# Patient Record
Sex: FEMALE | Race: WHITE | NOT HISPANIC OR LATINO | Employment: OTHER | URBAN - METROPOLITAN AREA
[De-identification: names, ages, dates, MRNs, and addresses within clinical notes are randomized per-mention and may not be internally consistent; named-entity substitution may affect disease eponyms.]

---

## 2017-05-11 ENCOUNTER — TRANSFERRED RECORDS (OUTPATIENT)
Dept: HEALTH INFORMATION MANAGEMENT | Facility: CLINIC | Age: 82
End: 2017-05-11

## 2019-01-18 ENCOUNTER — TRANSFERRED RECORDS (OUTPATIENT)
Dept: HEALTH INFORMATION MANAGEMENT | Facility: CLINIC | Age: 84
End: 2019-01-18

## 2019-02-01 ENCOUNTER — TRANSFERRED RECORDS (OUTPATIENT)
Dept: HEALTH INFORMATION MANAGEMENT | Facility: CLINIC | Age: 84
End: 2019-02-01

## 2019-02-01 LAB
ALT SERPL-CCNC: 20 U/L
AST SERPL-CCNC: 35 U/L (ref 14–36)
CREAT SERPL-MCNC: 1.08 MG/DL (ref 0.52–1.04)
GFR SERPL CREATININE-BSD FRML MDRD: 49 ML/MIN/1.73M2
GLUCOSE SERPL-MCNC: 108 MG/DL (ref 75–100)
POTASSIUM SERPL-SCNC: 4 MMOL/L (ref 3.5–5.1)

## 2019-03-07 LAB — PHQ9 SCORE: 2

## 2019-03-27 LAB
CREAT SERPL-MCNC: 1.07 MG/DL (ref 0.52–1.04)
CREAT SERPL-MCNC: 1.2 MG/DL (ref 0.52–1.04)
GFR SERPL CREATININE-BSD FRML MDRD: 43 ML/MIN/1.73M2
GFR SERPL CREATININE-BSD FRML MDRD: 49 ML/MIN/1.73M2
GLUCOSE SERPL-MCNC: 82 MG/DL (ref 75–100)
GLUCOSE SERPL-MCNC: 96 MG/DL (ref 75–100)
PHQ9 SCORE: 4
POTASSIUM SERPL-SCNC: 3.8 MMOL/L (ref 3.5–5.1)
POTASSIUM SERPL-SCNC: 4.4 MMOL/L (ref 3.5–5.1)

## 2019-04-30 ENCOUNTER — TRANSFERRED RECORDS (OUTPATIENT)
Dept: HEALTH INFORMATION MANAGEMENT | Facility: CLINIC | Age: 84
End: 2019-04-30

## 2019-04-30 LAB
CREAT SERPL-MCNC: 1.07 MG/DL (ref 0.52–1.04)
GFR SERPL CREATININE-BSD FRML MDRD: 49 ML/MIN/1.73M2
GLUCOSE SERPL-MCNC: 82 MG/DL (ref 75–100)
PHQ9 SCORE: 2
POTASSIUM SERPL-SCNC: 3.8 MMOL/L (ref 3.5–5.1)

## 2019-06-12 LAB — PHQ9 SCORE: 11

## 2019-06-26 ENCOUNTER — TRANSFERRED RECORDS (OUTPATIENT)
Dept: HEALTH INFORMATION MANAGEMENT | Facility: CLINIC | Age: 84
End: 2019-06-26

## 2019-06-26 LAB — PHQ9 SCORE: 3

## 2019-12-16 ENCOUNTER — APPOINTMENT (OUTPATIENT)
Dept: CT IMAGING | Facility: CLINIC | Age: 84
End: 2019-12-16
Attending: EMERGENCY MEDICINE
Payer: MEDICARE

## 2019-12-16 ENCOUNTER — HOSPITAL ENCOUNTER (EMERGENCY)
Facility: CLINIC | Age: 84
Discharge: HOME | End: 2019-12-16
Attending: EMERGENCY MEDICINE | Admitting: EMERGENCY MEDICINE
Payer: MEDICARE

## 2019-12-16 VITALS
TEMPERATURE: 98.8 F | SYSTOLIC BLOOD PRESSURE: 138 MMHG | WEIGHT: 124.31 LBS | OXYGEN SATURATION: 97 % | HEIGHT: 65 IN | HEART RATE: 70 BPM | BODY MASS INDEX: 20.71 KG/M2 | RESPIRATION RATE: 10 BRPM | DIASTOLIC BLOOD PRESSURE: 67 MMHG

## 2019-12-16 DIAGNOSIS — E86.0 DEHYDRATION: ICD-10-CM

## 2019-12-16 DIAGNOSIS — R55 SYNCOPE AND COLLAPSE: ICD-10-CM

## 2019-12-16 DIAGNOSIS — K57.92 ACUTE DIVERTICULITIS: ICD-10-CM

## 2019-12-16 DIAGNOSIS — E87.1 HYPONATREMIA: ICD-10-CM

## 2019-12-16 LAB
ALBUMIN SERPL-MCNC: 4.1 G/DL (ref 3.4–5)
ALBUMIN UR-MCNC: NEGATIVE MG/DL
ALP SERPL-CCNC: 70 U/L (ref 40–150)
ALT SERPL W P-5'-P-CCNC: 25 U/L (ref 0–50)
ANION GAP SERPL CALCULATED.3IONS-SCNC: 6 MMOL/L (ref 3–14)
APPEARANCE UR: ABNORMAL
AST SERPL W P-5'-P-CCNC: 20 U/L (ref 0–45)
BACTERIA #/AREA URNS HPF: ABNORMAL /HPF
BASOPHILS # BLD AUTO: 0 10E9/L (ref 0–0.2)
BASOPHILS NFR BLD AUTO: 0.1 %
BILIRUB SERPL-MCNC: 1.7 MG/DL (ref 0.2–1.3)
BILIRUB UR QL STRIP: NEGATIVE
BUN SERPL-MCNC: 24 MG/DL (ref 7–30)
CALCIUM SERPL-MCNC: 9.3 MG/DL (ref 8.5–10.1)
CHLORIDE SERPL-SCNC: 89 MMOL/L (ref 94–109)
CO2 SERPL-SCNC: 27 MMOL/L (ref 20–32)
COLOR UR AUTO: YELLOW
CREAT SERPL-MCNC: 1.21 MG/DL (ref 0.52–1.04)
CRP SERPL-MCNC: 15.7 MG/L (ref 0–8)
DIFFERENTIAL METHOD BLD: ABNORMAL
EOSINOPHIL NFR BLD AUTO: 0.2 %
ERYTHROCYTE [DISTWIDTH] IN BLOOD BY AUTOMATED COUNT: 11.8 % (ref 10–15)
GFR SERPL CREATININE-BSD FRML MDRD: 41 ML/MIN/{1.73_M2}
GLUCOSE SERPL-MCNC: 115 MG/DL (ref 70–99)
GLUCOSE UR STRIP-MCNC: NEGATIVE MG/DL
GRAN CASTS #/AREA URNS LPF: 3 /LPF
HCT VFR BLD AUTO: 41.2 % (ref 35–47)
HGB BLD-MCNC: 14.5 G/DL (ref 11.7–15.7)
HGB UR QL STRIP: NEGATIVE
HYALINE CASTS #/AREA URNS LPF: 46 /LPF (ref 0–2)
IMM GRANULOCYTES # BLD: 0.1 10E9/L (ref 0–0.4)
IMM GRANULOCYTES NFR BLD: 0.5 %
KETONES UR STRIP-MCNC: NEGATIVE MG/DL
LEUKOCYTE ESTERASE UR QL STRIP: ABNORMAL
LIPASE SERPL-CCNC: 157 U/L (ref 73–393)
LYMPHOCYTES # BLD AUTO: 0.8 10E9/L (ref 0.8–5.3)
LYMPHOCYTES NFR BLD AUTO: 6.3 %
MCH RBC QN AUTO: 31.7 PG (ref 26.5–33)
MCHC RBC AUTO-ENTMCNC: 35.2 G/DL (ref 31.5–36.5)
MCV RBC AUTO: 90 FL (ref 78–100)
MONOCYTES # BLD AUTO: 1 10E9/L (ref 0–1.3)
MONOCYTES NFR BLD AUTO: 7.9 %
MUCOUS THREADS #/AREA URNS LPF: PRESENT /LPF
NEUTROPHILS # BLD AUTO: 10.9 10E9/L (ref 1.6–8.3)
NEUTROPHILS NFR BLD AUTO: 85 %
NITRATE UR QL: NEGATIVE
NRBC # BLD AUTO: 0 10*3/UL
NRBC BLD AUTO-RTO: 0 /100
PH UR STRIP: 7 PH (ref 5–7)
PLATELET # BLD AUTO: 238 10E9/L (ref 150–450)
POTASSIUM SERPL-SCNC: 4.4 MMOL/L (ref 3.4–5.3)
PROT SERPL-MCNC: 7.9 G/DL (ref 6.8–8.8)
RBC # BLD AUTO: 4.58 10E12/L (ref 3.8–5.2)
RBC #/AREA URNS AUTO: <1 /HPF (ref 0–2)
SODIUM SERPL-SCNC: 122 MMOL/L (ref 133–144)
SOURCE: ABNORMAL
SP GR UR STRIP: 1.01 (ref 1–1.03)
SQUAMOUS #/AREA URNS AUTO: 3 /HPF (ref 0–1)
TROPONIN I SERPL-MCNC: <0.015 UG/L (ref 0–0.04)
UROBILINOGEN UR STRIP-MCNC: 0 MG/DL (ref 0–2)
WAXY CASTS #/AREA URNS LPF: 1 /LPF
WBC # BLD AUTO: 12.8 10E9/L (ref 4–11)
WBC #/AREA URNS AUTO: 5 /HPF (ref 0–5)

## 2019-12-16 PROCEDURE — 25000128 H RX IP 250 OP 636: Performed by: RADIOLOGY

## 2019-12-16 PROCEDURE — 25800030 ZZH RX IP 258 OP 636: Performed by: EMERGENCY MEDICINE

## 2019-12-16 PROCEDURE — 87088 URINE BACTERIA CULTURE: CPT | Performed by: EMERGENCY MEDICINE

## 2019-12-16 PROCEDURE — 83690 ASSAY OF LIPASE: CPT | Performed by: EMERGENCY MEDICINE

## 2019-12-16 PROCEDURE — 99285 EMERGENCY DEPT VISIT HI MDM: CPT | Mod: 25 | Performed by: EMERGENCY MEDICINE

## 2019-12-16 PROCEDURE — 85025 COMPLETE CBC W/AUTO DIFF WBC: CPT | Performed by: EMERGENCY MEDICINE

## 2019-12-16 PROCEDURE — 80053 COMPREHEN METABOLIC PANEL: CPT | Performed by: EMERGENCY MEDICINE

## 2019-12-16 PROCEDURE — 96360 HYDRATION IV INFUSION INIT: CPT | Performed by: EMERGENCY MEDICINE

## 2019-12-16 PROCEDURE — 25000125 ZZHC RX 250: Performed by: RADIOLOGY

## 2019-12-16 PROCEDURE — 86140 C-REACTIVE PROTEIN: CPT | Performed by: EMERGENCY MEDICINE

## 2019-12-16 PROCEDURE — 72125 CT NECK SPINE W/O DYE: CPT

## 2019-12-16 PROCEDURE — 81001 URINALYSIS AUTO W/SCOPE: CPT | Performed by: EMERGENCY MEDICINE

## 2019-12-16 PROCEDURE — 93005 ELECTROCARDIOGRAM TRACING: CPT | Performed by: EMERGENCY MEDICINE

## 2019-12-16 PROCEDURE — 74177 CT ABD & PELVIS W/CONTRAST: CPT

## 2019-12-16 PROCEDURE — 84484 ASSAY OF TROPONIN QUANT: CPT | Performed by: EMERGENCY MEDICINE

## 2019-12-16 PROCEDURE — 93010 ELECTROCARDIOGRAM REPORT: CPT | Mod: Z6 | Performed by: EMERGENCY MEDICINE

## 2019-12-16 PROCEDURE — 87086 URINE CULTURE/COLONY COUNT: CPT | Performed by: EMERGENCY MEDICINE

## 2019-12-16 RX ORDER — IOPAMIDOL 755 MG/ML
500 INJECTION, SOLUTION INTRAVASCULAR ONCE
Status: COMPLETED | OUTPATIENT
Start: 2019-12-16 | End: 2019-12-16

## 2019-12-16 RX ADMIN — SODIUM CHLORIDE 60 ML: 9 INJECTION, SOLUTION INTRAVENOUS at 11:46

## 2019-12-16 RX ADMIN — SODIUM CHLORIDE 1000 ML: 9 INJECTION, SOLUTION INTRAVENOUS at 12:21

## 2019-12-16 RX ADMIN — IOPAMIDOL 61 ML: 755 INJECTION, SOLUTION INTRAVENOUS at 11:46

## 2019-12-16 ASSESSMENT — ENCOUNTER SYMPTOMS
APPETITE CHANGE: 0
PSYCHIATRIC NEGATIVE: 1
CONSTIPATION: 1
NAUSEA: 1
DIARRHEA: 1
LIGHT-HEADEDNESS: 1
COUGH: 0
ABDOMINAL PAIN: 1
FATIGUE: 1
WEAKNESS: 1
EYES NEGATIVE: 1
HEMATOLOGIC/LYMPHATIC NEGATIVE: 1
DIFFICULTY URINATING: 0
WHEEZING: 0
PALPITATIONS: 0
ACTIVITY CHANGE: 0
SHORTNESS OF BREATH: 0
VOMITING: 0

## 2019-12-16 ASSESSMENT — MIFFLIN-ST. JEOR: SCORE: 1019.76

## 2019-12-16 NOTE — ED AVS SNAPSHOT
[facility] Emergency Department  [address] [place] [address]  Phone:  [phone]  Fax:  [phone]                                    Audrey Ortega   MRN: [id number]    Department:  [facility] Emergency Department   Date of Visit:  12/16/2019           After Visit Summary Signature Page    I have received my discharge instructions, and my questions have been answered. I have discussed any challenges I see with this plan with the nurse or doctor.    ..........................................................................................................................................  Patient/Patient Representative Signature      ..........................................................................................................................................  Patient Representative Print Name and Relationship to Patient    ..................................................               ................................................  Date                                   Time    ..........................................................................................................................................  Reviewed by Signature/Title    ...................................................              ..............................................  Date                                               Time          22EPIC Rev 08/18

## 2019-12-16 NOTE — DISCHARGE INSTRUCTIONS
Acute abdominal pain was confirmed to be secondary to acute diverticulitis.  This is when diverticulum in the lower colon become inflamed and infected.  Fortunately this was caught early and there is no indication for any complications.  Should respond favorably to oral antibiotics.  Please start Augmentin 875 mg and take twice daily for 10 days.  With recent collapse/fainting on 2 separate occasions I recommend follow-up with your primary clinic provider.  I suspect that this could be due to electrolyte disturbance with low sodium and because you are dehydrated.  This would result in lightheadedness when he stands or change positions quickly.  With history for needing a cardiac pacemaker would recommend that she follow-up in the clinic and have the pacemaker interrogated.     Drink more water or Gatorade    Please keep appointment with your new clinic provider at [facility].

## 2019-12-16 NOTE — ED PROVIDER NOTES
History     Chief Complaint   Patient presents with     Abdominal Pain     HPI     Audrey Ortega is a 84 year old female significant past medical history for paroxysmal atrial fibrillation, sick sinus syndrome requiring in situ pacemaker, hypertension, hyperlipidemia presents with complaints of abdominal discomfort and event of syncope.    Abdominal discomfort: Patient reports she is been having hypogastric bilateral abdominal pain intermittently over the last 2 weeks.  She admits to having difficulties with constipation.  Has been taking OTC products to try to alleviate constipation.  This morning had a very loose stool.  Prior to having loose stool she was having increased abdominal cramping.  States the discomfort stopped after stool decompression.     Syncope: Shortly after passing stool she got up felt lightheaded and states she collapsed falling forward.  She has had no chest discomfort or recognized palpitations.  Patient is seen by cardiology bradycardia related to sick sinus syndrome.  Last pacemaker interrogation completed about 11 months ago showed normal functioning pacemaker.  Patient does report a few months ago she had an event of syncope unexplained.      Patient reports today when she fell she fell forward.  She is complaining of some mild to moderate midline neck pain and mild headache.  Does not believe she hit her head.  C-collar applied per RN in triage.    Allergies    No Known Allergies    Medications  Reconcile with Patient's Chart  Medication Sig Dispensed Refills Start Date End Date Status   aspirin enteric coated (ECOTRIN) 325 mg tablet   Take 1 tablet by mouth once daily with a meal.   0 10/13/2017   Active   sertraline (ZOLOFT) 50 mg tablet    Indications: Mood disorder (HC) Take 0.5 tablets by mouth once daily. 30 tablet   0 06/12/2019   Active   zolpidem (AMBIEN) 10 mg tablet    Indications: Primary insomnia Take 2.5 mg as needed for sleep. 30 tablet   0 06/26/2019   Active    lisinopril-hydrochlorothiazide, 20-25 mg, (PRINZIDE, ZESTORETIC) 20-25 mg per tablet    Indications: Essential hypertension Take 0.5 tablets by mouth once daily. 90 tablet   0 06/26/2019   Active   lisinopril-hydrochlorothiazide, 20-25 mg, (PRINZIDE, ZESTORETIC) 20-25 mg per tablet    Indications: Essential hypertension TAKE 1 TABLET BY MOUTH ONCE DAILY 90 tablet   2 08/27/2019   Active     Active Problems  Reconcile with Patient's Chart  Problem Noted Date   Pacemaker 10/13/2017   Overview:     Cardiology consult 1/2019  Her pacemaker interrogation from December 21st is overall very favorable. She did have an episode of A. fib of 33 hours duration on December 18th. The EGM suggests either an atrial tachycardia or an atrial flutter as there is significant regularity to the atrial activity. She had no perception of this but acknowledges increased situational stress around the holidays.          Atrial fibrillation 10/13/2017   Overview:         Cardiology consult 1/2019  1. Paroxysmal atrial fibrillation.   A. Low AF burden, asymptomatic. The 33-hour episode on December 18th may have reflected atrial flutter of even atrial tachycardia. Asymptomatic. Continue to follow for any increase atrial arrhythmia burden.   B. Strong conviction against anticoagulation. Continue to use aspirin. We will continue surveillance.   C. If we see evidence of AF of significant frequency and duration I would like to discuss with her a trial of anti-arrhythmic therapy such as flecainide given her firm reservations against anti-coagulation therapy.          HTN (hypertension) 10/13/2017   Sick sinus syndrome     Overview:     Status post dual chamber pacemaker placement for this in 2011       Immunizations    Name Administration Dates Next Due   Pneumococcal conj 13-Valent (Prevnar 13) 03/07/2019       Surgical History    Surgery Date Site/Laterality Comments   BUNIONECTOMY          HEART CATHETERIZATION          PACEMAKER EXTERNAL           ANAL FISSURECTOMY     in her 30's       Medical History    Medical History Date Comments   Atrial fibrillation (HC)       Hyperlipidemia       HTN (hypertension)       Sick sinus syndrome (HC)       Bradycardia       Tick fever         Family History    Medical History Relation Name Comments   Heart attack Brother       Heart attack Brother       Heart attack Father       Irregular heart beat Sister         Relation Name Status Comments   Brother        Brother        Brother        Brother        Brother   Alive     Father        Mother        Sister   Alive     Sister   Alive     Sister   Alive     Sister   Alive       Social History    Tobacco Use Types Packs/Day Years Used Date   Never Smoker           Smokeless Tobacco: Never Used           Alcohol Use Drinks/Week oz/Week Comments   No            Allergies:  No Known Allergies    Problem List:    There are no active problems to display for this patient.       Past Medical History:    No past medical history on file.    Past Surgical History:    No past surgical history on file.    Family History:    No family history on file.    Social History:  Marital Status:  Single [1]  Social History     Tobacco Use     Smoking status: Not on file   Substance Use Topics     Alcohol use: Not on file     Drug use: Not on file        Medications:    No current outpatient medications on file.        Review of Systems   Constitutional: Positive for fatigue. Negative for activity change and appetite change.   HENT: Negative.    Eyes: Negative.    Respiratory: Negative for cough, shortness of breath and wheezing.    Cardiovascular: Negative for chest pain, palpitations and leg swelling.   Gastrointestinal: Positive for abdominal pain, constipation, diarrhea and nausea. Negative for vomiting.   Genitourinary: Positive for urgency. Negative for difficulty urinating.   Neurological: Positive for weakness and light-headedness.  "  Hematological: Negative.    Psychiatric/Behavioral: Negative.    All other systems reviewed and are negative.        Physical Exam   BP: (!) 141/68  Pulse: 67  Temp: 98.8  F (37.1  C)  Resp: 18  Height: 165.1 cm (5' 5\")  Weight: 56.4 kg (124 lb 5 oz)  SpO2: 98 %      Physical Exam  Vitals signs and nursing note reviewed.   Constitutional:       General: She is not in acute distress.     Appearance: She is well-developed. She is not ill-appearing or diaphoretic.   HENT:      Head: Normocephalic and atraumatic.      Mouth/Throat:      Pharynx: No oropharyngeal exudate.      Comments: Dry oral mucous membranes  Eyes:      Pupils: Pupils are equal, round, and reactive to light.   Cardiovascular:      Rate and Rhythm: Normal rate and regular rhythm.      Heart sounds: Normal heart sounds. No murmur. No friction rub.   Pulmonary:      Effort: No respiratory distress.      Breath sounds: Normal breath sounds.   Chest:      Chest wall: No tenderness.   Abdominal:      General: Abdomen is flat. Bowel sounds are normal. There is no distension or abdominal bruit.      Palpations: Abdomen is soft.      Tenderness: There is no abdominal tenderness. There is no guarding or rebound.      Hernia: No hernia is present.   Musculoskeletal: Normal range of motion.         General: No tenderness.      Cervical back: She exhibits no tenderness.      Thoracic back: She exhibits no tenderness.      Lumbar back: She exhibits no tenderness.   Skin:     General: Skin is warm and dry.      Capillary Refill: Capillary refill takes 2 to 3 seconds.      Coloration: Skin is not cyanotic or mottled.      Findings: No abrasion, laceration or rash.   Neurological:      General: No focal deficit present.      Mental Status: She is alert and oriented to person, place, and time.   Psychiatric:         Mood and Affect: Mood normal.         ED Course        Procedures          EKG:  Interpretation by Lincoln Sosa DO.   Indication:  Rate is 73 bpm.  " There is no discernible P waves which would be concerning for this being atrial fibrillation.  There is occasional PVCs that appear to multifocal.  Low voltage in the precordial leads V1 through V3 with a difficult to interpret if there is associated Q waves that could represent an old anterior infarct.  There is no acute ST-T repolarization changes.  Impressions: Abnormal EKG         Results for orders placed or performed during the hospital encounter of 12/16/19 (from the past 24 hour(s))   CBC with platelets differential   Result Value Ref Range    WBC 12.8 (H) 4.0 - 11.0 10e9/L    RBC Count 4.58 3.8 - 5.2 10e12/L    Hemoglobin 14.5 11.7 - 15.7 g/dL    Hematocrit 41.2 35.0 - 47.0 %    MCV 90 78 - 100 fl    MCH 31.7 26.5 - 33.0 pg    MCHC 35.2 31.5 - 36.5 g/dL    RDW 11.8 10.0 - 15.0 %    Platelet Count 238 150 - 450 10e9/L    Diff Method Automated Method     % Neutrophils 85.0 %    % Lymphocytes 6.3 %    % Monocytes 7.9 %    % Eosinophils 0.2 %    % Basophils 0.1 %    % Immature Granulocytes 0.5 %    Nucleated RBCs 0 0 /100    Absolute Neutrophil 10.9 (H) 1.6 - 8.3 10e9/L    Absolute Lymphocytes 0.8 0.8 - 5.3 10e9/L    Absolute Monocytes 1.0 0.0 - 1.3 10e9/L    Absolute Basophils 0.0 0.0 - 0.2 10e9/L    Abs Immature Granulocytes 0.1 0 - 0.4 10e9/L    Absolute Nucleated RBC 0.0    Comprehensive metabolic panel   Result Value Ref Range    Sodium 122 (L) 133 - 144 mmol/L    Potassium 4.4 3.4 - 5.3 mmol/L    Chloride 89 (L) 94 - 109 mmol/L    Carbon Dioxide 27 20 - 32 mmol/L    Anion Gap 6 3 - 14 mmol/L    Glucose 115 (H) 70 - 99 mg/dL    Urea Nitrogen 24 7 - 30 mg/dL    Creatinine 1.21 (H) 0.52 - 1.04 mg/dL    GFR Estimate 41 (L) >60 mL/min/[1.73_m2]    GFR Estimate If Black 48 (L) >60 mL/min/[1.73_m2]    Calcium 9.3 8.5 - 10.1 mg/dL    Bilirubin Total 1.7 (H) 0.2 - 1.3 mg/dL    Albumin 4.1 3.4 - 5.0 g/dL    Protein Total 7.9 6.8 - 8.8 g/dL    Alkaline Phosphatase 70 40 - 150 U/L    ALT 25 0 - 50 U/L    AST 20 0 -  45 U/L   Lipase   Result Value Ref Range    Lipase 157 73 - 393 U/L   Troponin I   Result Value Ref Range    Troponin I ES <0.015 0.000 - 0.045 ug/L   CRP inflammation   Result Value Ref Range    CRP Inflammation 15.7 (H) 0.0 - 8.0 mg/L   UA reflex to Microscopic and Culture   Result Value Ref Range    Color Urine Yellow     Appearance Urine Slightly Cloudy     Glucose Urine Negative NEG^Negative mg/dL    Bilirubin Urine Negative NEG^Negative    Ketones Urine Negative NEG^Negative mg/dL    Specific Gravity Urine 1.011 1.003 - 1.035    Blood Urine Negative NEG^Negative    pH Urine 7.0 5.0 - 7.0 pH    Protein Albumin Urine Negative NEG^Negative mg/dL    Urobilinogen mg/dL 0.0 0.0 - 2.0 mg/dL    Nitrite Urine Negative NEG^Negative    Leukocyte Esterase Urine Large (A) NEG^Negative    Source Midstream Urine     RBC Urine <1 0 - 2 /HPF    WBC Urine 5 0 - 5 /HPF    Bacteria Urine Few (A) NEG^Negative /HPF    Squamous Epithelial /HPF Urine 3 (H) 0 - 1 /HPF    Mucous Urine Present (A) NEG^Negative /LPF    Hyaline Casts 46 (H) 0 - 2 /LPF    Granular Casts 3 (A) NEG^Negative /LPF    Waxy Cast 1 (A) NEG^Negative /LPF   CT Abdomen Pelvis w Contrast    Narrative    CT ABDOMEN AND PELVIS WITH CONTRAST  12/16/2019 11:59 AM    HISTORY: Abdominal pain, more hypogastric and bilateral.    TECHNIQUE: Scans obtained from the diaphragm through the pelvis with  IV contrast, 61 mL Isovue-[id number].  Radiation dose for this scan was  reduced using automated exposure control, adjustment of the mA and/or  kV according to patient size, or iterative reconstruction technique.    COMPARISON:  None.    FINDINGS: Minimal atelectasis versus scarring is seen in the inferior  lingula. Visualized portions of the lung bases are otherwise grossly  clear. Probable pacer wires are projected over the heart, and there  are thoracic aortic calcifications. Visualized mediastinal contents  are otherwise grossly unremarkable. No aggressive osseous lesions are  seen.  Degenerative changes are noted in the spine. No acute fracture.    The gallbladder is mostly decompressed but otherwise unremarkable. The  liver, pancreas, spleen, bilateral adrenal glands, and bilateral  kidneys enhance grossly normally. No hydronephrosis, nephrolithiasis,  hydroureter or ureteral calculus is identified. Urinary bladder is  mostly compressed but is otherwise unremarkable.    The uterus is grossly unremarkable. Prominent vascular structures in  the pelvic regions could represent pelvic congestion syndrome in the  appropriate clinical setting. Structures likely representing the  patient's normal ovaries are seen in the adnexal regions. They are  slightly larger than expected for an 84-year-old female but still  within normal limits. Questionable cyst in the right ovary measures up  to 1.2 cm in diameter.    No adenopathy or free air is seen in the peritoneal cavity. There is  nonaneurysmal atherosclerosis.    There is mild thickening of the wall of the sigmoid colon in the  pelvis (image 58 series 3). There is a small amount free fluid in the  pelvis which could be secondary to this etiology. No definite  pericolonic inflammatory changes are identified. There are scattered  diverticula in the sigmoid colon. No other pericolonic inflammatory  changes to suggest diverticulitis. Appendix is not well seen. No  pericecal inflammatory change to suggest acute appendicitis. Small  bowel is of normal caliber. The stomach is distended by fluid and air  but is otherwise unremarkable.      Impression    IMPRESSION:  1. Question mild thickening of the wall of sigmoid colon of the pelvis  with adjacent fluid. This could represent acute diverticulitis in the  appropriate clinical setting. There are less inflammatory changes in  this region than expected for diverticulitis.  2. Otherwise scattered diverticulosis is seen in the sigmoid colon.  3. Small amount of free fluid in pelvis is greater than expected in  an  84-year-old female.  4. Nonaneurysmal atherosclerosis.  5. Ovaries are slightly more prominent than expected for an  84-year-old female. Questionable small cyst in the right ovary.  6. Prominent vessels in the pelvis adjacent to the uterus could be  associated with pelvic congestion syndrome in the appropriate clinical  setting.    Etiology for patient's symptoms is otherwise not definitely  identified.   CT Cervical Spine w/o Contrast    Narrative    CT OF THE CERVICAL SPINE WITHOUT CONTRAST December 16, 2019 12:00 PM     HISTORY: Fall with acute neck pain.     TECHNIQUE: Axial images of the cervical spine were acquired without  intravenous contrast. Multiplanar reformations were created.   Radiation dose for this scan was reduced using automated exposure  control, adjustment of the mA and/or kV according to patient size, or  iterative reconstruction technique.    COMPARISON: None.    FINDINGS: There is normal alignment of the cervical vertebrae.  Vertebral body heights of the cervical spine are normal.  Craniocervical alignment is normal. There are no fractures of the  cervical spine. There is degenerative endplate spurring at the C5-C6  and C6-C7 levels. There is no degenerative spinal canal narrowing of  the cervical spine. There is no prevertebral soft tissue swelling.    Incidental note is made of bilateral thyroid nodules measuring up to  1.7 cm long axis.      Impression    IMPRESSION:  1. No evidence for fracture or traumatic malalignment of the cervical  spine.  2. Thyroid nodules requiring further evaluation with thyroid  ultrasound.           Medications - No data to display    Assessments & Plan (with Medical Decision Making)  84-year-old female presents for evaluation of 2-week history for hypogastric abdominal pain and 2 episodes of syncope.  Problem list:  1.  Acute sigmoid diverticulitis.-Symptomatic for last few weeks with constipated symptoms now having diarrhea.  Mild leukocytosis.  No fever.   First occurrence.  No complications noted on CT.  CT does confirm diverticulitis.  Recommend starting antibiotic therapy with Augmentin 875 mg twice daily for 10 days  2.  Urinalysis ordered as part of her acute abdominal pain work-up is suggestive for infection versus collection contamination.  Elected not to proceed with urine culture given that she will be on Augmentin  3.  Syncope-patient's had 2 occurrences of syncope.  I suspect the one today could be related to dehydration.  She looks fluid volume down and she has hyponatremia and hypochloremia.  This should correct with 1 L of normal saline.  She also reports that she had a previous event of syncope which she described as lightheadedness preceded by collapse in the last month.  With her history of  sick sinus syndrome needing an in situ pacemaker placed I recommend follow-up with her primary clinic provider to arrange for interrogation.  Her EKG today showed no acute concerns and her troponin was negative.  4.  Fall secondary to dehydration/syncope today.  She did have C-spine pain therefore she c-collar was applied.  Fortunately her CT scan C-spine showed no acute injury and the collar was taken off with repeat examination showing no pain midline nor with range of motion.  No suggested ligamentous disruption.      12:17 PM  C-collar removed after review of CT.  Post removal no s/s ligament instability.  No midline pain.       I have reviewed the nursing notes.    I have reviewed the findings, diagnosis, plan and need for follow up with the patient.      New Prescriptions    No medications on file       Final diagnoses:   Syncope and collapse   Acute diverticulitis   Dehydration   Hyponatremia       12/16/2019   [facility] EMERGENCY DEPARTMENT     Lincoln Sosa,   12/16/19 [id number]

## 2019-12-16 NOTE — ED TRIAGE NOTES
She has had lower abdominal pain for the past week without vomiting or diarrhea.  This morning she passed out at home and hit her head she knows because she bent up her glasses.  She lives alone and had a neighbor  her in.  She had a syncopal episode 2 weeks ago at her nieces also. She has some neck tenderness and a c collar is applied.

## 2019-12-17 LAB
BACTERIA SPEC CULT: ABNORMAL
BACTERIA SPEC CULT: ABNORMAL
Lab: ABNORMAL
SPECIMEN SOURCE: ABNORMAL

## 2019-12-27 ENCOUNTER — OFFICE VISIT (OUTPATIENT)
Dept: FAMILY MEDICINE | Facility: CLINIC | Age: 84
End: 2019-12-27
Payer: COMMERCIAL

## 2019-12-27 ENCOUNTER — TRANSFERRED RECORDS (OUTPATIENT)
Dept: HEALTH INFORMATION MANAGEMENT | Facility: CLINIC | Age: 84
End: 2019-12-27

## 2019-12-27 ENCOUNTER — ALLIED HEALTH/NURSE VISIT (OUTPATIENT)
Dept: FAMILY MEDICINE | Facility: CLINIC | Age: 84
End: 2019-12-27
Payer: COMMERCIAL

## 2019-12-27 VITALS
HEART RATE: 70 BPM | OXYGEN SATURATION: 97 % | WEIGHT: 127.5 LBS | RESPIRATION RATE: 16 BRPM | TEMPERATURE: 96 F | BODY MASS INDEX: 21.22 KG/M2 | SYSTOLIC BLOOD PRESSURE: 138 MMHG | DIASTOLIC BLOOD PRESSURE: 62 MMHG

## 2019-12-27 DIAGNOSIS — Z09 HOSPITAL DISCHARGE FOLLOW-UP: Primary | ICD-10-CM

## 2019-12-27 DIAGNOSIS — K57.32 DIVERTICULITIS OF COLON: ICD-10-CM

## 2019-12-27 DIAGNOSIS — R55 SYNCOPE, UNSPECIFIED SYNCOPE TYPE: ICD-10-CM

## 2019-12-27 DIAGNOSIS — Z23 NEED FOR VACCINATION: ICD-10-CM

## 2019-12-27 LAB
ANION GAP SERPL CALCULATED.3IONS-SCNC: 4 MMOL/L (ref 3–14)
BUN SERPL-MCNC: 26 MG/DL (ref 7–30)
CALCIUM SERPL-MCNC: 9.3 MG/DL (ref 8.5–10.1)
CHLORIDE SERPL-SCNC: 101 MMOL/L (ref 94–109)
CO2 SERPL-SCNC: 30 MMOL/L (ref 20–32)
CREAT SERPL-MCNC: 1.08 MG/DL (ref 0.52–1.04)
ERYTHROCYTE [DISTWIDTH] IN BLOOD BY AUTOMATED COUNT: 12.1 % (ref 10–15)
GFR SERPL CREATININE-BSD FRML MDRD: 47 ML/MIN/{1.73_M2}
GLUCOSE SERPL-MCNC: 59 MG/DL (ref 70–99)
HCT VFR BLD AUTO: 43.3 % (ref 35–47)
HGB BLD-MCNC: 13.8 G/DL (ref 11.7–15.7)
MCH RBC QN AUTO: 31.2 PG (ref 26.5–33)
MCHC RBC AUTO-ENTMCNC: 31.9 G/DL (ref 31.5–36.5)
MCV RBC AUTO: 98 FL (ref 78–100)
PLATELET # BLD AUTO: 289 10E9/L (ref 150–450)
POTASSIUM SERPL-SCNC: 4 MMOL/L (ref 3.4–5.3)
RBC # BLD AUTO: 4.42 10E12/L (ref 3.8–5.2)
SODIUM SERPL-SCNC: 135 MMOL/L (ref 133–144)
WBC # BLD AUTO: 7.7 10E9/L (ref 4–11)

## 2019-12-27 PROCEDURE — 80048 BASIC METABOLIC PNL TOTAL CA: CPT | Performed by: NURSE PRACTITIONER

## 2019-12-27 PROCEDURE — 85027 COMPLETE CBC AUTOMATED: CPT | Performed by: NURSE PRACTITIONER

## 2019-12-27 PROCEDURE — 90471 IMMUNIZATION ADMIN: CPT | Performed by: NURSE PRACTITIONER

## 2019-12-27 PROCEDURE — 93000 ELECTROCARDIOGRAM COMPLETE: CPT | Performed by: NURSE PRACTITIONER

## 2019-12-27 PROCEDURE — 36415 COLL VENOUS BLD VENIPUNCTURE: CPT | Performed by: NURSE PRACTITIONER

## 2019-12-27 PROCEDURE — 99207 ZZC NO CHARGE NURSE ONLY: CPT

## 2019-12-27 PROCEDURE — 99204 OFFICE O/P NEW MOD 45 MIN: CPT | Mod: 25 | Performed by: NURSE PRACTITIONER

## 2019-12-27 PROCEDURE — 90715 TDAP VACCINE 7 YRS/> IM: CPT | Performed by: NURSE PRACTITIONER

## 2019-12-27 RX ORDER — ZOLPIDEM TARTRATE 10 MG/1
TABLET ORAL
COMMUNITY
Start: 2019-06-26 | End: 2020-03-24

## 2019-12-27 RX ORDER — LISINOPRIL AND HYDROCHLOROTHIAZIDE 20; 25 MG/1; MG/1
1 TABLET ORAL
COMMUNITY
Start: 2019-06-26 | End: 2020-04-20

## 2019-12-27 ASSESSMENT — PAIN SCALES - GENERAL: PAINLEVEL: NO PAIN (0)

## 2019-12-27 NOTE — PROGRESS NOTES
Subjective     Audrey Ortega is a 84 year old female who presents to clinic today for the following health issues:    Audrey was seen in the emergency department on 12/16 after an episode of syncope at home.  She woke up in front of refrigerator and had a call the neighbor for a ride to the ED.  She has been having days of loose stool and abdominal pain.  Abdominal CT in the ED confirmed diverticulitis and she just completed a 10-day course of Augmentin yesterday.  She was also found to have hyponatremia and hypochloremia.  She was corrected with 1 L of normal saline.  She has a history of sick sinus syndrome needing an in situ pacemaker.  She has been following with cardiology up near her home in [place] but she recently moved to [place] and would like to transfer care to our cardiology department here.  Recommendation upon discharge from the ED was to get her set up and get interrogation of the pacemaker in the near future.    Since discharge symptoms have now resolved.  She is drinking lots of fluids to include 1 Gatorade a day.  No new episodes of syncope.  No shortness of breath. She is eating normal diet and has actually gained back some weight. She is negative for any dysuria or hematuria. No flank pain of any kind. Stools are back to baseline. She had some bruising and swelling of the left lower leg, she thinks from the fall. This too has resolved.     HPI   ED/UC Followup:    Facility:  [facility] ED   Date of visit: 12/16/19  Reason for visit: Abdominal pain, acute diverticulitis   Current Status: improved, no pain or dizziness            There is no problem list on file for this patient.    History reviewed. No pertinent surgical history.    Social History     Tobacco Use     Smoking status: Never Smoker     Smokeless tobacco: Never Used   Substance Use Topics     Alcohol use: Not on file     History reviewed. No pertinent family history.      Current Outpatient Medications   Medication Sig Dispense Refill      aspirin (ASA) 325 MG EC tablet Take 325 mg by mouth       lisinopril-hydrochlorothiazide (PRINZIDE/ZESTORETIC) 20-25 MG tablet Take 1 tablet by mouth       zolpidem (AMBIEN) 10 MG tablet Take 2.5 mg as needed for sleep.       No Known Allergies  BP Readings from Last 3 Encounters:   12/27/19 138/62   12/16/19 138/67    Wt Readings from Last 3 Encounters:   12/27/19 57.8 kg (127 lb 8 oz)   12/16/19 56.4 kg (124 lb 5 oz)                      Reviewed and updated as needed this visit by Provider  Tobacco  Allergies  Meds  Problems  Med Hx  Surg Hx  Fam Hx         Review of Systems   ROS COMP: Constitutional, HEENT, cardiovascular, pulmonary, gi and gu systems are negative, except as otherwise noted.      Objective    /62   Pulse 70   Temp 96  F (35.6  C) (Temporal)   Resp 16   Wt 57.8 kg (127 lb 8 oz)   SpO2 97%   BMI 21.22 kg/m    Body mass index is 21.22 kg/m .  Physical Exam  Vitals signs and nursing note reviewed.   Constitutional:       Appearance: Normal appearance.   HENT:      Head: Normocephalic and atraumatic.      Nose: Nose normal.      Mouth/Throat:      Mouth: Mucous membranes are moist.      Pharynx: Oropharynx is clear.   Eyes:      Extraocular Movements: Extraocular movements intact.   Neck:      Musculoskeletal: Normal range of motion.   Cardiovascular:      Rate and Rhythm: Normal rate.      Pulses: Normal pulses.      Heart sounds: Normal heart sounds.   Pulmonary:      Effort: Pulmonary effort is normal.      Breath sounds: Normal breath sounds.   Abdominal:      Palpations: Abdomen is soft.   Musculoskeletal: Normal range of motion.   Skin:     General: Skin is warm and dry.   Neurological:      General: No focal deficit present.      Mental Status: She is oriented to person, place, and time.   Psychiatric:         Mood and Affect: Mood normal.         Thought Content: Thought content normal.         Judgment: Judgment normal.            Diagnostic Test Results:  Labs  reviewed in Epic        Assessment & Plan     1. Hospital discharge follow-up  - Patient is improved.  - Patient states she is back to baseline and doing normal daily activities.   - We are going to work to get her in for the pace maker interrogation sooner than January 15 th, although I feel like the syncope was related to the diverticulitis and the dehydration.   - Dicussed signs and symptoms to relate to clinic if the weakness, light headedness return.     2. Syncope, unspecified syncope type  - Will recheck her labs today.   - Will have her continue with daily Gatorade.   - Basic metabolic panel  - CBC with platelets  - Patient will be called with cardiology appointment time to set up the interrogation. She does not have land line and can not do at home anymore. Cardiology will be made aware.     3. Diverticulitis of colon  Leukocytosis has resolved.   - Diarrhea and stomach pain has resolved.   - She will call clinic should symptoms return.     4. Need for vaccination  - TDAP, IM (10 - 64 YRS) - Adacel  - INITIAL VACCINE ADMINISTRATION    Patient instructed to call clinic with new or worsening symptoms prior to her next follow up appointment.      Return in about 3 months (around 3/27/2020) for Physical Exam.    Anatoly Shepard, NP  [facility]

## 2019-12-27 NOTE — NURSING NOTE
Patient came in for an EKG. Anatoly Shepard placed orders and review EKG prior to patient leaving the clinic.     EKG sent to be scanned.    Alessandra Bean LPN........12/27/2019 1:18 PM

## 2019-12-27 NOTE — PATIENT INSTRUCTIONS

## 2020-01-14 NOTE — PROGRESS NOTES
CARDIOLOGY CONSULT    REASON FOR CONSULT: establish cardiology care    PRIMARY CARE PHYSICIAN:  Anatoly Shepard    HISTORY OF PRESENT ILLNESS:  Ms. Ortega is an 83 y/o woman with PMH significant for paroxysmal atrial fibrillation, SSS s/p pacemaker in 2011, hypertension who presents today to establish cardiology care in the [facility].  She was last seen through [facility], most recently in 1/2019. At that time, her device interrogation noted a 33 hour episode of tachycardia-thought possibly aflutter or atrial tachycardia.  She was asymptomatic.  No changes were made to her mediation regimen at that time.  She has declined anticoagulation.  She states she does not have prescription drug coverage to afford the NOACs and she states she took the warfarin for a couple of weeks and got bilateral shoulder pain.  Audrey had two episodes of syncope in December.  The first occurred while she was baking cookies with family members in a hot kitchen.  She got suddenly very light-headed and warm.  She sat down and subsequently passed out.  Her relative who is a paramedic was there and checked things out.  She drank some water and felt better.  The next episode occurred two weeks later at her home.  She had been experiencing abdominal pain (later diagnosed with acute diverticulitis) and not drinking a lot of fluid.  She got up to go to the bathroom and passed out when coming out.  She was seen in the ED and was noted to be dehydrated.  She was diagnosed with acute diverticulitis. She was hyponatremic. She was given IV fluids and instructed to drink gatorade. She states she has felt dramatically better and has had no further episodes of light-headedness or syncope.      PAST MEDICAL HISTORY:  1.  Paroxysmal atrial fibrillation; patient has declined anticoagulation  2.  Sick sinus syndrome s/p pacemaker 2011  3.  Hypertension    MEDICATIONS:  Current Outpatient Medications   Medication     aspirin (ASA) 325 MG EC tablet      lisinopril-hydrochlorothiazide (PRINZIDE/ZESTORETIC) 20-25 MG tablet     zolpidem (AMBIEN) 10 MG tablet     No current facility-administered medications for this visit.        ALLERGIES:  No Known Allergies    SOCIAL HISTORY:  I have reviewed this patient's social history and updated it with pertinent information if needed. Audrey Ortega  reports that she has never smoked. She has never used smokeless tobacco.    FAMILY HISTORY:  I have reviewed this patient's family history and updated it with pertinent information if needed.   No family history on file.    REVIEW OF SYSTEMS:  A complete ROS was obtained and the pertinent positives are outlined in the history of present illness above.  The remainder of systems is negative.      PHYSICAL EXAM:                     Vital Signs with Ranges     0 lbs 0 oz    Constitutional: awake, alert, no distress  Eyes: PERRL, sclera nonicteric  ENT: trachea midline  Respiratory: CTAB  Cardiovascular: RRR no m/r/g, no JVD  GI: nondistended, nontender, bowel sounds present  Lymph/Hematologic: no lymphadenopathy  Skin: dry, no rash  Musculoskeletal: good muscle tone,no edema bilaterally  Neurologic: no focal deficits  Neuropsychiatric: appropriate affact    DATA:  Labs: Reviewed in EPIC    EKG: ECG dated 12/16/19 reviewed personally. Significant artifact.  Probable sinus rhythm with frequent PVCs  ECG dated 12/27/19 reviewed personally.  Intermittent atrial paced rhythm.  First degree AV block          ASSESSMENT:  1.  Paroxysmal atrial fibrillation; patient has declined anticoagulation  2.  Sick sinus syndrome s/p pacemaker 2011  3.  Hypertension    RECOMMENDATIONS:  1. Continue ASA and hydrochlorothiazide/lisinopril.  2.  Will schedule Audrey with our device clinic  3.  Plan for follow up in 12 months or before than as necessary.      Cassy Jama MD  Cardiology - [facility]  Pager:  [phone]  January 14, 2020

## 2020-01-15 ENCOUNTER — OFFICE VISIT (OUTPATIENT)
Dept: CARDIOLOGY | Facility: CLINIC | Age: 85
End: 2020-01-15
Payer: COMMERCIAL

## 2020-01-15 VITALS
HEART RATE: 74 BPM | BODY MASS INDEX: 21.29 KG/M2 | HEIGHT: 65 IN | OXYGEN SATURATION: 97 % | WEIGHT: 127.8 LBS | SYSTOLIC BLOOD PRESSURE: 130 MMHG | DIASTOLIC BLOOD PRESSURE: 74 MMHG

## 2020-01-15 DIAGNOSIS — Z95.0 S/P PLACEMENT OF CARDIAC PACEMAKER: Primary | ICD-10-CM

## 2020-01-15 PROCEDURE — 99204 OFFICE O/P NEW MOD 45 MIN: CPT | Performed by: INTERNAL MEDICINE

## 2020-01-15 ASSESSMENT — MIFFLIN-ST. JEOR: SCORE: 1035.58

## 2020-01-15 NOTE — LETTER
1/15/2020    Anatoly Shepard, NP  [address] [place] [address]    RE: Audrey Ortega       Dear Colleague,    I had the pleasure of seeing Audrey Ortega in the [facility].    CARDIOLOGY CONSULT    REASON FOR CONSULT: establish cardiology care    PRIMARY CARE PHYSICIAN:  Anatoly Shepard    HISTORY OF PRESENT ILLNESS:  Ms. Ortega is an 83 y/o woman with PMH significant for paroxysmal atrial fibrillation, SSS s/p pacemaker in 2011, hypertension who presents today to establish cardiology care in the [facility].  She was last seen through [facility], most recently in 1/2019. At that time, her device interrogation noted a 33 hour episode of tachycardia-thought possibly aflutter or atrial tachycardia.  She was asymptomatic.  No changes were made to her mediation regimen at that time.  She has declined anticoagulation.  She states she does not have prescription drug coverage to afford the NOACs and she states she took the warfarin for a couple of weeks and got bilateral shoulder pain.  Audrey had two episodes of syncope in December.  The first occurred while she was baking cookies with family members in a hot kitchen.  She got suddenly very light-headed and warm.  She sat down and subsequently passed out.  Her relative who is a paramedic was there and checked things out.  She drank some water and felt better.  The next episode occurred two weeks later at her home.  She had been experiencing abdominal pain (later diagnosed with acute diverticulitis) and not drinking a lot of fluid.  She got up to go to the bathroom and passed out when coming out.  She was seen in the ED and was noted to be dehydrated.  She was diagnosed with acute diverticulitis. She was hyponatremic. She was given IV fluids and instructed to drink gatorade. She states she has felt dramatically better and has had no further episodes of light-headedness or syncope.      PAST MEDICAL HISTORY:  1.  Paroxysmal atrial  fibrillation; patient has declined anticoagulation  2.  Sick sinus syndrome s/p pacemaker 2011  3.  Hypertension    MEDICATIONS:  Current Outpatient Medications   Medication     aspirin (ASA) 325 MG EC tablet     lisinopril-hydrochlorothiazide (PRINZIDE/ZESTORETIC) 20-25 MG tablet     zolpidem (AMBIEN) 10 MG tablet     No current facility-administered medications for this visit.        ALLERGIES:  No Known Allergies    SOCIAL HISTORY:  I have reviewed this patient's social history and updated it with pertinent information if needed. Audrey Ortega  reports that she has never smoked. She has never used smokeless tobacco.    FAMILY HISTORY:  I have reviewed this patient's family history and updated it with pertinent information if needed.   No family history on file.    REVIEW OF SYSTEMS:  A complete ROS was obtained and the pertinent positives are outlined in the history of present illness above.  The remainder of systems is negative.      PHYSICAL EXAM:                     Vital Signs with Ranges     0 lbs 0 oz    Constitutional: awake, alert, no distress  Eyes: PERRL, sclera nonicteric  ENT: trachea midline  Respiratory: CTAB  Cardiovascular: RRR no m/r/g, no JVD  GI: nondistended, nontender, bowel sounds present  Lymph/Hematologic: no lymphadenopathy  Skin: dry, no rash  Musculoskeletal: good muscle tone,no edema bilaterally  Neurologic: no focal deficits  Neuropsychiatric: appropriate affact    DATA:  Labs: Reviewed in EPIC    EKG: ECG dated 12/16/19 reviewed personally. Significant artifact.  Probable sinus rhythm with frequent PVCs  ECG dated 12/27/19 reviewed personally.  Intermittent atrial paced rhythm.  First degree AV block          ASSESSMENT:  1.  Paroxysmal atrial fibrillation; patient has declined anticoagulation  2.  Sick sinus syndrome s/p pacemaker 2011  3.  Hypertension    RECOMMENDATIONS:  1. Continue ASA and hydrochlorothiazide/lisinopril.  2.  Will schedule Audrey with our device clinic  3.  Plan for  follow up in 12 months or before than as necessary.      Cassy Jama MD  Cardiology - [facility]  Pager:  [phone]  January 14, 2020    Thank you for allowing me to participate in the care of your patient.    Sincerely,     Cassy Jama MD     [facility]

## 2020-03-15 ENCOUNTER — TELEPHONE (OUTPATIENT)
Dept: CARDIOLOGY | Facility: CLINIC | Age: 85
End: 2020-03-15

## 2020-03-15 NOTE — TELEPHONE ENCOUNTER
Call to pt to discuss likely rescheduling her OV ppm check 3- in [facility] at 0900. Pt is new and establishing with the clinic. She saw Dr Jama on 1-  Message left that her appointment is likely to be cancelled; informed her I will each out later today or tomorrow AM. SueLangenbrunnerRN

## 2020-03-16 ENCOUNTER — TELEPHONE (OUTPATIENT)
Dept: CARDIOLOGY | Facility: CLINIC | Age: 85
End: 2020-03-16

## 2020-03-16 NOTE — TELEPHONE ENCOUNTER
COVID 19 SCREENING DONE    Call to pt to reschedule OV for ppm check to a remote transmission. Pt is doing well with no concerns. She is new and her appt was to establish. She has [employer] so will monitor remotely at this time.    Her monitor was outdated, requiring a phone line connection. I called [employer] and ordered her a new one. She will call [employer] to set it up (ph # given) and then call the clinic for a transmission date. Office # also provided.    SueLangenbrunnerRN

## 2020-03-19 ENCOUNTER — ANCILLARY PROCEDURE (OUTPATIENT)
Dept: CARDIOLOGY | Facility: CLINIC | Age: 85
End: 2020-03-19
Attending: INTERNAL MEDICINE
Payer: COMMERCIAL

## 2020-03-19 DIAGNOSIS — Z95.0 CARDIAC PACEMAKER IN SITU: ICD-10-CM

## 2020-03-19 DIAGNOSIS — I48.91 A-FIB (H): ICD-10-CM

## 2020-03-19 DIAGNOSIS — Z95.0 CARDIAC PACEMAKER IN SITU: Primary | ICD-10-CM

## 2020-03-19 DIAGNOSIS — I49.5 SSS (SICK SINUS SYNDROME) (H): ICD-10-CM

## 2020-03-19 PROCEDURE — 93294 REM INTERROG EVL PM/LDLS PM: CPT | Performed by: INTERNAL MEDICINE

## 2020-03-19 PROCEDURE — 93296 REM INTERROG EVL PM/IDS: CPT | Performed by: INTERNAL MEDICINE

## 2020-03-24 DIAGNOSIS — F51.01 PRIMARY INSOMNIA: Primary | ICD-10-CM

## 2020-03-24 RX ORDER — ZOLPIDEM TARTRATE 5 MG/1
TABLET ORAL
Qty: 15 TABLET | Refills: 0 | Status: SHIPPED | OUTPATIENT
Start: 2020-03-24 | End: 2020-04-20

## 2020-03-24 NOTE — TELEPHONE ENCOUNTER
zolpidem      Last Written Prescription Date:  n/a  Last Fill Quantity: n/a,   # refills: n/a  Last Office Visit: 01/15/20  Future Office visit:       Routing refill request to provider for review/approval because:  Drug not on the [facility], [facility] or [facility] refill protocol or controlled substance

## 2020-03-24 NOTE — TELEPHONE ENCOUNTER
Pt states the she taking a quarter of 5 MG as PRN for sleep. Pt reports she doesn't not take the medication every night. Pt reports the last RX lasted her over a year. Pt reports she hasn't had any falls lately. Pt reports she had a new pacemaker placed and it was check last week. Pt had a cardiology appt over the phone this week. No further questions or concerns.

## 2020-04-03 LAB
MDC_IDC_LEAD_IMPLANT_DT: NORMAL
MDC_IDC_LEAD_IMPLANT_DT: NORMAL
MDC_IDC_LEAD_LOCATION: NORMAL
MDC_IDC_LEAD_LOCATION: NORMAL
MDC_IDC_LEAD_LOCATION_DETAIL_1: NORMAL
MDC_IDC_LEAD_LOCATION_DETAIL_1: NORMAL
MDC_IDC_LEAD_MFG: NORMAL
MDC_IDC_LEAD_MFG: NORMAL
MDC_IDC_LEAD_MODEL: NORMAL
MDC_IDC_LEAD_MODEL: NORMAL
MDC_IDC_LEAD_POLARITY_TYPE: NORMAL
MDC_IDC_LEAD_POLARITY_TYPE: NORMAL
MDC_IDC_LEAD_SERIAL: NORMAL
MDC_IDC_LEAD_SERIAL: NORMAL
MDC_IDC_MSMT_BATTERY_DTM: NORMAL
MDC_IDC_MSMT_BATTERY_IMPEDANCE: 1857 OHM
MDC_IDC_MSMT_BATTERY_REMAINING_LONGEVITY: 39 MO
MDC_IDC_MSMT_BATTERY_STATUS: NORMAL
MDC_IDC_MSMT_BATTERY_VOLTAGE: 2.75 V
MDC_IDC_MSMT_LEADCHNL_RA_IMPEDANCE_VALUE: 401 OHM
MDC_IDC_MSMT_LEADCHNL_RA_PACING_THRESHOLD_AMPLITUDE: 0.5 V
MDC_IDC_MSMT_LEADCHNL_RA_PACING_THRESHOLD_PULSEWIDTH: 0.4 MS
MDC_IDC_MSMT_LEADCHNL_RV_IMPEDANCE_VALUE: 498 OHM
MDC_IDC_MSMT_LEADCHNL_RV_PACING_THRESHOLD_AMPLITUDE: 0.5 V
MDC_IDC_MSMT_LEADCHNL_RV_PACING_THRESHOLD_PULSEWIDTH: 0.4 MS
MDC_IDC_PG_IMPLANT_DTM: NORMAL
MDC_IDC_PG_MFG: NORMAL
MDC_IDC_PG_MODEL: NORMAL
MDC_IDC_PG_SERIAL: NORMAL
MDC_IDC_PG_TYPE: NORMAL
MDC_IDC_SESS_CLINIC_NAME: NORMAL
MDC_IDC_SESS_DTM: NORMAL
MDC_IDC_SESS_TYPE: NORMAL
MDC_IDC_SET_BRADY_AT_MODE_SWITCH_MODE: NORMAL
MDC_IDC_SET_BRADY_AT_MODE_SWITCH_RATE: 150 {BEATS}/MIN
MDC_IDC_SET_BRADY_LOWRATE: 60 {BEATS}/MIN
MDC_IDC_SET_BRADY_MAX_SENSOR_RATE: 130 {BEATS}/MIN
MDC_IDC_SET_BRADY_MAX_TRACKING_RATE: 130 {BEATS}/MIN
MDC_IDC_SET_BRADY_MODE: NORMAL
MDC_IDC_SET_BRADY_PAV_DELAY_LOW: 180 MS
MDC_IDC_SET_BRADY_SAV_DELAY_LOW: 150 MS
MDC_IDC_SET_LEADCHNL_RA_PACING_AMPLITUDE: 1.5 V
MDC_IDC_SET_LEADCHNL_RA_PACING_CAPTURE_MODE: NORMAL
MDC_IDC_SET_LEADCHNL_RA_PACING_POLARITY: NORMAL
MDC_IDC_SET_LEADCHNL_RA_PACING_PULSEWIDTH: 0.4 MS
MDC_IDC_SET_LEADCHNL_RA_SENSING_POLARITY: NORMAL
MDC_IDC_SET_LEADCHNL_RA_SENSING_SENSITIVITY: 0.5 MV
MDC_IDC_SET_LEADCHNL_RV_PACING_AMPLITUDE: 1.5 V
MDC_IDC_SET_LEADCHNL_RV_PACING_CAPTURE_MODE: NORMAL
MDC_IDC_SET_LEADCHNL_RV_PACING_POLARITY: NORMAL
MDC_IDC_SET_LEADCHNL_RV_PACING_PULSEWIDTH: 0.4 MS
MDC_IDC_SET_LEADCHNL_RV_SENSING_POLARITY: NORMAL
MDC_IDC_SET_LEADCHNL_RV_SENSING_SENSITIVITY: 2 MV
MDC_IDC_SET_ZONE_DETECTION_INTERVAL: 333.33 MS
MDC_IDC_SET_ZONE_DETECTION_INTERVAL: 400 MS
MDC_IDC_SET_ZONE_TYPE: NORMAL
MDC_IDC_SET_ZONE_TYPE: NORMAL
MDC_IDC_STAT_AT_BURDEN_PERCENT: 0.4 %
MDC_IDC_STAT_AT_DTM_END: NORMAL
MDC_IDC_STAT_AT_DTM_START: NORMAL
MDC_IDC_STAT_AT_MODE_SW_COUNT: 60
MDC_IDC_STAT_BRADY_AP_VP_PERCENT: 13 %
MDC_IDC_STAT_BRADY_AP_VS_PERCENT: 23 %
MDC_IDC_STAT_BRADY_AS_VP_PERCENT: 13 %
MDC_IDC_STAT_BRADY_AS_VS_PERCENT: 52 %
MDC_IDC_STAT_BRADY_DTM_END: NORMAL
MDC_IDC_STAT_BRADY_DTM_START: NORMAL
MDC_IDC_STAT_EPISODE_RECENT_COUNT: 0
MDC_IDC_STAT_EPISODE_RECENT_COUNT: 18
MDC_IDC_STAT_EPISODE_RECENT_COUNT_DTM_END: NORMAL
MDC_IDC_STAT_EPISODE_RECENT_COUNT_DTM_END: NORMAL
MDC_IDC_STAT_EPISODE_RECENT_COUNT_DTM_START: NORMAL
MDC_IDC_STAT_EPISODE_RECENT_COUNT_DTM_START: NORMAL
MDC_IDC_STAT_EPISODE_TYPE: NORMAL
MDC_IDC_STAT_EPISODE_TYPE: NORMAL

## 2020-04-08 ENCOUNTER — NURSE TRIAGE (OUTPATIENT)
Dept: NURSING | Facility: CLINIC | Age: 85
End: 2020-04-08

## 2020-04-08 DIAGNOSIS — I48.0 PAF (PAROXYSMAL ATRIAL FIBRILLATION) (H): Primary | ICD-10-CM

## 2020-04-08 RX ORDER — METOPROLOL TARTRATE 25 MG/1
25 TABLET, FILM COATED ORAL 2 TIMES DAILY
Qty: 60 TABLET | Refills: 3 | Status: SHIPPED | OUTPATIENT
Start: 2020-04-08 | End: 2020-04-10

## 2020-04-08 NOTE — TELEPHONE ENCOUNTER
S: Audrey calling about fast heart rate.  B: Intermittently she hears her heart beating very fast in her left ear. When checks her pulse at the same time is it 70.  This occurs most often at night and when she lays down to sleep.  Has a pacemaker in place.  She thinks in was put in over 10 years ago. Saw Dr. Jama on 1/15/20 her assessment stated Paroxysmal atrial fibrillation; patient has declined anticoagulation. About 1 month ago pacemaker was interrogated. Next appointment with cardiology is in June.   A:  Will page on call  Cardiology on call Dr. Roberts  R:   Provider will call in RX Metoprolol to [facility] in [place].  Writer call to inform patient.   Nichol Smith RN, [facility]      Reason for Disposition    Age > 60 years (Exception: brief heart beat symptoms that went away and now feels well)    Additional Information    Negative: Passed out (i.e., lost consciousness, collapsed and was not responding)    Negative: Shock suspected (e.g., cold/pale/clammy skin, too weak to stand, low BP, rapid pulse)    Negative: Difficult to awaken or acting confused (e.g., disoriented, slurred speech)    Negative: Visible sweat on face or sweat dripping down face    Negative: Unable to walk, or can only walk with assistance (e.g., requires support)    Negative: [1] Received SHOCK from implantable cardiac defibrillator AND [2] persisting symptoms (i.e., palpitations, lightheadedness)    Negative: Sounds like a life-threatening emergency to the triager    Negative: Difficulty breathing    Negative: Dizziness, lightheadedness, or weakness    Negative: Heart beating very slowly (e.g., < 50 / minute)  (Exception: athlete)    Negative: [1] Heart beating very rapidly (e.g., > 140 / minute) AND [2] present now  (Exception: during exercise)    Negative: New or worsened shortness of breath with activity (dyspnea on exertion)    Negative: Patient sounds very sick or weak to the triager    Negative: [1] Heart beating very  rapidly (e.g., > 140 / minute) AND [2] not present now  (Exception: during exercise)    Negative: [1] Skipped or extra beat(s) AND [2] increases with exercise or exertion    Negative: [1] Skipped or extra beat(s) AND [2] occurs 4 or more times per minute    Negative: New or worsened ankle swelling    Negative: History of heart disease  (i.e., heart attack, bypass surgery, angina, angioplasty, CHF) (Exception: brief heart beat symptoms that went away and now feels well)    Protocols used: HEART RATE AND HEARTBEAT [id number]

## 2020-04-09 DIAGNOSIS — I48.0 PAF (PAROXYSMAL ATRIAL FIBRILLATION) (H): Primary | ICD-10-CM

## 2020-04-09 NOTE — PROGRESS NOTES
As the cardiologist on call, I received a page from the [place] nurse advisor line.     Patient Audrey Ortega is a 85-year-old lady, who recently established care with my partner Dr. Cassy Morillo (see her note dated 1/15/2020).  Patient is known to have paroxysmal atrial fibrillation, status post dual-chamber pacemaker implanted in 2011 for sick sinus syndrome (DDDR mode, functioning well on recent interrogation), with normal renal function.    Patient called the nurse line complaining of paroxysmal palpitations that occurs 2-3 times a day, particularly when she lays down at night, and can last for several minutes.  Not associated with chest pain or dyspnea.  Has been going on for a few days.    I reviewed patient's recent pacemaker interrogation.  It shows 60 recorded mode switches consistent with atrial arrhythmia with ventricular rates in the 70-100s.  Patient is currently not on any rate slowing medications and has previously declined anticoagulation.    RECOMMENDATIONS:  1.  I have prescribed metoprolol tartrate 25 mg 2 times daily as I think this lady is been having symptomatic breakthrough atrial fibrillation.  2.  I will have her see a cardiology DERRICK at [place] in 2 to 3 weeks to revisit the question of anticoagulation as she is very high risk for thromboembolic phenomena given her age of 85 years and hypertension.    Dr. PAOLA Roberts MD [facility]  Cardiology

## 2020-04-10 ENCOUNTER — NURSE TRIAGE (OUTPATIENT)
Dept: FAMILY MEDICINE | Facility: CLINIC | Age: 85
End: 2020-04-10

## 2020-04-10 DIAGNOSIS — I48.0 PAF (PAROXYSMAL ATRIAL FIBRILLATION) (H): ICD-10-CM

## 2020-04-10 RX ORDER — METOPROLOL TARTRATE 25 MG/1
TABLET, FILM COATED ORAL DAILY
Qty: 60 TABLET | Refills: 3 | COMMUNITY
Start: 2020-04-10 | End: 2020-04-20 | Stop reason: SINTOL

## 2020-04-10 NOTE — TELEPHONE ENCOUNTER
Audrey Ortega is a 85 year old female     PRESENTING PROBLEM:  Pt started on Metoprolol 25 mg twice daily yesterday by cardiology.  Pt states that yesterday she only took 1/2 tab twice daily instead of the whole dose and felt great. This am she took the full dose just this morning around 7 am and her blood pressure is reading low 85/50.      Pt DENIES lightheadedness, dizziness, weakness or pain. Pt is sitting and drinking water. She previously had been up and about. She is just concerned about taking the full doses from now on. She is wondering if she can change the dose to 1/2 tabs or stop.     Informed that writer is unable to advise her to do this and will route to provider for advice.   Pt would like a call back on best measure to take going forward.     Bell Juan, RN, BSN

## 2020-04-10 NOTE — TELEPHONE ENCOUNTER
Reply from Dr. Roberts:  Please advise patient to reduce Metoprolol to 12.5 mg (half a pill) two times daily.     Please move EDRRICK visit to Monday, April 13th.     Thanks.      Spoke with patient to review Dr. Roberts's recommendation to decrease lopressor to 12.5mg BID. She will break her pills in half.    Message to [place] RN team to contact patient and move up her telephone visit with DERRICK Chula Brown from 4/23/2020.

## 2020-04-13 ENCOUNTER — TELEPHONE (OUTPATIENT)
Dept: CARDIOLOGY | Facility: CLINIC | Age: 85
End: 2020-04-13

## 2020-04-13 NOTE — TELEPHONE ENCOUNTER
Notified patient of an DERRICK phone visit needing to be set up for this week. Patient agreed and call transferred to [place] schedulers to get an DERRICK phone visit set up.

## 2020-04-13 NOTE — TELEPHONE ENCOUNTER
----- Message from Marcella Sorensen RN sent at 4/13/2020  7:55 AM CDT -----  Regarding: RE: per Dr. Roberts, please contact patient and move up her telephone follow up visit to Monday if possible (or asap)  Pacheco Wolf,   In Dr. Roberts's note is says DERRICK visit on Monday 4/13. We only have Dr. Jama with a [facility] schedule we do not have an DERRICK schedule on Mondays. Chula is the only DERRICK with [place] schedule for the next two weeks. She is full on 4/16 and then the next time is 4/23. Can this patient be put on an [place] schedule??    Thanks  Marcella  ----- Message -----  From: Maryann Richards RN  Sent: 4/10/2020   2:11 PM CDT  To: MORENO Pineda, #  Subject: per Dr. Roberts, please contact patient and mov#    She is currently scheduled to see DERRICK Chula More (phone visit) on 4/23/2020 and  wants that moved up as she approved a medication change on Friday.  Thanks  Team 2

## 2020-04-13 NOTE — TELEPHONE ENCOUNTER
Per message from Benny, Patient called, message left, would like to move up telephone visit to ear;y this week.  Message left to please call back, , that scheduling will be calling to arrange and that Benny RN was messaged also.     No DERRICK Visits available today. 4-

## 2020-04-15 ENCOUNTER — VIRTUAL VISIT (OUTPATIENT)
Dept: CARDIOLOGY | Facility: CLINIC | Age: 85
End: 2020-04-15
Payer: COMMERCIAL

## 2020-04-15 VITALS — TEMPERATURE: 94.9 F | SYSTOLIC BLOOD PRESSURE: 125 MMHG | DIASTOLIC BLOOD PRESSURE: 74 MMHG | HEART RATE: 67 BPM

## 2020-04-15 DIAGNOSIS — I48.0 PAF (PAROXYSMAL ATRIAL FIBRILLATION) (H): Primary | ICD-10-CM

## 2020-04-15 PROCEDURE — 99213 OFFICE O/P EST LOW 20 MIN: CPT | Mod: 95 | Performed by: NURSE PRACTITIONER

## 2020-04-15 NOTE — PROGRESS NOTES
"Audrey Ortega is a 85 year old female who is being evaluated via a billable telephone visit.      The patient has been notified of following:     \"This telephone visit will be conducted via a call between you and your physician/provider. We have found that certain health care needs can be provided without the need for a physical exam.  This service lets us provide the care you need with a short phone conversation.  If a prescription is necessary we can send it directly to your pharmacy.  If lab work is needed we can place an order for that and you can then stop by our lab to have the test done at a later time.    If during the course of the call the physician/provider feels a telephone visit is not appropriate, you will not be charged for this service.\"     Physician has received verbal consent for a Telephone Visit from the patient? Yes  4/15/20  - Writer spoke w/pt via phone. She is expecting the  Phone visit today.  - She has concerns of side effects w/her metoprolol    Bmoen, LPN    Audrey Ortega complains of  No chief complaint on file.      I have reviewed and updated the patient's Past Medical History, Social History, Family History and Medication List.    ALLERGIES  Patient has no known allergies.      Reason for visit: Medication adjustments    Primary cardiologist: Dr. Roberts    History of presenting illness:    Audrey Ortega, a pleasant 85 year old patient who has a past medical history significant for paroxysmal atrial fibrillation (declined anticoagulation), sick sinus syndrome status post permanent pacemaker in 2011 and hypertension.    She was initially seen by Dr. Jama in January of this year to establish care within the [facility] system.  She has previously declined anticoagulation (unable to afford DOACs and warfarin caused bilateral shoulder pain). Previous device checks have been showing prolonged episodes of tachycardia thought to be atrial flutter or atrial tachycardia.    In December 2018 that she " had 2 episodes of syncope.  One episode was while she was baking cookies in a hot kitchen.  She initially felt very lightheaded and warm and sat down and subsequently passed out.  She had assisted by a family member who is a paramedic and drink water and then did feel better.  The next episode is when she was getting up to go use the restroom and she passed out after coming out of the bathroom.  She was seen in the ED and was diagnosed with acute diverticulitis.  She was found to be dehydrated and hyponatremic and was treated with IV hydration and instructed to drink Gatorade.    Recently the patient called the [place] nurse advisors line with paroxysmal palpitations (that she describes as pounding in her left ear) 2-3 times daily and when she lays down at night.  She was discussed with our on-call cardiologist Dr. Roberts who reviewed her most recent device check that noted 60 recorded mode switches consistent with atrial arrhythmia on ventricular rates between 70 to 100 bpm.  She felt the this was likely indicating breakthrough atrial fibrillation and recommended metoprolol 25 mg twice daily.    Since starting metoprolol she has had ongoing symptoms of pounding in her ear.  She states that when she was taking 25 mg twice daily she had severe hypotension down to 80/48 and has been very gassy with profound diarrhea.  She has subsequently decreased her dose to 12.5 mg in the p.m. with ongoing diarrhea but improvement in blood pressure.  She also notes that she has had left shoulder pain since starting metoprolol    Again discussed discussed oral anticoagulation given her elevated CHADS2-VASc score.  She states that while living in [place] she had bilateral shoulder and arm pain while taking warfarin and that she she is unable to afford DOACs.         Assessment and Plan:     ASSESSMENT:    1. Paroxysmal atrial fibrillation    Declines anticoagulation    Recently commenced on metoprolol 25 mg twice daily for rate  control during episodes of paroxysmal atrial fibrillation noted on device check    Due to drop in blood pressure as low as 80/48 the patient decreased her metoprolol from 25 mg twice daily to 12.5 mg in the evening.    2. Hypertension    Lisinopril HCTZ 20-25 mg daily    3. Sick sinus syndrome    Status post permanent pacemaker in 2011    PLAN:     1. Stop metoprolol   2. Follow up in 1 week phone visit     ZA Keyes, CNP    Phone call duration: 11 minutes

## 2020-04-19 ENCOUNTER — NURSE TRIAGE (OUTPATIENT)
Dept: NURSING | Facility: CLINIC | Age: 85
End: 2020-04-19

## 2020-04-19 NOTE — TELEPHONE ENCOUNTER
Pt states the metoprolol made her blood pressure go low, had diarrhea.   Pt states she was told to stop taking the metoprolol.  Pt stopped taking the metoprolol but the diarrhea is getting worse.  Pt states diarrhea is watery, has had 3 already this morning and was going multiple times through the night.   Pt states she gets diarrhea every time she eats or drinks.   Some severe abdominal pain when she has to go to the bathroom, goes away with a bowel movement.   Urinating, mouth is moist, denies being light headed at this time.   Pt advised of need to schedule telephone visit with urgent care provider to discuss symptoms.  Pt agreed to plan.  Pt transferred to scheduling to set up phone visit.     Soumya Callahan RN   [facility] RN Triage     COVID 19 Nurse Triage Plan/Patient Instructions    Please be aware that novel coronavirus (COVID-19) may be circulating in the community. If you develop symptoms such as fever, cough, or SOB or if you have concerns about the presence of another infection including coronavirus (COVID-19), please contact your health care provider or visit [email or web address].     Disposition/Instructions    Patient to have an Urgent Care Telephone Visit with a provider. Follow System Ambulatory Workflow for COVID 19.     Urgent Care Telephone Visits are available between the hours of 8 am to 9 pm. Staff will assist patent in scheduling an appointment for this Urgent Care Telephone Visit.     Call Back If: Your symptoms worsen before you are able to complete your Urgent Care Telephone Visit with a provider.    Thank you for limiting contact with others, wearing a simple mask to cover your cough, practice good hand hygiene habits and accessing our virtual services where possible to limit the spread of this virus.    For more information about COVID19 and options for caring for yourself at home, please visit the CDC website at  https://www.cdc.gov/coronavirus/2019-ncov/about/steps-when-sick.html  For more options for care at [facility], please visit our website at [email or web address]    For more information, please use the [place] Department of Health ([facility]) COVID-19 Hotlines (Interpreters available):     Health questions: Phone Number: [phone] or [phone] and Hours: 7 a.m. to 7 p.m.    Schools and  questions: Phone Number: [phone] or [phone] and Hours 7 a.m. to 7 p.m.                      Reason for Disposition    [1] SEVERE diarrhea (e.g., 7 or more times / day more than normal) AND [2]  age > 60 years    Additional Information    Negative: Shock suspected (e.g., cold/pale/clammy skin, too weak to stand, low BP, rapid pulse)    Negative: Difficult to awaken or acting confused (e.g., disoriented, slurred speech)    Negative: Sounds like a life-threatening emergency to the triager    Negative: Vomiting also present and worse than the diarrhea    Negative: [1] Blood in stool AND [2] without diarrhea    Negative: Diarrhea in a cancer patient who is currently (or recently) receiving chemotherapy or radiation therapy, or cancer patient who has metastatic or end-stage cancer and is receiving palliative care    Negative: [1] SEVERE abdominal pain (e.g., excruciating) AND [2] present > 1 hour    Negative: [1] SEVERE abdominal pain AND [2] age > 60    Negative: [1] Blood in the stool AND [2] moderate or large amount of blood    Negative: Black or tarry bowel movements  (Exception: chronic-unchanged  black-grey bowel movements AND is taking iron pills or Pepto-bismol)    Negative: [1] Drinking very little AND [2] dehydration suspected (e.g., no urine > 12 hours, very dry mouth, very lightheaded)    Negative: Patient sounds very sick or weak to the triager    Protocols used: DIARRHEA-A-AH

## 2020-04-19 NOTE — TELEPHONE ENCOUNTER
Pt calls in trying to make appt for clinic appt tomorrow and labs per her virtual visit today   ( see note)    [facility]    Pt transferred to Scheduling     Protocol and care advice reviewed  Caller states understanding of the recommended disposition    Advised to call back if further questions or concerns    Malvin Antony , RN / [facility]    Reason for Disposition    Question about upcoming scheduled test, no triage required and triager able to answer question    Protocols used: INFORMATION ONLY CALL-A-AH

## 2020-04-20 ENCOUNTER — OFFICE VISIT (OUTPATIENT)
Dept: FAMILY MEDICINE | Facility: CLINIC | Age: 85
End: 2020-04-20
Payer: COMMERCIAL

## 2020-04-20 ENCOUNTER — VIRTUAL VISIT (OUTPATIENT)
Dept: CARDIOLOGY | Facility: CLINIC | Age: 85
End: 2020-04-20
Attending: INTERNAL MEDICINE
Payer: COMMERCIAL

## 2020-04-20 VITALS
SYSTOLIC BLOOD PRESSURE: 128 MMHG | BODY MASS INDEX: 21.3 KG/M2 | WEIGHT: 128 LBS | RESPIRATION RATE: 16 BRPM | TEMPERATURE: 97.1 F | DIASTOLIC BLOOD PRESSURE: 76 MMHG | HEART RATE: 84 BPM

## 2020-04-20 DIAGNOSIS — Z00.00 ENCOUNTER FOR MEDICARE ANNUAL WELLNESS EXAM: ICD-10-CM

## 2020-04-20 DIAGNOSIS — M62.81 GENERALIZED MUSCLE WEAKNESS: ICD-10-CM

## 2020-04-20 DIAGNOSIS — F51.01 PRIMARY INSOMNIA: ICD-10-CM

## 2020-04-20 DIAGNOSIS — I48.0 PAF (PAROXYSMAL ATRIAL FIBRILLATION) (H): ICD-10-CM

## 2020-04-20 DIAGNOSIS — R19.7 DIARRHEA, UNSPECIFIED TYPE: Primary | ICD-10-CM

## 2020-04-20 DIAGNOSIS — K57.32 DIVERTICULITIS OF COLON: ICD-10-CM

## 2020-04-20 DIAGNOSIS — I10 BENIGN ESSENTIAL HYPERTENSION: ICD-10-CM

## 2020-04-20 DIAGNOSIS — E87.1 HYPONATREMIA: ICD-10-CM

## 2020-04-20 DIAGNOSIS — Z12.10 ENCOUNTER FOR SCREENING FOR MALIGNANT NEOPLASM OF INTESTINAL TRACT, UNSPECIFIED: ICD-10-CM

## 2020-04-20 DIAGNOSIS — Z23 ENCOUNTER FOR IMMUNIZATION: ICD-10-CM

## 2020-04-20 DIAGNOSIS — N39.41 URGENCY INCONTINENCE: ICD-10-CM

## 2020-04-20 LAB
ALBUMIN SERPL-MCNC: 3.7 G/DL (ref 3.4–5)
ALBUMIN UR-MCNC: NEGATIVE MG/DL
ALP SERPL-CCNC: 62 U/L (ref 40–150)
ALT SERPL W P-5'-P-CCNC: 20 U/L (ref 0–50)
ANION GAP SERPL CALCULATED.3IONS-SCNC: 7 MMOL/L (ref 3–14)
APPEARANCE UR: CLEAR
AST SERPL W P-5'-P-CCNC: 18 U/L (ref 0–45)
BILIRUB SERPL-MCNC: 0.7 MG/DL (ref 0.2–1.3)
BILIRUB UR QL STRIP: NEGATIVE
BUN SERPL-MCNC: 25 MG/DL (ref 7–30)
CALCIUM SERPL-MCNC: 9.5 MG/DL (ref 8.5–10.1)
CHLORIDE SERPL-SCNC: 91 MMOL/L (ref 94–109)
CO2 SERPL-SCNC: 28 MMOL/L (ref 20–32)
COLOR UR AUTO: YELLOW
CREAT SERPL-MCNC: 1.07 MG/DL (ref 0.52–1.04)
ERYTHROCYTE [DISTWIDTH] IN BLOOD BY AUTOMATED COUNT: 11.7 % (ref 10–15)
GFR SERPL CREATININE-BSD FRML MDRD: 48 ML/MIN/{1.73_M2}
GLUCOSE SERPL-MCNC: 95 MG/DL (ref 70–99)
GLUCOSE UR STRIP-MCNC: NEGATIVE MG/DL
HCT VFR BLD AUTO: 39.1 % (ref 35–47)
HGB BLD-MCNC: 13.6 G/DL (ref 11.7–15.7)
HGB UR QL STRIP: NEGATIVE
HYALINE CASTS #/AREA URNS LPF: 6 /LPF (ref 0–2)
KETONES UR STRIP-MCNC: NEGATIVE MG/DL
LEUKOCYTE ESTERASE UR QL STRIP: ABNORMAL
MCH RBC QN AUTO: 32 PG (ref 26.5–33)
MCHC RBC AUTO-ENTMCNC: 34.8 G/DL (ref 31.5–36.5)
MCV RBC AUTO: 92 FL (ref 78–100)
MUCOUS THREADS #/AREA URNS LPF: PRESENT /LPF
NITRATE UR QL: NEGATIVE
PH UR STRIP: 6 PH (ref 5–7)
PLATELET # BLD AUTO: 250 10E9/L (ref 150–450)
POTASSIUM SERPL-SCNC: 3.8 MMOL/L (ref 3.4–5.3)
PROT SERPL-MCNC: 7.9 G/DL (ref 6.8–8.8)
RBC # BLD AUTO: 4.25 10E12/L (ref 3.8–5.2)
RBC #/AREA URNS AUTO: <1 /HPF (ref 0–2)
SODIUM SERPL-SCNC: 126 MMOL/L (ref 133–144)
SOURCE: ABNORMAL
SP GR UR STRIP: 1.01 (ref 1–1.03)
SQUAMOUS #/AREA URNS AUTO: 2 /HPF (ref 0–1)
UROBILINOGEN UR STRIP-MCNC: 0 MG/DL (ref 0–2)
WBC # BLD AUTO: 7.7 10E9/L (ref 4–11)
WBC #/AREA URNS AUTO: 2 /HPF (ref 0–5)

## 2020-04-20 PROCEDURE — 87088 URINE BACTERIA CULTURE: CPT | Performed by: NURSE PRACTITIONER

## 2020-04-20 PROCEDURE — 85027 COMPLETE CBC AUTOMATED: CPT | Performed by: NURSE PRACTITIONER

## 2020-04-20 PROCEDURE — 36415 COLL VENOUS BLD VENIPUNCTURE: CPT | Performed by: NURSE PRACTITIONER

## 2020-04-20 PROCEDURE — 99213 OFFICE O/P EST LOW 20 MIN: CPT | Mod: 25 | Performed by: NURSE PRACTITIONER

## 2020-04-20 PROCEDURE — 87086 URINE CULTURE/COLONY COUNT: CPT | Performed by: NURSE PRACTITIONER

## 2020-04-20 PROCEDURE — 99212 OFFICE O/P EST SF 10 MIN: CPT | Mod: 95 | Performed by: NURSE PRACTITIONER

## 2020-04-20 PROCEDURE — 81001 URINALYSIS AUTO W/SCOPE: CPT | Performed by: NURSE PRACTITIONER

## 2020-04-20 PROCEDURE — G0439 PPPS, SUBSEQ VISIT: HCPCS | Performed by: NURSE PRACTITIONER

## 2020-04-20 PROCEDURE — 80053 COMPREHEN METABOLIC PANEL: CPT | Performed by: NURSE PRACTITIONER

## 2020-04-20 RX ORDER — MULTIPLE VITAMINS W/ MINERALS TAB 9MG-400MCG
1 TAB ORAL DAILY
COMMUNITY

## 2020-04-20 RX ORDER — IBUPROFEN 200 MG
1 CAPSULE ORAL DAILY
COMMUNITY

## 2020-04-20 RX ORDER — MULTIVIT-MIN/IRON/FOLIC ACID/K 18-600-40
1000 CAPSULE ORAL DAILY
COMMUNITY

## 2020-04-20 RX ORDER — LISINOPRIL AND HYDROCHLOROTHIAZIDE 20; 25 MG/1; MG/1
1 TABLET ORAL DAILY
Qty: 90 TABLET | Refills: 3 | Status: SHIPPED | OUTPATIENT
Start: 2020-04-20 | End: 2020-05-15

## 2020-04-20 RX ORDER — ZOLPIDEM TARTRATE 5 MG/1
TABLET ORAL
Qty: 15 TABLET | Refills: 0 | Status: SHIPPED | OUTPATIENT
Start: 2020-04-20 | End: 2021-05-05

## 2020-04-20 RX ORDER — MULTIVITAMIN WITH IRON
1 TABLET ORAL DAILY
COMMUNITY

## 2020-04-20 RX ORDER — MULTIVIT-MIN/IRON/FOLIC ACID/K 18-600-40
500 CAPSULE ORAL DAILY
COMMUNITY

## 2020-04-20 ASSESSMENT — PAIN SCALES - GENERAL: PAINLEVEL: NO PAIN (0)

## 2020-04-20 ASSESSMENT — ACTIVITIES OF DAILY LIVING (ADL): CURRENT_FUNCTION: NO ASSISTANCE NEEDED

## 2020-04-20 NOTE — PROGRESS NOTES
"Audrey Ortega is a 85 year old female who is being evaluated via a billable video visit.  This visit is being conducted as a virtual visit due to the emphasis on mitigation of the COVID-19 virus pandemic. The clinician has decided that the risk of an in-office visit outweighs the benefit for this patient.     The patient has been notified of following:   \"This video visit will be conducted via a call between you and your physician/provider. We have found that certain health care needs can be provided without the need for an in-person physical exam.  This service lets us provide the care you need with a video conversation.  If a prescription is necessary we can send it directly to your pharmacy.  If lab work is needed we can place an order for that and you can then stop by our lab to have the test done at a later time.  If during the course of the call the physician/provider feels a video visit is not appropriate, you will not be charged for this service.\"     Patient has given verbal consent for Video visit? Yes    Patient would like the video invitation sent by: Text to cell phone: [phone]    VITALS REPORTED BY PATIENT:  Weight- 127 lbs  BP- 128/76  HR- 84    AMBER Olivas    Video Start Time: 1530    Cardiology Clinic Progress Note  Audrey Ortega MRN# [id number]   YOB: 1935 Age: 85 year old     Primary cardiologist: Dr. Jama    Reason for visit: One week followup    History of presenting illness:    Audrey Ortega, a pleasant 85 year old patient who has a past medical history significant for paroxysmal atrial fibrillation (declined anticoagulation), sick sinus syndrome status post permanent pacemaker in 2011 and hypertension    She was initially seen by Dr. Jama in January of this year to establish care within the [facility].  She has previously declined anticoagulation (unable to afford DOACs and warfarin caused bilateral shoulder pain). Previous device checks have been showing prolonged " episodes of tachycardia thought to be atrial flutter or atrial tachycardia.     In December 2018 that she had 2 episodes of syncope.  One episode was while she was baking cookies in a hot kitchen.  She initially felt very lightheaded and warm and sat down and subsequently passed out.  She had assisted by a family member who is a paramedic and drink water and then did feel better.  The next episode is when she was getting up to go use the restroom and she passed out after coming out of the bathroom.  She was seen in the ED and was diagnosed with acute diverticulitis.  She was found to be dehydrated and hyponatremic and was treated with IV hydration and instructed to drink Gatorade.     Recently the patient called the [place] nurse advisors line with paroxysmal palpitations (that she describes as pounding in her left ear) 2-3 times daily and when she lays down at night.  She was discussed with our on-call cardiologist Dr. Roberts who reviewed her most recent device check that noted 60 recorded mode switches consistent with atrial arrhythmia on ventricular rates between 70 to 100 bpm.  She felt the this was likely indicating breakthrough atrial fibrillation and recommended metoprolol 25 mg twice daily.     After starting metoprolol she has had ongoing symptoms of pounding in her ear.  She states that when she was taking 25 mg twice daily she had severe hypotension down to 80/48 and has been very gassy with profound diarrhea.  She independently decreased her dose to 12.5 mg in the p.m. with ongoing diarrhea but improvement in blood pressure with reported left shoulder pain since starting metoprolol     The patient declines oral anticoagulation despite the choco risk of CVA with elevated CHADS2-VASc score.  She stated that while living in [place] she had bilateral shoulder and arm pain while taking warfarin and that she she is unable to afford DOACs.     Since discontinuing Toprol she is had ongoing symptoms of abdominal  discomfort and diarrhea.  Over the weekend she was evaluated by primary care is found to be hyponatremic and diagnosed with diverticulitis.  Previously, she was treated for hyponatremia in early [place] and HCTZ was eliminated.  The patient reports that her blood pressure was unable to be controlled off the HCTZ.  Of note, she no longer has the pounding in her ears and has had no further palpitations.           Assessment and Plan:     ASSESSMENT:    1. Paroxysmal atrial fibrillation    Declines anticoagulation    Recently commenced on metoprolol 25 mg twice daily for rate control during episodes of paroxysmal atrial fibrillation noted on device check    Metoprolol was recently discontinued due to patient's report of diarrhea.  After discontinuation of the metoprolol the diarrhea was ongoing.    Due to drop in blood pressure as low as 80/48 the patient decreased her metoprolol from 25 mg twice daily to 12.5 mg in the evening.    2. Hypertension    Lisinopril HCTZ 20-25 mg daily     3. Sick sinus syndrome    Status post permanent pacemaker in 2011     PLAN:  1. Remain off metoprolol  2. Follow-up with cardiology in 3 months (video or in person visit).            Review of Systems:     Negative unless noted in HPI          Physical Exam:     General Appearance:  No distress, normal body habitus, upright.    ENT/Mouth:  Membranes moist, no nasal discharge or bleeding gums. Normal head shape, no evidence of injury or laceration.    EYES:  No scleral icterus, normal conjunctivae    Neck:  No evidence of thyromegaly. Supple    Chest/Lungs:  No audible wheezing equal chest wall expansion. Non labored breathing. No cough.    Cardiovascular:  No evidence of elevated jugular venous pressure. No evidence of pitting edema bilaterally    Abdomen:  No evidence of abdominal distention. No observed jaundice.    Extremities:  No cyanosis or clubbing noted.    Skin:  No xanthelasma, normal skin collar. No evidence of facial  lacerations.    Neurologic:  Normal arm motion bilateral, no tremors. No evidence of focal defect.    Psychiatric:  Alert and oriented x3, calm         Medications:     Current Outpatient Medications   Medication Sig Dispense Refill     amoxicillin-clavulanate (AUGMENTIN) 875-125 MG tablet Take 1 tablet by mouth 2 times daily for 7 days 14 tablet 0     aspirin (ASA) 325 MG EC tablet Take 325 mg by mouth       lisinopril-hydrochlorothiazide (ZESTORETIC) 20-25 MG tablet Take 1 tablet by mouth daily 90 tablet 3     zolpidem (AMBIEN) 5 MG tablet Take 2.5 mg as needed for sleep. 15 tablet 0       No family history on file.    Social History     Socioeconomic History     Marital status: Single     Spouse name: Not on file     Number of children: Not on file     Years of education: Not on file     Highest education level: Not on file   Occupational History     Not on file   Social Needs     Financial resource strain: Not on file     Food insecurity     Worry: Not on file     Inability: Not on file     Transportation needs     Medical: Not on file     Non-medical: Not on file   Tobacco Use     Smoking status: Never Smoker     Smokeless tobacco: Never Used   Substance and Sexual Activity     Alcohol use: Not on file     Drug use: Not on file     Sexual activity: Not on file   Lifestyle     Physical activity     Days per week: Not on file     Minutes per session: Not on file     Stress: Not on file   Relationships     Social connections     Talks on phone: Not on file     Gets together: Not on file     Attends [employer] service: Not on file     Active member of club or organization: Not on file     Attends meetings of clubs or organizations: Not on file     Relationship status: Not on file     Intimate partner violence     Fear of current or ex partner: Not on file     Emotionally abused: Not on file     Physically abused: Not on file     Forced sexual activity: Not on file   Other Topics Concern     Not on file   Social  History Narrative     Not on file            Past Medical History:   No past medical history on file.           Past Surgical History:   No past surgical history on file.           Allergies:   Patient has no known allergies.       Data:   All laboratory data reviewed:    No lab results found.    Invalid input(s): CMP, CBC    Lab Results   Component Value Date    WBC 7.7 04/20/2020    RBC 4.25 04/20/2020    HGB 13.6 04/20/2020    HCT 39.1 04/20/2020    MCV 92 04/20/2020    MCH 32.0 04/20/2020    MCHC 34.8 04/20/2020    RDW 11.7 04/20/2020     04/20/2020       Lab Results   Component Value Date     (L) 04/20/2020    POTASSIUM 3.8 04/20/2020    CHLORIDE 91 (L) 04/20/2020    CO2 28 04/20/2020    ANIONGAP 7 04/20/2020    GLC 95 04/20/2020    BUN 25 04/20/2020    CR 1.07 (H) 04/20/2020    GFRESTIMATED 48 (L) 04/20/2020    GFRESTBLACK 55 (L) 04/20/2020    HO 9.5 04/20/2020      Lab Results   Component Value Date    AST 18 04/20/2020    ALT 20 04/20/2020       No results found for: A1C    No results found for: INR    ZA LUNDBERG [facility]  Pager: [phone]  RN phone: [phone]    Video-Visit Details  Type of service:  Video Visit  Video End Time (time video stopped): 1534  Originating Location (pt. Location): Home  Distant Location (provider location):  [facility]   Mode of Communication:  Doximity

## 2020-04-20 NOTE — PATIENT INSTRUCTIONS
Patient Education   Diet for Diverticular Disease  What is diverticular disease?   When the inner wall of your colon weakens and forms small pouches, you have diverticulosis. For most people, this causes no symptoms.   If the pouches become inflamed or infected, you have diverticulitis. Warning signs may include pain in the lower abdomen, chills and vomiting (throwing up).  These conditions are called diverticular disease.  What causes it?  The cause has been linked to many years of eating too little fiber. People who eat plenty of whole grains, fresh fruits and vegetables are less likely to get this disease.   Once the pouches have formed, there is no way to reverse them.   How much fiber should I get?  If you're healing from diverticulitis or surgery to remove the inflamed area, you will at first follow a low-fiber diet (less than 10 grams each day). Then, as your doctor advises, you may slowly add fiber. Increase your intake by 1 to 2 grams a day. Your goal will be 25 to 30 grams a day.   To avoid future problems, continue to get 25 to 30 grams of fiber each day.   How can fiber help?   A high-fiber diet will help you have regular bowel movements. Fiber adds bulk to the stool and helps it pass more easily. Fiber also helps prevent the pouches from growing larger or getting infected.  In the past, doctors have warned patients to avoid eating nuts, seeds and popcorn. They believed these foods could get caught in the pouches and inflame them. But recent studies do not support this idea.   Please ask your dietitian for the handout Fiber Content in Common Foods. It will show you how to get more fiber in your diet.  For informational purposes only. Not to replace the advice of your health care provider.   Copyright   2007 [employer]. All rights reserved. [employer] [id number] - REV 09/15.       Patient Education   Coping with Diarrhea  What is diarrhea?  If you have loose, watery bowel movements at least  three times a day, you have diarrhea. You may also have gas and stomach cramps.  Emotional upset, infection and bad reactions to food may make diarrhea worse.  How is it treated?  Severe diarrhea can be treated with medicine, such as Imodium and Lomotil. These slow the digestive tract and reduce the amount of fluid lost in bowel movements. Your care team can tell you if medicine is right for you.  What else can I do to treat or prevent diarrhea?    Avoid:  ? Fried, fatty, greasy, spicy or very sweet foods  ? Caffeine and alcohol  ? High-fiber foods such as whole grains, raw vegetables, unpeeled fresh fruits, dried fruits, dried beans and peas, nuts, seeds and popcorn  ? Chewing gum and sugar-free candies (these often contain a sugar alcohol that may increase diarrhea)  ? Gas-forming foods such as broccoli, cauliflower, brussels sprouts and carbonated (fizzy) drinks.    Eat smaller amounts of food, but eat more often. Serve foods cold or at room temperature.    Drink six to eight 8-ounce glasses of fluid each day, such as:  ? Fruit juice, watered-down (half water)  ? Broth or gelatin  ? Popsicles  ? Sports drinks or flat decaf soda pop (leave it open for at least 10 minutes before drinking).    Limit milk products to two low-fat servings daily.    Increase your potassium with watered-down orange juice, sports drinks, potatoes without skin, bananas or tomato products.    Increase your sodium with soups and broths, sports drinks, crackers and pretzels.    Rest and avoid stress.    Weigh yourself daily. Keep a record of your weight and how often you pass loose stools.    Take warm baths to keep yourself clean. Tell your doctor if your rectum is red, painful or swollen.    Think about buying a skin barrier (such as Aquaphor) at the drug store. This can help protect the skin around your rectum from irritation and skin breakdown. Use it after each bowel movement.    If diarrhea is severe, have only clear liquids (liquids  that you can see through) until it stops. Once it stops, slowly return to your normal diet.  When should I call my care team?  Call your care team if:    You follow the steps listed here, but your diarrhea does not improve within 24 hours.    You have more than six loose stools in one day.    You have sudden, severe belly pain.    You have been unable to eat for over two days.    You have fever or dizziness along with diarrhea.    You notice blood in your stool or you have black, tarry stools.  Food group Recommended foods Foods to avoid   Meats, eggs and cheese Broiled or baked lean meat, fish, chicken or turkey (no skin). Eggs, well-cooked. Beans. Chicken or turkey with the skin.   Breads and starches Breads, rolls and pastas made from white flour. Instant white rice, refined cereals (Cream of Wheat, Cream of Rice, Cornflakes, Rice Krispies), pancakes, waffles, muffins, cornbread, alfie crackers. Whole grain breads and cereals, bran, Shredded Wheat, wild rice, granola.   Fruits and vegetables Canned, frozen or peeled fresh fruits such as bananas or applesauce. Tomato paste, tomato sauce, tomato puree, cooked vegetables (acorn squash, asparagus, beets, carrots, celery, green beans, mushrooms, baked potato without skin, peeled zucchini). Unpeeled fruits, melons, grapefruit juice, all vegetables not listed on the left.   Milk products Skim or 1% milk, low-fat yogurt, low-fat cheese. Whole or 2% milk, high-fat ice cream, cream.   Drinks Sports drinks, watered-down fruit juices. (No caffeine.) Prune juice, caffeine.   Desserts Cookies, cake, gelatin, sherbet, fruit pies (avoid pies made with unpeeled fruit). Nuts, coconut, chocolate, licorice.   Other Broth, butter, margarine, mayonnaise, salad dressing, vegetable oil. Hot sauce, pepper, chili powder, taco seasoning.    Comments:  __________________________________________  __________________________________________  __________________________________________  __________________________________________  __________________________________________  __________________________________________  __________________________________________  __________________________________________  __________________________________________  __________________________________________  For informational purposes only. Not to replace the advice of your health care provider. Copyright   2006 [facility]. All rights reserved. Clinically reviewed by [employer]. [employer] 951447 - REV 04/19.       Patient Education   Personalized Prevention Plan  You are due for the preventive services outlined below.  Your care team is available to assist you in scheduling these services.  If you have already completed any of these items, please share that information with your care team to update in your medical record.  Health Maintenance Due   Topic Date Due     Osteoporosis Screening  1935     Discuss Advance Care Planning  1935     Zoster (Shingles) Vaccine (1 of 2) 03/19/1986     Annual Wellness Visit  03/19/2001     Flu Vaccine (1) 09/01/2019     Depression Assessment  12/26/2019     Pneumococcal Vaccine (2 of 2 - PPSV23) 03/07/2020       Understanding [employer] MyPlate  The USDA (U.S. Department of Agriculture) has guidelines to help you make healthy food choices. These are called MyPlate. MyPlate shows the food groups that make up healthy meals using the image of a place setting. Before you eat, think about the healthiest choices for what to put onto your plate or into your cup or bowl. To learn more about building a healthy plate, visit www.choosemyplate.gov.    The food groups    Fruits. Any fruit or 100% fruit juice counts as part of the Fruit Group. Fruits may be fresh, canned, frozen, or dried, and may be whole, cut-up, or pureed.  Make half your plate fruits and vegetables.    Vegetables. Any vegetable or 100% vegetable juice counts as a member of the Vegetable Group. Vegetables may be fresh, frozen, canned, or dried. They can be served raw or cooked and may be whole, cut-up, or mashed. Make half your plate fruits and vegetables.    Grains. All foods made from grains are part of the Grains Group. These include wheat, rice, oats, cornmeal, and barley such as bread, pasta, oatmeal, cereal, tortillas, and grits. Grains should be no more than a quarter of your plate. At least half of your grains should be whole grains.    Protein. This group includes meat, poultry, seafood, beans and peas, eggs, processed soy products (like tofu), nuts (including nut butters), and seeds. Make protein choices no more than a quarter of your plate. Meat and poultry choices should be lean or low fat.    Dairy. All fluid milk products and foods made from milk that contain calcium, like yogurt and cheese, are part of the Dairy Group. (Foods that have little calcium, such as cream, butter, and cream cheese, are not part of the group.) Most dairy choices should be low-fat or fat-free.    Oils. These are fats that are liquid at room temperature. They include canola, corn, olive, soybean, and sunflower oil. Foods that are mainly oil include mayonnaise, certain salad dressings, and soft margarines. You should have only 5 to 7 teaspoons of oils a day. You probably already get this much from the food you eat.  Date Last Reviewed: 8/1/2017 2000-2019 [employer]. [address] [place] [address]. All rights reserved. This information is not intended as a substitute for professional medical care. Always follow your healthcare professional's instructions.

## 2020-04-20 NOTE — PATIENT INSTRUCTIONS
TODAY'S RECOMMENDATIONS    1. Remain off metoprolol   2. Follow up with cardiology in 3 months    If you have questions or concerns please call clinic at [phone].    Please call [phone] for scheduling.      It was a pleasure seeing you today!

## 2020-04-21 LAB
BACTERIA SPEC CULT: ABNORMAL
Lab: ABNORMAL
SPECIMEN SOURCE: ABNORMAL

## 2020-04-22 ENCOUNTER — TELEPHONE (OUTPATIENT)
Dept: FAMILY MEDICINE | Facility: CLINIC | Age: 85
End: 2020-04-22

## 2020-04-22 DIAGNOSIS — M62.81 GENERALIZED MUSCLE WEAKNESS: ICD-10-CM

## 2020-04-22 DIAGNOSIS — Z12.10 ENCOUNTER FOR SCREENING FOR MALIGNANT NEOPLASM OF INTESTINAL TRACT, UNSPECIFIED: ICD-10-CM

## 2020-04-22 DIAGNOSIS — K57.32 DIVERTICULITIS OF COLON: ICD-10-CM

## 2020-04-22 LAB
COLLECT DATE SP2 STL: NORMAL
COLLECT DATE SP3 STL: NORMAL
COLLECT DATE STL: NORMAL
HEMOCCULT SP1 STL QL: NEGATIVE
HEMOCCULT SP2 STL QL: NEGATIVE
HEMOCCULT SP3 STL QL: NEGATIVE

## 2020-04-22 PROCEDURE — 82270 OCCULT BLOOD FECES: CPT | Performed by: NURSE PRACTITIONER

## 2020-04-22 NOTE — TELEPHONE ENCOUNTER
----- Message from Anatoly Shepard NP sent at 4/22/2020 10:09 AM CDT -----  Please call with results. Please let her know she does have a UTI but the antibiotic we put her on for her diverticulitis will treat the UTI as well.     Thank you,       Anatoly Shepard NP on 4/22/2020 at 10:08 AM

## 2020-04-27 ENCOUNTER — TELEPHONE (OUTPATIENT)
Dept: FAMILY MEDICINE | Facility: CLINIC | Age: 85
End: 2020-04-27

## 2020-04-27 DIAGNOSIS — E87.1 HYPONATREMIA: ICD-10-CM

## 2020-04-27 LAB
ANION GAP SERPL CALCULATED.3IONS-SCNC: 7 MMOL/L (ref 3–14)
BUN SERPL-MCNC: 33 MG/DL (ref 7–30)
CALCIUM SERPL-MCNC: 8.6 MG/DL (ref 8.5–10.1)
CHLORIDE SERPL-SCNC: 105 MMOL/L (ref 94–109)
CO2 SERPL-SCNC: 24 MMOL/L (ref 20–32)
CREAT SERPL-MCNC: 1.22 MG/DL (ref 0.52–1.04)
GFR SERPL CREATININE-BSD FRML MDRD: 41 ML/MIN/{1.73_M2}
GLUCOSE SERPL-MCNC: 64 MG/DL (ref 70–99)
POTASSIUM SERPL-SCNC: 3.8 MMOL/L (ref 3.4–5.3)
SODIUM SERPL-SCNC: 136 MMOL/L (ref 133–144)

## 2020-04-27 PROCEDURE — 36415 COLL VENOUS BLD VENIPUNCTURE: CPT | Performed by: NURSE PRACTITIONER

## 2020-04-27 PROCEDURE — 80048 BASIC METABOLIC PNL TOTAL CA: CPT | Performed by: NURSE PRACTITIONER

## 2020-04-27 NOTE — TELEPHONE ENCOUNTER
----- Message from Anatoly Shepard NP sent at 4/27/2020 10:27 AM CDT -----  Please call with results. Please ensure that labs overall are stable. I want her to continue pushing fluids. She should follow up with me with labs in 2 months. I will want a BMP with my visit for follow up of diverticulitis.     Thank you,      Anatoly Shepard NP on 4/27/2020 at 10:24 AM

## 2020-04-27 NOTE — TELEPHONE ENCOUNTER
Called and left a  for return call. Please give results and close encounter.  Geetha Bean on 4/27/2020 at 11:12 AM

## 2020-05-14 ENCOUNTER — NURSE TRIAGE (OUTPATIENT)
Dept: FAMILY MEDICINE | Facility: CLINIC | Age: 85
End: 2020-05-14

## 2020-05-14 NOTE — TELEPHONE ENCOUNTER
"Patient has been having low BP in the morning when she takes her BP medication.  She would like to know what she can do to fix this issue.  She does get dizzy when her BP is that low.  She thinks she should be reducing her BP medications and is informed she should discuss this with a provider first.  She is scheduled with Dr. Bates tomorrow, 5/15/2020.  Patient understands and agrees to this plan.  SAM UgarteN, RN      Additional Information    Negative: Systolic BP < 80 and NOT dizzy, lightheaded or weak (feels normal)    Negative: Abdominal pain    Negative: Major surgery in the past month    Negative: Fever > 100.5 F (38.1 C)    Negative: Drinking very little and has signs of dehydration (e.g., no urine > 12 hours, very dry mouth, very lightheaded)    Negative: Systolic BP < 90 and feeling dizzy, lightheaded, or weak    Negative: Started suddenly after an allergic medicine, an allergic food, or bee sting    Negative: Shock suspected (e.g., cold/pale/clammy skin, too weak to stand, low BP, rapid pulse)    Negative: Difficult to awaken or acting confused  (e.g., disoriented, slurred speech)    Negative: Fainted    Negative: Chest pain    Negative: Bleeding (e.g., vomiting blood, rectal bleeding or tarry stools, severe vaginal bleeding)    Negative: Extra heart beats or heart is beating fast  (i.e., \"palpitations\")    Negative: Sounds like a life-threatening emergency to the triager    Negative: Systolic BP < 90 and NOT dizzy, lightheaded or weak    Negative: Fall in systolic BP > 20 mm Hg from normal and feeling dizzy, lightheaded, or weak    Negative: Patient sounds very sick or weak to the triager    Protocols used: LOW BLOOD PRESSURE-A-OH      "

## 2020-05-15 ENCOUNTER — VIRTUAL VISIT (OUTPATIENT)
Dept: INTERNAL MEDICINE | Facility: CLINIC | Age: 85
End: 2020-05-15
Payer: COMMERCIAL

## 2020-05-15 VITALS — DIASTOLIC BLOOD PRESSURE: 58 MMHG | SYSTOLIC BLOOD PRESSURE: 98 MMHG

## 2020-05-15 DIAGNOSIS — E86.1 HYPOTENSION DUE TO HYPOVOLEMIA: Primary | ICD-10-CM

## 2020-05-15 DIAGNOSIS — R19.7 DIARRHEA, UNSPECIFIED TYPE: ICD-10-CM

## 2020-05-15 DIAGNOSIS — I10 BENIGN ESSENTIAL HYPERTENSION: ICD-10-CM

## 2020-05-15 PROCEDURE — 99214 OFFICE O/P EST MOD 30 MIN: CPT | Mod: 95 | Performed by: INTERNAL MEDICINE

## 2020-05-15 RX ORDER — LISINOPRIL AND HYDROCHLOROTHIAZIDE 20; 25 MG/1; MG/1
TABLET ORAL
Qty: 90 TABLET | Refills: 3 | COMMUNITY
Start: 2020-05-15 | End: 2020-07-09

## 2020-05-15 NOTE — PROGRESS NOTES
"Audrey Ortega is a 85 year old female who is being evaluated via a billable video visit.      The patient has been notified of following:     \"This video visit will be conducted via a call between you and your physician/provider. We have found that certain health care needs can be provided without the need for an in-person physical exam.  This service lets us provide the care you need with a video conversation.  If a prescription is necessary we can send it directly to your pharmacy.  If lab work is needed we can place an order for that and you can then stop by our lab to have the test done at a later time.    Video visits are billed at different rates depending on your insurance coverage.  Please reach out to your insurance provider with any questions.    If during the course of the call the physician/provider feels a video visit is not appropriate, you will not be charged for this service.\"    Patient has given verbal consent for Video visit? Yes    How would you like to obtain your AVS?     Patient would like the video invitation sent by: Text to cell phone: [phone]    Will anyone else be joining your video visit? No    Subjective     Audrey Ortega is a 85 year old female who presents today via video visit for the following health issues:    HPI  Chief Complaint   Patient presents with     Video Visit     discuss low blood pressure and follow up on diarrhea       Video Start Time: 1:39 PM    Diarrhea is getting better, was lasting 6 weeks.  Imodium is making her only go once or not at all.     BP goes down felt faint, she was planting a cristobal.  BP was down to 66/44. Sat and drank some water, got up to 78/48. Then 89/55.  Weight hasn't gone down she thinks.  Drinking gatorade 16 ounce a day.  Add some salt. Breakfast oatmeal cookies, lunch not much, not eating much supper.     Pulse has been 71, 83, 75, 73.      Taking lisinopril-hydrochlorothiazide 20-25.      She was out mowing today, then was 98/58.  " "        There is no problem list on file for this patient.    History reviewed. No pertinent surgical history.    Social History     Tobacco Use     Smoking status: Never Smoker     Smokeless tobacco: Never Used   Substance Use Topics     Alcohol use: Not Currently     History reviewed. No pertinent family history.      Current Outpatient Medications   Medication Sig Dispense Refill     Ascorbic Acid (VITAMIN C) 500 MG CAPS Take 500 mg by mouth daily       aspirin (ASA) 325 MG EC tablet Take 325 mg by mouth       calcium 600 MG tablet Take 1 tablet by mouth daily       lisinopril-hydrochlorothiazide (ZESTORETIC) 20-25 MG tablet Take 1 tablet by mouth daily 90 tablet 3     magnesium 250 MG tablet Take 1 tablet by mouth daily       multivitamin w/minerals (MULTI-VITAMIN) tablet Take 1 tablet by mouth daily       Vitamin D, Cholecalciferol, 25 MCG (1000 UT) TABS Take 1,000 Units by mouth daily       zolpidem (AMBIEN) 5 MG tablet Take 2.5 mg as needed for sleep. 15 tablet 0       Reviewed and updated as needed this visit by Provider         Review of Systems   Constitutional, HEENT, cardiovascular, pulmonary, gi and gu systems are negative, except as otherwise noted. Just lightheaded       Objective    BP 98/58   Estimated body mass index is 21.3 kg/m  as calculated from the following:    Height as of 1/15/20: 1.651 m (5' 5\").    Weight as of 4/20/20: 58.1 kg (128 lb).  Physical Exam     GENERAL: Healthy, alert and no distress  EYES: Eyes grossly normal to inspection.  No discharge or erythema, or obvious scleral/conjunctival abnormalities.  RESP: No audible wheeze, cough, or visible cyanosis.  No visible retractions or increased work of breathing.    SKIN: Visible skin clear. No significant rash, abnormal pigmentation or lesions.  NEURO: Cranial nerves grossly intact.  Mentation and speech appropriate for age.  PSYCH: Mentation appears normal, affect normal/bright, judgement and insight intact, normal speech and " appearance well-groomed.      Diagnostic Test Results:  Labs reviewed in Epic        Assessment & Plan       ICD-10-CM    1. Hypotension due to hypovolemia  I95.89     E86.1    2. Benign essential hypertension  I10 lisinopril-hydrochlorothiazide (ZESTORETIC) 20-25 MG tablet   3. Diarrhea, unspecified type  R19.7      Patient has had diarrhea for 6 weeks.  Had one virtual visit and thought it was diverticulitis.  She did get Augmentin.  She says now the diarrhea is finally getting better she is used some Imodium.  I do want her to stop the Imodium and see if she can have normal bowel movements.  If she continues to have diarrhea we need to see her again.  She denies any weight loss.  She is really not eating a lot.  Probably has some hypovolemia.  She does take Gatorade and water.  I told her she needs to have a meals 3 times a day something for breakfast lunch and supper each day.    Her hypotension and lightheadedness.  The patient looks very good looks calm looks strong has good color.  She has been out planting bushes and mowing her lawn so I think her energy is there she denies chest pain or shortness of breath.  However with blood pressure readings of 60/40 she needs to stop her lisinopril hydrochlorothiazide.  She can stop this until her blood pressure goes up over 140 and she could restart at half a pill of the lisinopril hydrochlorothiazide.  Patient is able to read [place] my instructions and is comfortable doing that medication change over the weekend.        Return in about 1 week (around 5/22/2020) for If not better with this diagnosis.    Krish Bates MD  [facility]      Video-Visit Details    Type of service:  Video Visit    Video End Time:1:57    Originating Location (pt. Location): Home    Distant Location (provider location):  [facility]     Platform used for Video Visit: Doximity    No follow-ups on file.       Krish Bates MD

## 2020-05-20 DIAGNOSIS — I49.5 SSS (SICK SINUS SYNDROME) (H): ICD-10-CM

## 2020-05-20 DIAGNOSIS — Z95.0 CARDIAC PACEMAKER IN SITU: Primary | ICD-10-CM

## 2020-06-18 ENCOUNTER — ANCILLARY PROCEDURE (OUTPATIENT)
Dept: CARDIOLOGY | Facility: CLINIC | Age: 85
End: 2020-06-18
Attending: INTERNAL MEDICINE
Payer: COMMERCIAL

## 2020-06-18 DIAGNOSIS — Z95.0 CARDIAC PACEMAKER IN SITU: ICD-10-CM

## 2020-06-18 DIAGNOSIS — I49.5 SSS (SICK SINUS SYNDROME) (H): ICD-10-CM

## 2020-06-18 PROCEDURE — 93296 REM INTERROG EVL PM/IDS: CPT | Performed by: INTERNAL MEDICINE

## 2020-06-18 PROCEDURE — 93294 REM INTERROG EVL PM/LDLS PM: CPT | Performed by: INTERNAL MEDICINE

## 2020-06-22 LAB
MDC_IDC_LEAD_IMPLANT_DT: NORMAL
MDC_IDC_LEAD_IMPLANT_DT: NORMAL
MDC_IDC_LEAD_LOCATION: NORMAL
MDC_IDC_LEAD_LOCATION: NORMAL
MDC_IDC_LEAD_LOCATION_DETAIL_1: NORMAL
MDC_IDC_LEAD_LOCATION_DETAIL_1: NORMAL
MDC_IDC_LEAD_MFG: NORMAL
MDC_IDC_LEAD_MFG: NORMAL
MDC_IDC_LEAD_MODEL: NORMAL
MDC_IDC_LEAD_MODEL: NORMAL
MDC_IDC_LEAD_POLARITY_TYPE: NORMAL
MDC_IDC_LEAD_POLARITY_TYPE: NORMAL
MDC_IDC_LEAD_SERIAL: NORMAL
MDC_IDC_LEAD_SERIAL: NORMAL
MDC_IDC_MSMT_BATTERY_DTM: NORMAL
MDC_IDC_MSMT_BATTERY_IMPEDANCE: 1892 OHM
MDC_IDC_MSMT_BATTERY_REMAINING_LONGEVITY: 38 MO
MDC_IDC_MSMT_BATTERY_STATUS: NORMAL
MDC_IDC_MSMT_BATTERY_VOLTAGE: 2.74 V
MDC_IDC_MSMT_LEADCHNL_RA_IMPEDANCE_VALUE: 352 OHM
MDC_IDC_MSMT_LEADCHNL_RA_PACING_THRESHOLD_AMPLITUDE: 0.38 V
MDC_IDC_MSMT_LEADCHNL_RA_PACING_THRESHOLD_PULSEWIDTH: 0.4 MS
MDC_IDC_MSMT_LEADCHNL_RV_IMPEDANCE_VALUE: 435 OHM
MDC_IDC_MSMT_LEADCHNL_RV_PACING_THRESHOLD_AMPLITUDE: 0.38 V
MDC_IDC_MSMT_LEADCHNL_RV_PACING_THRESHOLD_PULSEWIDTH: 0.4 MS
MDC_IDC_PG_IMPLANT_DTM: NORMAL
MDC_IDC_PG_MFG: NORMAL
MDC_IDC_PG_MODEL: NORMAL
MDC_IDC_PG_SERIAL: NORMAL
MDC_IDC_PG_TYPE: NORMAL
MDC_IDC_SESS_CLINIC_NAME: NORMAL
MDC_IDC_SESS_DTM: NORMAL
MDC_IDC_SESS_TYPE: NORMAL
MDC_IDC_SET_BRADY_AT_MODE_SWITCH_MODE: NORMAL
MDC_IDC_SET_BRADY_AT_MODE_SWITCH_RATE: 150 {BEATS}/MIN
MDC_IDC_SET_BRADY_LOWRATE: 60 {BEATS}/MIN
MDC_IDC_SET_BRADY_MAX_SENSOR_RATE: 130 {BEATS}/MIN
MDC_IDC_SET_BRADY_MAX_TRACKING_RATE: 130 {BEATS}/MIN
MDC_IDC_SET_BRADY_MODE: NORMAL
MDC_IDC_SET_BRADY_PAV_DELAY_LOW: 180 MS
MDC_IDC_SET_BRADY_SAV_DELAY_LOW: 150 MS
MDC_IDC_SET_LEADCHNL_RA_PACING_AMPLITUDE: 1.5 V
MDC_IDC_SET_LEADCHNL_RA_PACING_CAPTURE_MODE: NORMAL
MDC_IDC_SET_LEADCHNL_RA_PACING_POLARITY: NORMAL
MDC_IDC_SET_LEADCHNL_RA_PACING_PULSEWIDTH: 0.4 MS
MDC_IDC_SET_LEADCHNL_RA_SENSING_POLARITY: NORMAL
MDC_IDC_SET_LEADCHNL_RA_SENSING_SENSITIVITY: 0.5 MV
MDC_IDC_SET_LEADCHNL_RV_PACING_AMPLITUDE: 1.5 V
MDC_IDC_SET_LEADCHNL_RV_PACING_CAPTURE_MODE: NORMAL
MDC_IDC_SET_LEADCHNL_RV_PACING_POLARITY: NORMAL
MDC_IDC_SET_LEADCHNL_RV_PACING_PULSEWIDTH: 0.4 MS
MDC_IDC_SET_LEADCHNL_RV_SENSING_POLARITY: NORMAL
MDC_IDC_SET_LEADCHNL_RV_SENSING_SENSITIVITY: 2 MV
MDC_IDC_SET_ZONE_DETECTION_INTERVAL: 333.33 MS
MDC_IDC_SET_ZONE_DETECTION_INTERVAL: 400 MS
MDC_IDC_SET_ZONE_TYPE: NORMAL
MDC_IDC_SET_ZONE_TYPE: NORMAL
MDC_IDC_STAT_AT_BURDEN_PERCENT: 0.7 %
MDC_IDC_STAT_AT_DTM_END: NORMAL
MDC_IDC_STAT_AT_DTM_START: NORMAL
MDC_IDC_STAT_AT_MODE_SW_COUNT: 93
MDC_IDC_STAT_BRADY_AP_VP_PERCENT: 12 %
MDC_IDC_STAT_BRADY_AP_VS_PERCENT: 24 %
MDC_IDC_STAT_BRADY_AS_VP_PERCENT: 13 %
MDC_IDC_STAT_BRADY_AS_VS_PERCENT: 51 %
MDC_IDC_STAT_BRADY_DTM_END: NORMAL
MDC_IDC_STAT_BRADY_DTM_START: NORMAL
MDC_IDC_STAT_EPISODE_RECENT_COUNT: 0
MDC_IDC_STAT_EPISODE_RECENT_COUNT: 26
MDC_IDC_STAT_EPISODE_RECENT_COUNT_DTM_END: NORMAL
MDC_IDC_STAT_EPISODE_RECENT_COUNT_DTM_END: NORMAL
MDC_IDC_STAT_EPISODE_RECENT_COUNT_DTM_START: NORMAL
MDC_IDC_STAT_EPISODE_RECENT_COUNT_DTM_START: NORMAL
MDC_IDC_STAT_EPISODE_TYPE: NORMAL
MDC_IDC_STAT_EPISODE_TYPE: NORMAL

## 2020-06-29 DIAGNOSIS — Z11.59 ENCOUNTER FOR SCREENING FOR OTHER VIRAL DISEASES: Primary | ICD-10-CM

## 2020-07-08 DIAGNOSIS — I49.5 SSS (SICK SINUS SYNDROME) (H): ICD-10-CM

## 2020-07-08 DIAGNOSIS — Z95.0 CARDIAC PACEMAKER IN SITU: Primary | ICD-10-CM

## 2020-07-09 ENCOUNTER — OFFICE VISIT (OUTPATIENT)
Dept: INTERNAL MEDICINE | Facility: CLINIC | Age: 85
End: 2020-07-09
Payer: COMMERCIAL

## 2020-07-09 VITALS
HEART RATE: 74 BPM | OXYGEN SATURATION: 98 % | RESPIRATION RATE: 16 BRPM | DIASTOLIC BLOOD PRESSURE: 66 MMHG | BODY MASS INDEX: 20.8 KG/M2 | TEMPERATURE: 97.8 F | SYSTOLIC BLOOD PRESSURE: 122 MMHG | WEIGHT: 125 LBS

## 2020-07-09 DIAGNOSIS — Z01.818 PREOP GENERAL PHYSICAL EXAM: Primary | ICD-10-CM

## 2020-07-09 DIAGNOSIS — I48.0 PAF (PAROXYSMAL ATRIAL FIBRILLATION) (H): ICD-10-CM

## 2020-07-09 PROCEDURE — 99214 OFFICE O/P EST MOD 30 MIN: CPT | Performed by: INTERNAL MEDICINE

## 2020-07-09 RX ORDER — METOPROLOL TARTRATE 25 MG/1
25 TABLET, FILM COATED ORAL 2 TIMES DAILY
Qty: 60 TABLET | Refills: 3 | COMMUNITY
Start: 2020-07-09 | End: 2020-10-09

## 2020-07-09 ASSESSMENT — PAIN SCALES - GENERAL: PAINLEVEL: NO PAIN (0)

## 2020-07-09 NOTE — PROGRESS NOTES
[address] [address]  [phone]  Dept: [phone]    PRE-OP EVALUATION:  Today's date: 2020    Audrey Ortega (: 1935) presents for pre-operative evaluation assessment as requested by Dr. Ford.  She requires evaluation and anesthesia risk assessment prior to undergoing surgery/procedure for treatment of cataracts .    Proposed Surgery/ Procedure: right cataract  Date of Surgery/ Procedure: 20 and 20  Time of Surgery/ Procedure:   Hospital/Surgical Facility: [facility]    Primary Physician: Anatoly Shepard  Type of Anesthesia Anticipated: Local    Patient has a Health Care Directive or Living Will:  NO    1. NO - Do you have a history of heart attack, stroke, stent, bypass or surgery on an artery in the head, neck, heart or legs?  2. NO - Do you ever have any pain or discomfort in your chest?  3. NO - Do you have a history of  Heart Failure?  4. NO - Are you troubled by shortness of breath when: walking on the level, up a slight hill or at night?  5. NO - Do you currently have a cold, bronchitis or other respiratory infection?  6. NO - Do you have a cough, shortness of breath or wheezing?  7. NO - Do you sometimes get pains in the calves of your legs when you walk?  8. NO - Do you or anyone in your family have previous history of blood clots?  9. NO - Do you or does anyone in your family have a serious bleeding problem such as prolonged bleeding following surgeries or cuts?  10. NO - Have you ever had problems with anemia or been told to take iron pills?  11. NO - Have you had any abnormal blood loss such as black, tarry or bloody stools, or abnormal vaginal bleeding?  12. NO - Have you ever had a blood transfusion?  13. NO - Have you or any of your relatives ever had problems with anesthesia?  14. NO - Do you have sleep apnea, excessive snoring or daytime drowsiness?  15. NO - Do you have any prosthetic heart valves?  16. NO - Do you  have prosthetic joints?  17. NO - Is there any chance that you may be pregnant?      HPI:     HPI related to upcoming procedure: needs to have cataracts treated, especially the right eye.       HYPERTENSION - Patient has longstanding history of HTN , currently denies any symptoms referable to elevated blood pressure. Specifically denies chest pain, palpitations, dyspnea, orthopnea, PND or peripheral edema. Blood pressure readings have been in normal range. Current medication regimen is as listed below. Patient denies any side effects of medication.       MEDICAL HISTORY:   There are no active problems to display for this patient.     History reviewed. No pertinent past medical history.  History reviewed. No pertinent surgical history.  Current Outpatient Medications   Medication Sig Dispense Refill     Ascorbic Acid (VITAMIN C) 500 MG CAPS Take 500 mg by mouth daily       aspirin (ASA) 325 MG EC tablet Take 325 mg by mouth       calcium 600 MG tablet Take 1 tablet by mouth daily       magnesium 250 MG tablet Take 1 tablet by mouth daily       multivitamin w/minerals (MULTI-VITAMIN) tablet Take 1 tablet by mouth daily       Vitamin D, Cholecalciferol, 25 MCG (1000 UT) TABS Take 1,000 Units by mouth daily       lisinopril-hydrochlorothiazide (ZESTORETIC) 20-25 MG tablet She will hold as her bp is too low.   If bp goes over 140 then will take half a pill. 90 tablet 3     zolpidem (AMBIEN) 5 MG tablet Take 2.5 mg as needed for sleep. (Patient not taking: Reported on 7/9/2020) 15 tablet 0     OTC products: None, except as noted above    Allergies   Allergen Reactions     Metoprolol      Possible diarrhea.       Latex Allergy: NO    Social History     Tobacco Use     Smoking status: Never Smoker     Smokeless tobacco: Never Used   Substance Use Topics     Alcohol use: Not Currently     History   Drug Use Not on file       REVIEW OF SYSTEMS:   Constitutional, neuro, ENT, endocrine, pulmonary, cardiac, gastrointestinal,  genitourinary, musculoskeletal, integument and psychiatric systems are negative, except as otherwise noted.    EXAM:   /66   Pulse 74   Temp 97.8  F (36.6  C) (Temporal)   Resp 16   Wt 56.7 kg (125 lb)   SpO2 98%   BMI 20.80 kg/m      GENERAL APPEARANCE: healthy, alert and no distress     EYES: EOMI, PERRL     HENT: ear canals and TM's normal and nose and mouth without ulcers or lesions     NECK: no adenopathy, no asymmetry, masses, or scars and thyroid normal to palpation     RESP: lungs clear to auscultation - no rales, rhonchi or wheezes     CV: regular rates and rhythm, normal S1 S2, no S3 or S4 and no murmur, click or rub     ABDOMEN:  soft, nontender, no HSM or masses and bowel sounds normal     MS: extremities normal- no gross deformities noted, no evidence of inflammation in joints, FROM in all extremities.     SKIN: no suspicious lesions or rashes     NEURO: Normal strength and tone, sensory exam grossly normal, mentation intact and speech normal     PSYCH: mentation appears normal. and affect normal/bright     LYMPHATICS: No cervical adenopathy    DIAGNOSTICS:   No labs or EKG required for low risk surgery (cataract, skin procedure, breast biopsy, etc)    Recent Labs   Lab Test 04/27/20  0911 04/20/20  1018 04/20/20  1009 12/27/19  0845   HGB  --   --  13.6 13.8   PLT  --   --  250 289    126*  --  135   POTASSIUM 3.8 3.8  --  4.0   CR 1.22* 1.07*  --  1.08*        IMPRESSION:   Reason for surgery/procedure: cataract    The proposed surgical procedure is considered LOW risk.    REVISED CARDIAC RISK INDEX  The patient has the following serious cardiovascular risks for perioperative complications such as (MI, PE, VFib and 3  AV Block):  No serious cardiac risks  INTERPRETATION: 1 risks: Class II (low risk - 0.9% complication rate)    The patient has the following additional risks for perioperative complications:  No identified additional risks    No diagnosis found.    RECOMMENDATIONS:      APPROVAL GIVEN to proceed with proposed procedure, without further diagnostic evaluation       Signed Electronically by: Krish Bates MD    Copy of this evaluation report is provided to requesting physician.    [facility] Preop Guidelines    Revised Cardiac Risk Index

## 2020-07-14 DIAGNOSIS — Z11.59 ENCOUNTER FOR SCREENING FOR OTHER VIRAL DISEASES: ICD-10-CM

## 2020-07-14 PROCEDURE — U0003 INFECTIOUS AGENT DETECTION BY NUCLEIC ACID (DNA OR RNA); SEVERE ACUTE RESPIRATORY SYNDROME CORONAVIRUS 2 (SARS-COV-2) (CORONAVIRUS DISEASE [COVID-19]), AMPLIFIED PROBE TECHNIQUE, MAKING USE OF HIGH THROUGHPUT TECHNOLOGIES AS DESCRIBED BY CMS-2020-01-R: HCPCS | Performed by: FAMILY MEDICINE

## 2020-07-15 ENCOUNTER — ANESTHESIA EVENT (OUTPATIENT)
Dept: SURGERY | Facility: CLINIC | Age: 85
End: 2020-07-15
Payer: MEDICARE

## 2020-07-15 LAB
SARS-COV-2 RNA SPEC QL NAA+PROBE: NOT DETECTED
SPECIMEN SOURCE: NORMAL

## 2020-07-15 SDOH — HEALTH STABILITY: MENTAL HEALTH: CURRENT SMOKER: 0

## 2020-07-15 ASSESSMENT — LIFESTYLE VARIABLES: TOBACCO_USE: 0

## 2020-07-16 ENCOUNTER — ANESTHESIA (OUTPATIENT)
Dept: SURGERY | Facility: CLINIC | Age: 85
End: 2020-07-16
Payer: MEDICARE

## 2020-07-16 ENCOUNTER — HOSPITAL ENCOUNTER (OUTPATIENT)
Facility: CLINIC | Age: 85
Discharge: HOME | End: 2020-07-16
Attending: OPHTHALMOLOGY | Admitting: OPHTHALMOLOGY
Payer: MEDICARE

## 2020-07-16 VITALS
OXYGEN SATURATION: 100 % | SYSTOLIC BLOOD PRESSURE: 147 MMHG | WEIGHT: 125 LBS | BODY MASS INDEX: 20.83 KG/M2 | TEMPERATURE: 97.7 F | RESPIRATION RATE: 16 BRPM | DIASTOLIC BLOOD PRESSURE: 90 MMHG | HEIGHT: 65 IN

## 2020-07-16 PROCEDURE — 37000012 ZZH ANESTHESIA CATARACT PACKAGE: Performed by: OPHTHALMOLOGY

## 2020-07-16 PROCEDURE — 25000125 ZZHC RX 250: Performed by: OPHTHALMOLOGY

## 2020-07-16 PROCEDURE — 25000128 H RX IP 250 OP 636: Performed by: NURSE ANESTHETIST, CERTIFIED REGISTERED

## 2020-07-16 PROCEDURE — V2632 POST CHMBR INTRAOCULAR LENS: HCPCS | Performed by: OPHTHALMOLOGY

## 2020-07-16 PROCEDURE — 25000125 ZZHC RX 250: Performed by: NURSE ANESTHETIST, CERTIFIED REGISTERED

## 2020-07-16 PROCEDURE — 25000128 H RX IP 250 OP 636: Performed by: OPHTHALMOLOGY

## 2020-07-16 PROCEDURE — 71000022 ZZH RECOVERY CATRACT PACKAGE: Performed by: OPHTHALMOLOGY

## 2020-07-16 PROCEDURE — 36000025 ZZH CATARACT SURGICAL PACKAGE: Performed by: OPHTHALMOLOGY

## 2020-07-16 DEVICE — EYE IMP IOL AMO PCL TECNIS ZCB00 17.5: Type: IMPLANTABLE DEVICE | Site: EYE | Status: FUNCTIONAL

## 2020-07-16 RX ORDER — FENTANYL CITRATE 50 UG/ML
INJECTION, SOLUTION INTRAMUSCULAR; INTRAVENOUS PRN
Status: DISCONTINUED | OUTPATIENT
Start: 2020-07-16 | End: 2020-07-16

## 2020-07-16 RX ORDER — PROPARACAINE HYDROCHLORIDE 5 MG/ML
1 SOLUTION/ DROPS OPHTHALMIC ONCE
Status: DISCONTINUED | OUTPATIENT
Start: 2020-07-16 | End: 2020-07-16 | Stop reason: HOSPADM

## 2020-07-16 RX ORDER — SODIUM CHLORIDE, SODIUM LACTATE, POTASSIUM CHLORIDE, CALCIUM CHLORIDE 600; 310; 30; 20 MG/100ML; MG/100ML; MG/100ML; MG/100ML
INJECTION, SOLUTION INTRAVENOUS CONTINUOUS
Status: DISCONTINUED | OUTPATIENT
Start: 2020-07-16 | End: 2020-07-16 | Stop reason: HOSPADM

## 2020-07-16 RX ORDER — PHENYLEPHRINE HYDROCHLORIDE 25 MG/ML
1 SOLUTION/ DROPS OPHTHALMIC
Status: COMPLETED | OUTPATIENT
Start: 2020-07-16 | End: 2020-07-16

## 2020-07-16 RX ORDER — PROPARACAINE HYDROCHLORIDE 5 MG/ML
1 SOLUTION/ DROPS OPHTHALMIC ONCE
Status: COMPLETED | OUTPATIENT
Start: 2020-07-16 | End: 2020-07-16

## 2020-07-16 RX ORDER — MOXIFLOXACIN 5 MG/ML
1 SOLUTION/ DROPS OPHTHALMIC
Status: COMPLETED | OUTPATIENT
Start: 2020-07-16 | End: 2020-07-16

## 2020-07-16 RX ORDER — TROPICAMIDE 10 MG/ML
1 SOLUTION/ DROPS OPHTHALMIC
Status: COMPLETED | OUTPATIENT
Start: 2020-07-16 | End: 2020-07-16

## 2020-07-16 RX ORDER — DICLOFENAC SODIUM 1 MG/ML
1 SOLUTION/ DROPS OPHTHALMIC
Status: COMPLETED | OUTPATIENT
Start: 2020-07-16 | End: 2020-07-16

## 2020-07-16 RX ADMIN — PHENYLEPHRINE HYDROCHLORIDE 1 DROP: 25 SOLUTION/ DROPS OPHTHALMIC at 06:50

## 2020-07-16 RX ADMIN — TROPICAMIDE 1 DROP: 10 SOLUTION/ DROPS OPHTHALMIC at 07:14

## 2020-07-16 RX ADMIN — TROPICAMIDE 1 DROP: 10 SOLUTION/ DROPS OPHTHALMIC at 07:02

## 2020-07-16 RX ADMIN — MOXIFLOXACIN 1 DROP: 5 SOLUTION/ DROPS OPHTHALMIC at 07:14

## 2020-07-16 RX ADMIN — DICLOFENAC SODIUM 1 DROP: 1 SOLUTION OPHTHALMIC at 07:14

## 2020-07-16 RX ADMIN — FENTANYL CITRATE 50 MCG: 50 INJECTION, SOLUTION INTRAMUSCULAR; INTRAVENOUS at 07:59

## 2020-07-16 RX ADMIN — MOXIFLOXACIN 1 DROP: 5 SOLUTION/ DROPS OPHTHALMIC at 06:50

## 2020-07-16 RX ADMIN — MIDAZOLAM 1 MG: 1 INJECTION INTRAMUSCULAR; INTRAVENOUS at 07:54

## 2020-07-16 RX ADMIN — MOXIFLOXACIN 1 DROP: 5 SOLUTION/ DROPS OPHTHALMIC at 07:02

## 2020-07-16 RX ADMIN — LIDOCAINE HYDROCHLORIDE 1 ML: 10 INJECTION, SOLUTION EPIDURAL; INFILTRATION; INTRACAUDAL; PERINEURAL at 06:50

## 2020-07-16 RX ADMIN — PHENYLEPHRINE HYDROCHLORIDE 1 DROP: 25 SOLUTION/ DROPS OPHTHALMIC at 07:14

## 2020-07-16 RX ADMIN — PHENYLEPHRINE HYDROCHLORIDE 1 DROP: 25 SOLUTION/ DROPS OPHTHALMIC at 07:02

## 2020-07-16 RX ADMIN — DICLOFENAC SODIUM 1 DROP: 1 SOLUTION OPHTHALMIC at 07:02

## 2020-07-16 RX ADMIN — PROPARACAINE HYDROCHLORIDE 1 DROP: 5 SOLUTION/ DROPS OPHTHALMIC at 06:50

## 2020-07-16 RX ADMIN — TROPICAMIDE 1 DROP: 10 SOLUTION/ DROPS OPHTHALMIC at 06:50

## 2020-07-16 RX ADMIN — DICLOFENAC SODIUM 1 DROP: 1 SOLUTION OPHTHALMIC at 06:50

## 2020-07-16 ASSESSMENT — MIFFLIN-ST. JEOR: SCORE: 1017.88

## 2020-07-16 NOTE — DISCHARGE INSTRUCTIONS
POST CATARACT SURGERY EYE INSTRUCTIONS  [facility]           Eye Medications    VIGAMOX/OFLOXACIN (Tan Cap)    KETOROLAC (García Cap)    PREDNISOLONE (Pink or White Cap)    If you opted for the individual bottles of eye medications follow these instructions.    *Instill one drop from each bottle into the operative eye 3 times a day until each  bottle is empty.    *Wait 5 minutes between each drop.    If you opted for the one bottle containing all 3 eye medications, follow these instructions.    *Instill one drop into the operative eye 3 times a day until the bottle is empty.       - If your are currently being treated for glaucoma please continue your    medication as normally prescribed.     - Wear eye shield while sleeping for 3 days     - Refrain from heavy lifting, bending, straining, or strenuous activity for 1      week.     - Do not rub or push on the operative eye for 1 week (you may wipe the    eye lid(s) gently with a wet wash cloth to remove matter from                         eyelashes).     - You may bathe or shower, but do not get the eye wet for 1 month      (swimming, hot tubs or other water activities).     - Minor itching, scratching sensation, burning sensation, etc. is normal.     Report any severe eye pain or loss of vision.     - Please call our office at [phone] if you have questions or     Concerns.    [facility]  Same-Day Surgery   Adult Discharge Orders & Instructions     For 24 hours after surgery    1. Get plenty of rest.  A responsible adult must stay with you for at least 24 hours after you leave the hospital.   2. Do not drive or use heavy equipment.  If you have weakness or tingling, don't drive or use heavy equipment until this feeling goes away.  3. Do not drink alcohol.  4. Avoid strenuous or risky activities.  Ask for help when climbing stairs.   5. You may feel lightheaded.  IF so, sit for a few minutes before standing.  Have someone help you get up.    6. If you have nausea (feel sick to your stomach): Drink only clear liquids such as apple juice, ginger ale, broth or 7-Up.  Rest may also help.  Be sure to drink enough fluids.  Move to a regular diet as you feel able.  7. You may have a slight fever. Call the doctor if your fever is over 100 F (37.7 C) (taken under the tongue) or lasts longer than 24 hours.  8. You may have a dry mouth, a sore throat, muscle aches or trouble sleeping.  These should go away after 24 hours.  9. Do not make important or legal decisions.   Call your doctor for any of the followin.  Signs of infection (fever, growing tenderness at the surgery site, a large amount of drainage or bleeding, severe pain, foul-smelling drainage, redness, swelling).    2. It has been over 8 to 10 hours since surgery and you are still not able to urinate (pass water).    3.  Headache for over 24 hours.      You have a post op appointment scheduled with Dr. Powell tomorrow (Friday) at 10:30 am here in [place].

## 2020-07-16 NOTE — ANESTHESIA CARE TRANSFER NOTE
Patient: Audrey Ortega    Procedure(s):  Cataract surgery with intraocular lens implant    Diagnosis: Nuclear sclerotic cataract of right eye [H25.11]  Diagnosis Additional Information: No value filed.    Anesthesia Type:   MAC     Note:  Airway :Room Air  Patient transferred to:Phase II  Handoff Report: Identifed the Patient, Identified the Reponsible Provider, Reviewed the pertinent medical history, Discussed the surgical course, Reviewed Intra-OP anesthesia mangement and issues during anesthesia, Set expectations for post-procedure period and Allowed opportunity for questions and acknowledgement of understanding      Vitals: (Last set prior to Anesthesia Care Transfer)    CRNA VITALS  7/16/2020 0754 - 7/16/2020 0842      7/16/2020             Resp Rate (observed):  (!) 4                Electronically Signed By: ZA Cross CRNA  July 16, 2020  8:42 AM

## 2020-07-16 NOTE — ANESTHESIA POSTPROCEDURE EVALUATION
Patient: Audrey Ortega    Procedure(s):  Cataract surgery with intraocular lens implant    Diagnosis:Nuclear sclerotic cataract of right eye [H25.11]  Diagnosis Additional Information: No value filed.    Anesthesia Type:  MAC    Note:  Anesthesia Post Evaluation    Patient location during evaluation: Phase 2  Patient participation: Able to fully participate in evaluation  Level of consciousness: awake  Pain management: adequate  Cardiovascular status: acceptable, stable, hypotensive and blood pressure returned to baseline  Respiratory status: acceptable and room air  Hydration status: acceptable  PONV: none     Anesthetic complications: None    Comments: Appear to tolerate MAC with IV sedation well without anesthesia related problems / complications noted.  Pain level satisfactory per patient. No N  /  V.  No complaints per patient.  Will follow as needed.        Last vitals:  Vitals:    07/16/20 0653 07/16/20 0825   BP: (!) 147/76 (!) 142/65   Resp: 16 16   Temp: 97.7  F (36.5  C)    SpO2: 98% 99%         Electronically Signed By: ZA Cross CRNA  July 16, 2020  8:42 AM

## 2020-07-16 NOTE — ANESTHESIA PREPROCEDURE EVALUATION
Anesthesia Pre-Procedure Evaluation    Patient: Audrey Ortega   MRN: [id number] : 1935          Preoperative Diagnosis: Nuclear sclerotic cataract of right eye [H25.11]    Procedure(s):  Cataract surgery with intraocular lens implant    No past medical history on file.  History reviewed. No pertinent surgical history.    Anesthesia Evaluation     . Pt has had prior anesthetic. Type: General    No history of anesthetic complications          ROS/MED HX    ENT/Pulmonary:      (-) tobacco use   Neurologic:  - neg neurologic ROS     Cardiovascular:     (+) hypertension--CAD, --. : . . . pacemaker :type: Medtronic . .       METS/Exercise Tolerance:     Hematologic:  - neg hematologic  ROS       Musculoskeletal:  - neg musculoskeletal ROS       GI/Hepatic:  - neg GI/hepatic ROS       Renal/Genitourinary:  - ROS Renal section negative       Endo:  - neg endo ROS       Psychiatric:  - neg psychiatric ROS       Infectious Disease:  - neg infectious disease ROS       Malignancy:      - no malignancy   Other:    - neg other ROS                      Physical Exam  Normal systems: cardiovascular, pulmonary and dental    Airway   Mallampati: II  TM distance: >3 FB  Neck ROM: full    Dental     Cardiovascular   Rhythm and rate: regular and normal  (-) no murmur    Pulmonary    breath sounds clear to auscultation    Other findings: Last dose Metoprolol was this am with sip water        Lab Results   Component Value Date    WBC 7.7 2020    HGB 13.6 2020    HCT 39.1 2020     2020    CRP 15.7 (H) 2019     2020    POTASSIUM 3.8 2020    CHLORIDE 105 2020    CO2 24 2020    BUN 33 (H) 2020    CR 1.22 (H) 2020    GLC 64 (L) 2020    HO 8.6 2020    ALBUMIN 3.7 2020    PROTTOTAL 7.9 2020    ALT 20 2020    AST 18 2020    ALKPHOS 62 2020    BILITOTAL 0.7 2020    LIPASE 157 2019       Preop Vitals  BP  "Readings from Last 3 Encounters:   07/09/20 122/66   05/15/20 98/58   04/20/20 128/76    Pulse Readings from Last 3 Encounters:   07/09/20 74   04/20/20 84   04/15/20 67      Resp Readings from Last 3 Encounters:   07/09/20 16   04/20/20 16   12/27/19 16    SpO2 Readings from Last 3 Encounters:   07/09/20 98%   01/15/20 97%   12/27/19 97%      Temp Readings from Last 1 Encounters:   07/09/20 97.8  F (36.6  C) (Temporal)    Ht Readings from Last 1 Encounters:   01/15/20 1.651 m (5' 5\")      Wt Readings from Last 1 Encounters:   07/09/20 56.7 kg (125 lb)    Estimated body mass index is 20.8 kg/m  as calculated from the following:    Height as of 1/15/20: 1.651 m (5' 5\").    Weight as of 7/9/20: 56.7 kg (125 lb).       Anesthesia Plan      History & Physical Review  History and physical reviewed and following examination; no interval change.    ASA Status:  2 .    NPO Status:  > 6 hours    Plan for MAC with Other induction. Maintenance will be TIVA.  Reason for MAC:  Procedure to face, neck, head or breast      The patient is not a current smoker      Postoperative Care  Postoperative pain management:  Oral pain medications.      Consents  Anesthetic plan, risks, benefits and alternatives discussed with:  Patient.  Use of blood products discussed: No .   .                 ZA Cross CRNA  "

## 2020-07-29 ENCOUNTER — VIRTUAL VISIT (OUTPATIENT)
Dept: CARDIOLOGY | Facility: CLINIC | Age: 85
End: 2020-07-29
Payer: COMMERCIAL

## 2020-07-29 ENCOUNTER — ANESTHESIA EVENT (OUTPATIENT)
Dept: SURGERY | Facility: CLINIC | Age: 85
End: 2020-07-29
Payer: MEDICARE

## 2020-07-29 DIAGNOSIS — Z11.59 ENCOUNTER FOR SCREENING FOR OTHER VIRAL DISEASES: ICD-10-CM

## 2020-07-29 DIAGNOSIS — I48.0 PAF (PAROXYSMAL ATRIAL FIBRILLATION) (H): ICD-10-CM

## 2020-07-29 DIAGNOSIS — Z11.59 ENCOUNTER FOR SCREENING FOR OTHER VIRAL DISEASES: Primary | ICD-10-CM

## 2020-07-29 LAB
LABORATORY COMMENT REPORT: NORMAL
SARS-COV-2 RNA SPEC QL NAA+PROBE: NEGATIVE
SARS-COV-2 RNA SPEC QL NAA+PROBE: NORMAL
SPECIMEN SOURCE: NORMAL
SPECIMEN SOURCE: NORMAL

## 2020-07-29 PROCEDURE — U0003 INFECTIOUS AGENT DETECTION BY NUCLEIC ACID (DNA OR RNA); SEVERE ACUTE RESPIRATORY SYNDROME CORONAVIRUS 2 (SARS-COV-2) (CORONAVIRUS DISEASE [COVID-19]), AMPLIFIED PROBE TECHNIQUE, MAKING USE OF HIGH THROUGHPUT TECHNOLOGIES AS DESCRIBED BY CMS-2020-01-R: HCPCS | Performed by: OPHTHALMOLOGY

## 2020-07-29 PROCEDURE — 99213 OFFICE O/P EST LOW 20 MIN: CPT | Mod: 95 | Performed by: NURSE PRACTITIONER

## 2020-07-29 SDOH — HEALTH STABILITY: MENTAL HEALTH: CURRENT SMOKER: 0

## 2020-07-29 ASSESSMENT — LIFESTYLE VARIABLES: TOBACCO_USE: 0

## 2020-07-29 NOTE — LETTER
7/29/2020    Krish Bates MD  [address] [facility] [address]    RE: Audrey Ortega       Dear Colleague,    I had the pleasure of seeing Audrey Ortega in the [facility].      Reason for visit: Three months follow up    Primary cardiologist: Dr. Jama    History of presenting illness:    Audrey Ortega, a pleasant 85 year old patient who has a past medical history significant for paroxysmal atrial fibrillation (declined anticoagulation), sick sinus syndrome status post permanent pacemaker in 2011 and hypertension.     She was initially seen by Dr. Jama in January of this year to establish care within the [facility]. She has previously declined anticoagulation (unable to afford DOACs and warfarin caused bilateral shoulder pain). Previous device checks have been showing prolonged episodes of tachycardia thought to be atrial flutter or atrial tachycardia.     Previously, she noted paroxysmal palpitations (that she describes as pounding in her left ear) 2-3 times daily and when she lays down at night.  The on-call cardiologist, Dr. Roberts, reviewed her most recent device check that noted 60 recorded mode switches consistent with atrial arrhythmia on ventricular rates between 70 to 100 bpm.  She felt the this was likely indicating breakthrough atrial fibrillation and recommended metoprolol 25 mg twice daily. Due to hypotension Lisinopril was discontinued recently without further episodes. She also no longer has palpitations.     Her most recent device check from June 2020 noted 33 mode switches with 8 episodes of high atrial rates.  The longest episode lasting 20 hours with a total of 0.7% of atrial tachycardia or atrial fib flutter burden.          Assessment and Plan:     ASSESSMENT:    1. Paroxysmal atrial fibrillation    Continues to decline anticoagulation    Most recent device 33 mode switches with the longest lasting greater than 28 hours    Previously metoprolol was  decreased and then discontinued due to diarrhea, but the patient has restarted metoprolol at 25 mg twice daily    2. Hypertension    Lisinopril discontinued due to episodes of hypotension    Currently only maintained on metoprolol 25 twice daily    3. Sick sinus syndrome    Status post permanent pacemaker in 2011    Follows with device clinic    PLAN:     1. Follow-up with device clinic as scheduled  2. Return to clinic in 1 year for follow-up with Dr. Jama         Thank you for allowing me to participate in the care of your patient.    Sincerely,     ZA LUNDBERG [facility]

## 2020-07-29 NOTE — PROGRESS NOTES
"  The patient has been notified of following:     \"This telephone visit will be conducted via a call between you and your physician/provider. We have found that certain health care needs can be provided without the need for a physical exam.  This service lets us provide the care you need with a short phone conversation.  If a prescription is necessary we can send it directly to your pharmacy.  If lab work is needed we can place an order for that and you can then stop by our lab to have the test done at a later time.    Telephone visits are billed at different rates depending on your insurance coverage. During this emergency period, for some insurers they may be billed the same as an in-person visit.  Please reach out to your insurance provider with any questions.    If during the course of the call the physician/provider feels a telephone visit is not appropriate, you will not be charged for this service.\"    Patient has given verbal consent for Telephone visit?  Yes    What phone number would you like to be contacted at? [phone]    How would you like to obtain your AVS? Mail a copy      Blood pressure: on 7/9 - 122/66  Had a low reading about two months ago of 62/40 then called PCP who stopped lisinopril and now has been feeling better   Pulse:  Weight: 125 #    Phone call duration: 11 minutes    ZA LUNDBERG CNP     Reason for visit: Three months follow up    Primary cardiologist: Dr. Jama    History of presenting illness:    Audrey Ortega, a pleasant 85 year old patient who has a past medical history significant for paroxysmal atrial fibrillation (declined anticoagulation), sick sinus syndrome status post permanent pacemaker in 2011 and hypertension.     She was initially seen by Dr. Jama in January of this year to establish care within the [facility]. She has previously declined anticoagulation (unable to afford DOACs and warfarin caused bilateral shoulder pain). Previous device checks have " been showing prolonged episodes of tachycardia thought to be atrial flutter or atrial tachycardia.     Previously, she noted paroxysmal palpitations (that she describes as pounding in her left ear) 2-3 times daily and when she lays down at night.  The on-call cardiologist, Dr. Roberts, reviewed her most recent device check that noted 60 recorded mode switches consistent with atrial arrhythmia on ventricular rates between 70 to 100 bpm.  She felt the this was likely indicating breakthrough atrial fibrillation and recommended metoprolol 25 mg twice daily. Due to hypotension Lisinopril was discontinued recently without further episodes. She also no longer has palpitations.     Her most recent device check from June 2020 noted 33 mode switches with 8 episodes of high atrial rates.  The longest episode lasting 20 hours with a total of 0.7% of atrial tachycardia or atrial fib flutter burden.          Assessment and Plan:     ASSESSMENT:    1. Paroxysmal atrial fibrillation    Continues to decline anticoagulation    Most recent device 33 mode switches with the longest lasting greater than 28 hours    Previously metoprolol was decreased and then discontinued due to diarrhea, but the patient has restarted metoprolol at 25 mg twice daily    2. Hypertension    Lisinopril discontinued due to episodes of hypotension    Currently only maintained on metoprolol 25 twice daily    3. Sick sinus syndrome    Status post permanent pacemaker in 2011    Follows with device clinic    PLAN:     1. Follow-up with device clinic as scheduled  2. Return to clinic in 1 year for follow-up with Dr. Wiley Walker, APRN, CNP

## 2020-07-30 ENCOUNTER — ANESTHESIA (OUTPATIENT)
Dept: SURGERY | Facility: CLINIC | Age: 85
End: 2020-07-30
Payer: MEDICARE

## 2020-07-30 ENCOUNTER — HOSPITAL ENCOUNTER (OUTPATIENT)
Facility: CLINIC | Age: 85
Discharge: HOME | End: 2020-07-30
Attending: OPHTHALMOLOGY | Admitting: OPHTHALMOLOGY
Payer: MEDICARE

## 2020-07-30 VITALS
SYSTOLIC BLOOD PRESSURE: 157 MMHG | DIASTOLIC BLOOD PRESSURE: 77 MMHG | TEMPERATURE: 97.6 F | OXYGEN SATURATION: 98 % | RESPIRATION RATE: 18 BRPM | HEART RATE: 60 BPM

## 2020-07-30 PROCEDURE — 71000022 ZZH RECOVERY CATRACT PACKAGE: Performed by: OPHTHALMOLOGY

## 2020-07-30 PROCEDURE — 25000128 H RX IP 250 OP 636: Performed by: NURSE ANESTHETIST, CERTIFIED REGISTERED

## 2020-07-30 PROCEDURE — V2632 POST CHMBR INTRAOCULAR LENS: HCPCS | Performed by: OPHTHALMOLOGY

## 2020-07-30 PROCEDURE — 25000125 ZZHC RX 250: Performed by: NURSE ANESTHETIST, CERTIFIED REGISTERED

## 2020-07-30 PROCEDURE — 37000012 ZZH ANESTHESIA CATARACT PACKAGE: Performed by: OPHTHALMOLOGY

## 2020-07-30 PROCEDURE — 25000125 ZZHC RX 250: Performed by: OPHTHALMOLOGY

## 2020-07-30 PROCEDURE — 36000025 ZZH CATARACT SURGICAL PACKAGE: Performed by: OPHTHALMOLOGY

## 2020-07-30 PROCEDURE — 25000128 H RX IP 250 OP 636: Performed by: OPHTHALMOLOGY

## 2020-07-30 DEVICE — EYE IMP IOL AMO PCL TECNIS ZCB00 17.5: Type: IMPLANTABLE DEVICE | Site: EYE | Status: FUNCTIONAL

## 2020-07-30 RX ORDER — MOXIFLOXACIN 5 MG/ML
SOLUTION/ DROPS OPHTHALMIC PRN
Status: DISCONTINUED | OUTPATIENT
Start: 2020-07-30 | End: 2020-07-30 | Stop reason: HOSPADM

## 2020-07-30 RX ORDER — PROPARACAINE HYDROCHLORIDE 5 MG/ML
1 SOLUTION/ DROPS OPHTHALMIC ONCE
Status: DISCONTINUED | OUTPATIENT
Start: 2020-07-30 | End: 2020-07-30 | Stop reason: HOSPADM

## 2020-07-30 RX ORDER — PROPARACAINE HYDROCHLORIDE 5 MG/ML
1 SOLUTION/ DROPS OPHTHALMIC ONCE
Status: COMPLETED | OUTPATIENT
Start: 2020-07-30 | End: 2020-07-30

## 2020-07-30 RX ORDER — MEPERIDINE HYDROCHLORIDE 25 MG/ML
12.5 INJECTION INTRAMUSCULAR; INTRAVENOUS; SUBCUTANEOUS
Status: CANCELLED | OUTPATIENT
Start: 2020-07-30

## 2020-07-30 RX ORDER — SODIUM CHLORIDE, SODIUM LACTATE, POTASSIUM CHLORIDE, CALCIUM CHLORIDE 600; 310; 30; 20 MG/100ML; MG/100ML; MG/100ML; MG/100ML
INJECTION, SOLUTION INTRAVENOUS CONTINUOUS
Status: CANCELLED | OUTPATIENT
Start: 2020-07-30

## 2020-07-30 RX ORDER — ONDANSETRON 4 MG/1
4 TABLET, ORALLY DISINTEGRATING ORAL EVERY 30 MIN PRN
Status: CANCELLED | OUTPATIENT
Start: 2020-07-30

## 2020-07-30 RX ORDER — FENTANYL CITRATE 50 UG/ML
INJECTION, SOLUTION INTRAMUSCULAR; INTRAVENOUS PRN
Status: DISCONTINUED | OUTPATIENT
Start: 2020-07-30 | End: 2020-07-30

## 2020-07-30 RX ORDER — TROPICAMIDE 10 MG/ML
1 SOLUTION/ DROPS OPHTHALMIC
Status: COMPLETED | OUTPATIENT
Start: 2020-07-30 | End: 2020-07-30

## 2020-07-30 RX ORDER — MOXIFLOXACIN 5 MG/ML
1 SOLUTION/ DROPS OPHTHALMIC
Status: COMPLETED | OUTPATIENT
Start: 2020-07-30 | End: 2020-07-30

## 2020-07-30 RX ORDER — NALOXONE HYDROCHLORIDE 0.4 MG/ML
.1-.4 INJECTION, SOLUTION INTRAMUSCULAR; INTRAVENOUS; SUBCUTANEOUS
Status: CANCELLED | OUTPATIENT
Start: 2020-07-30 | End: 2020-07-31

## 2020-07-30 RX ORDER — PREDNISOLONE ACETATE 10 MG/ML
SUSPENSION/ DROPS OPHTHALMIC PRN
Status: DISCONTINUED | OUTPATIENT
Start: 2020-07-30 | End: 2020-07-30 | Stop reason: HOSPADM

## 2020-07-30 RX ORDER — ONDANSETRON 2 MG/ML
4 INJECTION INTRAMUSCULAR; INTRAVENOUS EVERY 30 MIN PRN
Status: CANCELLED | OUTPATIENT
Start: 2020-07-30

## 2020-07-30 RX ORDER — PHENYLEPHRINE HYDROCHLORIDE 25 MG/ML
1 SOLUTION/ DROPS OPHTHALMIC
Status: COMPLETED | OUTPATIENT
Start: 2020-07-30 | End: 2020-07-30

## 2020-07-30 RX ADMIN — MIDAZOLAM 1 MG: 1 INJECTION INTRAMUSCULAR; INTRAVENOUS at 07:55

## 2020-07-30 RX ADMIN — FENTANYL CITRATE 25 MCG: 50 INJECTION, SOLUTION INTRAMUSCULAR; INTRAVENOUS at 07:55

## 2020-07-30 RX ADMIN — MOXIFLOXACIN 1 DROP: 5 SOLUTION/ DROPS OPHTHALMIC at 06:52

## 2020-07-30 RX ADMIN — TROPICAMIDE 1 DROP: 10 SOLUTION/ DROPS OPHTHALMIC at 06:47

## 2020-07-30 RX ADMIN — MOXIFLOXACIN 1 DROP: 5 SOLUTION/ DROPS OPHTHALMIC at 07:01

## 2020-07-30 RX ADMIN — PHENYLEPHRINE HYDROCHLORIDE 1 DROP: 25 SOLUTION/ DROPS OPHTHALMIC at 06:52

## 2020-07-30 RX ADMIN — PHENYLEPHRINE HYDROCHLORIDE 1 DROP: 25 SOLUTION/ DROPS OPHTHALMIC at 07:01

## 2020-07-30 RX ADMIN — MOXIFLOXACIN 1 DROP: 5 SOLUTION/ DROPS OPHTHALMIC at 06:47

## 2020-07-30 RX ADMIN — PHENYLEPHRINE HYDROCHLORIDE 1 DROP: 25 SOLUTION/ DROPS OPHTHALMIC at 06:47

## 2020-07-30 RX ADMIN — PROPARACAINE HYDROCHLORIDE 1 DROP: 5 SOLUTION/ DROPS OPHTHALMIC at 06:46

## 2020-07-30 RX ADMIN — LIDOCAINE HYDROCHLORIDE 1 ML: 10 INJECTION, SOLUTION EPIDURAL; INFILTRATION; INTRACAUDAL; PERINEURAL at 07:00

## 2020-07-30 RX ADMIN — TROPICAMIDE 1 DROP: 10 SOLUTION/ DROPS OPHTHALMIC at 07:01

## 2020-07-30 RX ADMIN — TROPICAMIDE 1 DROP: 10 SOLUTION/ DROPS OPHTHALMIC at 06:52

## 2020-07-30 NOTE — OP NOTE
PREOPERATIVE DIAGNOSIS: Cataract, Left eye.   POSTOPERATIVE DIAGNOSIS: Cataract, Left eye.   OPERATION: Phacoemulsification with implantation of posterior chamber intraocular lens, Left eye.   ANESTHESIA: Monitored anesthesia care.   INDICATIONS FOR SURGERY: Audrey Ortega has noted a progressive decline in the vision of her Left eye secondary to a cataract. This has affected her ability to perform routine functions including reading. The patient has progressive cataract changes consistent with her vision and symptoms.     PROCEDURE: Informed consent was obtained from the patient preoperatively with the risks and alternatives reviewed, including the possibility of loss of vision. In the preoperative area, the patient was administered topical anesthetic consisting of 2% Xylocaine jelly. The patient was taken to the operating room. The face was prepped and draped in the usual sterile fashion. Attention was directed to the Left eye. A stab incision was made at the limbus with a 15-degree blade. Viscoat was used to replace aqueous. A keratome was used to make a limbal self-sealing incision 2.5 mm in diameter. A curvilinear capsulorrhexis was performed with the Utrata forceps. Hydrodissection was carried out. The nucleus was removed with the phacoemulsification handpiece in a four-quadrant cracking technique. The cortex was removed with the irrigation and aspiration handpiece. The posterior capsule remained intact. Provisc was used to inflate the capsular bag. A posterior chamber intraocular lens was taken from its case and inspected. It was free of defects and it was folded into the shooter. The lens was then injected into the eye by directing the leading haptic into the capsular bag. The trailing haptic was then placed in the eye with a haptic . The lens centered well. The Provisc was removed from the eye with the I&A handpiece. The eye was inflated with balanced salt solution. The wound was inspected and found to  be watertight. Topical Vigamox and Pred Forte were applied. An eye shield was placed over the eye. The patient tolerated the procedure well and left the operating area in good condition.     Implant Name Type Inv. Item Serial No.  Lot No. LRB No. Used Action   EYE IMP IOL PHILIP PCL TECNIS [id number] 17.5 Lens/Eye Implant EYE IMP IOL PHILIP PCL TECNIS [id number] 17.5 [id number] [employer]  Left 1 Implanted       Max Cooley M.D.

## 2020-07-30 NOTE — DISCHARGE INSTRUCTIONS
POST CATARACT SURGERY EYE INSTRUCTIONS  [facility]           Eye Medications    VIGAMOX/OFLOXACIN (Tan Cap)    KETOROLAC (García Cap)    PREDNISOLONE (Pink or White Cap)    If you opted for the individual bottles of eye medications follow these instructions.    *Instill one drop from each bottle into the operative eye 3 times a day until each  bottle is empty.    *Wait 5 minutes between each drop.    If you opted for the one bottle containing all 3 eye medications, follow these instructions.    *Instill one drop into the operative eye 3 times a day until the bottle is empty.       - If your are currently being treated for glaucoma please continue your    medication as normally prescribed.     - Wear eye shield while sleeping for 3 days     - Refrain from heavy lifting, bending, straining, or strenuous activity for 1      week.     - Do not rub or push on the operative eye for 1 week (you may wipe the eye lid(s) gently with a wet wash cloth to remove matter from eyelashes).     - You may bathe or shower, but do not get the eye wet for 1 month      (swimming, hot tubs or other water activities).     - Minor itching, scratching sensation, burning sensation, etc. is normal.     Report any severe eye pain or loss of vision.     - Please call our office at [phone] if you have questions or     concerns.

## 2020-07-30 NOTE — ANESTHESIA POSTPROCEDURE EVALUATION
Patient: Audrey Ortega    Procedure(s):  Cataract Surgery Left Eye    Diagnosis:Nuclear sclerotic cataract of left eye [H25.12]  Diagnosis Additional Information: No value filed.    Anesthesia Type:  MAC    Note:  Anesthesia Post Evaluation    Patient location during evaluation: Phase 2 and Bedside  Patient participation: Able to fully participate in evaluation  Level of consciousness: awake and alert  Pain management: adequate  Airway patency: patent  Cardiovascular status: acceptable  Respiratory status: acceptable  Hydration status: acceptable  PONV: none     Anesthetic complications: None          Last vitals:  Vitals:    07/30/20 0648   BP: (!) 171/91   Pulse: 71   Resp: 18   Temp: 97.6  F (36.4  C)   SpO2: 100%         Electronically Signed By: ZA Patterson CRNA  July 30, 2020  8:39 AM

## 2020-07-30 NOTE — ANESTHESIA PREPROCEDURE EVALUATION
Anesthesia Pre-Procedure Evaluation    Patient: Audrey Ortega   MRN: [id number] : 1935          Preoperative Diagnosis: Nuclear sclerotic cataract of left eye [H25.12]    Procedure(s):  Cataract Surgery Left Eye    No past medical history on file.  Past Surgical History:   Procedure Laterality Date     PHACOEMULSIFICATION WITH STANDARD INTRAOCULAR LENS IMPLANT Right 2020    Procedure: Cataract surgery with intraocular lens implant;  Surgeon: Fortino Ford MD;  Location:  OR       Anesthesia Evaluation     . Pt has had prior anesthetic. Type: General    No history of anesthetic complications          ROS/MED HX    ENT/Pulmonary:      (-) tobacco use   Neurologic:  - neg neurologic ROS     Cardiovascular:     (+) hypertension--CAD, --. : . . . pacemaker :type: Medtronic . .       METS/Exercise Tolerance:     Hematologic:  - neg hematologic  ROS       Musculoskeletal:  - neg musculoskeletal ROS       GI/Hepatic:  - neg GI/hepatic ROS       Renal/Genitourinary:  - ROS Renal section negative       Endo:  - neg endo ROS       Psychiatric:  - neg psychiatric ROS       Infectious Disease:  - neg infectious disease ROS       Malignancy:      - no malignancy   Other:    - neg other ROS                      Physical Exam  Normal systems: cardiovascular, pulmonary and dental    Airway   Mallampati: II  TM distance: >3 FB  Neck ROM: full    Dental     Cardiovascular   Rhythm and rate: regular and normal  (-) no murmur    Pulmonary    breath sounds clear to auscultation    Other findings: Last dose Metoprolol was this am with sip water        Lab Results   Component Value Date    WBC 7.7 2020    HGB 13.6 2020    HCT 39.1 2020     2020    CRP 15.7 (H) 2019     2020    POTASSIUM 3.8 2020    CHLORIDE 105 2020    CO2 24 2020    BUN 33 (H) 2020    CR 1.22 (H) 2020    GLC 64 (L) 2020    HO 8.6 2020    ALBUMIN 3.7 2020     "PROTTOTAL 7.9 04/20/2020    ALT 20 04/20/2020    AST 18 04/20/2020    ALKPHOS 62 04/20/2020    BILITOTAL 0.7 04/20/2020    LIPASE 157 12/16/2019       Preop Vitals  BP Readings from Last 3 Encounters:   07/16/20 (!) 147/90   07/09/20 122/66   05/15/20 98/58    Pulse Readings from Last 3 Encounters:   07/09/20 74   04/20/20 84   04/15/20 67      Resp Readings from Last 3 Encounters:   07/16/20 16   07/09/20 16   04/20/20 16    SpO2 Readings from Last 3 Encounters:   07/16/20 100%   07/09/20 98%   01/15/20 97%      Temp Readings from Last 1 Encounters:   07/16/20 97.7  F (36.5  C) (Oral)    Ht Readings from Last 1 Encounters:   07/16/20 1.651 m (5' 5\")      Wt Readings from Last 1 Encounters:   07/16/20 56.7 kg (125 lb)    Estimated body mass index is 20.8 kg/m  as calculated from the following:    Height as of 7/16/20: 1.651 m (5' 5\").    Weight as of 7/16/20: 56.7 kg (125 lb).       Anesthesia Plan      History & Physical Review  History and physical reviewed and following examination; no interval change.    ASA Status:  2 .    NPO Status:  > 6 hours    Plan for MAC with Other induction. Maintenance will be TIVA.  Reason for MAC:  Procedure to face, neck, head or breast      The patient is not a current smoker      Postoperative Care  Postoperative pain management:  Oral pain medications.      Consents  Anesthetic plan, risks, benefits and alternatives discussed with:  Patient.  Use of blood products discussed: No .   .                 ZA Patterson CRNA  "

## 2020-07-30 NOTE — ANESTHESIA CARE TRANSFER NOTE
Patient: Audrey Ortega    Procedure(s):  Cataract Surgery Left Eye    Diagnosis: Nuclear sclerotic cataract of left eye [H25.12]  Diagnosis Additional Information: No value filed.    Anesthesia Type:   MAC     Note:  Airway :Room Air  Patient transferred to:Phase II  Handoff Report: Identifed the Patient, Identified the Reponsible Provider, Reviewed the pertinent medical history, Discussed the surgical course, Reviewed Intra-OP anesthesia mangement and issues during anesthesia, Set expectations for post-procedure period and Allowed opportunity for questions and acknowledgement of understanding      Vitals: (Last set prior to Anesthesia Care Transfer)    CRNA VITALS  7/30/2020 0750 - 7/30/2020 0838      7/30/2020             SpO2:  100 %                Electronically Signed By: ZA Patterson CRNA  July 30, 2020  8:38 AM

## 2020-08-31 ENCOUNTER — OFFICE VISIT (OUTPATIENT)
Dept: INTERNAL MEDICINE | Facility: CLINIC | Age: 85
End: 2020-08-31
Payer: COMMERCIAL

## 2020-08-31 ENCOUNTER — TELEPHONE (OUTPATIENT)
Dept: INTERNAL MEDICINE | Facility: CLINIC | Age: 85
End: 2020-08-31

## 2020-08-31 VITALS
BODY MASS INDEX: 20.8 KG/M2 | WEIGHT: 125 LBS | OXYGEN SATURATION: 99 % | HEART RATE: 76 BPM | DIASTOLIC BLOOD PRESSURE: 60 MMHG | SYSTOLIC BLOOD PRESSURE: 125 MMHG | TEMPERATURE: 97.6 F | RESPIRATION RATE: 16 BRPM

## 2020-08-31 DIAGNOSIS — L03.113 CELLULITIS OF RIGHT HAND: Primary | ICD-10-CM

## 2020-08-31 PROCEDURE — 99213 OFFICE O/P EST LOW 20 MIN: CPT | Performed by: INTERNAL MEDICINE

## 2020-08-31 RX ORDER — CEPHALEXIN 500 MG/1
500 CAPSULE ORAL 2 TIMES DAILY
Qty: 20 CAPSULE | Refills: 0 | Status: SHIPPED | OUTPATIENT
Start: 2020-08-31 | End: 2021-05-05

## 2020-08-31 ASSESSMENT — PAIN SCALES - GENERAL: PAINLEVEL: NO PAIN (0)

## 2020-08-31 NOTE — PROGRESS NOTES
Subjective     Audrey Ortega is a 85 year old female who presents to clinic today for the following health issues:    HPI     Chief Complaint   Patient presents with     Hand Injury     cut right hand on a branch last wednesday, has redness and swelling     Was trimming a bush 5 days ago, cut edge stabbed her between the 3rd and 4th fingers, swelled up and drained pus 2 days ago.  Red, warm but no systemic fevers.    Happened in [place] a few years ago.          Objective    /60   Pulse 76   Temp 97.6  F (36.4  C) (Temporal)   Resp 16   Wt 56.7 kg (125 lb)   SpO2 99%   BMI 20.80 kg/m    Body mass index is 20.8 kg/m .  Physical Exam   No acute distress  Right hand between the third and fourth finger she does have erythema and an open wound which is healing over, there is mild swelling but she is able to make a fist with her hand            Assessment & Plan     Audrey was seen today for hand injury.    Diagnoses and all orders for this visit:    Cellulitis of right hand  -     cephALEXin (KEFLEX) 500 MG capsule; Take 1 capsule (500 mg) by mouth 2 times daily    Right hand cellulitis from a stick jamming in between the third and fourth digit.  Does not appear to involve the joint at this time we will treat her with Keflex.  She is already done or had some drainage which has improved the swelling.           No follow-ups on file.    Krish Bates MD  [facility]

## 2020-08-31 NOTE — TELEPHONE ENCOUNTER
Reason for call:  Patient reporting a symptom    Symptom or request: red swollen hand. Pt cut hand on a branch    Duration (how long have symptoms been present): 1 wk    Have you been treated for this before? No    Additional comments:     Phone Number patient can be reached at:  Home number on file [phone] (home)    Best Time:      Can we leave a detailed message on this number:  YES    Call taken on 8/31/2020 at 11:36 AM by Lisbet Senior

## 2020-10-05 ENCOUNTER — ANCILLARY PROCEDURE (OUTPATIENT)
Dept: CARDIOLOGY | Facility: CLINIC | Age: 85
End: 2020-10-05
Attending: INTERNAL MEDICINE
Payer: COMMERCIAL

## 2020-10-05 DIAGNOSIS — Z95.0 CARDIAC PACEMAKER IN SITU: ICD-10-CM

## 2020-10-05 DIAGNOSIS — I49.5 SSS (SICK SINUS SYNDROME) (H): ICD-10-CM

## 2020-10-05 PROCEDURE — 93296 REM INTERROG EVL PM/IDS: CPT | Performed by: INTERNAL MEDICINE

## 2020-10-05 PROCEDURE — 93294 REM INTERROG EVL PM/LDLS PM: CPT | Performed by: INTERNAL MEDICINE

## 2020-10-09 DIAGNOSIS — I48.0 PAF (PAROXYSMAL ATRIAL FIBRILLATION) (H): ICD-10-CM

## 2020-10-09 RX ORDER — METOPROLOL TARTRATE 25 MG/1
25 TABLET, FILM COATED ORAL 2 TIMES DAILY
Qty: 180 TABLET | Refills: 2 | Status: SHIPPED | OUTPATIENT
Start: 2020-10-09 | End: 2021-05-05

## 2020-10-26 LAB
MDC_IDC_LEAD_IMPLANT_DT: NORMAL
MDC_IDC_LEAD_IMPLANT_DT: NORMAL
MDC_IDC_LEAD_LOCATION: NORMAL
MDC_IDC_LEAD_LOCATION: NORMAL
MDC_IDC_LEAD_LOCATION_DETAIL_1: NORMAL
MDC_IDC_LEAD_LOCATION_DETAIL_1: NORMAL
MDC_IDC_LEAD_MFG: NORMAL
MDC_IDC_LEAD_MFG: NORMAL
MDC_IDC_LEAD_MODEL: NORMAL
MDC_IDC_LEAD_MODEL: NORMAL
MDC_IDC_LEAD_POLARITY_TYPE: NORMAL
MDC_IDC_LEAD_POLARITY_TYPE: NORMAL
MDC_IDC_LEAD_SERIAL: NORMAL
MDC_IDC_LEAD_SERIAL: NORMAL
MDC_IDC_MSMT_BATTERY_DTM: NORMAL
MDC_IDC_MSMT_BATTERY_IMPEDANCE: 2312 OHM
MDC_IDC_MSMT_BATTERY_REMAINING_LONGEVITY: 31 MO
MDC_IDC_MSMT_BATTERY_STATUS: NORMAL
MDC_IDC_MSMT_BATTERY_VOLTAGE: 2.75 V
MDC_IDC_MSMT_LEADCHNL_RA_IMPEDANCE_VALUE: 425 OHM
MDC_IDC_MSMT_LEADCHNL_RA_PACING_THRESHOLD_AMPLITUDE: 0.5 V
MDC_IDC_MSMT_LEADCHNL_RA_PACING_THRESHOLD_PULSEWIDTH: 0.4 MS
MDC_IDC_MSMT_LEADCHNL_RV_IMPEDANCE_VALUE: 514 OHM
MDC_IDC_MSMT_LEADCHNL_RV_PACING_THRESHOLD_AMPLITUDE: 0.5 V
MDC_IDC_MSMT_LEADCHNL_RV_PACING_THRESHOLD_PULSEWIDTH: 0.4 MS
MDC_IDC_PG_IMPLANT_DTM: NORMAL
MDC_IDC_PG_MFG: NORMAL
MDC_IDC_PG_MODEL: NORMAL
MDC_IDC_PG_SERIAL: NORMAL
MDC_IDC_PG_TYPE: NORMAL
MDC_IDC_SESS_CLINIC_NAME: NORMAL
MDC_IDC_SESS_DTM: NORMAL
MDC_IDC_SESS_TYPE: NORMAL
MDC_IDC_SET_BRADY_AT_MODE_SWITCH_MODE: NORMAL
MDC_IDC_SET_BRADY_AT_MODE_SWITCH_RATE: 150 {BEATS}/MIN
MDC_IDC_SET_BRADY_LOWRATE: 60 {BEATS}/MIN
MDC_IDC_SET_BRADY_MAX_SENSOR_RATE: 130 {BEATS}/MIN
MDC_IDC_SET_BRADY_MAX_TRACKING_RATE: 130 {BEATS}/MIN
MDC_IDC_SET_BRADY_MODE: NORMAL
MDC_IDC_SET_BRADY_PAV_DELAY_LOW: 180 MS
MDC_IDC_SET_BRADY_SAV_DELAY_LOW: 150 MS
MDC_IDC_SET_LEADCHNL_RA_PACING_AMPLITUDE: 1.5 V
MDC_IDC_SET_LEADCHNL_RA_PACING_CAPTURE_MODE: NORMAL
MDC_IDC_SET_LEADCHNL_RA_PACING_POLARITY: NORMAL
MDC_IDC_SET_LEADCHNL_RA_PACING_PULSEWIDTH: 0.4 MS
MDC_IDC_SET_LEADCHNL_RA_SENSING_POLARITY: NORMAL
MDC_IDC_SET_LEADCHNL_RA_SENSING_SENSITIVITY: 0.5 MV
MDC_IDC_SET_LEADCHNL_RV_PACING_AMPLITUDE: 1.5 V
MDC_IDC_SET_LEADCHNL_RV_PACING_CAPTURE_MODE: NORMAL
MDC_IDC_SET_LEADCHNL_RV_PACING_POLARITY: NORMAL
MDC_IDC_SET_LEADCHNL_RV_PACING_PULSEWIDTH: 0.4 MS
MDC_IDC_SET_LEADCHNL_RV_SENSING_POLARITY: NORMAL
MDC_IDC_SET_LEADCHNL_RV_SENSING_SENSITIVITY: 2.8 MV
MDC_IDC_SET_ZONE_DETECTION_INTERVAL: 333.33 MS
MDC_IDC_SET_ZONE_DETECTION_INTERVAL: 400 MS
MDC_IDC_SET_ZONE_TYPE: NORMAL
MDC_IDC_SET_ZONE_TYPE: NORMAL
MDC_IDC_STAT_AT_BURDEN_PERCENT: 0.6 %
MDC_IDC_STAT_AT_DTM_END: NORMAL
MDC_IDC_STAT_AT_DTM_START: NORMAL
MDC_IDC_STAT_AT_MODE_SW_COUNT: 126
MDC_IDC_STAT_BRADY_AP_VP_PERCENT: 18 %
MDC_IDC_STAT_BRADY_AP_VS_PERCENT: 27 %
MDC_IDC_STAT_BRADY_AS_VP_PERCENT: 11 %
MDC_IDC_STAT_BRADY_AS_VS_PERCENT: 44 %
MDC_IDC_STAT_BRADY_DTM_END: NORMAL
MDC_IDC_STAT_BRADY_DTM_START: NORMAL
MDC_IDC_STAT_EPISODE_RECENT_COUNT: 0
MDC_IDC_STAT_EPISODE_RECENT_COUNT: 34
MDC_IDC_STAT_EPISODE_RECENT_COUNT_DTM_END: NORMAL
MDC_IDC_STAT_EPISODE_RECENT_COUNT_DTM_END: NORMAL
MDC_IDC_STAT_EPISODE_RECENT_COUNT_DTM_START: NORMAL
MDC_IDC_STAT_EPISODE_RECENT_COUNT_DTM_START: NORMAL
MDC_IDC_STAT_EPISODE_TYPE: NORMAL
MDC_IDC_STAT_EPISODE_TYPE: NORMAL

## 2021-01-11 ENCOUNTER — ANCILLARY PROCEDURE (OUTPATIENT)
Dept: CARDIOLOGY | Facility: CLINIC | Age: 86
End: 2021-01-11
Attending: INTERNAL MEDICINE
Payer: COMMERCIAL

## 2021-01-11 DIAGNOSIS — Z95.0 CARDIAC PACEMAKER IN SITU: ICD-10-CM

## 2021-01-11 PROCEDURE — 93296 REM INTERROG EVL PM/IDS: CPT | Performed by: INTERNAL MEDICINE

## 2021-01-11 PROCEDURE — 93294 REM INTERROG EVL PM/LDLS PM: CPT | Performed by: INTERNAL MEDICINE

## 2021-01-18 LAB
MDC_IDC_LEAD_IMPLANT_DT: NORMAL
MDC_IDC_LEAD_IMPLANT_DT: NORMAL
MDC_IDC_LEAD_LOCATION: NORMAL
MDC_IDC_LEAD_LOCATION: NORMAL
MDC_IDC_LEAD_LOCATION_DETAIL_1: NORMAL
MDC_IDC_LEAD_LOCATION_DETAIL_1: NORMAL
MDC_IDC_LEAD_MFG: NORMAL
MDC_IDC_LEAD_MFG: NORMAL
MDC_IDC_LEAD_MODEL: NORMAL
MDC_IDC_LEAD_MODEL: NORMAL
MDC_IDC_LEAD_POLARITY_TYPE: NORMAL
MDC_IDC_LEAD_POLARITY_TYPE: NORMAL
MDC_IDC_LEAD_SERIAL: NORMAL
MDC_IDC_LEAD_SERIAL: NORMAL
MDC_IDC_MSMT_BATTERY_DTM: NORMAL
MDC_IDC_MSMT_BATTERY_IMPEDANCE: 2413 OHM
MDC_IDC_MSMT_BATTERY_REMAINING_LONGEVITY: 29 MO
MDC_IDC_MSMT_BATTERY_STATUS: NORMAL
MDC_IDC_MSMT_BATTERY_VOLTAGE: 2.74 V
MDC_IDC_MSMT_LEADCHNL_RA_IMPEDANCE_VALUE: 393 OHM
MDC_IDC_MSMT_LEADCHNL_RA_PACING_THRESHOLD_AMPLITUDE: 0.5 V
MDC_IDC_MSMT_LEADCHNL_RA_PACING_THRESHOLD_PULSEWIDTH: 0.4 MS
MDC_IDC_MSMT_LEADCHNL_RV_IMPEDANCE_VALUE: 505 OHM
MDC_IDC_MSMT_LEADCHNL_RV_PACING_THRESHOLD_AMPLITUDE: 0.5 V
MDC_IDC_MSMT_LEADCHNL_RV_PACING_THRESHOLD_PULSEWIDTH: 0.4 MS
MDC_IDC_PG_IMPLANT_DTM: NORMAL
MDC_IDC_PG_MFG: NORMAL
MDC_IDC_PG_MODEL: NORMAL
MDC_IDC_PG_SERIAL: NORMAL
MDC_IDC_PG_TYPE: NORMAL
MDC_IDC_SESS_CLINIC_NAME: NORMAL
MDC_IDC_SESS_DTM: NORMAL
MDC_IDC_SESS_TYPE: NORMAL
MDC_IDC_SET_BRADY_AT_MODE_SWITCH_MODE: NORMAL
MDC_IDC_SET_BRADY_AT_MODE_SWITCH_RATE: 150 {BEATS}/MIN
MDC_IDC_SET_BRADY_LOWRATE: 60 {BEATS}/MIN
MDC_IDC_SET_BRADY_MAX_SENSOR_RATE: 130 {BEATS}/MIN
MDC_IDC_SET_BRADY_MAX_TRACKING_RATE: 130 {BEATS}/MIN
MDC_IDC_SET_BRADY_MODE: NORMAL
MDC_IDC_SET_BRADY_PAV_DELAY_LOW: 180 MS
MDC_IDC_SET_BRADY_SAV_DELAY_LOW: 150 MS
MDC_IDC_SET_LEADCHNL_RA_PACING_AMPLITUDE: 1.5 V
MDC_IDC_SET_LEADCHNL_RA_PACING_CAPTURE_MODE: NORMAL
MDC_IDC_SET_LEADCHNL_RA_PACING_POLARITY: NORMAL
MDC_IDC_SET_LEADCHNL_RA_PACING_PULSEWIDTH: 0.4 MS
MDC_IDC_SET_LEADCHNL_RA_SENSING_POLARITY: NORMAL
MDC_IDC_SET_LEADCHNL_RA_SENSING_SENSITIVITY: 0.5 MV
MDC_IDC_SET_LEADCHNL_RV_PACING_AMPLITUDE: 1.5 V
MDC_IDC_SET_LEADCHNL_RV_PACING_CAPTURE_MODE: NORMAL
MDC_IDC_SET_LEADCHNL_RV_PACING_POLARITY: NORMAL
MDC_IDC_SET_LEADCHNL_RV_PACING_PULSEWIDTH: 0.4 MS
MDC_IDC_SET_LEADCHNL_RV_SENSING_POLARITY: NORMAL
MDC_IDC_SET_LEADCHNL_RV_SENSING_SENSITIVITY: 2.8 MV
MDC_IDC_SET_ZONE_DETECTION_INTERVAL: 333.33 MS
MDC_IDC_SET_ZONE_DETECTION_INTERVAL: 400 MS
MDC_IDC_SET_ZONE_TYPE: NORMAL
MDC_IDC_SET_ZONE_TYPE: NORMAL
MDC_IDC_STAT_AT_BURDEN_PERCENT: 0.6 %
MDC_IDC_STAT_AT_DTM_END: NORMAL
MDC_IDC_STAT_AT_DTM_START: NORMAL
MDC_IDC_STAT_AT_MODE_SW_COUNT: 149
MDC_IDC_STAT_BRADY_AP_VP_PERCENT: 20 %
MDC_IDC_STAT_BRADY_AP_VS_PERCENT: 29 %
MDC_IDC_STAT_BRADY_AS_VP_PERCENT: 10 %
MDC_IDC_STAT_BRADY_AS_VS_PERCENT: 41 %
MDC_IDC_STAT_BRADY_DTM_END: NORMAL
MDC_IDC_STAT_BRADY_DTM_START: NORMAL
MDC_IDC_STAT_EPISODE_RECENT_COUNT: 0
MDC_IDC_STAT_EPISODE_RECENT_COUNT: 42
MDC_IDC_STAT_EPISODE_RECENT_COUNT_DTM_END: NORMAL
MDC_IDC_STAT_EPISODE_RECENT_COUNT_DTM_END: NORMAL
MDC_IDC_STAT_EPISODE_RECENT_COUNT_DTM_START: NORMAL
MDC_IDC_STAT_EPISODE_RECENT_COUNT_DTM_START: NORMAL
MDC_IDC_STAT_EPISODE_TYPE: NORMAL
MDC_IDC_STAT_EPISODE_TYPE: NORMAL

## 2021-05-03 ENCOUNTER — ANCILLARY PROCEDURE (OUTPATIENT)
Dept: CARDIOLOGY | Facility: CLINIC | Age: 86
End: 2021-05-03
Attending: INTERNAL MEDICINE
Payer: COMMERCIAL

## 2021-05-03 DIAGNOSIS — Z95.0 CARDIAC PACEMAKER IN SITU: ICD-10-CM

## 2021-05-03 LAB
MDC_IDC_LEAD_IMPLANT_DT: NORMAL
MDC_IDC_LEAD_IMPLANT_DT: NORMAL
MDC_IDC_LEAD_LOCATION: NORMAL
MDC_IDC_LEAD_LOCATION: NORMAL
MDC_IDC_LEAD_LOCATION_DETAIL_1: NORMAL
MDC_IDC_LEAD_LOCATION_DETAIL_1: NORMAL
MDC_IDC_LEAD_MFG: NORMAL
MDC_IDC_LEAD_MFG: NORMAL
MDC_IDC_LEAD_MODEL: NORMAL
MDC_IDC_LEAD_MODEL: NORMAL
MDC_IDC_LEAD_POLARITY_TYPE: NORMAL
MDC_IDC_LEAD_POLARITY_TYPE: NORMAL
MDC_IDC_LEAD_SERIAL: NORMAL
MDC_IDC_LEAD_SERIAL: NORMAL
MDC_IDC_MSMT_BATTERY_DTM: NORMAL
MDC_IDC_MSMT_BATTERY_IMPEDANCE: 2674 OHM
MDC_IDC_MSMT_BATTERY_REMAINING_LONGEVITY: 27 MO
MDC_IDC_MSMT_BATTERY_STATUS: NORMAL
MDC_IDC_MSMT_BATTERY_VOLTAGE: 2.73 V
MDC_IDC_MSMT_LEADCHNL_RA_IMPEDANCE_VALUE: 398 OHM
MDC_IDC_MSMT_LEADCHNL_RA_PACING_THRESHOLD_AMPLITUDE: 0.5 V
MDC_IDC_MSMT_LEADCHNL_RA_PACING_THRESHOLD_PULSEWIDTH: 0.4 MS
MDC_IDC_MSMT_LEADCHNL_RV_IMPEDANCE_VALUE: 513 OHM
MDC_IDC_MSMT_LEADCHNL_RV_PACING_THRESHOLD_AMPLITUDE: 0.38 V
MDC_IDC_MSMT_LEADCHNL_RV_PACING_THRESHOLD_PULSEWIDTH: 0.4 MS
MDC_IDC_PG_IMPLANT_DTM: NORMAL
MDC_IDC_PG_MFG: NORMAL
MDC_IDC_PG_MODEL: NORMAL
MDC_IDC_PG_SERIAL: NORMAL
MDC_IDC_PG_TYPE: NORMAL
MDC_IDC_SESS_CLINIC_NAME: NORMAL
MDC_IDC_SESS_DTM: NORMAL
MDC_IDC_SESS_TYPE: NORMAL
MDC_IDC_SET_BRADY_AT_MODE_SWITCH_MODE: NORMAL
MDC_IDC_SET_BRADY_AT_MODE_SWITCH_RATE: 150 {BEATS}/MIN
MDC_IDC_SET_BRADY_LOWRATE: 60 {BEATS}/MIN
MDC_IDC_SET_BRADY_MAX_SENSOR_RATE: 130 {BEATS}/MIN
MDC_IDC_SET_BRADY_MAX_TRACKING_RATE: 130 {BEATS}/MIN
MDC_IDC_SET_BRADY_MODE: NORMAL
MDC_IDC_SET_BRADY_PAV_DELAY_LOW: 180 MS
MDC_IDC_SET_BRADY_SAV_DELAY_LOW: 150 MS
MDC_IDC_SET_LEADCHNL_RA_PACING_AMPLITUDE: 1.5 V
MDC_IDC_SET_LEADCHNL_RA_PACING_CAPTURE_MODE: NORMAL
MDC_IDC_SET_LEADCHNL_RA_PACING_POLARITY: NORMAL
MDC_IDC_SET_LEADCHNL_RA_PACING_PULSEWIDTH: 0.4 MS
MDC_IDC_SET_LEADCHNL_RA_SENSING_POLARITY: NORMAL
MDC_IDC_SET_LEADCHNL_RA_SENSING_SENSITIVITY: 0.5 MV
MDC_IDC_SET_LEADCHNL_RV_PACING_AMPLITUDE: 1.5 V
MDC_IDC_SET_LEADCHNL_RV_PACING_CAPTURE_MODE: NORMAL
MDC_IDC_SET_LEADCHNL_RV_PACING_POLARITY: NORMAL
MDC_IDC_SET_LEADCHNL_RV_PACING_PULSEWIDTH: 0.4 MS
MDC_IDC_SET_LEADCHNL_RV_SENSING_POLARITY: NORMAL
MDC_IDC_SET_LEADCHNL_RV_SENSING_SENSITIVITY: 2 MV
MDC_IDC_SET_ZONE_DETECTION_INTERVAL: 333.33 MS
MDC_IDC_SET_ZONE_DETECTION_INTERVAL: 400 MS
MDC_IDC_SET_ZONE_TYPE: NORMAL
MDC_IDC_SET_ZONE_TYPE: NORMAL
MDC_IDC_STAT_AT_BURDEN_PERCENT: 0.6 %
MDC_IDC_STAT_AT_DTM_END: NORMAL
MDC_IDC_STAT_AT_DTM_START: NORMAL
MDC_IDC_STAT_AT_MODE_SW_COUNT: 202
MDC_IDC_STAT_BRADY_AP_VP_PERCENT: 21 %
MDC_IDC_STAT_BRADY_AP_VS_PERCENT: 31 %
MDC_IDC_STAT_BRADY_AS_VP_PERCENT: 10 %
MDC_IDC_STAT_BRADY_AS_VS_PERCENT: 39 %
MDC_IDC_STAT_BRADY_DTM_END: NORMAL
MDC_IDC_STAT_BRADY_DTM_START: NORMAL
MDC_IDC_STAT_EPISODE_RECENT_COUNT: 0
MDC_IDC_STAT_EPISODE_RECENT_COUNT: 56
MDC_IDC_STAT_EPISODE_RECENT_COUNT_DTM_END: NORMAL
MDC_IDC_STAT_EPISODE_RECENT_COUNT_DTM_END: NORMAL
MDC_IDC_STAT_EPISODE_RECENT_COUNT_DTM_START: NORMAL
MDC_IDC_STAT_EPISODE_RECENT_COUNT_DTM_START: NORMAL
MDC_IDC_STAT_EPISODE_TYPE: NORMAL
MDC_IDC_STAT_EPISODE_TYPE: NORMAL

## 2021-05-03 PROCEDURE — 93296 REM INTERROG EVL PM/IDS: CPT | Performed by: INTERNAL MEDICINE

## 2021-05-03 PROCEDURE — 93294 REM INTERROG EVL PM/LDLS PM: CPT | Performed by: INTERNAL MEDICINE

## 2021-05-05 ENCOUNTER — OFFICE VISIT (OUTPATIENT)
Dept: INTERNAL MEDICINE | Facility: CLINIC | Age: 86
End: 2021-05-05
Payer: COMMERCIAL

## 2021-05-05 ENCOUNTER — IMMUNIZATION (OUTPATIENT)
Dept: FAMILY MEDICINE | Facility: CLINIC | Age: 86
End: 2021-05-05
Payer: COMMERCIAL

## 2021-05-05 VITALS
HEART RATE: 62 BPM | OXYGEN SATURATION: 98 % | SYSTOLIC BLOOD PRESSURE: 116 MMHG | RESPIRATION RATE: 16 BRPM | WEIGHT: 130 LBS | HEIGHT: 65 IN | TEMPERATURE: 97.4 F | DIASTOLIC BLOOD PRESSURE: 72 MMHG | BODY MASS INDEX: 21.66 KG/M2

## 2021-05-05 DIAGNOSIS — I10 BENIGN ESSENTIAL HYPERTENSION: ICD-10-CM

## 2021-05-05 DIAGNOSIS — N18.31 STAGE 3A CHRONIC KIDNEY DISEASE (H): ICD-10-CM

## 2021-05-05 DIAGNOSIS — Z00.00 MEDICARE ANNUAL WELLNESS VISIT, SUBSEQUENT: Primary | ICD-10-CM

## 2021-05-05 DIAGNOSIS — I48.0 PAF (PAROXYSMAL ATRIAL FIBRILLATION) (H): ICD-10-CM

## 2021-05-05 DIAGNOSIS — F51.01 PRIMARY INSOMNIA: ICD-10-CM

## 2021-05-05 PROBLEM — N18.30 CHRONIC KIDNEY DISEASE, STAGE 3 (H): Status: ACTIVE | Noted: 2021-05-05

## 2021-05-05 LAB
ALBUMIN SERPL-MCNC: 3.9 G/DL (ref 3.4–5)
ALP SERPL-CCNC: 58 U/L (ref 40–150)
ALT SERPL W P-5'-P-CCNC: 22 U/L (ref 0–50)
ANION GAP SERPL CALCULATED.3IONS-SCNC: 6 MMOL/L (ref 3–14)
AST SERPL W P-5'-P-CCNC: 17 U/L (ref 0–45)
BILIRUB SERPL-MCNC: 1 MG/DL (ref 0.2–1.3)
BUN SERPL-MCNC: 31 MG/DL (ref 7–30)
CALCIUM SERPL-MCNC: 9.4 MG/DL (ref 8.5–10.1)
CHLORIDE SERPL-SCNC: 97 MMOL/L (ref 94–109)
CO2 SERPL-SCNC: 29 MMOL/L (ref 20–32)
CREAT SERPL-MCNC: 1.2 MG/DL (ref 0.52–1.04)
CREAT UR-MCNC: 49 MG/DL
GFR SERPL CREATININE-BSD FRML MDRD: 41 ML/MIN/{1.73_M2}
GLUCOSE SERPL-MCNC: 96 MG/DL (ref 70–99)
MICROALBUMIN UR-MCNC: <5 MG/L
MICROALBUMIN/CREAT UR: NORMAL MG/G CR (ref 0–25)
POTASSIUM SERPL-SCNC: 4.5 MMOL/L (ref 3.4–5.3)
PROT SERPL-MCNC: 7.7 G/DL (ref 6.8–8.8)
SODIUM SERPL-SCNC: 132 MMOL/L (ref 133–144)

## 2021-05-05 PROCEDURE — 80053 COMPREHEN METABOLIC PANEL: CPT | Performed by: INTERNAL MEDICINE

## 2021-05-05 PROCEDURE — 36415 COLL VENOUS BLD VENIPUNCTURE: CPT | Performed by: INTERNAL MEDICINE

## 2021-05-05 PROCEDURE — 99397 PER PM REEVAL EST PAT 65+ YR: CPT | Performed by: INTERNAL MEDICINE

## 2021-05-05 PROCEDURE — 0011A PR COVID VAC MODERNA 100 MCG/0.5 ML IM: CPT

## 2021-05-05 PROCEDURE — 91301 PR COVID VAC MODERNA 100 MCG/0.5 ML IM: CPT

## 2021-05-05 PROCEDURE — 82043 UR ALBUMIN QUANTITATIVE: CPT | Performed by: INTERNAL MEDICINE

## 2021-05-05 RX ORDER — LISINOPRIL AND HYDROCHLOROTHIAZIDE 20; 25 MG/1; MG/1
0.5 TABLET ORAL
COMMUNITY
Start: 2019-06-26 | End: 2021-05-05

## 2021-05-05 RX ORDER — METOPROLOL TARTRATE 25 MG/1
25 TABLET, FILM COATED ORAL 2 TIMES DAILY
Qty: 180 TABLET | Refills: 3 | Status: SHIPPED | OUTPATIENT
Start: 2021-05-05 | End: 2022-08-19

## 2021-05-05 RX ORDER — ZOLPIDEM TARTRATE 5 MG/1
TABLET ORAL
Qty: 15 TABLET | Refills: 0 | Status: SHIPPED | OUTPATIENT
Start: 2021-05-05 | End: 2021-08-24

## 2021-05-05 RX ORDER — LISINOPRIL AND HYDROCHLOROTHIAZIDE 20; 25 MG/1; MG/1
0.5 TABLET ORAL DAILY
Qty: 45 TABLET | Refills: 3 | Status: SHIPPED | OUTPATIENT
Start: 2021-05-05 | End: 2022-06-22

## 2021-05-05 ASSESSMENT — MIFFLIN-ST. JEOR: SCORE: 1035.56

## 2021-05-05 ASSESSMENT — PAIN SCALES - GENERAL: PAINLEVEL: NO PAIN (0)

## 2021-05-05 ASSESSMENT — ACTIVITIES OF DAILY LIVING (ADL): CURRENT_FUNCTION: NO ASSISTANCE NEEDED

## 2021-05-05 NOTE — PROGRESS NOTES
"SUBJECTIVE:   Audrey Ortega is a 86 year old female who presents for Preventive Visit.    Patient has been advised of split billing requirements and indicates understanding: Yes   Are you in the first 12 months of your Medicare coverage?  No    Healthy Habits:     In general, how would you rate your overall health?  Very good    Frequency of exercise:  6-7 days/week    Duration of exercise:  30-45 minutes    Do you usually eat at least 4 servings of fruit and vegetables a day, include whole grains    & fiber and avoid regularly eating high fat or \"junk\" foods?  Yes    Taking medications regularly:  Yes    Barriers to taking medications:  None    Medication side effects:  None    Ability to successfully perform activities of daily living:  No assistance needed    Home Safety:  No safety concerns identified    Hearing Impairment:  No hearing concerns    In the past 6 months, have you been bothered by leaking of urine? Yes    In general, how would you rate your overall mental or emotional health?  Good      PHQ-2 Total Score: 0    Needs the covid vaccine, staying safe.  Walks for exercise, plays with her dog.       Do you feel safe in your environment? Yes    Have you ever done Advance Care Planning? (For example, a Health Directive, POLST, or a discussion with a medical provider or your loved ones about your wishes): No, advance care planning information given to patient to review.  Advanced care planning was discussed at today's visit.       Fall risk  Fallen 2 or more times in the past year?: No  Any fall with injury in the past year?: No    Cognitive Screening   1) Repeat 3 items (Leader, Season, Table)    2) Clock draw: ABNORMAL   3) 3 item recall: Recalls 3 objects  Results: 3 items recalled: COGNITIVE IMPAIRMENT LESS LIKELY    Mini-CogTM Copyright S Destiney. Licensed by the author for use in [facility]; reprinted with permission (jatinder@.[facility]). All rights reserved.      Do you have sleep apnea, " "excessive snoring or daytime drowsiness?: no    Reviewed and updated as needed this visit by clinical staff  Tobacco   Meds              Reviewed and updated as needed this visit by Provider                Social History     Tobacco Use     Smoking status: Never Smoker     Smokeless tobacco: Never Used   Substance Use Topics     Alcohol use: Not Currently     If you drink alcohol do you typically have >3 drinks per day or >7 drinks per week? No    Alcohol Use 5/5/2021   Prescreen: >3 drinks/day or >7 drinks/week? No     Current providers sharing in care for this patient include:   Patient Care Team:  Krish Bates MD as PCP - General (Internal Medicine)  Krish Bates MD as Assigned PCP  Cassy Jama MD as Assigned Heart and Vascular Provider    The following health maintenance items are reviewed in Epic and correct as of today:  Health Maintenance Due   Topic Date Due     COVID-19 Vaccine (1) Never done     ZOSTER IMMUNIZATION (1 of 2) Never done     Pneumococcal Vaccine: 65+ Years (2 of 2 - PPSV23) 03/07/2020     FALL RISK ASSESSMENT  04/20/2021     MEDICARE ANNUAL WELLNESS VISIT  04/20/2021     Lab work is in process  Pneumonia Vaccine:will wait on 23 for the Covid vaccine.     No mammogram or colonoscopy.   Pertinent mammograms are reviewed under the imaging tab.    Review of Systems  Constitutional, HEENT, cardiovascular, pulmonary, gi and gu systems are negative, except as otherwise noted.    OBJECTIVE:   /72   Temp 97.4  F (36.3  C) (Temporal)   Resp 16   Ht 1.651 m (5' 5\")   Wt 59 kg (130 lb)   BMI 21.63 kg/m   Estimated body mass index is 21.63 kg/m  as calculated from the following:    Height as of this encounter: 1.651 m (5' 5\").    Weight as of this encounter: 59 kg (130 lb).  Physical Exam  GENERAL: healthy, alert and no distress  EYES: Eyes grossly normal to inspection, PERRL and conjunctivae and sclerae normal  HENT: ear canals and TM's normal, nose and mouth without ulcers or " "lesions  NECK: no adenopathy, no asymmetry, masses, or scars and thyroid normal to palpation  RESP: lungs clear to auscultation - no rales, rhonchi or wheezes  CV: regular rate and rhythm, normal S1 S2, no S3 or S4, no murmur, click or rub, no peripheral edema and peripheral pulses strong  ABDOMEN: soft, nontender, no hepatosplenomegaly, no masses and bowel sounds normal  MS: no gross musculoskeletal defects noted, no edema  SKIN: no suspicious lesions or rashes  NEURO: Normal strength and tone, mentation intact and speech normal  PSYCH: mentation appears normal, affect normal/bright        ASSESSMENT / PLAN:       ICD-10-CM    1. Medicare annual wellness visit, subsequent  Z00.00    2. Benign essential hypertension  I10 Comprehensive metabolic panel     Albumin Random Urine Quantitative with Creat Ratio     lisinopril-hydrochlorothiazide (ZESTORETIC) 20-25 MG tablet   3. Primary insomnia  F51.01 zolpidem (AMBIEN) 5 MG tablet   4. PAF (paroxysmal atrial fibrillation) (H)  I48.0 metoprolol tartrate (LOPRESSOR) 25 MG tablet     Overall doing well   She will get the Covid vaccine today.  BP is ok continue medications, pulse is controlled.   CKD on ace inhibitor Insomnia will refill ambien,     Check labs today.     Patient has been advised of split billing requirements and indicates understanding: Yes  COUNSELING:  Reviewed preventive health counseling, as reflected in patient instructions       Regular exercise       Healthy diet/nutrition       Immunizations    Vaccinated for: Covid              Estimated body mass index is 21.63 kg/m  as calculated from the following:    Height as of this encounter: 1.651 m (5' 5\").    Weight as of this encounter: 59 kg (130 lb).        She reports that she has never smoked. She has never used smokeless tobacco.      Appropriate preventive services were discussed with this patient, including applicable screening as appropriate for cardiovascular disease, diabetes, " osteopenia/osteoporosis, and glaucoma.  As appropriate for age/gender, discussed screening for colorectal cancer, prostate cancer, breast cancer, and cervical cancer. Checklist reviewing preventive services available has been given to the patient.    Reviewed patients plan of care and provided an AVS. The Basic Care Plan (routine screening as documented in Health Maintenance) for Audrey meets the Care Plan requirement. This Care Plan has been established and reviewed with the Patient.    Counseling Resources:  ATP IV Guidelines  Pooled Cohorts Equation Calculator  Breast Cancer Risk Calculator  Breast Cancer: Medication to Reduce Risk  FRAX Risk Assessment  ICSI Preventive Guidelines  Dietary Guidelines for Americans, 2010  USDA's MyPlate  ASA Prophylaxis  Lung CA Screening    Krish Bates MD  [facility]    Identified Health Risks:

## 2021-05-06 ENCOUNTER — TELEPHONE (OUTPATIENT)
Dept: INTERNAL MEDICINE | Facility: CLINIC | Age: 86
End: 2021-05-06

## 2021-05-06 NOTE — TELEPHONE ENCOUNTER
Tried to reach patient, left message for patient to call the clinic back. If patient calls back, please relay providers message to patient.    Let her know her sodium, potassium, blood sugars and kidneys are stable.  Continue her medications.    Thank you.    Tracy Layton CMA

## 2021-05-06 NOTE — TELEPHONE ENCOUNTER
----- Message from Krish Bates MD sent at 5/6/2021  8:21 AM CDT -----  Please call her and let her know her sodium, potassium, blood sugars and kidneys are stable.  Continue her medications.

## 2021-05-06 NOTE — TELEPHONE ENCOUNTER
Patient returned call and informed of providers message below of stable labs, continue present medications, no changes at this time. Danae Moreau LPN

## 2021-06-02 ENCOUNTER — IMMUNIZATION (OUTPATIENT)
Dept: FAMILY MEDICINE | Facility: CLINIC | Age: 86
End: 2021-06-02
Attending: FAMILY MEDICINE
Payer: COMMERCIAL

## 2021-06-02 PROCEDURE — 91301 PR COVID VAC MODERNA 100 MCG/0.5 ML IM: CPT

## 2021-06-02 PROCEDURE — 0012A PR COVID VAC MODERNA 100 MCG/0.5 ML IM: CPT

## 2021-07-12 ENCOUNTER — OFFICE VISIT (OUTPATIENT)
Dept: CARDIOLOGY | Facility: CLINIC | Age: 86
End: 2021-07-12
Payer: COMMERCIAL

## 2021-07-12 VITALS
OXYGEN SATURATION: 98 % | SYSTOLIC BLOOD PRESSURE: 144 MMHG | WEIGHT: 133.2 LBS | HEART RATE: 74 BPM | HEIGHT: 65 IN | DIASTOLIC BLOOD PRESSURE: 84 MMHG | BODY MASS INDEX: 22.19 KG/M2

## 2021-07-12 DIAGNOSIS — I48.0 PAF (PAROXYSMAL ATRIAL FIBRILLATION) (H): ICD-10-CM

## 2021-07-12 DIAGNOSIS — R60.0 LOWER EXTREMITY EDEMA: Primary | ICD-10-CM

## 2021-07-12 DIAGNOSIS — Z95.0 S/P PLACEMENT OF CARDIAC PACEMAKER: ICD-10-CM

## 2021-07-12 PROCEDURE — 99214 OFFICE O/P EST MOD 30 MIN: CPT | Performed by: INTERNAL MEDICINE

## 2021-07-12 ASSESSMENT — MIFFLIN-ST. JEOR: SCORE: 1050.07

## 2021-07-12 NOTE — PATIENT INSTRUCTIONS
-wear compression stockings daily  -Venous competency ultrasound of left leg  -Follow up with Dr. Jama in one year

## 2021-07-12 NOTE — LETTER
7/12/2021    Krish Bates MD  [address] [facility] [address]    RE: Audrey Ortega       Dear Colleague,    I had the pleasure of seeing Audrey Ortega in the [facility].    CARDIOLOGY CLINIC VISIT  DATE OF SERVICE:  July 14, 2021      PRIMARY CARE PHYSICIAN:  Krish Bates    HISTORY OF PRESENT ILLNESS:  Ms. Audrey Ortega, a pleasant 86 year old patient who has a past medical history significant for paroxysmal atrial fibrillation (declined anticoagulation), sick sinus syndrome status post permanent pacemaker in 2011 and hypertension who presents to clinic today for follow up.  She was last seen by Brooklyn melendrez in 7/2020.       In brief review, she has previously declined anticoagulation (unable to afford DOACs and warfarin caused bilateral shoulder pain). Previous device checks have been showing prolonged episodes of tachycardia thought to be atrial flutter or atrial tachycardia.     Previously, she noted paroxysmal palpitations (that she describes as pounding in her left ear) 2-3 times daily and when she lays down at night.  The on-call cardiologist, Dr. Roberts, reviewed her most recent device check that noted 60 recorded mode switches consistent with atrial arrhythmia on ventricular rates between 70 to 100 bpm.  She felt the this was likely indicating breakthrough atrial fibrillation and recommended metoprolol 25 mg twice daily. Due to hypotension Lisinopril was discontinued recently without further episodes. She also no longer has palpitations.     In follow up today, Audrey reports feeling well from a cardiac standpoint.  She denies any exertional chest pain, chest discomfort or shortness of breath.  She rarely notes heart palpitations.  She denies light-headedness or dizziness. She does note pain/aching in her right lower extremity and has prominent varicose veins on her shin.  She notes leg cramps at night.  She denies any lower extremity  edema.  She denies any prior hx of blood clots, thrombophlebitis, bleeding varicosities.   She does get regular exercise.  She has not taken any over the counter medications for the discomfort.  She has not tried compression stockings.      PAST MEDICAL HISTORY:  No past medical history on file.    MEDICATIONS:  Current Outpatient Medications   Medication     Ascorbic Acid (VITAMIN C) 500 MG CAPS     aspirin (ASA) 325 MG EC tablet     calcium 600 MG tablet     lisinopril-hydrochlorothiazide (ZESTORETIC) 20-25 MG tablet     magnesium 250 MG tablet     metoprolol tartrate (LOPRESSOR) 25 MG tablet     multivitamin w/minerals (MULTI-VITAMIN) tablet     Vitamin D, Cholecalciferol, 25 MCG (1000 UT) TABS     zolpidem (AMBIEN) 5 MG tablet     No current facility-administered medications for this visit.       ALLERGIES:  No Known Allergies    SOCIAL HISTORY:  I have reviewed this patient's social history and updated it with pertinent information if needed. Audrey Ortega  reports that she has never smoked. She has never used smokeless tobacco. She reports previous alcohol use.    FAMILY HISTORY:  I have reviewed this patient's family history and updated it with pertinent information if needed.   No family history on file.    REVIEW OF SYSTEMS:  A complete ROS was obtained and the pertinent positives are outlined in the history of present illness above.  The remainder of systems is negative.      PHYSICAL EXAM:      BP: (!) 144/84 Pulse: 74     SpO2: 98 %      Vital Signs with Ranges  Pulse:  [74] 74  BP: (144-150)/(84-86) 144/84  SpO2:  [98 %] 98 %  133 lbs 3.2 oz    Constitutional: awake, alert, no distress  Eyes: PERRL, sclera nonicteric  ENT: trachea midline  Respiratory: CTAB  Cardiovascular: RRR no m/r/g, no JVD  GI: nondistended, nontender, bowel sounds present  Lymph/Hematologic: no lymphadenopathy  Skin: dry, no rash  Musculoskeletal: good muscle tone, no edema bilaterally, mild chronic venous stasis changes bilaterally  (mild hyperpigmentation) with prominent varicosities noted on the left shin  Neurologic: no focal deficits  Neuropsychiatric: appropriate affact      ASSESSMENT:    1. Paroxysmal atrial fibrillation    Continues to decline anticoagulation, confirmed again in clinic today    Most recent device notes frequent mode switches; controlled ventricular rates    Previously metoprolol was decreased and then discontinued due to diarrhea, but the patient has restarted metoprolol at 25 mg twice daily     2. Hypertension    Lisinopril discontinued due to episodes of hypotension    Currently only maintained on metoprolol 25 twice daily     3. Sick sinus syndrome    Status post permanent pacemaker in 2011    Follows with device clinic    4.  Chronic venous disease:  Visible mild hyperpigmentation of lower extremities bilaterally with varicosities noted on left shin.  She is symptomatic on the left with leg aching and cramping.        RECOMMENDATIONS:  1. Reviewed the pathophysiology of chronic venous disease and treatment.  She is active and exercises regularly.  For further evaluation will proceed with left venous competency ultrasound. She was given a rx for compression stockings and instructed on their use. In the future, she could be considered for ablation procedure, +/- phlebectomy of symptomatic varicose veins depending on results of venous ultrasound.  2.  Plan for follow up in 12 months or before than as necessary.          Cassy Jama MD [facility]  Cardiology - [facility]  July 12, 2021      Thank you for allowing me to participate in the care of your patient.      Sincerely,     Cassy Jama MD     [facility]  cc:   Brooklyn Walker, ZA [facility]  [address] [place]  [place] [address]

## 2021-07-12 NOTE — PROGRESS NOTES
CARDIOLOGY CLINIC VISIT  DATE OF SERVICE:  July 14, 2021      PRIMARY CARE PHYSICIAN:  Krish Bates    HISTORY OF PRESENT ILLNESS:  Ms. Audrey Ortega, a pleasant 86 year old patient who has a past medical history significant for paroxysmal atrial fibrillation (declined anticoagulation), sick sinus syndrome status post permanent pacemaker in 2011 and hypertension who presents to clinic today for follow up.  She was last seen by Brooklyn melendrez in 7/2020.       In brief review, she has previously declined anticoagulation (unable to afford DOACs and warfarin caused bilateral shoulder pain). Previous device checks have been showing prolonged episodes of tachycardia thought to be atrial flutter or atrial tachycardia.     Previously, she noted paroxysmal palpitations (that she describes as pounding in her left ear) 2-3 times daily and when she lays down at night.  The on-call cardiologist, Dr. Roberts, reviewed her most recent device check that noted 60 recorded mode switches consistent with atrial arrhythmia on ventricular rates between 70 to 100 bpm.  She felt the this was likely indicating breakthrough atrial fibrillation and recommended metoprolol 25 mg twice daily. Due to hypotension Lisinopril was discontinued recently without further episodes. She also no longer has palpitations.     In follow up today, Audrey reports feeling well from a cardiac standpoint.  She denies any exertional chest pain, chest discomfort or shortness of breath.  She rarely notes heart palpitations.  She denies light-headedness or dizziness. She does note pain/aching in her right lower extremity and has prominent varicose veins on her shin.  She notes leg cramps at night.  She denies any lower extremity edema.  She denies any prior hx of blood clots, thrombophlebitis, bleeding varicosities.   She does get regular exercise.  She has not taken any over the counter medications for the discomfort.  She has not tried compression stockings.       PAST MEDICAL HISTORY:  No past medical history on file.    MEDICATIONS:  Current Outpatient Medications   Medication     Ascorbic Acid (VITAMIN C) 500 MG CAPS     aspirin (ASA) 325 MG EC tablet     calcium 600 MG tablet     lisinopril-hydrochlorothiazide (ZESTORETIC) 20-25 MG tablet     magnesium 250 MG tablet     metoprolol tartrate (LOPRESSOR) 25 MG tablet     multivitamin w/minerals (MULTI-VITAMIN) tablet     Vitamin D, Cholecalciferol, 25 MCG (1000 UT) TABS     zolpidem (AMBIEN) 5 MG tablet     No current facility-administered medications for this visit.       ALLERGIES:  No Known Allergies    SOCIAL HISTORY:  I have reviewed this patient's social history and updated it with pertinent information if needed. Audrey Ortega  reports that she has never smoked. She has never used smokeless tobacco. She reports previous alcohol use.    FAMILY HISTORY:  I have reviewed this patient's family history and updated it with pertinent information if needed.   No family history on file.    REVIEW OF SYSTEMS:  A complete ROS was obtained and the pertinent positives are outlined in the history of present illness above.  The remainder of systems is negative.      PHYSICAL EXAM:      BP: (!) 144/84 Pulse: 74     SpO2: 98 %      Vital Signs with Ranges  Pulse:  [74] 74  BP: (144-150)/(84-86) 144/84  SpO2:  [98 %] 98 %  133 lbs 3.2 oz    Constitutional: awake, alert, no distress  Eyes: PERRL, sclera nonicteric  ENT: trachea midline  Respiratory: CTAB  Cardiovascular: RRR no m/r/g, no JVD  GI: nondistended, nontender, bowel sounds present  Lymph/Hematologic: no lymphadenopathy  Skin: dry, no rash  Musculoskeletal: good muscle tone, no edema bilaterally, mild chronic venous stasis changes bilaterally (mild hyperpigmentation) with prominent varicosities noted on the left shin  Neurologic: no focal deficits  Neuropsychiatric: appropriate affact      ASSESSMENT:    1. Paroxysmal atrial fibrillation    Continues to decline  anticoagulation, confirmed again in clinic today    Most recent device notes frequent mode switches; controlled ventricular rates    Previously metoprolol was decreased and then discontinued due to diarrhea, but the patient has restarted metoprolol at 25 mg twice daily     2. Hypertension    Lisinopril discontinued due to episodes of hypotension    Currently only maintained on metoprolol 25 twice daily     3. Sick sinus syndrome    Status post permanent pacemaker in 2011    Follows with device clinic    4.  Chronic venous disease:  Visible mild hyperpigmentation of lower extremities bilaterally with varicosities noted on left shin.  She is symptomatic on the left with leg aching and cramping.        RECOMMENDATIONS:  1. Reviewed the pathophysiology of chronic venous disease and treatment.  She is active and exercises regularly.  For further evaluation will proceed with left venous competency ultrasound. She was given a rx for compression stockings and instructed on their use. In the future, she could be considered for ablation procedure, +/- phlebectomy of symptomatic varicose veins depending on results of venous ultrasound.  2.  Plan for follow up in 12 months or before than as necessary.          Cassy Jama MD [facility]  Cardiology - [facility]  July 12, 2021

## 2021-07-16 ENCOUNTER — HOSPITAL ENCOUNTER (OUTPATIENT)
Dept: ULTRASOUND IMAGING | Facility: CLINIC | Age: 86
Discharge: HOME | End: 2021-07-16
Attending: INTERNAL MEDICINE | Admitting: INTERNAL MEDICINE
Payer: MEDICARE

## 2021-07-16 DIAGNOSIS — R60.0 LOWER EXTREMITY EDEMA: ICD-10-CM

## 2021-07-16 PROCEDURE — 93971 EXTREMITY STUDY: CPT | Mod: LT

## 2021-08-13 ENCOUNTER — NURSE TRIAGE (OUTPATIENT)
Dept: INTERNAL MEDICINE | Facility: CLINIC | Age: 86
End: 2021-08-13

## 2021-08-13 NOTE — TELEPHONE ENCOUNTER
Advised in person OV today to assess intermittent dizziness/lightheadeness over the last 2 weeks.     Denies any chest pain, trouble breathing, SOB or weakness. HA is mild, responds to Tylenol. Has been drinking plenty of fluids. BP has been on the lower end, was 110/60 today, HR unknown. The Pt has been holding her HTN medications.     Transferred to central scheduling.     Dianna Nuñez RN   [facility] -- Triage Nurse        Reason for Disposition    Lightheadedness (dizziness) present now, after 2 hours of rest and fluids    Additional Information    Negative: Shock suspected (e.g., cold/pale/clammy skin, too weak to stand, low BP, rapid pulse)    Negative: Difficult to awaken or acting confused (e.g., disoriented, slurred speech)    Negative: Fainted, and still feels dizzy afterwards    Negative: Severe difficulty breathing (e.g., struggling for each breath, speaks in single words)    Negative: Overdose (accidental or intentional) of medications    Negative: New neurologic deficit that is present now: * Weakness of the face, arm, or leg on one side of the body * Numbness of the face, arm, or leg on one side of the body * Loss of speech or garbled speech    Negative: Heart beating < 50 beats per minute OR > 140 beats per minute    Negative: Sounds like a life-threatening emergency to the triager    Negative: Chest pain    Negative: Rectal bleeding, bloody stool, or tarry-black stool    Negative: Vomiting is the main symptom    Negative: Diarrhea is the main symptom    Negative: Headache is the main symptom    Negative: Heat exhaustion suspected (i.e., dehydration from heat exposure)    Negative: Patient states that he/she is having an anxiety/panic attack    Negative: SEVERE dizziness (e.g., unable to stand, requires support to walk, feels like passing out now)    Negative: SEVERE headache or neck pain    Negative: Spinning or tilting sensation (vertigo) present now and one or more stroke risk factors  "(i.e., hypertension, diabetes, prior stroke/TIA, heart attack, age over 60) (Exception: prior physician evaluation for this AND no different/worse than usual)    Negative: Loss of vision or double vision    Negative: Extra heart beats OR irregular heart beating (i.e., 'palpitations')    Negative: Difficulty breathing    Negative: Drinking very little and has signs of dehydration (e.g., no urine > 12 hours, very dry mouth, very lightheaded)    Negative: Follows bleeding (e.g., stomach, rectum, vagina) (Exception: became dizzy from sight of small amount blood)    Negative: Patient sounds very sick or weak to the triager    Answer Assessment - Initial Assessment Questions  1. DESCRIPTION: \"Describe your dizziness.\"      Feels dizzy and lightheaded.   2. LIGHTHEADED: \"Do you feel lightheaded?\" (e.g., somewhat faint, woozy, weak upon standing)      Yes feels lightheaded.   3. VERTIGO: \"Do you feel like either you or the room is spinning or tilting?\" (i.e. vertigo)      No vertigo.   4. SEVERITY: \"How bad is it?\"  \"Do you feel like you are going to faint?\" \"Can you stand and walk?\"    - MILD - walking normally    - MODERATE - interferes with normal activities (e.g., work, school)     - SEVERE - unable to stand, requires support to walk, feels like passing out now.       Mild.   5. ONSET:  \"When did the dizziness begin?\"      A few weeks ago it started.   6. AGGRAVATING FACTORS: \"Does anything make it worse?\" (e.g., standing, change in head position)      Nothing makes it worse, nothing makes it better.   7. HEART RATE: \"Can you tell me your heart rate?\" \"How many beats in 15 seconds?\"  (Note: not all patients can do this)        No.  8. CAUSE: \"What do you think is causing the dizziness?\"      Unknown.   9. RECURRENT SYMPTOM: \"Have you had dizziness before?\" If so, ask: \"When was the last time?\" \"What happened that time?\"      Had an episode of low sodium,   10. OTHER SYMPTOMS: \"Do you have any other symptoms?\" (e.g., " "fever, chest pain, vomiting, diarrhea, bleeding)        Has a headache, rating pain at a 3/10.   11. PREGNANCY: \"Is there any chance you are pregnant?\" \"When was your last menstrual period?\"        No    Protocols used: DIZZINESS-A-OH      "

## 2021-08-23 DIAGNOSIS — F51.01 PRIMARY INSOMNIA: ICD-10-CM

## 2021-08-24 RX ORDER — ZOLPIDEM TARTRATE 5 MG/1
TABLET ORAL
Qty: 15 TABLET | Refills: 0 | Status: SHIPPED | OUTPATIENT
Start: 2021-08-24 | End: 2022-02-01

## 2021-08-24 NOTE — TELEPHONE ENCOUNTER
Requested Prescriptions   Pending Prescriptions Disp Refills     zolpidem (AMBIEN) 5 MG tablet [Pharmacy Med Name: Zolpidem Tartrate 5 MG Oral Tablet] 15 tablet 0     Sig: TAKE 1/2 (ONE-HALF) TABLET BY MOUTH AS NEEDED FOR SLEEP     Last Written Prescription Date:  05/05/2021  Last Fill Quantity: 15,   # refills: 0  Last Office Visit: 05/05/2021  Future Office visit:       Routing refill request to provider for review/approval because:  Drug not on the [facility], [facility] or [facility] refill protocol or controlled substance

## 2021-08-26 ENCOUNTER — ANCILLARY PROCEDURE (OUTPATIENT)
Dept: CARDIOLOGY | Facility: CLINIC | Age: 86
End: 2021-08-26
Attending: INTERNAL MEDICINE
Payer: COMMERCIAL

## 2021-08-26 DIAGNOSIS — I49.5 SSS (SICK SINUS SYNDROME) (H): Primary | ICD-10-CM

## 2021-08-26 DIAGNOSIS — Z95.0 CARDIAC PACEMAKER IN SITU: ICD-10-CM

## 2021-08-26 PROCEDURE — 93280 PM DEVICE PROGR EVAL DUAL: CPT | Performed by: INTERNAL MEDICINE

## 2021-09-20 ENCOUNTER — TELEPHONE (OUTPATIENT)
Dept: INTERNAL MEDICINE | Facility: CLINIC | Age: 86
End: 2021-09-20

## 2021-09-20 NOTE — TELEPHONE ENCOUNTER
Reason for Call:  Other appointment    Detailed comments: Patient said she is hoping to get something for her knee it is really bothering her and nothing seems to be helping it. She has tried everything she can think of but it is hurting so bad she can hardly walk on it.     Phone Number Patient can be reached at: Home number on file [phone] (home)    Best Time: Any    Can we leave a detailed message on this number? YES    Call taken on 9/20/2021 at 11:13 AM by Zhanna Astudillo

## 2021-09-20 NOTE — TELEPHONE ENCOUNTER
She needs a visit with someone, ortho or sports med ok if they have openings, otherwise use my virtual time

## 2021-09-27 ENCOUNTER — HOSPITAL ENCOUNTER (EMERGENCY)
Facility: CLINIC | Age: 86
Discharge: HOME | End: 2021-09-28
Attending: FAMILY MEDICINE | Admitting: FAMILY MEDICINE
Payer: MEDICARE

## 2021-09-27 ENCOUNTER — HOSPITAL ENCOUNTER (EMERGENCY)
Facility: CLINIC | Age: 86
End: 2021-09-27
Payer: MEDICARE

## 2021-09-27 ENCOUNTER — NURSE TRIAGE (OUTPATIENT)
Dept: INTERNAL MEDICINE | Facility: CLINIC | Age: 86
End: 2021-09-27

## 2021-09-27 VITALS
TEMPERATURE: 98.2 F | DIASTOLIC BLOOD PRESSURE: 81 MMHG | SYSTOLIC BLOOD PRESSURE: 153 MMHG | RESPIRATION RATE: 18 BRPM | OXYGEN SATURATION: 97 % | HEART RATE: 77 BPM

## 2021-09-27 DIAGNOSIS — M71.22 BAKER'S CYST OF KNEE, LEFT: ICD-10-CM

## 2021-09-27 PROCEDURE — 99284 EMERGENCY DEPT VISIT MOD MDM: CPT | Mod: 25

## 2021-09-27 PROCEDURE — 99282 EMERGENCY DEPT VISIT SF MDM: CPT | Performed by: FAMILY MEDICINE

## 2021-09-27 NOTE — TELEPHONE ENCOUNTER
"Patient passed out yesterday due to hypotension 60/40, ate some lunch and it went back up.     Today BP was 162/75, asymptomatic. Typically runs lower.    Left leg pain started yesterday after passing out, does not recall injury. Fell on butt.     Entire leg pain. Swelling appears behind knee. Pain when sitting and leg is up then very mild. When sitting with leg down or walking then pain goes up to 8-9/10. Able to walk but stiff leg due to pain.      No redness, no warmth, no fevers.     Denies SOB, denies chest pain.     Has pacemaker.     GO TO ED/UCC NOW (OR TO OFFICE WITH PCP APPROVAL):                          * After using nurse judgment regarding the most appropriate site, tell the caller:  Go to [facility] ED.  Leave NOW.    * TRIAGER CAUTION: In selecting the most appropriate care site, you must consider both the severity of the patient's symptoms AND what resources are available at that care site.  * ED: Patients who may need surgery, sound seriously ill or may be unstable need to be sent to an ED. Likewise, so do most patients with complex medical problems and serious symptoms.    Advised patient to be seen in emergency room. Patient states that she was there this morning and waiting 1 hour, leg started to hurt from sitting there and she left.     Advised to go back to the emergency room and wait to be seen. Advised patient to let them know her leg is in pain and ask for a chair and pillow to prop leg up while waiting.     Patient agrees to plan and will go back to ED at [facility].     LUISA Gonzales/[place] [facility]      1. ONSET: \"When did the pain start?\"       See above  2. LOCATION: \"Where is the pain located?\"       Left leg  3. PAIN: \"How bad is the pain?\"    (Scale 1-10; or mild, moderate, severe)    -  MILD (1-3): doesn't interfere with normal activities     -  MODERATE (4-7): interferes with normal activities (e.g., work or school) or awakens from " "sleep, limping     -  SEVERE (8-10): excruciating pain, unable to do any normal activities, unable to walk      Mild-severe  4. WORK OR EXERCISE: \"Has there been any recent work or exercise that involved this part of the body?\"       Not known of  5. CAUSE: \"What do you think is causing the leg pain?\"      Does not know  6. OTHER SYMPTOMS: \"Do you have any other symptoms?\" (e.g., chest pain, back pain, breathing difficulty, swelling, rash, fever, numbness, weakness)      Swelling        Reason for Disposition    Thigh or calf pain in only one leg and present > 1 hour    Additional Information    Negative: Looks like a broken bone or dislocated joint (e.g., crooked or deformed)    Negative: Sounds like a life-threatening emergency to the triager    Negative: Followed a hip injury    Negative: Followed a knee injury    Negative: Followed an ankle or foot injury    Negative: Back pain radiating (shooting) into leg(s)    Negative: Foot pain is the main symptom    Negative: Ankle pain is the main symptom    Negative: Knee pain is the main symptom    Negative: Leg swelling is the main symptom    Negative: Chest pain    Negative: Difficulty breathing    Negative: Entire foot is cool or blue in comparison to other side    Negative: Unable to walk    Negative: Fever and red area (or area very tender to touch)    Negative: Fever and swollen joint    Protocols used: LEG PAIN-A-OH      "

## 2021-09-28 ENCOUNTER — APPOINTMENT (OUTPATIENT)
Dept: ULTRASOUND IMAGING | Facility: CLINIC | Age: 86
End: 2021-09-28
Attending: FAMILY MEDICINE
Payer: MEDICARE

## 2021-09-28 PROCEDURE — 93971 EXTREMITY STUDY: CPT | Mod: LT

## 2021-09-28 NOTE — ED PROVIDER NOTES
History     Chief Complaint   Patient presents with     Leg Pain     HPI  Audrey Ortega is a 86 year old female who is concerned about a feeling of swelling and tenderness to the posterior left knee and left calf area.  Patient states that her symptoms started couple days ago but seem to be getting more severe.  She does not recall any specific injury but states that she did have a fall several days ago but does not think she injured the leg with the fall.  Patient has had no fever symptoms, cough, chest pain, shortness of breath, or skin wound to the left leg.        Allergies:  No Known Allergies    Problem List:    Patient Active Problem List    Diagnosis Date Noted     Chronic kidney disease, stage 3 05/05/2021     Priority: Medium        Past Medical History:    No past medical history on file.    Past Surgical History:    Past Surgical History:   Procedure Laterality Date     PHACOEMULSIFICATION WITH STANDARD INTRAOCULAR LENS IMPLANT Right 7/16/2020    Procedure: Cataract surgery with intraocular lens implant;  Surgeon: Fortino Ford MD;  Location: [facility] OR     PHACOEMULSIFICATION WITH STANDARD INTRAOCULAR LENS IMPLANT Left 7/30/2020    Procedure: Cataract Surgery Left Eye;  Surgeon: Max Cooley MD;  Location: [facility] OR       Family History:    No family history on file.    Social History:  Marital Status:  Single [1]  Social History     Tobacco Use     Smoking status: Never Smoker     Smokeless tobacco: Never Used   Substance Use Topics     Alcohol use: Not Currently     Drug use: Not on file        Medications:    zolpidem (AMBIEN) 5 MG tablet  Ascorbic Acid (VITAMIN C) 500 MG CAPS  aspirin (ASA) 325 MG EC tablet  calcium 600 MG tablet  COMPRESSION STOCKINGS  lisinopril-hydrochlorothiazide (ZESTORETIC) 20-25 MG tablet  magnesium 250 MG tablet  metoprolol tartrate (LOPRESSOR) 25 MG tablet  multivitamin w/minerals (MULTI-VITAMIN) tablet  Vitamin D, Cholecalciferol, 25 MCG (1000 UT)  TABS          Review of Systems  Review of Systems   Constitutional: Negative for appetite change, chills and fever.   HENT: Negative.    Eyes: Negative.    Respiratory: Negative.    Cardiovascular: Positive for leg swelling. Negative for chest pain and palpitations.   Genitourinary: Negative.    Musculoskeletal: Positive for joint swelling (posterior left knee) and myalgias (left calf area tenderness).   Skin: Negative.  Negative for color change, rash and wound.   Neurological: Negative.    Hematological: Negative.    Psychiatric/Behavioral: Negative.    All other systems reviewed and are negative.    Physical Exam   BP: (!) 153/81  Pulse: 77  Temp: 98.2  F (36.8  C)  Resp: 18  SpO2: 97 %      Physical Exam  Vitals and nursing note reviewed.   HENT:      Head: Normocephalic and atraumatic.   Cardiovascular:      Rate and Rhythm: Normal rate.      Pulses:           Dorsalis pedis pulses are 1+ on the right side and 1+ on the left side.        Posterior tibial pulses are 1+ on the right side and 1+ on the left side.   Pulmonary:      Effort: Pulmonary effort is normal.      Breath sounds: Normal breath sounds.   Abdominal:      General: There is no distension.      Tenderness: There is no abdominal tenderness.   Musculoskeletal:         General: Tenderness (posterior left knee, ? Baker's cyst on exam.) present. No deformity or signs of injury.      Cervical back: Normal range of motion and neck supple.      Right lower leg: No edema.      Left lower leg: No edema.   Skin:     Capillary Refill: Capillary refill takes less than 2 seconds.      Findings: No bruising, lesion or rash.   Neurological:      Mental Status: She is alert and oriented to person, place, and time.   Psychiatric:         Mood and Affect: Mood normal.         Behavior: Behavior normal.     ED Course        Procedures              Critical Care time:  none               Results for orders placed or performed during the hospital encounter of  09/27/21 (from the past 24 hour(s))   US Lower Extremity Venous Duplex Left    Narrative    EXAM: US LOWER EXTREMITY VENOUS DUPLEX LEFT  LOCATION: [facility]  DATE/TIME: 9/28/2021 12:53 AM    INDICATION: leg pain, swelling, ? Bakers cyst as well  COMPARISON: None.  TECHNIQUE: Venous Duplex ultrasound of the left lower extremity with and without compression, augmentation and duplex. Color flow and spectral Doppler with waveform analysis performed.    FINDINGS: Exam includes the common femoral, femoral, popliteal, and contralateral common femoral veins as well as segmentally visualized deep calf veins and greater saphenous vein.     LEFT: No deep vein thrombosis. No superficial thrombophlebitis. 3.8 x 1.9 x 2.5 cm popliteal cyst.      Impression    IMPRESSION:  1.  No deep venous thrombosis in the left lower extremity.  2.  Baker's cyst.           Assessments & Plan (with Medical Decision Making)  86-year-old to the ER with concerns of left lower leg pain specifically posterior left knee pain.  Exam findings consistent with Baker's cyst is likely reason for symptoms.  Patient was instructed on options for treatment for the cyst.  She was also sent home with handouts describing things that she can do to help with a Baker's cyst/pain.  Patient was relieved that she did not have a DVT in her left leg as reason for symptoms.  Encourage follow-up with her clinic if not improved over the next 10 to 14 days.  To return to the ER for increase or worsening symptoms if needed.     I have reviewed the nursing notes.    I have reviewed the findings, diagnosis, plan and need for follow up with the patient.       Final diagnoses:   Baker's cyst of knee, left       9/27/2021   [facility] EMERGENCY DEPT     Tyler Ward, DO  09/28/21 [id number]

## 2021-11-10 LAB
MDC_IDC_LEAD_IMPLANT_DT: NORMAL
MDC_IDC_LEAD_IMPLANT_DT: NORMAL
MDC_IDC_LEAD_LOCATION: NORMAL
MDC_IDC_LEAD_LOCATION: NORMAL
MDC_IDC_LEAD_LOCATION_DETAIL_1: NORMAL
MDC_IDC_LEAD_LOCATION_DETAIL_1: NORMAL
MDC_IDC_LEAD_MFG: NORMAL
MDC_IDC_LEAD_MFG: NORMAL
MDC_IDC_LEAD_MODEL: NORMAL
MDC_IDC_LEAD_MODEL: NORMAL
MDC_IDC_LEAD_POLARITY_TYPE: NORMAL
MDC_IDC_LEAD_POLARITY_TYPE: NORMAL
MDC_IDC_LEAD_SERIAL: NORMAL
MDC_IDC_LEAD_SERIAL: NORMAL
MDC_IDC_MSMT_BATTERY_DTM: NORMAL
MDC_IDC_MSMT_BATTERY_IMPEDANCE: 2854 OHM
MDC_IDC_MSMT_BATTERY_REMAINING_LONGEVITY: 25 MO
MDC_IDC_MSMT_BATTERY_STATUS: NORMAL
MDC_IDC_MSMT_BATTERY_VOLTAGE: 2.73 V
MDC_IDC_MSMT_LEADCHNL_RA_IMPEDANCE_VALUE: 433 OHM
MDC_IDC_MSMT_LEADCHNL_RA_PACING_THRESHOLD_AMPLITUDE: 0.5 V
MDC_IDC_MSMT_LEADCHNL_RA_PACING_THRESHOLD_PULSEWIDTH: 0.4 MS
MDC_IDC_MSMT_LEADCHNL_RA_SENSING_INTR_AMPL: 4 MV
MDC_IDC_MSMT_LEADCHNL_RV_IMPEDANCE_VALUE: 536 OHM
MDC_IDC_MSMT_LEADCHNL_RV_PACING_THRESHOLD_AMPLITUDE: 0.38 V
MDC_IDC_MSMT_LEADCHNL_RV_PACING_THRESHOLD_PULSEWIDTH: 0.4 MS
MDC_IDC_MSMT_LEADCHNL_RV_SENSING_INTR_AMPL: 8 MV
MDC_IDC_PG_IMPLANT_DTM: NORMAL
MDC_IDC_PG_MFG: NORMAL
MDC_IDC_PG_MODEL: NORMAL
MDC_IDC_PG_SERIAL: NORMAL
MDC_IDC_PG_TYPE: NORMAL
MDC_IDC_SESS_CLINIC_NAME: NORMAL
MDC_IDC_SESS_DTM: NORMAL
MDC_IDC_SESS_TYPE: NORMAL
MDC_IDC_SET_BRADY_AT_MODE_SWITCH_MODE: NORMAL
MDC_IDC_SET_BRADY_AT_MODE_SWITCH_RATE: 150 {BEATS}/MIN
MDC_IDC_SET_BRADY_LOWRATE: 60 {BEATS}/MIN
MDC_IDC_SET_BRADY_MAX_SENSOR_RATE: 130 {BEATS}/MIN
MDC_IDC_SET_BRADY_MAX_TRACKING_RATE: 130 {BEATS}/MIN
MDC_IDC_SET_BRADY_MODE: NORMAL
MDC_IDC_SET_BRADY_PAV_DELAY_LOW: 180 MS
MDC_IDC_SET_BRADY_SAV_DELAY_LOW: 150 MS
MDC_IDC_SET_LEADCHNL_RA_PACING_AMPLITUDE: 1.5 V
MDC_IDC_SET_LEADCHNL_RA_PACING_CAPTURE_MODE: NORMAL
MDC_IDC_SET_LEADCHNL_RA_PACING_POLARITY: NORMAL
MDC_IDC_SET_LEADCHNL_RA_PACING_PULSEWIDTH: 0.4 MS
MDC_IDC_SET_LEADCHNL_RA_SENSING_POLARITY: NORMAL
MDC_IDC_SET_LEADCHNL_RA_SENSING_SENSITIVITY: 0.5 MV
MDC_IDC_SET_LEADCHNL_RV_PACING_AMPLITUDE: 1.5 V
MDC_IDC_SET_LEADCHNL_RV_PACING_CAPTURE_MODE: NORMAL
MDC_IDC_SET_LEADCHNL_RV_PACING_POLARITY: NORMAL
MDC_IDC_SET_LEADCHNL_RV_PACING_PULSEWIDTH: 0.4 MS
MDC_IDC_SET_LEADCHNL_RV_SENSING_POLARITY: NORMAL
MDC_IDC_SET_LEADCHNL_RV_SENSING_SENSITIVITY: 2.8 MV
MDC_IDC_SET_ZONE_DETECTION_INTERVAL: 333.33 MS
MDC_IDC_SET_ZONE_DETECTION_INTERVAL: 400 MS
MDC_IDC_SET_ZONE_TYPE: NORMAL
MDC_IDC_SET_ZONE_TYPE: NORMAL
MDC_IDC_STAT_AT_BURDEN_PERCENT: 0.7 %
MDC_IDC_STAT_AT_DTM_END: NORMAL
MDC_IDC_STAT_AT_DTM_START: NORMAL
MDC_IDC_STAT_AT_MODE_SW_COUNT: 239
MDC_IDC_STAT_BRADY_AP_VP_PERCENT: 22 %
MDC_IDC_STAT_BRADY_AP_VS_PERCENT: 33 %
MDC_IDC_STAT_BRADY_AS_VP_PERCENT: 9 %
MDC_IDC_STAT_BRADY_AS_VS_PERCENT: 36 %
MDC_IDC_STAT_BRADY_DTM_END: NORMAL
MDC_IDC_STAT_BRADY_DTM_START: NORMAL
MDC_IDC_STAT_EPISODE_RECENT_COUNT: 0
MDC_IDC_STAT_EPISODE_RECENT_COUNT: 75
MDC_IDC_STAT_EPISODE_RECENT_COUNT_DTM_END: NORMAL
MDC_IDC_STAT_EPISODE_RECENT_COUNT_DTM_END: NORMAL
MDC_IDC_STAT_EPISODE_RECENT_COUNT_DTM_START: NORMAL
MDC_IDC_STAT_EPISODE_RECENT_COUNT_DTM_START: NORMAL
MDC_IDC_STAT_EPISODE_TYPE: NORMAL
MDC_IDC_STAT_EPISODE_TYPE: NORMAL

## 2021-12-02 ENCOUNTER — ANCILLARY PROCEDURE (OUTPATIENT)
Dept: CARDIOLOGY | Facility: CLINIC | Age: 86
End: 2021-12-02
Attending: INTERNAL MEDICINE
Payer: COMMERCIAL

## 2021-12-02 DIAGNOSIS — Z95.0 CARDIAC PACEMAKER IN SITU: ICD-10-CM

## 2021-12-02 DIAGNOSIS — I49.5 SSS (SICK SINUS SYNDROME) (H): ICD-10-CM

## 2021-12-02 PROCEDURE — 93294 REM INTERROG EVL PM/LDLS PM: CPT | Performed by: INTERNAL MEDICINE

## 2021-12-02 PROCEDURE — 93296 REM INTERROG EVL PM/IDS: CPT | Performed by: INTERNAL MEDICINE

## 2021-12-03 LAB
MDC_IDC_LEAD_IMPLANT_DT: NORMAL
MDC_IDC_LEAD_IMPLANT_DT: NORMAL
MDC_IDC_LEAD_LOCATION: NORMAL
MDC_IDC_LEAD_LOCATION: NORMAL
MDC_IDC_LEAD_LOCATION_DETAIL_1: NORMAL
MDC_IDC_LEAD_LOCATION_DETAIL_1: NORMAL
MDC_IDC_LEAD_MFG: NORMAL
MDC_IDC_LEAD_MFG: NORMAL
MDC_IDC_LEAD_MODEL: NORMAL
MDC_IDC_LEAD_MODEL: NORMAL
MDC_IDC_LEAD_POLARITY_TYPE: NORMAL
MDC_IDC_LEAD_POLARITY_TYPE: NORMAL
MDC_IDC_LEAD_SERIAL: NORMAL
MDC_IDC_LEAD_SERIAL: NORMAL
MDC_IDC_MSMT_BATTERY_DTM: NORMAL
MDC_IDC_MSMT_BATTERY_IMPEDANCE: 2871 OHM
MDC_IDC_MSMT_BATTERY_REMAINING_LONGEVITY: 25 MO
MDC_IDC_MSMT_BATTERY_STATUS: NORMAL
MDC_IDC_MSMT_BATTERY_VOLTAGE: 2.72 V
MDC_IDC_MSMT_LEADCHNL_RA_IMPEDANCE_VALUE: 388 OHM
MDC_IDC_MSMT_LEADCHNL_RA_PACING_THRESHOLD_AMPLITUDE: 0.5 V
MDC_IDC_MSMT_LEADCHNL_RA_PACING_THRESHOLD_PULSEWIDTH: 0.4 MS
MDC_IDC_MSMT_LEADCHNL_RV_IMPEDANCE_VALUE: 487 OHM
MDC_IDC_MSMT_LEADCHNL_RV_PACING_THRESHOLD_AMPLITUDE: 0.38 V
MDC_IDC_MSMT_LEADCHNL_RV_PACING_THRESHOLD_PULSEWIDTH: 0.4 MS
MDC_IDC_PG_IMPLANT_DTM: NORMAL
MDC_IDC_PG_MFG: NORMAL
MDC_IDC_PG_MODEL: NORMAL
MDC_IDC_PG_SERIAL: NORMAL
MDC_IDC_PG_TYPE: NORMAL
MDC_IDC_SESS_CLINIC_NAME: NORMAL
MDC_IDC_SESS_DTM: NORMAL
MDC_IDC_SESS_TYPE: NORMAL
MDC_IDC_SET_BRADY_AT_MODE_SWITCH_MODE: NORMAL
MDC_IDC_SET_BRADY_AT_MODE_SWITCH_RATE: 150 {BEATS}/MIN
MDC_IDC_SET_BRADY_LOWRATE: 60 {BEATS}/MIN
MDC_IDC_SET_BRADY_MAX_SENSOR_RATE: 130 {BEATS}/MIN
MDC_IDC_SET_BRADY_MAX_TRACKING_RATE: 130 {BEATS}/MIN
MDC_IDC_SET_BRADY_MODE: NORMAL
MDC_IDC_SET_BRADY_PAV_DELAY_LOW: 180 MS
MDC_IDC_SET_BRADY_SAV_DELAY_LOW: 150 MS
MDC_IDC_SET_LEADCHNL_RA_PACING_AMPLITUDE: 1.5 V
MDC_IDC_SET_LEADCHNL_RA_PACING_CAPTURE_MODE: NORMAL
MDC_IDC_SET_LEADCHNL_RA_PACING_POLARITY: NORMAL
MDC_IDC_SET_LEADCHNL_RA_PACING_PULSEWIDTH: 0.4 MS
MDC_IDC_SET_LEADCHNL_RA_SENSING_POLARITY: NORMAL
MDC_IDC_SET_LEADCHNL_RA_SENSING_SENSITIVITY: 0.5 MV
MDC_IDC_SET_LEADCHNL_RV_PACING_AMPLITUDE: 1.5 V
MDC_IDC_SET_LEADCHNL_RV_PACING_CAPTURE_MODE: NORMAL
MDC_IDC_SET_LEADCHNL_RV_PACING_POLARITY: NORMAL
MDC_IDC_SET_LEADCHNL_RV_PACING_PULSEWIDTH: 0.4 MS
MDC_IDC_SET_LEADCHNL_RV_SENSING_POLARITY: NORMAL
MDC_IDC_SET_LEADCHNL_RV_SENSING_SENSITIVITY: 2 MV
MDC_IDC_SET_ZONE_DETECTION_INTERVAL: 333.33 MS
MDC_IDC_SET_ZONE_DETECTION_INTERVAL: 400 MS
MDC_IDC_SET_ZONE_TYPE: NORMAL
MDC_IDC_SET_ZONE_TYPE: NORMAL
MDC_IDC_STAT_AT_BURDEN_PERCENT: 0.6 %
MDC_IDC_STAT_AT_DTM_END: NORMAL
MDC_IDC_STAT_AT_DTM_START: NORMAL
MDC_IDC_STAT_AT_MODE_SW_COUNT: 33
MDC_IDC_STAT_BRADY_AP_VP_PERCENT: 11 %
MDC_IDC_STAT_BRADY_AP_VS_PERCENT: 23 %
MDC_IDC_STAT_BRADY_AS_VP_PERCENT: 11 %
MDC_IDC_STAT_BRADY_AS_VS_PERCENT: 54 %
MDC_IDC_STAT_BRADY_DTM_END: NORMAL
MDC_IDC_STAT_BRADY_DTM_START: NORMAL
MDC_IDC_STAT_EPISODE_RECENT_COUNT: 0
MDC_IDC_STAT_EPISODE_RECENT_COUNT: 20
MDC_IDC_STAT_EPISODE_RECENT_COUNT_DTM_END: NORMAL
MDC_IDC_STAT_EPISODE_RECENT_COUNT_DTM_END: NORMAL
MDC_IDC_STAT_EPISODE_RECENT_COUNT_DTM_START: NORMAL
MDC_IDC_STAT_EPISODE_RECENT_COUNT_DTM_START: NORMAL
MDC_IDC_STAT_EPISODE_TYPE: NORMAL
MDC_IDC_STAT_EPISODE_TYPE: NORMAL

## 2021-12-28 ENCOUNTER — OFFICE VISIT (OUTPATIENT)
Dept: INTERNAL MEDICINE | Facility: CLINIC | Age: 86
End: 2021-12-28
Payer: COMMERCIAL

## 2021-12-28 VITALS
BODY MASS INDEX: 22.08 KG/M2 | DIASTOLIC BLOOD PRESSURE: 66 MMHG | OXYGEN SATURATION: 99 % | TEMPERATURE: 97.1 F | RESPIRATION RATE: 18 BRPM | HEART RATE: 84 BPM | SYSTOLIC BLOOD PRESSURE: 134 MMHG | WEIGHT: 132.7 LBS

## 2021-12-28 DIAGNOSIS — N81.11 CYSTOCELE, MIDLINE: ICD-10-CM

## 2021-12-28 DIAGNOSIS — N30.00 ACUTE CYSTITIS WITHOUT HEMATURIA: Primary | ICD-10-CM

## 2021-12-28 LAB
ALBUMIN UR-MCNC: 30 MG/DL
APPEARANCE UR: ABNORMAL
BACTERIA #/AREA URNS HPF: ABNORMAL /HPF
BILIRUB UR QL STRIP: NEGATIVE
COLOR UR AUTO: YELLOW
GLUCOSE UR STRIP-MCNC: NEGATIVE MG/DL
HGB UR QL STRIP: ABNORMAL
HYALINE CASTS: 80 /LPF
KETONES UR STRIP-MCNC: NEGATIVE MG/DL
LEUKOCYTE ESTERASE UR QL STRIP: ABNORMAL
MUCOUS THREADS #/AREA URNS LPF: PRESENT /LPF
NITRATE UR QL: NEGATIVE
PH UR STRIP: 7 [PH] (ref 5–7)
RBC URINE: >182 /HPF
SP GR UR STRIP: 1.02 (ref 1–1.03)
SQUAMOUS EPITHELIAL: 5 /HPF
UROBILINOGEN UR STRIP-MCNC: 2 MG/DL
WBC CLUMPS #/AREA URNS HPF: PRESENT /HPF
WBC URINE: 149 /HPF

## 2021-12-28 PROCEDURE — 99214 OFFICE O/P EST MOD 30 MIN: CPT | Performed by: INTERNAL MEDICINE

## 2021-12-28 PROCEDURE — 87186 SC STD MICRODIL/AGAR DIL: CPT | Performed by: INTERNAL MEDICINE

## 2021-12-28 PROCEDURE — 87086 URINE CULTURE/COLONY COUNT: CPT | Performed by: INTERNAL MEDICINE

## 2021-12-28 PROCEDURE — 81001 URINALYSIS AUTO W/SCOPE: CPT | Performed by: INTERNAL MEDICINE

## 2021-12-28 RX ORDER — NITROFURANTOIN 25; 75 MG/1; MG/1
100 CAPSULE ORAL 2 TIMES DAILY
Qty: 14 CAPSULE | Refills: 0 | Status: SHIPPED | OUTPATIENT
Start: 2021-12-28 | End: 2022-08-19

## 2021-12-28 ASSESSMENT — PAIN SCALES - GENERAL: PAINLEVEL: NO PAIN (0)

## 2021-12-28 NOTE — PROGRESS NOTES
Lyric Ventura is a 86 year old who presents for the following health issues     HPI     Chief Complaint   Patient presents with     UTI     1 day, pain with urination, unable to empty bladder        EMR reviewed including:             Complaint, History of Chief Complaint, Corresponding Review of Systems, and Complaint Specific Physical Examination.    #1   Suprapubic discomfort and dysuria.  Frequency of urination.  No hematuria.  No fever or flank pain.  Small volume urinations.        Exam:   URINARY: Hua's punch is negative.  Moderate suprapubic tenderness is noted with palpation.      #2   Insides falling out  Patient describes a cystocele which is reducible.  Uncomfortable when occurs.  Becoming externalized more frequently.        Patient has been interviewed, applicable history and applied review of systems have been performed.    Vital Signs:   /66 (BP Location: Right arm, Patient Position: Sitting, Cuff Size: Adult Regular)   Pulse 84   Temp 97.1  F (36.2  C) (Temporal)   Resp 18   Wt 60.2 kg (132 lb 11.2 oz)   SpO2 99%   BMI 22.08 kg/m        Decision Making    Problem and Complexity     1. Acute cystitis without hematuria  We will start on Macrobid and check urinary analysis for culture results.  - UA Macro with Reflex to Micro and Culture - lab collect; Future  - nitroFURantoin macrocrystal-monohydrate (MACROBID) 100 MG capsule; Take 1 capsule (100 mg) by mouth 2 times daily  Dispense: 14 capsule; Refill: 0    2. Cystocele, midline  Set up with gynecology for evaluation regarding possible pessary device  - Ob/Gyn Referral; Future                                FOLLOW UP   I have asked the patient to make an appointment for followup with primary care provider in 3 months        I have carefully explained the diagnosis and treatment options to the patient.  The patient has displayed an understanding of the above, and all subsequent questions were answered.      Javier Sampson  DO FACOI    Portions of this note were produced using [employer]  Although every attempt at real-time proof reading has been made, occasional grammar/syntax errors may have been missed.

## 2021-12-28 NOTE — LETTER
January 10, 2022      Audrey Ortega  [address]  [place] [address]      Dear Audrey, your recent test results are attached.     Urine culture demonstrates E. coli.  It is sensitive to the antibiotic upon which you were placed.  Recommend repeat urinary analysis upon conclusion of the antibiotic.     You will be contacted with any outstanding results as they are available.   Feel free to contact me via the office or My Chart if you have any questions regarding the above.                     Resulted Orders   UA Macro with Reflex to Micro and Culture - lab collect   Result Value Ref Range    Color Urine Yellow Colorless, Straw, Light Yellow, Yellow    Appearance Urine Slightly Cloudy (A) Clear    Glucose Urine Negative Negative mg/dL    Bilirubin Urine Negative Negative    Ketones Urine Negative Negative mg/dL    Specific Gravity Urine 1.018 1.003 - 1.035    Blood Urine Moderate (A) Negative    pH Urine 7.0 5.0 - 7.0    Protein Albumin Urine 30  (A) Negative mg/dL    Urobilinogen Urine 2.0 Normal, 2.0 mg/dL    Nitrite Urine Negative Negative    Leukocyte Esterase Urine Large (A) Negative    Bacteria Urine Moderate (A) None Seen /HPF    WBC Clumps Urine Present (A) None Seen /HPF    Mucus Urine Present (A) None Seen /LPF    RBC Urine >182 (H) <=2 /HPF    WBC Urine 149 (H) <=5 /HPF    Squamous Epithelials Urine 5 (H) <=1 /HPF    Hyaline Casts Urine 80 (H) <=2 /LPF    Narrative    Urine Culture ordered based on laboratory criteria   Urine Culture   Result Value Ref Range    Culture >100,000 CFU/mL Escherichia coli (A)     Culture <10,000 CFU/mL Urogenital kisha        If you have any questions or concerns, please call the clinic at the number listed above.       Sincerely,      Javier Sampson,

## 2021-12-30 LAB
BACTERIA UR CULT: ABNORMAL
BACTERIA UR CULT: ABNORMAL

## 2022-01-01 ENCOUNTER — NURSE TRIAGE (OUTPATIENT)
Dept: NURSING | Facility: CLINIC | Age: 87
End: 2022-01-01
Payer: COMMERCIAL

## 2022-01-01 NOTE — TELEPHONE ENCOUNTER
Pt started antibiotic for bladder infection on 12/28/21 and today when pt woke up pt tongue swollen on right side only and this makes it hard for pt to swallow.  No shortness of breath, no itching, no hives. One hour ago pt took Benadryl. Pt has taken 4  Days of 7 day Rx Nitrofurantoin.  Pt states symptoms of pain with urination are gone.  Pt wanting to know if she should stop taking the antibiotic.  On call paged @ 8:41 am.  Dr. Krish Bates returned page @ 8:46 am.  Pt to stop taking Nitrofurantoin, pt should be seen if tongue and swallowing worsens.  Pt to follow up with clinic if UTI symptoms recur.  This information called to pt.  Anna Freitas RN, [facility] Nurse Advisor      Additional Information    Negative: [1] Life-threatening reaction in the past to similar substance (e.g., food, insect bite/sting, medication, etc.) AND [2] < 2 hours since exposure    Negative: Wheezing, stridor, hoarseness, or difficulty breathing    Negative: [1] Tightness in the chest or throat AND [2] begins within 2 hours of exposure to allergic substance    Negative: Difficulty swallowing, drooling or slurred speech    Negative: Difficult to awaken or acting confused (e.g., disoriented, slurred speech)    Negative: Unresponsive, passed out or very weak    Negative: Other symptom of severe allergic reaction (Exception: Hives or facial swelling alone)    Negative: Sounds like a life-threatening emergency to the triager    Negative: [1] Widespread hives AND [2] onset > 2 hours after exposure to high-risk allergen (e.g., sting, nuts, 1st dose of antibiotic)    Negative: [1] Widespread itching AND [2] onset > 2 hours after exposure to high-risk allergen (e.g., sting, nuts, 1st dose of antibiotic)    Negative: [1] Face swelling AND [2] onset > 2 hours after exposure to high-risk allergen (e.g., sting, nuts, 1st dose of antibiotic)    Negative: [1] Widespread hives, itching or facial swelling AND [2] onset < 2 hours of exposure to  high-risk allergen (e.g., sting, nuts, 1st dose of antibiotic)    Negative: [1] Vomiting or abdominal cramps AND [2] onset < 2 hours of exposure to high-risk allergen  (e.g., sting, nuts, 1st dose of antibiotic)    Negative: [1] Gave epinephrine shot AND [2] no symptoms now    Negative: [1] Gave asthma inhaler or neb AND [2] no symptoms now    Negative: [1] Took antihistamine (e.g., Benadryl) by mouth AND [2] no symptoms now    Protocols used: [id number]

## 2022-01-04 ENCOUNTER — TELEPHONE (OUTPATIENT)
Dept: INTERNAL MEDICINE | Facility: CLINIC | Age: 87
End: 2022-01-04
Payer: COMMERCIAL

## 2022-01-04 DIAGNOSIS — N30.00 ACUTE CYSTITIS WITHOUT HEMATURIA: Primary | ICD-10-CM

## 2022-01-04 RX ORDER — CIPROFLOXACIN 250 MG/1
250 TABLET, FILM COATED ORAL 2 TIMES DAILY
Qty: 14 TABLET | Refills: 0 | Status: SHIPPED | OUTPATIENT
Start: 2022-01-04 | End: 2022-08-19

## 2022-01-04 NOTE — TELEPHONE ENCOUNTER
Patient reports she is still having dysuria and overall not feeling well. She had to stop Nitrofurantoin for UTI due to having a reaction. She is asking to restart another antibiotic due to persistent symptoms.     Cassandra Justin RN

## 2022-01-04 NOTE — TELEPHONE ENCOUNTER
Please call let her know I sent ciprofloxacin  250 mg twice a day for 7 days for her.  She should not take it with her vitamins or minerals.  Sent to [facility]

## 2022-01-06 ENCOUNTER — TELEPHONE (OUTPATIENT)
Dept: INTERNAL MEDICINE | Facility: CLINIC | Age: 87
End: 2022-01-06
Payer: COMMERCIAL

## 2022-01-06 DIAGNOSIS — M62.81 GENERALIZED MUSCLE WEAKNESS: Primary | ICD-10-CM

## 2022-01-06 NOTE — TELEPHONE ENCOUNTER
Patient states she thinks her abx are causing her issues.    Patient states she is weak, and she has a headache with nausea and slight chest tightness.  She is informed these symptoms do not sound like an issue with her abx, they sound like she has COVID or other illness.  She states she has been to [facility] in the past couple of weeks.  She does wear a mask, but does not sanitize or wash hands until she gets home.    She is informed to continue her Cipro, and that a message will be sent to PCP to see if he would like to see her, test for COVID, continue on abx, or change.  Izzy Barron, SAMN, RN

## 2022-01-12 ENCOUNTER — NURSE TRIAGE (OUTPATIENT)
Dept: INTERNAL MEDICINE | Facility: CLINIC | Age: 87
End: 2022-01-12
Payer: COMMERCIAL

## 2022-01-12 DIAGNOSIS — B37.31 YEAST INFECTION OF THE VAGINA: Primary | ICD-10-CM

## 2022-01-12 RX ORDER — FLUCONAZOLE 150 MG/1
150 TABLET ORAL ONCE
Qty: 1 TABLET | Refills: 0 | Status: SHIPPED | OUTPATIENT
Start: 2022-01-12 | End: 2022-01-12

## 2022-01-12 NOTE — TELEPHONE ENCOUNTER
"Patient was treated for a UTI and stated the she is feeling better with those symptoms but the itching she was having prior to treatment of the UTI is increasing.  Patient is requesting a telephone visit/ office visit or just have medication ordered for treatment of vaginal itching that has been continuing prior, during and now after her UTI and medication treatment.  Advised patient home care remedies per protocol.  Advised patient to call back with any further questions or concerns.  Patient stated understanding.    Will forward to PCP for review.    Additional Information    Negative: Pain or burning with passing urine (urination) is main symptom    Negative: Pregnant with vaginal discharge    Negative: SEVERE abdominal pain (e.g., excruciating)    Negative: Patient sounds very sick or weak to the triager    Negative: Yellow or green vaginal discharge and has a fever    Negative: Constant abdominal pain lasting > 2 hours    Negative: Mild lower abdominal pain comes and goes (cramps) that lasts > 24 hours    Negative: Genital area looks infected (e.g., draining sore, spreading redness)    Negative: Rash is tiny water blisters (3 or more)    Negative: Patient wants to be seen    Negative: Rash (e.g., redness, tiny bumps, sore) of genital area present > 24 hours    Symptoms of a yeast infection' (i.e., itchy, white discharge, not bad smelling) and not improved > 3 days following Care Advice    Answer Assessment - Initial Assessment Questions  1. DISCHARGE: \"Describe the discharge.\" (e.g., white, yellow, green, gray, foamy, cottage cheese-like)      Some leakage - clear to white    2. ODOR: \"Is there a bad odor?\"      No    3. ONSET: \"When did the discharge begin?\"      Itching has been going on prior to being treated for UTI - now worse    4. RASH: \"Is there a rash in that area?\" If so, ask: \"Describe it.\" (e.g., redness, blisters, sores, bumps)      Redness    5. ABDOMINAL PAIN: \"Are you having any abdominal pain?\" " "If yes: \"What does it feel like? \" (e.g., crampy, dull, intermittent, constant)       No    6. ABDOMINAL PAIN SEVERITY: If present, ask: \"How bad is it?\"  (e.g., mild, moderate, severe)   - MILD - doesn't interfere with normal activities    - MODERATE - interferes with normal activities or awakens from sleep    - SEVERE - patient doesn't want to move (R/O peritonitis)       No    7. CAUSE: \"What do you think is causing the discharge?\" \"Have you had the same problem before? What happened then?\"      Unknown - yeast infection    8. OTHER SYMPTOMS: \"Do you have any other symptoms?\" (e.g., fever, itching, vaginal bleeding, pain with urination, injury to genital area, vaginal foreign body)      No    9. PREGNANCY: \"Is there any chance you are pregnant?\" \"When was your last menstrual period?\"      NA    Protocols used: VAGINAL [id number]    Kavya Simmons RN    "

## 2022-01-12 NOTE — TELEPHONE ENCOUNTER
Phoned patient and informed her of provider documented prescriptions sent as stated below.  Patient stated understanding.  Advised patient to call with any further questions or concerns.  Patient stated understanding.    Kavya Simmons RN

## 2022-01-14 ENCOUNTER — TELEPHONE (OUTPATIENT)
Dept: INTERNAL MEDICINE | Facility: CLINIC | Age: 87
End: 2022-01-14
Payer: COMMERCIAL

## 2022-01-29 DIAGNOSIS — F51.01 PRIMARY INSOMNIA: ICD-10-CM

## 2022-02-01 RX ORDER — ZOLPIDEM TARTRATE 5 MG/1
TABLET ORAL
Qty: 15 TABLET | Refills: 0 | Status: SHIPPED | OUTPATIENT
Start: 2022-02-01 | End: 2022-08-19

## 2022-02-01 NOTE — TELEPHONE ENCOUNTER
Pending Prescriptions:                       Disp   Refills    zolpidem (AMBIEN) 5 MG tablet [Pharmacy Me*15 tab*0        Sig: TAKE 1/2 (ONE-HALF) TABLET BY MOUTH AS NEEDED FOR           SLEEP    Routing refill request to provider for review/approval because:  Drug not on the [facility] refill protocol

## 2022-03-04 ENCOUNTER — ANCILLARY PROCEDURE (OUTPATIENT)
Dept: CARDIOLOGY | Facility: CLINIC | Age: 87
End: 2022-03-04
Attending: INTERNAL MEDICINE
Payer: COMMERCIAL

## 2022-03-04 DIAGNOSIS — Z95.0 CARDIAC PACEMAKER IN SITU: ICD-10-CM

## 2022-03-04 PROCEDURE — 93294 REM INTERROG EVL PM/LDLS PM: CPT | Performed by: INTERNAL MEDICINE

## 2022-03-04 PROCEDURE — 93296 REM INTERROG EVL PM/IDS: CPT | Performed by: INTERNAL MEDICINE

## 2022-03-06 LAB
MDC_IDC_LEAD_IMPLANT_DT: NORMAL
MDC_IDC_LEAD_IMPLANT_DT: NORMAL
MDC_IDC_LEAD_LOCATION: NORMAL
MDC_IDC_LEAD_LOCATION: NORMAL
MDC_IDC_LEAD_LOCATION_DETAIL_1: NORMAL
MDC_IDC_LEAD_LOCATION_DETAIL_1: NORMAL
MDC_IDC_LEAD_MFG: NORMAL
MDC_IDC_LEAD_MFG: NORMAL
MDC_IDC_LEAD_MODEL: NORMAL
MDC_IDC_LEAD_MODEL: NORMAL
MDC_IDC_LEAD_POLARITY_TYPE: NORMAL
MDC_IDC_LEAD_POLARITY_TYPE: NORMAL
MDC_IDC_LEAD_SERIAL: NORMAL
MDC_IDC_LEAD_SERIAL: NORMAL
MDC_IDC_MSMT_BATTERY_DTM: NORMAL
MDC_IDC_MSMT_BATTERY_IMPEDANCE: 3207 OHM
MDC_IDC_MSMT_BATTERY_REMAINING_LONGEVITY: 22 MO
MDC_IDC_MSMT_BATTERY_STATUS: NORMAL
MDC_IDC_MSMT_BATTERY_VOLTAGE: 2.72 V
MDC_IDC_MSMT_LEADCHNL_RA_IMPEDANCE_VALUE: 398 OHM
MDC_IDC_MSMT_LEADCHNL_RA_PACING_THRESHOLD_AMPLITUDE: 0.5 V
MDC_IDC_MSMT_LEADCHNL_RA_PACING_THRESHOLD_PULSEWIDTH: 0.4 MS
MDC_IDC_MSMT_LEADCHNL_RV_IMPEDANCE_VALUE: 499 OHM
MDC_IDC_MSMT_LEADCHNL_RV_PACING_THRESHOLD_AMPLITUDE: 0.5 V
MDC_IDC_MSMT_LEADCHNL_RV_PACING_THRESHOLD_PULSEWIDTH: 0.4 MS
MDC_IDC_PG_IMPLANT_DTM: NORMAL
MDC_IDC_PG_MFG: NORMAL
MDC_IDC_PG_MODEL: NORMAL
MDC_IDC_PG_SERIAL: NORMAL
MDC_IDC_PG_TYPE: NORMAL
MDC_IDC_SESS_CLINIC_NAME: NORMAL
MDC_IDC_SESS_DTM: NORMAL
MDC_IDC_SESS_TYPE: NORMAL
MDC_IDC_SET_BRADY_AT_MODE_SWITCH_MODE: NORMAL
MDC_IDC_SET_BRADY_AT_MODE_SWITCH_RATE: 150 {BEATS}/MIN
MDC_IDC_SET_BRADY_LOWRATE: 60 {BEATS}/MIN
MDC_IDC_SET_BRADY_MAX_SENSOR_RATE: 130 {BEATS}/MIN
MDC_IDC_SET_BRADY_MAX_TRACKING_RATE: 130 {BEATS}/MIN
MDC_IDC_SET_BRADY_MODE: NORMAL
MDC_IDC_SET_BRADY_PAV_DELAY_LOW: 180 MS
MDC_IDC_SET_BRADY_SAV_DELAY_LOW: 150 MS
MDC_IDC_SET_LEADCHNL_RA_PACING_AMPLITUDE: 1.5 V
MDC_IDC_SET_LEADCHNL_RA_PACING_CAPTURE_MODE: NORMAL
MDC_IDC_SET_LEADCHNL_RA_PACING_POLARITY: NORMAL
MDC_IDC_SET_LEADCHNL_RA_PACING_PULSEWIDTH: 0.4 MS
MDC_IDC_SET_LEADCHNL_RA_SENSING_POLARITY: NORMAL
MDC_IDC_SET_LEADCHNL_RA_SENSING_SENSITIVITY: 0.5 MV
MDC_IDC_SET_LEADCHNL_RV_PACING_AMPLITUDE: 1.5 V
MDC_IDC_SET_LEADCHNL_RV_PACING_CAPTURE_MODE: NORMAL
MDC_IDC_SET_LEADCHNL_RV_PACING_POLARITY: NORMAL
MDC_IDC_SET_LEADCHNL_RV_PACING_PULSEWIDTH: 0.4 MS
MDC_IDC_SET_LEADCHNL_RV_SENSING_POLARITY: NORMAL
MDC_IDC_SET_LEADCHNL_RV_SENSING_SENSITIVITY: 2 MV
MDC_IDC_SET_ZONE_DETECTION_INTERVAL: 333.33 MS
MDC_IDC_SET_ZONE_DETECTION_INTERVAL: 400 MS
MDC_IDC_SET_ZONE_TYPE: NORMAL
MDC_IDC_SET_ZONE_TYPE: NORMAL
MDC_IDC_STAT_AT_BURDEN_PERCENT: 1.3 %
MDC_IDC_STAT_AT_DTM_END: NORMAL
MDC_IDC_STAT_AT_DTM_START: NORMAL
MDC_IDC_STAT_AT_MODE_SW_COUNT: 63
MDC_IDC_STAT_BRADY_AP_VP_PERCENT: 16 %
MDC_IDC_STAT_BRADY_AP_VS_PERCENT: 20 %
MDC_IDC_STAT_BRADY_AS_VP_PERCENT: 17 %
MDC_IDC_STAT_BRADY_AS_VS_PERCENT: 47 %
MDC_IDC_STAT_BRADY_DTM_END: NORMAL
MDC_IDC_STAT_BRADY_DTM_START: NORMAL
MDC_IDC_STAT_EPISODE_RECENT_COUNT: 0
MDC_IDC_STAT_EPISODE_RECENT_COUNT: 37
MDC_IDC_STAT_EPISODE_RECENT_COUNT_DTM_END: NORMAL
MDC_IDC_STAT_EPISODE_RECENT_COUNT_DTM_END: NORMAL
MDC_IDC_STAT_EPISODE_RECENT_COUNT_DTM_START: NORMAL
MDC_IDC_STAT_EPISODE_RECENT_COUNT_DTM_START: NORMAL
MDC_IDC_STAT_EPISODE_TYPE: NORMAL
MDC_IDC_STAT_EPISODE_TYPE: NORMAL

## 2022-03-21 ENCOUNTER — NURSE TRIAGE (OUTPATIENT)
Dept: CARDIOLOGY | Facility: CLINIC | Age: 87
End: 2022-03-21
Payer: COMMERCIAL

## 2022-03-21 NOTE — TELEPHONE ENCOUNTER
"Received a call from Audrey stating for the last 3-4 days  she has been having chest tightness at night when she lays down. She says it is not chest \"pain\" per se, but rates it #5 and is on the left side then goes to the middle chest area. She says it goes on for hours. She says she will intermittently feel it during the day but is very minimal in severity. She denies having this chest pressure currently. She says she used to be able to do her 20 minute exercises but has not been able to do it because she \"feels weakish\". She denies any heart racing or palpitations. Blood pressure today 141/78 HR 72 early this morning, then later in the morning after taking medications was 117/67 HR 75. She believes her heart is beating at a regular rhythm. She denies any shortness of breath, dizziness, lightheadedness, nausea, sweating.  She also mentions that her head feels tight and her eyes hurt. She also describes the tips of her left ring and middle finger being discolored, and is noticing this on the other hand now. She says they turn white and then a purplish color. She says her hands are usually cold. She has not seen her PCP recently.   Advised a message will be sent to Dr. Jama's team for follow up.  Also advised if symptoms worsen before speaking to the team, it is recommended to go to ER for an evaluation. She verbalized agreement and understanding of the plan.   1. LOCATION: \"Where does it hurt?\" Left chest to the middle  2. RADIATION: \"Does the pain go anywhere else?\" (e.g., into neck, jaw, arms, back) Denies  3. ONSET: \"When did the chest pain begin?\" (Minutes, hours or days) 3-4 days ago at night when lying down  4. PATTERN \"Does the pain come and go, or has it been constant since it started?\" \"Does it get worse with exertion?\"   5. DURATION: \"How long does it last\" (e.g., seconds, minutes, hours) hours  6. SEVERITY: \"How bad is the pain?\" (e.g., Scale 1-10; mild, moderate, or severe) #5, moderate  - MILD (1-3): " "doesn't interfere with normal activities   - MODERATE (4-7): interferes with normal activities or awakens from sleep  - SEVERE (8-10): excruciating pain, unable to do any normal activities   7. CARDIAC RISK FACTORS: \"Do you have any history of heart problems or risk factors for heart disease?\" (e.g., angina, prior heart attack; diabetes, high blood pressure, high cholesterol, smoker, or strong family history of heart disease) PAF, PPM  8. PULMONARY RISK FACTORS: \"Do you have any history of lung disease?\" (e.g., blood clots in lung, asthma, emphysema, birth control pills)  9. CAUSE: \"What do you think is causing the chest pain?\"  10. OTHER SYMPTOMS: \"Do you have any other symptoms?\" (e.g., dizziness, nausea, vomiting, sweating, fever, difficulty breathing, cough) Denies. Feels \"weakish\"      Additional Information    Negative: Heart beating < 50 beats per minute OR > 140 beats per minute    Negative: Difficulty breathing    Negative: SEVERE chest pain    Negative: Heart beating irregularly or very rapidly    Negative: Dizziness or lightheadedness    Protocols used: CHEST PAIN-A-OH      "

## 2022-03-22 NOTE — TELEPHONE ENCOUNTER
"RN called patient and reviewed with her Dr. Jama's recommendations. Patient advised RN that she feels \"much better\" today and would like to hold off on further evaluation at this time. Patient did report to RN she fell on the ice a few weeks ago and feels that her symptoms may have been related to that. Patient denies hitting her head and also denies being evaluated after her fall. RN recommended patient see her PMD for further evaluation post fall and to call us back if her symptoms persist and we would be happy to order the stress test and cardiology f/u. Patient verbalized understanding and is in agreement with plan.       Dr. Jama's recommendations    Symptoms are atypical for cardiac etiology.  Reasonable to do regadenoson nuclear stress test for further risk stratification with cardiology follow up.   This may also be related to noncardiac issue such as heart burn, musculoskeletal etc.  I would like her to also see her primary MD; especially regarding her other concerns.     Thanks,   Cassy Jama MD   "

## 2022-06-10 ENCOUNTER — ANCILLARY PROCEDURE (OUTPATIENT)
Dept: CARDIOLOGY | Facility: CLINIC | Age: 87
End: 2022-06-10
Attending: INTERNAL MEDICINE
Payer: COMMERCIAL

## 2022-06-10 DIAGNOSIS — I49.5 SICK SINUS SYNDROME (H): ICD-10-CM

## 2022-06-10 DIAGNOSIS — Z95.0 CARDIAC PACEMAKER IN SITU: ICD-10-CM

## 2022-06-10 DIAGNOSIS — Z95.0 CARDIAC PACEMAKER IN SITU: Primary | ICD-10-CM

## 2022-06-10 PROCEDURE — 93296 REM INTERROG EVL PM/IDS: CPT | Performed by: INTERNAL MEDICINE

## 2022-06-10 PROCEDURE — 93294 REM INTERROG EVL PM/LDLS PM: CPT | Performed by: INTERNAL MEDICINE

## 2022-06-20 DIAGNOSIS — I10 BENIGN ESSENTIAL HYPERTENSION: ICD-10-CM

## 2022-06-22 LAB
MDC_IDC_LEAD_IMPLANT_DT: NORMAL
MDC_IDC_LEAD_IMPLANT_DT: NORMAL
MDC_IDC_LEAD_LOCATION: NORMAL
MDC_IDC_LEAD_LOCATION: NORMAL
MDC_IDC_LEAD_LOCATION_DETAIL_1: NORMAL
MDC_IDC_LEAD_LOCATION_DETAIL_1: NORMAL
MDC_IDC_LEAD_MFG: NORMAL
MDC_IDC_LEAD_MFG: NORMAL
MDC_IDC_LEAD_MODEL: NORMAL
MDC_IDC_LEAD_MODEL: NORMAL
MDC_IDC_LEAD_POLARITY_TYPE: NORMAL
MDC_IDC_LEAD_POLARITY_TYPE: NORMAL
MDC_IDC_LEAD_SERIAL: NORMAL
MDC_IDC_LEAD_SERIAL: NORMAL
MDC_IDC_MSMT_BATTERY_DTM: NORMAL
MDC_IDC_MSMT_BATTERY_IMPEDANCE: 3202 OHM
MDC_IDC_MSMT_BATTERY_REMAINING_LONGEVITY: 22 MO
MDC_IDC_MSMT_BATTERY_STATUS: NORMAL
MDC_IDC_MSMT_BATTERY_VOLTAGE: 2.72 V
MDC_IDC_MSMT_LEADCHNL_RA_IMPEDANCE_VALUE: 389 OHM
MDC_IDC_MSMT_LEADCHNL_RA_PACING_THRESHOLD_AMPLITUDE: 0.38 V
MDC_IDC_MSMT_LEADCHNL_RA_PACING_THRESHOLD_PULSEWIDTH: 0.4 MS
MDC_IDC_MSMT_LEADCHNL_RV_IMPEDANCE_VALUE: 485 OHM
MDC_IDC_MSMT_LEADCHNL_RV_PACING_THRESHOLD_AMPLITUDE: 0.5 V
MDC_IDC_MSMT_LEADCHNL_RV_PACING_THRESHOLD_PULSEWIDTH: 0.4 MS
MDC_IDC_PG_IMPLANT_DTM: NORMAL
MDC_IDC_PG_MFG: NORMAL
MDC_IDC_PG_MODEL: NORMAL
MDC_IDC_PG_SERIAL: NORMAL
MDC_IDC_PG_TYPE: NORMAL
MDC_IDC_SESS_CLINIC_NAME: NORMAL
MDC_IDC_SESS_DTM: NORMAL
MDC_IDC_SESS_TYPE: NORMAL
MDC_IDC_SET_BRADY_AT_MODE_SWITCH_MODE: NORMAL
MDC_IDC_SET_BRADY_AT_MODE_SWITCH_RATE: 150 {BEATS}/MIN
MDC_IDC_SET_BRADY_LOWRATE: 60 {BEATS}/MIN
MDC_IDC_SET_BRADY_MAX_SENSOR_RATE: 130 {BEATS}/MIN
MDC_IDC_SET_BRADY_MAX_TRACKING_RATE: 130 {BEATS}/MIN
MDC_IDC_SET_BRADY_MODE: NORMAL
MDC_IDC_SET_BRADY_PAV_DELAY_LOW: 180 MS
MDC_IDC_SET_BRADY_SAV_DELAY_LOW: 150 MS
MDC_IDC_SET_LEADCHNL_RA_PACING_AMPLITUDE: 1.5 V
MDC_IDC_SET_LEADCHNL_RA_PACING_CAPTURE_MODE: NORMAL
MDC_IDC_SET_LEADCHNL_RA_PACING_POLARITY: NORMAL
MDC_IDC_SET_LEADCHNL_RA_PACING_PULSEWIDTH: 0.4 MS
MDC_IDC_SET_LEADCHNL_RA_SENSING_POLARITY: NORMAL
MDC_IDC_SET_LEADCHNL_RA_SENSING_SENSITIVITY: 0.5 MV
MDC_IDC_SET_LEADCHNL_RV_PACING_AMPLITUDE: 1.5 V
MDC_IDC_SET_LEADCHNL_RV_PACING_CAPTURE_MODE: NORMAL
MDC_IDC_SET_LEADCHNL_RV_PACING_POLARITY: NORMAL
MDC_IDC_SET_LEADCHNL_RV_PACING_PULSEWIDTH: 0.4 MS
MDC_IDC_SET_LEADCHNL_RV_SENSING_POLARITY: NORMAL
MDC_IDC_SET_LEADCHNL_RV_SENSING_SENSITIVITY: 2 MV
MDC_IDC_SET_ZONE_DETECTION_INTERVAL: 333.33 MS
MDC_IDC_SET_ZONE_DETECTION_INTERVAL: 400 MS
MDC_IDC_SET_ZONE_TYPE: NORMAL
MDC_IDC_SET_ZONE_TYPE: NORMAL
MDC_IDC_STAT_AT_BURDEN_PERCENT: 4.6 %
MDC_IDC_STAT_AT_DTM_END: NORMAL
MDC_IDC_STAT_AT_DTM_START: NORMAL
MDC_IDC_STAT_AT_MODE_SW_COUNT: 115
MDC_IDC_STAT_BRADY_AP_VP_PERCENT: 15 %
MDC_IDC_STAT_BRADY_AP_VS_PERCENT: 21 %
MDC_IDC_STAT_BRADY_AS_VP_PERCENT: 17 %
MDC_IDC_STAT_BRADY_AS_VS_PERCENT: 47 %
MDC_IDC_STAT_BRADY_DTM_END: NORMAL
MDC_IDC_STAT_BRADY_DTM_START: NORMAL
MDC_IDC_STAT_EPISODE_RECENT_COUNT: 0
MDC_IDC_STAT_EPISODE_RECENT_COUNT: 57
MDC_IDC_STAT_EPISODE_RECENT_COUNT_DTM_END: NORMAL
MDC_IDC_STAT_EPISODE_RECENT_COUNT_DTM_END: NORMAL
MDC_IDC_STAT_EPISODE_RECENT_COUNT_DTM_START: NORMAL
MDC_IDC_STAT_EPISODE_RECENT_COUNT_DTM_START: NORMAL
MDC_IDC_STAT_EPISODE_TYPE: NORMAL
MDC_IDC_STAT_EPISODE_TYPE: NORMAL

## 2022-06-22 RX ORDER — LISINOPRIL AND HYDROCHLOROTHIAZIDE 20; 25 MG/1; MG/1
TABLET ORAL
Qty: 45 TABLET | Refills: 0 | Status: SHIPPED | OUTPATIENT
Start: 2022-06-22 | End: 2022-08-19

## 2022-06-22 NOTE — TELEPHONE ENCOUNTER
Pending Prescriptions:                       Disp   Refills    lisinopril-hydrochlorothiazide (ZESTORETIC*45 tab*0        Sig: Take 1/2 (one-half) tablet by mouth once daily      Routing refill request to provider for review/approval because:  Labs not current:    Creatinine   Date Value Ref Range Status   05/05/2021 1.20 (H) 0.52 - 1.04 mg/dL Final     Potassium   Date Value Ref Range Status   05/05/2021 4.5 3.4 - 5.3 mmol/L Final         Samantha Benoit RN

## 2022-07-11 NOTE — PROGRESS NOTES
CARDIOLOGY CLINIC VISIT  DATE OF SERVICE:  July 12, 2022      PRIMARY CARE PHYSICIAN:  Krish Bates    HISTORY OF PRESENT ILLNESS:  Ms. Audrey Ortega, a pleasant 87 year old patient who has a past medical history significant for paroxysmal atrial fibrillation (declined anticoagulation), sick sinus syndrome status post permanent pacemaker in 2011 and hypertension who presents to clinic today for follow up.  She was last seen by me in July 2021.       In brief review, she has previously declined anticoagulation (unable to afford DOACs and warfarin caused bilateral shoulder pain). Previous device checks have been showing prolonged episodes of tachycardia thought to be atrial flutter or atrial tachycardia.     Previously, she noted paroxysmal palpitations (that she describes as pounding in her left ear) 2-3 times daily and when she lays down at night.  The on-call cardiologist, Dr. Roberts, reviewed her most recent device check that noted 60 recorded mode switches consistent with atrial arrhythmia on ventricular rates between 70 to 100 bpm.  She felt the this was likely indicating breakthrough atrial fibrillation and recommended metoprolol 25 mg twice daily. Due to hypotension Lisinopril was discontinued recently without further episodes. She also no longer has palpitations.     At our visit last year, she noted pain/aching in her right lower extremity and has prominent varicose veins on her shin.  She notes leg cramps at night.  She denies any lower extremity edema.  She denies any prior hx of blood clots, thrombophlebitis, bleeding varicosities.   She does get regular exercise.  She has not taken any over the counter medications for the discomfort.  She has not tried compression stockings.  A venous competency ultrasound demonstrated no significant superficial vein insufficiency.  The varicose veins of her anterior shin did not demonstrate incompetence.      In follow up today, Audrey reports feeling well.  She is active  in her garden.  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  She denies any exertional chest pain, chest discomfort or shortness of breath.  She states she checks her BP regularly at home and it runs significantly lower.  After working in the garden, she states her BP can be in the 90's systolic.  She rests and her symptoms resolve.  She reports adequate fluid intake.  She stopped her metoprolol due to these symptoms and lower BP's.        PAST MEDICAL HISTORY:  No past medical history on file.    MEDICATIONS:  Current Outpatient Medications   Medication     Ascorbic Acid (VITAMIN C) 500 MG CAPS     aspirin (ASA) 325 MG EC tablet     calcium 600 MG tablet     ciprofloxacin (CIPRO) 250 MG tablet     COMPRESSION STOCKINGS     lisinopril-hydrochlorothiazide (ZESTORETIC) 20-25 MG tablet     magnesium 250 MG tablet     metoprolol tartrate (LOPRESSOR) 25 MG tablet     multivitamin w/minerals (MULTI-VITAMIN) tablet     nitroFURantoin macrocrystal-monohydrate (MACROBID) 100 MG capsule     Vitamin D, Cholecalciferol, 25 MCG (1000 UT) TABS     zolpidem (AMBIEN) 5 MG tablet     No current facility-administered medications for this visit.       ALLERGIES:  Allergies   Allergen Reactions     Ciprofloxacin Rash     Macrobid [Nitrofurantoin] Itching and Rash       SOCIAL HISTORY:  I have reviewed this patient's social history and updated it with pertinent information if needed. Audrey Ortega  reports that she has never smoked. She has never used smokeless tobacco. She reports previous alcohol use.    FAMILY HISTORY:  I have reviewed this patient's family history and updated it with pertinent information if needed.   No family history on file.    REVIEW OF SYSTEMS:  A complete ROS was obtained and the pertinent positives are outlined in the history of present illness above.  The remainder of systems is negative.      PHYSICAL EXAM:                     Vital Signs with Ranges  BP: ()/()   Arterial Line BP: ()/()   0 lbs 0  oz    Constitutional: awake, alert, no distress  Eyes: PERRL, sclera nonicteric  ENT: trachea midline  Respiratory: CTAB  Cardiovascular: RRR no m/r/g, no JVD  GI: nondistended, nontender, bowel sounds present  Lymph/Hematologic: no lymphadenopathy  Skin: dry, no rash  Musculoskeletal: good muscle tone, no edema bilaterally, mild chronic venous stasis changes bilaterally (mild hyperpigmentation) with prominent varicosities noted on the left shin  Neurologic: no focal deficits  Neuropsychiatric: appropriate affact      ASSESSMENT:    1. Paroxysmal atrial fibrillation    Continues to decline anticoagulation, confirmed again in clinic today.  She has also declined Watchman    Most recent device notes frequent mode switches; controlled ventricular rates    Patient stopped metoprolol on her own due to hypotension     2. Hypertension    Currently on zestoretic. BP elevated in clinic today, but carol reports BP's at goal at home with some soft blood pressures as well    3. Sick sinus syndrome    Status post permanent pacemaker in 2011    Follows with device clinic; normal device functoin    4.  Chronic venous disease:   No significant venous disease by venous competency ultrasound performed last year.        RECOMMENDATIONS:  -Continue zestoretic; BMP today  -Routine device function follow up  -Follow up in one year or before than as necessary    Cassy Jama MD [facility]  Cardiology - [facility]  July 12, 2022

## 2022-07-12 ENCOUNTER — OFFICE VISIT (OUTPATIENT)
Dept: CARDIOLOGY | Facility: CLINIC | Age: 87
End: 2022-07-12
Attending: INTERNAL MEDICINE
Payer: COMMERCIAL

## 2022-07-12 VITALS
DIASTOLIC BLOOD PRESSURE: 82 MMHG | HEART RATE: 66 BPM | BODY MASS INDEX: 21.83 KG/M2 | SYSTOLIC BLOOD PRESSURE: 150 MMHG | OXYGEN SATURATION: 99 % | HEIGHT: 65 IN | WEIGHT: 131 LBS

## 2022-07-12 DIAGNOSIS — Z95.0 S/P PLACEMENT OF CARDIAC PACEMAKER: ICD-10-CM

## 2022-07-12 DIAGNOSIS — I10 BENIGN ESSENTIAL HYPERTENSION: Primary | ICD-10-CM

## 2022-07-12 LAB
ANION GAP SERPL CALCULATED.3IONS-SCNC: 4 MMOL/L (ref 3–14)
BUN SERPL-MCNC: 22 MG/DL (ref 7–30)
CALCIUM SERPL-MCNC: 9.3 MG/DL (ref 8.5–10.1)
CHLORIDE BLD-SCNC: 100 MMOL/L (ref 94–109)
CO2 SERPL-SCNC: 28 MMOL/L (ref 20–32)
CREAT SERPL-MCNC: 1.15 MG/DL (ref 0.52–1.04)
GFR SERPL CREATININE-BSD FRML MDRD: 46 ML/MIN/1.73M2
GLUCOSE BLD-MCNC: 79 MG/DL (ref 70–99)
POTASSIUM BLD-SCNC: 4.6 MMOL/L (ref 3.4–5.3)
SODIUM SERPL-SCNC: 132 MMOL/L (ref 133–144)

## 2022-07-12 PROCEDURE — 36415 COLL VENOUS BLD VENIPUNCTURE: CPT | Performed by: INTERNAL MEDICINE

## 2022-07-12 PROCEDURE — 80048 BASIC METABOLIC PNL TOTAL CA: CPT | Performed by: INTERNAL MEDICINE

## 2022-07-12 PROCEDURE — 99214 OFFICE O/P EST MOD 30 MIN: CPT | Performed by: INTERNAL MEDICINE

## 2022-07-12 NOTE — LETTER
7/12/2022    Krish Bates MD  [address] [address]    RE: Audrey Ortega       Dear Colleague,     I had the pleasure of seeing Audrey Ortega in the [facility].  CARDIOLOGY CLINIC VISIT  DATE OF SERVICE:  July 12, 2022      PRIMARY CARE PHYSICIAN:  Krish Bates    HISTORY OF PRESENT ILLNESS:  Ms. Audrey Ortega, a pleasant 87 year old patient who has a past medical history significant for paroxysmal atrial fibrillation (declined anticoagulation), sick sinus syndrome status post permanent pacemaker in 2011 and hypertension who presents to clinic today for follow up.  She was last seen by me in July 2021.       In brief review, she has previously declined anticoagulation (unable to afford DOACs and warfarin caused bilateral shoulder pain). Previous device checks have been showing prolonged episodes of tachycardia thought to be atrial flutter or atrial tachycardia.     Previously, she noted paroxysmal palpitations (that she describes as pounding in her left ear) 2-3 times daily and when she lays down at night.  The on-call cardiologist, Dr. Roberts, reviewed her most recent device check that noted 60 recorded mode switches consistent with atrial arrhythmia on ventricular rates between 70 to 100 bpm.  She felt the this was likely indicating breakthrough atrial fibrillation and recommended metoprolol 25 mg twice daily. Due to hypotension Lisinopril was discontinued recently without further episodes. She also no longer has palpitations.     At our visit last year, she noted pain/aching in her right lower extremity and has prominent varicose veins on her shin.  She notes leg cramps at night.  She denies any lower extremity edema.  She denies any prior hx of blood clots, thrombophlebitis, bleeding varicosities.   She does get regular exercise.  She has not taken any over the counter medications for the discomfort.  She has not tried compression stockings.  A venous competency ultrasound  demonstrated no significant superficial vein insufficiency.  The varicose veins of her anterior shin did not demonstrate incompetence.      In follow up today, Audrey reports feeling well.  She is active in her garden.  She works at a thrift store three mornings a week.  She denies any exertional chest pain, chest discomfort or shortness of breath.  She states she checks her BP regularly at home and it runs significantly lower.  After working in the garden, she states her BP can be in the 90's systolic.  She rests and her symptoms resolve.  She reports adequate fluid intake.  She stopped her metoprolol due to these symptoms and lower BP's.        PAST MEDICAL HISTORY:  No past medical history on file.    MEDICATIONS:  Current Outpatient Medications   Medication     Ascorbic Acid (VITAMIN C) 500 MG CAPS     aspirin (ASA) 325 MG EC tablet     calcium 600 MG tablet     ciprofloxacin (CIPRO) 250 MG tablet     COMPRESSION STOCKINGS     lisinopril-hydrochlorothiazide (ZESTORETIC) 20-25 MG tablet     magnesium 250 MG tablet     metoprolol tartrate (LOPRESSOR) 25 MG tablet     multivitamin w/minerals (MULTI-VITAMIN) tablet     nitroFURantoin macrocrystal-monohydrate (MACROBID) 100 MG capsule     Vitamin D, Cholecalciferol, 25 MCG (1000 UT) TABS     zolpidem (AMBIEN) 5 MG tablet     No current facility-administered medications for this visit.       ALLERGIES:  Allergies   Allergen Reactions     Ciprofloxacin Rash     Macrobid [Nitrofurantoin] Itching and Rash       SOCIAL HISTORY:  I have reviewed this patient's social history and updated it with pertinent information if needed. Audrey Ortega  reports that she has never smoked. She has never used smokeless tobacco. She reports previous alcohol use.    FAMILY HISTORY:  I have reviewed this patient's family history and updated it with pertinent information if needed.   No family history on file.    REVIEW OF SYSTEMS:  A complete ROS was obtained and the pertinent positives are  outlined in the history of present illness above.  The remainder of systems is negative.      PHYSICAL EXAM:                     Vital Signs with Ranges  BP: ()/()   Arterial Line BP: ()/()   0 lbs 0 oz    Constitutional: awake, alert, no distress  Eyes: PERRL, sclera nonicteric  ENT: trachea midline  Respiratory: CTAB  Cardiovascular: RRR no m/r/g, no JVD  GI: nondistended, nontender, bowel sounds present  Lymph/Hematologic: no lymphadenopathy  Skin: dry, no rash  Musculoskeletal: good muscle tone, no edema bilaterally, mild chronic venous stasis changes bilaterally (mild hyperpigmentation) with prominent varicosities noted on the left shin  Neurologic: no focal deficits  Neuropsychiatric: appropriate affact      ASSESSMENT:    1. Paroxysmal atrial fibrillation    Continues to decline anticoagulation, confirmed again in clinic today.  She has also declined Watchman    Most recent device notes frequent mode switches; controlled ventricular rates    Patient stopped metoprolol on her own due to hypotension     2. Hypertension    Currently on zestoretic. BP elevated in clinic today, but carol reports BP's at goal at home with some soft blood pressures as well    3. Sick sinus syndrome    Status post permanent pacemaker in 2011    Follows with device clinic; normal device functoin    4.  Chronic venous disease:   No significant venous disease by venous competency ultrasound performed last year.        RECOMMENDATIONS:  -Continue zestoretic; BMP today  -Routine device function follow up  -Follow up in one year or before than as necessary    Cassy Jama MD [facility]  Cardiology - [facility] Heart  July 12, 2022    Thank you for allowing me to participate in the care of your patient.      Sincerely,     Cassy Jama MD     [facility]  cc:   Cassy Jama MD  [facility]  [address] [facility] [address]  [place] [address]

## 2022-07-25 ENCOUNTER — TELEPHONE (OUTPATIENT)
Dept: CARDIOLOGY | Facility: CLINIC | Age: 87
End: 2022-07-25

## 2022-07-25 NOTE — TELEPHONE ENCOUNTER
RN called patient and provided her with lab results and Dr. Jama's recommendations. Patient verbalized understanding and has no further questions at this time.         ----- Message from Cassy Jama MD sent at 7/25/2022 11:45 AM CDT -----  BMP reviewed; stable renal function, mild hyponatremia; unchanged.    Cassy Jama MD

## 2022-07-31 ENCOUNTER — APPOINTMENT (OUTPATIENT)
Dept: CT IMAGING | Facility: CLINIC | Age: 87
End: 2022-07-31
Attending: PHYSICIAN ASSISTANT
Payer: MEDICARE

## 2022-07-31 ENCOUNTER — HOSPITAL ENCOUNTER (EMERGENCY)
Facility: CLINIC | Age: 87
Discharge: HOME | End: 2022-07-31
Attending: PHYSICIAN ASSISTANT | Admitting: PHYSICIAN ASSISTANT
Payer: MEDICARE

## 2022-07-31 VITALS
SYSTOLIC BLOOD PRESSURE: 152 MMHG | DIASTOLIC BLOOD PRESSURE: 114 MMHG | WEIGHT: 135 LBS | OXYGEN SATURATION: 97 % | HEART RATE: 69 BPM | RESPIRATION RATE: 17 BRPM | TEMPERATURE: 97.7 F | BODY MASS INDEX: 22.47 KG/M2

## 2022-07-31 DIAGNOSIS — I51.7 ATRIAL ENLARGEMENT, BILATERAL: ICD-10-CM

## 2022-07-31 DIAGNOSIS — U07.1 INFECTION DUE TO 2019 NOVEL CORONAVIRUS: ICD-10-CM

## 2022-07-31 DIAGNOSIS — E87.1 HYPONATREMIA: ICD-10-CM

## 2022-07-31 LAB
ANION GAP SERPL CALCULATED.3IONS-SCNC: 8 MMOL/L (ref 3–14)
BASOPHILS # BLD AUTO: 0 10E3/UL (ref 0–0.2)
BASOPHILS NFR BLD AUTO: 1 %
BUN SERPL-MCNC: 22 MG/DL (ref 7–30)
CALCIUM SERPL-MCNC: 8.9 MG/DL (ref 8.5–10.1)
CHLORIDE BLD-SCNC: 92 MMOL/L (ref 94–109)
CO2 SERPL-SCNC: 25 MMOL/L (ref 20–32)
CREAT SERPL-MCNC: 1.13 MG/DL (ref 0.52–1.04)
D DIMER PPP FEU-MCNC: 2.72 UG/ML FEU (ref 0–0.5)
EOSINOPHIL # BLD AUTO: 0.3 10E3/UL (ref 0–0.7)
EOSINOPHIL NFR BLD AUTO: 5 %
ERYTHROCYTE [DISTWIDTH] IN BLOOD BY AUTOMATED COUNT: 11.6 % (ref 10–15)
FLUAV RNA SPEC QL NAA+PROBE: NEGATIVE
FLUBV RNA RESP QL NAA+PROBE: NEGATIVE
GFR SERPL CREATININE-BSD FRML MDRD: 47 ML/MIN/1.73M2
GLUCOSE BLD-MCNC: 90 MG/DL (ref 70–99)
HCT VFR BLD AUTO: 35.3 % (ref 35–47)
HGB BLD-MCNC: 12.1 G/DL (ref 11.7–15.7)
IMM GRANULOCYTES # BLD: 0 10E3/UL
IMM GRANULOCYTES NFR BLD: 0 %
LACTATE SERPL-SCNC: 0.9 MMOL/L (ref 0.7–2)
LYMPHOCYTES # BLD AUTO: 2.1 10E3/UL (ref 0.8–5.3)
LYMPHOCYTES NFR BLD AUTO: 35 %
MCH RBC QN AUTO: 31.3 PG (ref 26.5–33)
MCHC RBC AUTO-ENTMCNC: 34.3 G/DL (ref 31.5–36.5)
MCV RBC AUTO: 92 FL (ref 78–100)
MONOCYTES # BLD AUTO: 0.5 10E3/UL (ref 0–1.3)
MONOCYTES NFR BLD AUTO: 9 %
NEUTROPHILS # BLD AUTO: 3.1 10E3/UL (ref 1.6–8.3)
NEUTROPHILS NFR BLD AUTO: 50 %
NRBC # BLD AUTO: 0 10E3/UL
NRBC BLD AUTO-RTO: 0 /100
NT-PROBNP SERPL-MCNC: 942 PG/ML (ref 0–1800)
PLATELET # BLD AUTO: 170 10E3/UL (ref 150–450)
POTASSIUM BLD-SCNC: 4.7 MMOL/L (ref 3.4–5.3)
RBC # BLD AUTO: 3.86 10E6/UL (ref 3.8–5.2)
SARS-COV-2 RNA RESP QL NAA+PROBE: POSITIVE
SODIUM SERPL-SCNC: 125 MMOL/L (ref 133–144)
TROPONIN I SERPL HS-MCNC: 19 NG/L
WBC # BLD AUTO: 6.1 10E3/UL (ref 4–11)

## 2022-07-31 PROCEDURE — 99285 EMERGENCY DEPT VISIT HI MDM: CPT | Mod: CS,25 | Performed by: PHYSICIAN ASSISTANT

## 2022-07-31 PROCEDURE — 93005 ELECTROCARDIOGRAM TRACING: CPT | Performed by: PHYSICIAN ASSISTANT

## 2022-07-31 PROCEDURE — 87636 SARSCOV2 & INF A&B AMP PRB: CPT | Performed by: PHYSICIAN ASSISTANT

## 2022-07-31 PROCEDURE — C9803 HOPD COVID-19 SPEC COLLECT: HCPCS | Performed by: PHYSICIAN ASSISTANT

## 2022-07-31 PROCEDURE — 84484 ASSAY OF TROPONIN QUANT: CPT | Performed by: PHYSICIAN ASSISTANT

## 2022-07-31 PROCEDURE — 250N000011 HC RX IP 250 OP 636: Performed by: PHYSICIAN ASSISTANT

## 2022-07-31 PROCEDURE — G1010 CDSM STANSON: HCPCS

## 2022-07-31 PROCEDURE — 85379 FIBRIN DEGRADATION QUANT: CPT | Performed by: PHYSICIAN ASSISTANT

## 2022-07-31 PROCEDURE — 93010 ELECTROCARDIOGRAM REPORT: CPT | Performed by: PHYSICIAN ASSISTANT

## 2022-07-31 PROCEDURE — 83605 ASSAY OF LACTIC ACID: CPT | Performed by: PHYSICIAN ASSISTANT

## 2022-07-31 PROCEDURE — 80048 BASIC METABOLIC PNL TOTAL CA: CPT | Performed by: PHYSICIAN ASSISTANT

## 2022-07-31 PROCEDURE — 94640 AIRWAY INHALATION TREATMENT: CPT | Performed by: PHYSICIAN ASSISTANT

## 2022-07-31 PROCEDURE — 250N000009 HC RX 250: Performed by: PHYSICIAN ASSISTANT

## 2022-07-31 PROCEDURE — 83880 ASSAY OF NATRIURETIC PEPTIDE: CPT | Performed by: PHYSICIAN ASSISTANT

## 2022-07-31 PROCEDURE — 250N000013 HC RX MED GY IP 250 OP 250 PS 637: Performed by: PHYSICIAN ASSISTANT

## 2022-07-31 PROCEDURE — 99285 EMERGENCY DEPT VISIT HI MDM: CPT | Mod: CS | Performed by: PHYSICIAN ASSISTANT

## 2022-07-31 PROCEDURE — 36415 COLL VENOUS BLD VENIPUNCTURE: CPT | Performed by: PHYSICIAN ASSISTANT

## 2022-07-31 PROCEDURE — 85025 COMPLETE CBC W/AUTO DIFF WBC: CPT | Performed by: PHYSICIAN ASSISTANT

## 2022-07-31 RX ORDER — SODIUM CHLORIDE 9 MG/ML
INJECTION, SOLUTION INTRAVENOUS CONTINUOUS
Status: DISCONTINUED | OUTPATIENT
Start: 2022-07-31 | End: 2022-07-31

## 2022-07-31 RX ORDER — ALBUTEROL SULFATE 90 UG/1
2 AEROSOL, METERED RESPIRATORY (INHALATION) ONCE
Status: COMPLETED | OUTPATIENT
Start: 2022-07-31 | End: 2022-07-31

## 2022-07-31 RX ORDER — IOPAMIDOL 755 MG/ML
500 INJECTION, SOLUTION INTRAVASCULAR ONCE
Status: COMPLETED | OUTPATIENT
Start: 2022-07-31 | End: 2022-07-31

## 2022-07-31 RX ADMIN — ALBUTEROL SULFATE 2 PUFF: 90 AEROSOL, METERED RESPIRATORY (INHALATION) at 21:25

## 2022-07-31 RX ADMIN — IOPAMIDOL 65 ML: 755 INJECTION, SOLUTION INTRAVENOUS at 20:29

## 2022-07-31 RX ADMIN — SODIUM CHLORIDE 70 ML: 9 INJECTION, SOLUTION INTRAVENOUS at 20:29

## 2022-08-01 ENCOUNTER — PATIENT OUTREACH (OUTPATIENT)
Dept: CARE COORDINATION | Facility: CLINIC | Age: 87
End: 2022-08-01

## 2022-08-01 DIAGNOSIS — Z71.89 OTHER SPECIFIED COUNSELING: ICD-10-CM

## 2022-08-01 NOTE — ED TRIAGE NOTES
"Pt concerned with chest tightness related to COVID SOB. Vaccinated, tested positive on Tuesday. Pt reports course is fatigue, fogginess, and this new onset tightness. \"I tried to lay in bed last nights and it was hard to breathe, so I slept in the chair, Hypertensive, VSS on room air.       Triage Assessment     Row Name 07/31/22 9184       Triage Assessment (Adult)    Airway WDL WDL       Respiratory WDL    Respiratory WDL WDL       Skin Circulation/Temperature WDL    Skin Circulation/Temperature WDL WDL       Cardiac WDL    Cardiac WDL WDL       Peripheral/Neurovascular WDL    Peripheral Neurovascular WDL WDL       Cognitive/Neuro/Behavioral WDL    Cognitive/Neuro/Behavioral WDL WDL              "

## 2022-08-01 NOTE — DISCHARGE INSTRUCTIONS
Your work-up today showed that you have a little bit of fluid in your lungs and your cardiac atria are enlarged.  Because of this, I ordered an echocardiogram to be completed in the outpatient setting at your convenience.  They should call to schedule this for you hopefully this week.    Your sodium was low today.  I would like you to try to restrict water intake and may be salt your food a little bit more to help compensate for this.  Please contact your clinic provider tomorrow morning to let them know you were here and discuss your low sodium.  They will likely want to recheck your lab studies.    In regard to your COVID-19, for headache and body aches you can use Tylenol.  Practice deep breathing regularly.  You should be on the tail end of it if you have already been sick for 5 days.    If you develop any worsening symptoms please return to the emergency department.    Thank you for choosing [facility] Emergency Department. It was a pleasure taking care of you today. If you have any questions, please call [phone].    Dianna Jacobsen PA-C

## 2022-08-01 NOTE — TELEPHONE ENCOUNTER
She can try to drink like Gatorade which is electrolytes and sodium in it.  I will have to recheck her sodium when I see her.  If she is not feeling well she should be seen by another provider as I will be out of the clinic until that date on the 19th.

## 2022-08-01 NOTE — ED PROVIDER NOTES
History     Chief Complaint   Patient presents with     Chest Pain       HPI  Audrey Ortega is a 87 year old female who presents to the emergency department complaining of chest tightness. The patient reports 5 days ago at home she had a little bit of a headache and body aches and just not feeling well so she took a COVID test and it came back positive.  Since then she has developed congestion and cough.  The cough has not been too bad in the last couple days.  She has not had any fevers.  Last night however she woke up feeling like she was having trouble breathing because her chest felt tight.  She ended up sleeping in her chair.  This chest tightness persisted through the day today so she decided to be evaluated.  She does have a history of a pacemaker due to atrial fibrillation/sick sinus syndrome.  She is not on any blood thinners but does take metoprolol and lisinopril-hydrochlorothiazide.  No leg swelling.  Currently denies any significant shortness of breath, only a mild tightness in her central chest.        Allergies:  Allergies   Allergen Reactions     Ciprofloxacin Rash     Macrobid [Nitrofurantoin] Itching and Rash       Problem List:    Patient Active Problem List    Diagnosis Date Noted     Chronic kidney disease, stage 3 (H) 05/05/2021     Priority: Medium        Past Medical History:    No past medical history on file.    Past Surgical History:    Past Surgical History:   Procedure Laterality Date     PHACOEMULSIFICATION WITH STANDARD INTRAOCULAR LENS IMPLANT Right 7/16/2020    Procedure: Cataract surgery with intraocular lens implant;  Surgeon: Fortino Ford MD;  Location:  OR     PHACOEMULSIFICATION WITH STANDARD INTRAOCULAR LENS IMPLANT Left 7/30/2020    Procedure: Cataract Surgery Left Eye;  Surgeon: Max Cooley MD;  Location:  OR       Family History:    No family history on file.    Social History:  Marital Status:  Single [1]  Social History     Tobacco Use     Smoking  status: Never Smoker     Smokeless tobacco: Never Used   Substance Use Topics     Alcohol use: Not Currently        Medications:    Ascorbic Acid (VITAMIN C) 500 MG CAPS  aspirin (ASA) 325 MG EC tablet  calcium 600 MG tablet  ciprofloxacin (CIPRO) 250 MG tablet  COMPRESSION STOCKINGS  lisinopril-hydrochlorothiazide (ZESTORETIC) 20-25 MG tablet  magnesium 250 MG tablet  metoprolol tartrate (LOPRESSOR) 25 MG tablet  multivitamin w/minerals (THERA-VIT-M) tablet  nitroFURantoin macrocrystal-monohydrate (MACROBID) 100 MG capsule  Vitamin D, Cholecalciferol, 25 MCG (1000 UT) TABS  zolpidem (AMBIEN) 5 MG tablet          Review of Systems   All other systems reviewed and are negative.      Physical Exam   BP: (!) 190/116  Pulse: 81  Temp: 97.7  F (36.5  C)  Resp: 17  Weight: 61.2 kg (135 lb)  SpO2: 97 %      Physical Exam  Vitals and nursing note reviewed.   Constitutional:       General: She is not in acute distress.     Appearance: She is well-developed and normal weight. She is not ill-appearing, toxic-appearing or diaphoretic.   HENT:      Head: Normocephalic and atraumatic.      Nose: Nose normal.      Mouth/Throat:      Mouth: Mucous membranes are moist.      Pharynx: Oropharynx is clear. No oropharyngeal exudate or posterior oropharyngeal erythema.   Eyes:      Extraocular Movements: Extraocular movements intact.      Conjunctiva/sclera: Conjunctivae normal.      Pupils: Pupils are equal, round, and reactive to light.   Cardiovascular:      Rate and Rhythm: Normal rate and regular rhythm.      Heart sounds: Normal heart sounds.   Pulmonary:      Effort: Pulmonary effort is normal. No respiratory distress.      Breath sounds: Normal breath sounds. No wheezing or rales.   Abdominal:      General: Abdomen is flat. There is no distension.      Tenderness: There is no abdominal tenderness.   Musculoskeletal:         General: No deformity.      Cervical back: Neck supple.      Right lower leg: No edema.      Left lower  leg: No edema.   Skin:     General: Skin is warm and dry.   Neurological:      General: No focal deficit present.      Mental Status: She is alert and oriented to person, place, and time. Mental status is at baseline.      Motor: No weakness.   Psychiatric:         Mood and Affect: Mood normal.         Behavior: Behavior normal.         ED Course        Procedures           EKG Interpretation:      Interpreted by Dianna Jacobsen PA-C  Time reviewed: 2000  Symptoms at time of EKG: chest tightness   Rhythm: atrial fibrillation - controlled  Rate: Normal  Axis: Normal  Ectopy: none  Conduction: normal  ST Segments/ T Waves: No acute ischemic changes  Q Waves: none  Comparison to prior: Unchanged    Clinical Impression: no acute changes, atrial fibrillation (chronic)    Results for orders placed or performed during the hospital encounter of 07/31/22 (from the past 24 hour(s))   CBC with platelets differential    Narrative    The following orders were created for panel order CBC with platelets differential.  Procedure                               Abnormality         Status                     ---------                               -----------         ------                     CBC with platelets and d...[[id number]]                      Final result                 Please view results for these tests on the individual orders.   D dimer quantitative   Result Value Ref Range    D-Dimer Quantitative 2.72 (H) 0.00 - 0.50 ug/mL FEU    Narrative    This D-dimer assay is intended for use in conjunction with a clinical pretest probability assessment model to exclude pulmonary embolism (PE) and deep venous thrombosis (DVT) in outpatients suspected of PE or DVT. The cut-off value is 0.50 ug/mL FEU.   Basic metabolic panel   Result Value Ref Range    Sodium 125 (L) 133 - 144 mmol/L    Potassium 4.7 3.4 - 5.3 mmol/L    Chloride 92 (L) 94 - 109 mmol/L    Carbon Dioxide (CO2) 25 20 - 32 mmol/L    Anion Gap 8 3 - 14 mmol/L    Urea  Nitrogen 22 7 - 30 mg/dL    Creatinine 1.13 (H) 0.52 - 1.04 mg/dL    Calcium 8.9 8.5 - 10.1 mg/dL    Glucose 90 70 - 99 mg/dL    GFR Estimate 47 (L) >60 mL/min/1.73m2   Lactic acid whole blood   Result Value Ref Range    Lactic Acid 0.9 0.7 - 2.0 mmol/L   Symptomatic; Auto-generated order Influenza A/B & SARS-CoV2 (COVID-19) Virus PCR Multiplex Nasopharyngeal    Specimen: Nasopharyngeal; Swab   Result Value Ref Range    Influenza A PCR Negative Negative    Influenza B PCR Negative Negative    SARS CoV2 PCR Positive (A) Negative    Narrative    Testing was performed using the irvin SARS-CoV-2 & Influenza A/B Assay on the irvin Lenore System. This test should be ordered for the detection of SARS-CoV-2 and influenza viruses in individuals who meet clinical and/or epidemiological criteria. Test performance is unknown in asymptomatic patients. This test is for in vitro diagnostic use under the FDA EUA for laboratories certified under CLIA to perform moderate and/or high complexity testing. This test has not been FDA cleared or approved. A negative result does not rule out the presence of PCR inhibitors in the specimen or target RNA in concentration below the limit of detection for the assay. If only one viral target is positive but coinfection with multiple targets is suspected, the sample should be re-tested with another FDA cleared, approved or authorized test, if coinfection would change clinical management. [facility] are certified under the Clinical Laboratory Improvement Amendments of 1988 (CLIA-88) as  qualified to perform moderate and/or high complexity laboratory testing.   CBC with platelets and differential   Result Value Ref Range    WBC Count 6.1 4.0 - 11.0 10e3/uL    RBC Count 3.86 3.80 - 5.20 10e6/uL    Hemoglobin 12.1 11.7 - 15.7 g/dL    Hematocrit 35.3 35.0 - 47.0 %    MCV 92 78 - 100 fL    MCH 31.3 26.5 - 33.0 pg    MCHC 34.3 31.5 - 36.5 g/dL    RDW 11.6 10.0 - 15.0 %    Platelet Count  170 150 - 450 10e3/uL    % Neutrophils 50 %    % Lymphocytes 35 %    % Monocytes 9 %    % Eosinophils 5 %    % Basophils 1 %    % Immature Granulocytes 0 %    NRBCs per 100 WBC 0 <1 /100    Absolute Neutrophils 3.1 1.6 - 8.3 10e3/uL    Absolute Lymphocytes 2.1 0.8 - 5.3 10e3/uL    Absolute Monocytes 0.5 0.0 - 1.3 10e3/uL    Absolute Eosinophils 0.3 0.0 - 0.7 10e3/uL    Absolute Basophils 0.0 0.0 - 0.2 10e3/uL    Absolute Immature Granulocytes 0.0 <=0.4 10e3/uL    Absolute NRBCs 0.0 10e3/uL   Troponin I   Result Value Ref Range    Troponin I High Sensitivity 19 <54 ng/L   Nt probnp inpatient (BNP)   Result Value Ref Range    N terminal Pro BNP Inpatient 942 0 - 1,800 pg/mL   CT Chest Pulmonary Embolism w Contrast    Narrative    EXAM: CT CHEST PULMONARY EMBOLISM W CONTRAST  LOCATION: [facility]  DATE/TIME: 7/31/2022 8:27 PM    INDICATION: dyspnea, chest tightness, elevated d dimer  COMPARISON: None.  TECHNIQUE: CT chest pulmonary angiogram during arterial phase injection of IV contrast. Multiplanar reformats and MIP reconstructions were performed. Dose reduction techniques were used.   CONTRAST: 65mL, isovue 370    FINDINGS:  ANGIOGRAM CHEST: Pulmonary arteries are normal caliber and negative for pulmonary emboli. Suboptimal opacification of the aortic lumen and the left ventricle, indirect findings suggesting low cardiac output. No thoracic aortic aneurysm. Moderate mixed   attenuation atheroma of the arch and descending aorta. No periaortic inflammatory stranding. There are no findings to suggest acute aortic syndrome.    LUNGS AND PLEURA: Moderate interstitial lung edema is present with predominantly smooth thickening of intralobular septa as well as peribronchial fluid cuffs in the lower lobes, right greater than left. No pleural effusions. No endoluminal airway debris   or bronchiectasis.    MEDIASTINUM: Left subclavian approach dual chamber pacemaker has right atrial appendage  and right ventricular leads. Severe enlargement of the cardiac atria and mild enlargement of the ventricles. No pericardial effusion. Hilar lymph nodes are at the   upper limit of normal in size, likely reactive in the setting of interstitial lung edema. Esophagus is decompressed.    CORONARY ARTERY CALCIFICATION: None.    UPPER ABDOMEN: Normal.    MUSCULOSKELETAL: Minimal superior endplate deformity of T11. Thoracic vertebra are maintained in height. No aggressive or destructive bone lesions.      Impression    IMPRESSION:    1.  No acute pulmonary embolism.  2.  Severe enlargement of the cardiac atria. Indirect findings of low cardiac output.  3.  Interstitial lung edema in the bases, right greater than left. Consistent with congestive failure.       Medications   Saline 100mL Bag (70 mLs Intravenous Given 7/31/22 2029)   iopamidol (ISOVUE-370) solution 500 mL (65 mLs Intravenous Given 7/31/22 2029)   albuterol (PROVENTIL HFA/VENTOLIN HFA) inhaler (2 puffs Inhalation Given 7/31/22 2125)         Assessments & Plan (with Medical Decision Making)  Audrey Ortega is a 87 year old female who presented to the ED for concerns of chest tightness in the setting of a positive home COVID test 5 days ago.  Reports some shortness of breath last night with the chest tightness but denies feeling short of breath here.  On arrival to the ED she was afebrile.  Blood pressure was elevated but otherwise normal vital signs, O2 saturations 97% on room air.  No acute abnormalities demonstrated on physical exam today.  She did not appear acutely ill or toxic.  Lung sounds were clear.  Decision made to test her for COVID-19 for confirmation.  We will also get EKG and labs to evaluate for cardiac component to chest tightness.  Also will add on D-dimer as COVID-19 does increase your risk for PE.  Patient was given albuterol inhaler here to see if that would help her chest tightness.  EKG showed atrial fibrillation but no signs of ischemia and  overall stable from previous.  Troponin was within normal limits.  White count and hemoglobin were normal.  Creatinine at baseline.  Her sodium was low at 125 so IV NS ordered.  COVID-19 test did come back positive.  D-dimer also quite elevated at 2.72.  CT PE study ordered and showed fortunately no signs of pulmonary embolus.  She did have severe enlargement of the cardiac atria with indirect findings of low cardiac output.  Also with findings of interstitial lung edema at the bases concerning for congestive failure.  BNP added to labs, fluid order DC'ed for now.  BNP was within normal limits.  I discussed case with Dr. Ames.  Patient does not have any significant symptoms of hyponatremia.  In regard to the congestive failure on CT scan, normal BNP, normal SPO2 here, no tachycardia, no evidence of respiratory distress.  At this point I think it reasonable to hold on any aggressive interventions for these findings.  No indication for hospitalization at this time.  Given the cardiac atria enlargement with indirect findings of low cardiac output, I think she would benefit from an echocardiogram but this can be done in the outpatient setting.  Patient is agreeable to this, echocardiogram ordered.  For her hyponatremia, she admitted that she has been drinking quite a lot of water due to her COVID-19 infection.  I advised that she try to restrict her fluid intake and even put a little more salt on her food.  She was advised to contact her clinic provider tomorrow morning to let them know she was here and schedule follow-up for lab recheck.  I anticipate her COVID-19 symptoms should improve since she is already on day 5, chest tightness she has had is likely multifactorial to the congestion in her lungs as well as her COVID infection.  The albuterol inhaler did not really change her symptoms too much.  I encouraged her to practice deep breathing at home.  Can take Tylenol as needed for achiness.  Overall she was provided  instructions on when she should return to the ED.  All questions were answered and patient was discharged home in stable condition.     I have reviewed the nursing notes.    I have reviewed the findings, diagnosis, plan and need for follow up with the patient.    New Prescriptions    No medications on file       Final diagnoses:   Infection due to 2019 novel coronavirus   Hyponatremia   Atrial enlargement, bilateral     Note: Chart documentation done in part with Dragon Voice Recognition software. Although reviewed after completion, some word and grammatical errors may remain.     7/31/2022   [facility] EMERGENCY DEPT     Dianna Jacobsen PA-C  07/31/22 7527

## 2022-08-01 NOTE — PROGRESS NOTES
"Clinic Care Coordination Contact  [facility]: Post-Discharge Note  SITUATION                                                      Admission:    Admission Date: 07/31/22   Reason for Admission: Infection due to 2019 novel coronavirus  Discharge:   Discharge Date: 07/31/22  Discharge Diagnosis: Infection due to 2019 novel coronavirus    BACKGROUND                                                      Per hospital discharge summary and inpatient provider notes:    Audrey Ortega is a 87 year old female who presents to the emergency department complaining of chest tightness. The patient reports 5 days ago at home she had a little bit of a headache and body aches and just not feeling well so she took a COVID test and it came back positive.  Since then she has developed congestion and cough.  The cough has not been too bad in the last couple days.  She has not had any fevers.  Last night however she woke up feeling like she was having trouble breathing because her chest felt tight.  She ended up sleeping in her chair.  This chest tightness persisted through the day today so she decided to be evaluated.  She does have a history of a pacemaker due to atrial fibrillation/sick sinus syndrome.  She is not on any blood thinners but does take metoprolol and lisinopril-hydrochlorothiazide.  No leg swelling.  Currently denies any significant shortness of breath, only a mild tightness in her central chest.    ASSESSMENT      Enrollment  Primary Care Care Coordination Status: Declined    Discharge Assessment  How are you doing now that you are home?: \"Felling tired\"  How are your symptoms? (Red Flag symptoms escalate to triage hotline per guidelines): Improved  Do you feel your condition is stable enough to be safe at home until your provider visit?: Yes  Does the patient have their discharge instructions? : Yes  Does the patient have questions regarding their discharge instructions? : No  Were you started on any new medications " or were there changes to any of your previous medications? : No  Does the patient have all of their medications?: Yes  Do you have questions regarding any of your medications? : No  Do you have all of your needed medical supplies or equipment (DME)?  (i.e. oxygen tank, CPAP, cane, etc.): Yes  Discharge follow-up appointment scheduled within 14 calendar days? : Yes  Discharge Follow Up Appointment Date: 08/19/22  Discharge Follow Up Appointment Scheduled with?: Primary Care Provider    Post-op ([facility] Only)  If the patient had a surgery or procedure, do they have any questions for a nurse?: No    PLAN                                                      Outpatient Plan:    Follow up with [facility] Emergency Dept (EMERGENCY MEDICINE)    Future Appointments   Date Time Provider Department Center   8/19/2022  1:40 PM Krish Bates MD [facility]   9[facility]16[facility]2022 12[facility]00 AM [facility]   9/19/2022  1:15 PM Tyler Mendez MD [facility]         For any urgent concerns, please contact our 24 hour nurse triage line: [phone] ([address] [place] [address])         MARGOTH Huitron  [phone]  [facility]  [facility]

## 2022-08-19 ENCOUNTER — OFFICE VISIT (OUTPATIENT)
Dept: INTERNAL MEDICINE | Facility: CLINIC | Age: 87
End: 2022-08-19
Payer: COMMERCIAL

## 2022-08-19 VITALS
TEMPERATURE: 97.6 F | WEIGHT: 130 LBS | RESPIRATION RATE: 16 BRPM | SYSTOLIC BLOOD PRESSURE: 138 MMHG | OXYGEN SATURATION: 99 % | DIASTOLIC BLOOD PRESSURE: 86 MMHG | HEART RATE: 78 BPM | BODY MASS INDEX: 21.63 KG/M2

## 2022-08-19 DIAGNOSIS — I10 BENIGN ESSENTIAL HYPERTENSION: ICD-10-CM

## 2022-08-19 DIAGNOSIS — E87.1 HYPONATREMIA: ICD-10-CM

## 2022-08-19 DIAGNOSIS — N18.31 STAGE 3A CHRONIC KIDNEY DISEASE (H): ICD-10-CM

## 2022-08-19 DIAGNOSIS — Z00.00 MEDICARE ANNUAL WELLNESS VISIT, SUBSEQUENT: Primary | ICD-10-CM

## 2022-08-19 DIAGNOSIS — F51.01 PRIMARY INSOMNIA: ICD-10-CM

## 2022-08-19 DIAGNOSIS — I48.0 PAF (PAROXYSMAL ATRIAL FIBRILLATION) (H): ICD-10-CM

## 2022-08-19 LAB
ALBUMIN SERPL-MCNC: 3.8 G/DL (ref 3.4–5)
ALP SERPL-CCNC: 66 U/L (ref 40–150)
ALT SERPL W P-5'-P-CCNC: 23 U/L (ref 0–50)
ANION GAP SERPL CALCULATED.3IONS-SCNC: 6 MMOL/L (ref 3–14)
AST SERPL W P-5'-P-CCNC: 20 U/L (ref 0–45)
BILIRUB SERPL-MCNC: 1.5 MG/DL (ref 0.2–1.3)
BUN SERPL-MCNC: 19 MG/DL (ref 7–30)
CALCIUM SERPL-MCNC: 9 MG/DL (ref 8.5–10.1)
CHLORIDE BLD-SCNC: 95 MMOL/L (ref 94–109)
CO2 SERPL-SCNC: 26 MMOL/L (ref 20–32)
CREAT SERPL-MCNC: 1.12 MG/DL (ref 0.52–1.04)
CREAT UR-MCNC: 133 MG/DL
GFR SERPL CREATININE-BSD FRML MDRD: 48 ML/MIN/1.73M2
GLUCOSE BLD-MCNC: 91 MG/DL (ref 70–99)
MICROALBUMIN UR-MCNC: 13 MG/L
MICROALBUMIN/CREAT UR: 9.77 MG/G CR (ref 0–25)
POTASSIUM BLD-SCNC: 4.3 MMOL/L (ref 3.4–5.3)
PROT SERPL-MCNC: 7.6 G/DL (ref 6.8–8.8)
SODIUM SERPL-SCNC: 127 MMOL/L (ref 133–144)

## 2022-08-19 PROCEDURE — G0009 ADMIN PNEUMOCOCCAL VACCINE: HCPCS | Performed by: INTERNAL MEDICINE

## 2022-08-19 PROCEDURE — 36415 COLL VENOUS BLD VENIPUNCTURE: CPT | Performed by: INTERNAL MEDICINE

## 2022-08-19 PROCEDURE — 80053 COMPREHEN METABOLIC PANEL: CPT | Performed by: INTERNAL MEDICINE

## 2022-08-19 PROCEDURE — G0439 PPPS, SUBSEQ VISIT: HCPCS | Performed by: INTERNAL MEDICINE

## 2022-08-19 PROCEDURE — 90732 PPSV23 VACC 2 YRS+ SUBQ/IM: CPT | Performed by: INTERNAL MEDICINE

## 2022-08-19 PROCEDURE — 99214 OFFICE O/P EST MOD 30 MIN: CPT | Mod: 25 | Performed by: INTERNAL MEDICINE

## 2022-08-19 PROCEDURE — 82043 UR ALBUMIN QUANTITATIVE: CPT | Performed by: INTERNAL MEDICINE

## 2022-08-19 RX ORDER — ZOLPIDEM TARTRATE 5 MG/1
TABLET ORAL
Qty: 15 TABLET | Refills: 0 | Status: SHIPPED | OUTPATIENT
Start: 2022-08-19 | End: 2023-01-08

## 2022-08-19 RX ORDER — LISINOPRIL AND HYDROCHLOROTHIAZIDE 20; 25 MG/1; MG/1
TABLET ORAL
Qty: 45 TABLET | Refills: 3 | Status: SHIPPED | OUTPATIENT
Start: 2022-08-19 | End: 2022-08-19

## 2022-08-19 RX ORDER — LISINOPRIL 10 MG/1
10 TABLET ORAL DAILY
Qty: 90 TABLET | Refills: 3 | Status: SHIPPED | OUTPATIENT
Start: 2022-08-19 | End: 2023-01-31

## 2022-08-19 ASSESSMENT — ENCOUNTER SYMPTOMS
BREAST MASS: 0
ARTHRALGIAS: 0
FEVER: 0
DYSURIA: 0
PALPITATIONS: 0
SHORTNESS OF BREATH: 0
HEARTBURN: 0
HEADACHES: 0
JOINT SWELLING: 0
NAUSEA: 0
MYALGIAS: 0
SORE THROAT: 0
CONSTIPATION: 1
WEAKNESS: 0
FREQUENCY: 0
PARESTHESIAS: 0

## 2022-08-19 ASSESSMENT — ACTIVITIES OF DAILY LIVING (ADL): CURRENT_FUNCTION: NO ASSISTANCE NEEDED

## 2022-08-19 ASSESSMENT — PAIN SCALES - GENERAL: PAINLEVEL: NO PAIN (0)

## 2022-08-19 NOTE — PROGRESS NOTES
"SUBJECTIVE:   Audrey Ortega is a 87 year old female who presents for Preventive Visit.    Patient has been advised of split billing requirements and indicates understanding: Yes  Are you in the first 12 months of your Medicare coverage?  No    Healthy Habits:     In general, how would you rate your overall health?  Excellent    Frequency of exercise:  4-5 days/week    Duration of exercise:  15-30 minutes    Do you usually eat at least 4 servings of fruit and vegetables a day, include whole grains    & fiber and avoid regularly eating high fat or \"junk\" foods?  Yes    Taking medications regularly:  Yes    Medication side effects:  No muscle aches    Ability to successfully perform activities of daily living:  No assistance needed    Home Safety:  No safety concerns identified    Hearing Impairment:  No hearing concerns    In the past 6 months, have you been bothered by leaking of urine? Yes    In general, how would you rate your overall mental or emotional health?  Good      PHQ-2 Total Score: 0    Additional concerns today:  No    Feeling ok, had Covid despite vaccinations.  All better now, no prolonged symptoms.  Was in the ER for sob.     Only needs ambien, no palpitations, not on metoprolol.   Half a bp pills.     Do you feel safe in your environment? Yes    Have you ever done Advance Care Planning? (For example, a Health Directive, POLST, or a discussion with a medical provider or your loved ones about your wishes): No, advance care planning information given to patient to review.  Advanced care planning was discussed at today's visit.       Fall risk  Fallen 2 or more times in the past year?: No  Any fall with injury in the past year?: No    Cognitive Screening   1) Repeat 3 items (Leader, Season, Table)    2) Clock draw: NORMAL  3) 3 item recall: Recalls 3 objects  Results: 3 items recalled: COGNITIVE IMPAIRMENT LESS LIKELY    Mini-CogTM Copyright S [employer]. Licensed by the author for use in [employer] " Services; reprinted with permission (soob@.[facility]). All rights reserved.      Do you have sleep apnea, excessive snoring or daytime drowsiness?: no    Reviewed and updated as needed this visit by clinical staff   Tobacco  Allergies  Meds                Reviewed and updated as needed this visit by Provider                   Social History     Tobacco Use     Smoking status: Never Smoker     Smokeless tobacco: Never Used   Substance Use Topics     Alcohol use: Not Currently         Alcohol Use 8/19/2022   Prescreen: >3 drinks/day or >7 drinks/week? Not Applicable   Prescreen: >3 drinks/day or >7 drinks/week? -         Current providers sharing in care for this patient include:   Patient Care Team:  Krish Bates MD as PCP - General (Internal Medicine)  Krish Bates MD as Assigned PCP  Cassy Jama MD as Assigned Heart and Vascular Provider    The following health maintenance items are reviewed in Epic and correct as of today:  Health Maintenance Due   Topic Date Due     LIPID  Never done     ANNUAL REVIEW OF HM ORDERS  Never done     ZOSTER IMMUNIZATION (1 of 2) Never done     Pneumococcal Vaccine: 65+ Years (2 - PPSV23 or PCV20) 03/07/2020     MEDICARE ANNUAL WELLNESS VISIT  05/05/2022     MICROALBUMIN  05/05/2022     Lab work is in process  Pneumonia Vaccine:will do pneumo 23    Mammogram Screening - Patient over age 75, has elected to discontinue screenings.  Pertinent mammograms are reviewed under the imaging tab.    Review of Systems  CONSTITUTIONAL: NEGATIVE for fever, chills, change in weight  INTEGUMENTARY/SKIN: NEGATIVE for worrisome rashes, moles or lesions  EYES: NEGATIVE for vision changes or irritation  ENT/MOUTH: NEGATIVE for ear, mouth and throat problems  RESP: NEGATIVE for significant cough or SOB  CV: NEGATIVE for chest pain, palpitations or peripheral edema  GI: NEGATIVE for nausea, abdominal pain, heartburn, or change in bowel habits  : NEGATIVE for frequency, dysuria, or  "hematuria  MUSCULOSKELETAL: NEGATIVE for significant arthralgias or myalgia  NEURO: NEGATIVE for weakness, dizziness or paresthesias  ENDOCRINE: NEGATIVE for temperature intolerance, skin/hair changes  HEME: NEGATIVE for bleeding problems  PSYCHIATRIC: NEGATIVE for changes in mood or affect    OBJECTIVE:   /86   Pulse 78   Temp 97.6  F (36.4  C) (Temporal)   Resp 16   Wt 59 kg (130 lb)   SpO2 99%   BMI 21.63 kg/m   Estimated body mass index is 21.63 kg/m  as calculated from the following:    Height as of 7/12/22: 1.651 m (5' 5\").    Weight as of this encounter: 59 kg (130 lb).  Physical Exam  GENERAL: healthy, alert and no distress  EYES: Eyes grossly normal to inspection, PERRL and conjunctivae and sclerae normal  HENT: ear canals and TM's normal, nose and mouth without ulcers or lesions  NECK: no adenopathy, no asymmetry, masses, or scars and thyroid normal to palpation  RESP: lungs clear to auscultation - no rales, rhonchi or wheezes  CV: regular rate and rhythm, normal S1 S2, no S3 or S4, no murmur, click or rub, no peripheral edema and peripheral pulses strong  ABDOMEN: soft, nontender, no hepatosplenomegaly, no masses and bowel sounds normal  MS: trace edema   SKIN: no suspicious lesions or rashes  NEURO: Normal strength and tone, mentation intact and speech normal  PSYCH: mentation appears normal, affect normal/bright    ASSESSMENT / PLAN:       ICD-10-CM    1. Medicare annual wellness visit, subsequent  Z00.00    2. Benign essential hypertension  I10 Albumin Random Urine Quantitative with Creat Ratio     lisinopril-hydrochlorothiazide (ZESTORETIC) 20-25 MG tablet     Comprehensive metabolic panel (BMP + Alb, Alk Phos, ALT, AST, Total. Bili, TP)     Albumin Random Urine Quantitative with Creat Ratio     Comprehensive metabolic panel (BMP + Alb, Alk Phos, ALT, AST, Total. Bili, TP)   3. Primary insomnia  F51.01 zolpidem (AMBIEN) 5 MG tablet   4. PAF (paroxysmal atrial fibrillation) (H)  I48.0    5. 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  Chronic stage IIIa kidney disease we will check labs  Primary insomnia she does have some Ambien that is refilled to use as needed  Blood pressures controlled with lisinopril hydrochlorothiazide at half a pill daily we will check labs again.    Paroxysmal atrial fibrillation is controlled with no problems does not want anticoagulation.  She is given a pneumonia shot recommended for flu shot and COVID update later this fall.              Patient has been advised of split billing requirements and indicates understanding: Yes    COUNSELING:  Reviewed preventive health counseling, as reflected in patient instructions       Regular exercise       Healthy diet/nutrition       Immunizations    Vaccinated for: Pneumococcal          Estimated body mass index is 21.63 kg/m  as calculated from the following:    Height as of 7/12/22: 1.651 m (5' 5\").    Weight as of this encounter: 59 kg (130 lb).        She reports that she has never smoked. She has never used smokeless tobacco.      Appropriate preventive services were discussed with this patient, including applicable screening as appropriate for cardiovascular disease, diabetes, osteopenia/osteoporosis, and glaucoma.  As appropriate for age/gender, discussed screening for colorectal cancer, prostate cancer, breast cancer, and cervical cancer. Checklist reviewing preventive services available has been given to the patient.    Reviewed patients plan of care and provided an AVS. The Basic Care Plan (routine screening as documented in Health Maintenance) for Audrey meets the Care Plan requirement. This Care Plan has been established and reviewed with the Patient.    Counseling Resources:  ATP IV Guidelines  Pooled Cohorts Equation Calculator  Breast Cancer Risk Calculator  Breast Cancer: Medication to Reduce Risk  FRAX Risk " Assessment  ICSI Preventive Guidelines  Dietary Guidelines for Americans, 2010  USDA's MyPlate  ASA Prophylaxis  Lung CA Screening    Krish Bates MD  [facility]    Identified Health Risks:

## 2022-08-19 NOTE — NURSING NOTE
Prior to injection, verified patient identity using patient's name and date of birth.  Due to injection administration, patient instructed to remain in clinic for 15 minutes  afterwards, and to report any adverse reaction to me immediately.    Screening Questionnaire for Adult Immunization     Are you sick today?   No    Do you have allergies to medications, food or any vaccine?   No    Have you ever had a serious reaction after receiving a vaccination?   No    Do you have a long-term health problem with heart disease, lung disease,  asthma, kidney disease, diabetes, anemia, metabolic or blood disease?   No    Do you have cancer, leukemia, AIDS, or any immune system problem?   No    Do you take cortisone, prednisone, other steroids, or anticancer drugs, or  have you had any x-ray (radiation) treatments?   No    Have you had a seizure, brain, or other nervous system problem?   No    During the past year, have you received a transfusion of blood or blood       products, or been given a medicine called immune (gamma) globulin?   No    For women: Are you pregnant or is there a chance you could become         pregnant during the next month?   No    Have you received any vaccinations in the past 4 weeks?   No     Immunization questionnaire answers were all negative.      MNVFC doesn't apply on this patient    Per orders of Dr. Bates, injection of PPSV23  given by Dari Robles MA. Patient instructed to remain in clinic for 20 minutes afterwards, and to report any adverse reaction to me immediately.       Screening performed by Dari Robles MA on 8/19/2022 at 2:07 PM.

## 2022-09-07 ENCOUNTER — NURSE TRIAGE (OUTPATIENT)
Dept: INTERNAL MEDICINE | Facility: CLINIC | Age: 87
End: 2022-09-07

## 2022-09-07 NOTE — TELEPHONE ENCOUNTER
"Patient stated she will have her blood pressure run low at times, and these numbers are common when running a low blood pressure reading.  She stated she is not having any other symptoms and feels \"fine\".  Patient stated she will take her BP medication when her blood pressure is running high, but will not take her medication when her BP is running low.  She stated she wanted to call because she knew that 82-84 for a top number and 51-52 for a bottom number are considered low, and she thought she better check in, but does not feel the need to go to an urgent care at this time or go to the ED at this time.  Advised patient to seek urgent/emergent care for worsening symptoms per protocol.  Advised patient of home care remedies for low blood pressure.  Patient stated understanding.  Advised patient to call back with any further questions or concerns.  Patient stated understanding.    Reason for Disposition    Systolic BP < 90 and NOT dizzy, lightheaded or weak    Additional Information    Negative: Systolic BP < 90 and feeling dizzy, lightheaded, or weak    Negative: Started suddenly after an allergic medicine, an allergic food, or bee sting    Negative: Shock suspected (e.g., cold/pale/clammy skin, too weak to stand, low BP, rapid pulse)    Negative: Difficult to awaken or acting confused  (e.g., disoriented, slurred speech)    Negative: Fainted    Negative: Chest pain    Negative: Bleeding (e.g., vomiting blood, rectal bleeding or tarry stools, severe vaginal bleeding)    Negative: Extra heart beats or heart is beating fast  (i.e., \"palpitations\")    Negative: Sounds like a life-threatening emergency to the triager    Negative: Systolic BP < 80 and NOT dizzy, lightheaded or weak (feels normal)    Negative: Abdominal pain    Negative: Major surgery in the past month    Negative: Fever > 100.4 F (38.0 C)    Negative: Drinking very little and has signs of dehydration (e.g., no urine > 12 hours, very dry mouth, very " "lightheaded)    Negative: Fall in systolic BP > 20 mm Hg from normal and feeling dizzy, lightheaded, or weak    Negative: Patient sounds very sick or weak to the triager    Answer Assessment - Initial Assessment Questions  1. BLOOD PRESSURE: \"What is the blood pressure?\" \"Did you take at least two measurements 5 minutes apart?\"      Yes - 82/51, 84/52    2. ONSET: \"When did you take your blood pressure?\"      Approximately 15 minutes ago    3. HOW: \"How did you obtain the blood pressure?\" (e.g., visiting nurse, automatic home BP monitor)      Home monitor    4. HISTORY: \"Do you have a history of low blood pressure?\" \"What is your blood pressure normally?\"      Yes    5. MEDICATIONS: \"Are you taking any medications for blood pressure?\" If yes: \"Have they been changed recently?\"      Please see chart    6. PULSE RATE: \"Do you know what your pulse rate is?\"       Unknown    7. OTHER SYMPTOMS: \"Have you been sick recently?\" \"Have you had a recent injury?\"      No    8. PREGNANCY: \"Is there any chance you are pregnant?\" \"When was your last menstrual period?\"      NA    Protocols used: LOW BLOOD PRESSURE-A-OH    Kavya Simmons RN    "

## 2022-09-15 ENCOUNTER — TELEPHONE (OUTPATIENT)
Dept: INTERNAL MEDICINE | Facility: CLINIC | Age: 87
End: 2022-09-15

## 2022-09-15 ENCOUNTER — LAB (OUTPATIENT)
Dept: LAB | Facility: CLINIC | Age: 87
End: 2022-09-15
Payer: COMMERCIAL

## 2022-09-15 DIAGNOSIS — E87.1 HYPONATREMIA: ICD-10-CM

## 2022-09-15 LAB
ANION GAP SERPL CALCULATED.3IONS-SCNC: 5 MMOL/L (ref 3–14)
BUN SERPL-MCNC: 27 MG/DL (ref 7–30)
CALCIUM SERPL-MCNC: 9.1 MG/DL (ref 8.5–10.1)
CHLORIDE BLD-SCNC: 105 MMOL/L (ref 94–109)
CO2 SERPL-SCNC: 26 MMOL/L (ref 20–32)
CREAT SERPL-MCNC: 1.22 MG/DL (ref 0.52–1.04)
GFR SERPL CREATININE-BSD FRML MDRD: 43 ML/MIN/1.73M2
GLUCOSE BLD-MCNC: 88 MG/DL (ref 70–99)
POTASSIUM BLD-SCNC: 4.9 MMOL/L (ref 3.4–5.3)
SODIUM SERPL-SCNC: 136 MMOL/L (ref 133–144)

## 2022-09-15 PROCEDURE — 80048 BASIC METABOLIC PNL TOTAL CA: CPT

## 2022-09-15 PROCEDURE — 36415 COLL VENOUS BLD VENIPUNCTURE: CPT

## 2022-09-15 NOTE — TELEPHONE ENCOUNTER
----- Message from Krish Bates MD sent at 9/15/2022 12:31 PM CDT -----  Please let her know her sodium level is back to normal.     Stay off the hydrochlorothiazide and hopefully her blood pressure is better.

## 2022-09-16 ENCOUNTER — ANCILLARY PROCEDURE (OUTPATIENT)
Dept: CARDIOLOGY | Facility: CLINIC | Age: 87
End: 2022-09-16
Attending: INTERNAL MEDICINE
Payer: COMMERCIAL

## 2022-09-16 DIAGNOSIS — I49.5 SICK SINUS SYNDROME (H): ICD-10-CM

## 2022-09-16 DIAGNOSIS — Z95.0 CARDIAC PACEMAKER IN SITU: Primary | ICD-10-CM

## 2022-09-16 DIAGNOSIS — Z95.0 CARDIAC PACEMAKER IN SITU: ICD-10-CM

## 2022-09-16 PROCEDURE — 93296 REM INTERROG EVL PM/IDS: CPT | Performed by: INTERNAL MEDICINE

## 2022-09-16 PROCEDURE — 93294 REM INTERROG EVL PM/LDLS PM: CPT | Performed by: INTERNAL MEDICINE

## 2022-09-19 ENCOUNTER — OFFICE VISIT (OUTPATIENT)
Dept: OBGYN | Facility: CLINIC | Age: 87
End: 2022-09-19
Payer: COMMERCIAL

## 2022-09-19 VITALS
SYSTOLIC BLOOD PRESSURE: 139 MMHG | BODY MASS INDEX: 21.47 KG/M2 | WEIGHT: 129 LBS | HEART RATE: 74 BPM | DIASTOLIC BLOOD PRESSURE: 81 MMHG

## 2022-09-19 DIAGNOSIS — N81.89 PELVIC RELAXATION: Primary | ICD-10-CM

## 2022-09-19 PROCEDURE — 99203 OFFICE O/P NEW LOW 30 MIN: CPT | Performed by: OBSTETRICS & GYNECOLOGY

## 2022-09-19 NOTE — PATIENT INSTRUCTIONS
If you have any questions regarding your visit, Please contact your care team.     [employer] Services: [phone]    To Schedule an Appointment 24/7  Call: [phone]     Tyler Mendez MD    Medical Assistant    Dick Cabrera-Surgery Scheduler  [facility]-Surgery Scheduler     Monday-[place]  1:00 pm-4:30 pm    Tuesday-[place]  7:30 am-4:30 pm    Wednesday-[place]  7:30 am-4:30 pm        Typical Surgery Days: Monday or Thursday       [address] [address]  [phone] appointment line  [phone] Fax    Imaging Scheduling all locations  [phone]    **Surgeries** Our Surgery Schedulers will contact you to schedule. If you do not receive a call within 3 business days, please call [phone].    If you need a medication refill, please contact your pharmacy. Please allow 3 business days for your refill to be completed.    As always, Thank you for trusting us with your healthcare needs!                   see additional instructions from your care team below

## 2022-09-19 NOTE — PROGRESS NOTES
I spent a total of 35 minutes on the care of Audrey on the day of service including 25 minutes of face-to-face time with remainder in chart review, care coordination, documentation on the day of service.\    Audrey comes in complaining of pelvic relaxation.  She notes that she is having to push her uterus as she believes it is back into her pelvis on occasion.  She states that this has been going on for approximately 5 years.  At this point she is not inclined to have surgery and just wants to make sure that there is nothing that is going to get her in trouble.    She has had 3 vaginal births the oldest child is 66 years old.  No traumatic issues with her deliveries.    Past medical history:  She is 87 years old and very active.  See most recent annual visit for evaluation which demonstrates healthy 87-year-old looks younger than her age.    We discussed types of herniations that have been minimal and I drew diagrams of the pelvis explaining her cystocele as a uterine uterine prolapse or rectocele and enterocele enterocele lives and answered all her questions regarding those.  I explained how a pessary would work and also explained how surgery would work.  Explained that if she is not having trouble with ulcerations or infections or bleeding or that of the organs or not eroding that there is probably nothing that is going to demand intervention at this point.  She makes it clear that she is not interested in surgery.    I explained that be advisable to be examined today and determine what exactly is penetrating and if there are any uterine masses or uterine or bladder's guarding that is going on and she is agreeable to that.    Examination:  Is a pleasant appropriate 87-year-old looking younger than her stated age  External genitalia are normal for age.  The bladder is able to be seen at the.  Speculum exam reveals no lesions or masses or unusual discharge or bleeding.  The cervix is trailing right behind the lower edge  of the bladder in to send.  Rectum is intact.    With straining prominent cystocele was viewed.  With that half speculum the uterus is seen to descend down to the level of the introitus.  The rectum does not seem to descend at all.    We talked about various options and how pessary would be used and applied and evaluated and suggest that if she is interested in going down that road be happy to have her back if she changes her mind and would like to have surgery that something else only would help schedule for her.    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    Plan:  Patient is invited to return for pessary placement and schedule at least 1/2-hour for that however at this point she is leaning towards doing nothing.  Suggest then return when there is more concern or more annoyance by the problem.

## 2022-10-04 LAB
MDC_IDC_LEAD_IMPLANT_DT: NORMAL
MDC_IDC_LEAD_IMPLANT_DT: NORMAL
MDC_IDC_LEAD_LOCATION: NORMAL
MDC_IDC_LEAD_LOCATION: NORMAL
MDC_IDC_LEAD_LOCATION_DETAIL_1: NORMAL
MDC_IDC_LEAD_LOCATION_DETAIL_1: NORMAL
MDC_IDC_LEAD_MFG: NORMAL
MDC_IDC_LEAD_MFG: NORMAL
MDC_IDC_LEAD_MODEL: NORMAL
MDC_IDC_LEAD_MODEL: NORMAL
MDC_IDC_LEAD_POLARITY_TYPE: NORMAL
MDC_IDC_LEAD_POLARITY_TYPE: NORMAL
MDC_IDC_LEAD_SERIAL: NORMAL
MDC_IDC_LEAD_SERIAL: NORMAL
MDC_IDC_MSMT_BATTERY_DTM: NORMAL
MDC_IDC_MSMT_BATTERY_IMPEDANCE: 3554 OHM
MDC_IDC_MSMT_BATTERY_REMAINING_LONGEVITY: 19 MO
MDC_IDC_MSMT_BATTERY_STATUS: NORMAL
MDC_IDC_MSMT_BATTERY_VOLTAGE: 2.71 V
MDC_IDC_MSMT_LEADCHNL_RA_IMPEDANCE_VALUE: 365 OHM
MDC_IDC_MSMT_LEADCHNL_RA_PACING_THRESHOLD_AMPLITUDE: 0.5 V
MDC_IDC_MSMT_LEADCHNL_RA_PACING_THRESHOLD_PULSEWIDTH: 0.4 MS
MDC_IDC_MSMT_LEADCHNL_RV_IMPEDANCE_VALUE: 463 OHM
MDC_IDC_MSMT_LEADCHNL_RV_PACING_THRESHOLD_AMPLITUDE: 0.38 V
MDC_IDC_MSMT_LEADCHNL_RV_PACING_THRESHOLD_PULSEWIDTH: 0.4 MS
MDC_IDC_PG_IMPLANT_DTM: NORMAL
MDC_IDC_PG_MFG: NORMAL
MDC_IDC_PG_MODEL: NORMAL
MDC_IDC_PG_SERIAL: NORMAL
MDC_IDC_PG_TYPE: NORMAL
MDC_IDC_SESS_CLINIC_NAME: NORMAL
MDC_IDC_SESS_DTM: NORMAL
MDC_IDC_SESS_TYPE: NORMAL
MDC_IDC_SET_BRADY_AT_MODE_SWITCH_MODE: NORMAL
MDC_IDC_SET_BRADY_AT_MODE_SWITCH_RATE: 150 {BEATS}/MIN
MDC_IDC_SET_BRADY_LOWRATE: 60 {BEATS}/MIN
MDC_IDC_SET_BRADY_MAX_SENSOR_RATE: 130 {BEATS}/MIN
MDC_IDC_SET_BRADY_MAX_TRACKING_RATE: 130 {BEATS}/MIN
MDC_IDC_SET_BRADY_MODE: NORMAL
MDC_IDC_SET_BRADY_PAV_DELAY_LOW: 180 MS
MDC_IDC_SET_BRADY_SAV_DELAY_LOW: 150 MS
MDC_IDC_SET_LEADCHNL_RA_PACING_AMPLITUDE: 1.5 V
MDC_IDC_SET_LEADCHNL_RA_PACING_CAPTURE_MODE: NORMAL
MDC_IDC_SET_LEADCHNL_RA_PACING_POLARITY: NORMAL
MDC_IDC_SET_LEADCHNL_RA_PACING_PULSEWIDTH: 0.4 MS
MDC_IDC_SET_LEADCHNL_RA_SENSING_POLARITY: NORMAL
MDC_IDC_SET_LEADCHNL_RA_SENSING_SENSITIVITY: 0.5 MV
MDC_IDC_SET_LEADCHNL_RV_PACING_AMPLITUDE: 1.5 V
MDC_IDC_SET_LEADCHNL_RV_PACING_CAPTURE_MODE: NORMAL
MDC_IDC_SET_LEADCHNL_RV_PACING_POLARITY: NORMAL
MDC_IDC_SET_LEADCHNL_RV_PACING_PULSEWIDTH: 0.4 MS
MDC_IDC_SET_LEADCHNL_RV_SENSING_POLARITY: NORMAL
MDC_IDC_SET_LEADCHNL_RV_SENSING_SENSITIVITY: 2 MV
MDC_IDC_SET_ZONE_DETECTION_INTERVAL: 333.33 MS
MDC_IDC_SET_ZONE_DETECTION_INTERVAL: 400 MS
MDC_IDC_SET_ZONE_TYPE: NORMAL
MDC_IDC_SET_ZONE_TYPE: NORMAL
MDC_IDC_STAT_AT_BURDEN_PERCENT: 6.6 %
MDC_IDC_STAT_AT_DTM_END: NORMAL
MDC_IDC_STAT_AT_DTM_START: NORMAL
MDC_IDC_STAT_AT_MODE_SW_COUNT: 171
MDC_IDC_STAT_BRADY_AP_VP_PERCENT: 18 %
MDC_IDC_STAT_BRADY_AP_VS_PERCENT: 21 %
MDC_IDC_STAT_BRADY_AS_VP_PERCENT: 18 %
MDC_IDC_STAT_BRADY_AS_VS_PERCENT: 43 %
MDC_IDC_STAT_BRADY_DTM_END: NORMAL
MDC_IDC_STAT_BRADY_DTM_START: NORMAL
MDC_IDC_STAT_EPISODE_RECENT_COUNT: 2
MDC_IDC_STAT_EPISODE_RECENT_COUNT: 78
MDC_IDC_STAT_EPISODE_RECENT_COUNT_DTM_END: NORMAL
MDC_IDC_STAT_EPISODE_RECENT_COUNT_DTM_END: NORMAL
MDC_IDC_STAT_EPISODE_RECENT_COUNT_DTM_START: NORMAL
MDC_IDC_STAT_EPISODE_RECENT_COUNT_DTM_START: NORMAL
MDC_IDC_STAT_EPISODE_TYPE: NORMAL
MDC_IDC_STAT_EPISODE_TYPE: NORMAL

## 2022-12-22 ENCOUNTER — ANCILLARY PROCEDURE (OUTPATIENT)
Dept: CARDIOLOGY | Facility: CLINIC | Age: 87
End: 2022-12-22
Attending: INTERNAL MEDICINE
Payer: COMMERCIAL

## 2022-12-22 DIAGNOSIS — I49.5 SICK SINUS SYNDROME (H): ICD-10-CM

## 2022-12-22 DIAGNOSIS — Z95.0 CARDIAC PACEMAKER IN SITU: ICD-10-CM

## 2022-12-22 PROCEDURE — 93296 REM INTERROG EVL PM/IDS: CPT | Performed by: INTERNAL MEDICINE

## 2022-12-22 PROCEDURE — 93294 REM INTERROG EVL PM/LDLS PM: CPT | Performed by: INTERNAL MEDICINE

## 2023-01-04 LAB
MDC_IDC_LEAD_IMPLANT_DT: NORMAL
MDC_IDC_LEAD_IMPLANT_DT: NORMAL
MDC_IDC_LEAD_LOCATION: NORMAL
MDC_IDC_LEAD_LOCATION: NORMAL
MDC_IDC_LEAD_LOCATION_DETAIL_1: NORMAL
MDC_IDC_LEAD_LOCATION_DETAIL_1: NORMAL
MDC_IDC_LEAD_MFG: NORMAL
MDC_IDC_LEAD_MFG: NORMAL
MDC_IDC_LEAD_MODEL: NORMAL
MDC_IDC_LEAD_MODEL: NORMAL
MDC_IDC_LEAD_POLARITY_TYPE: NORMAL
MDC_IDC_LEAD_POLARITY_TYPE: NORMAL
MDC_IDC_LEAD_SERIAL: NORMAL
MDC_IDC_LEAD_SERIAL: NORMAL
MDC_IDC_MSMT_BATTERY_DTM: NORMAL
MDC_IDC_MSMT_BATTERY_IMPEDANCE: 3784 OHM
MDC_IDC_MSMT_BATTERY_REMAINING_LONGEVITY: 17 MO
MDC_IDC_MSMT_BATTERY_STATUS: NORMAL
MDC_IDC_MSMT_BATTERY_VOLTAGE: 2.71 V
MDC_IDC_MSMT_LEADCHNL_RA_IMPEDANCE_VALUE: 374 OHM
MDC_IDC_MSMT_LEADCHNL_RA_PACING_THRESHOLD_AMPLITUDE: 0.5 V
MDC_IDC_MSMT_LEADCHNL_RA_PACING_THRESHOLD_PULSEWIDTH: 0.4 MS
MDC_IDC_MSMT_LEADCHNL_RV_IMPEDANCE_VALUE: 474 OHM
MDC_IDC_MSMT_LEADCHNL_RV_PACING_THRESHOLD_AMPLITUDE: 0.5 V
MDC_IDC_MSMT_LEADCHNL_RV_PACING_THRESHOLD_PULSEWIDTH: 0.4 MS
MDC_IDC_PG_IMPLANT_DTM: NORMAL
MDC_IDC_PG_MFG: NORMAL
MDC_IDC_PG_MODEL: NORMAL
MDC_IDC_PG_SERIAL: NORMAL
MDC_IDC_PG_TYPE: NORMAL
MDC_IDC_SESS_CLINIC_NAME: NORMAL
MDC_IDC_SESS_DTM: NORMAL
MDC_IDC_SESS_TYPE: NORMAL
MDC_IDC_SET_BRADY_AT_MODE_SWITCH_MODE: NORMAL
MDC_IDC_SET_BRADY_AT_MODE_SWITCH_RATE: 150 {BEATS}/MIN
MDC_IDC_SET_BRADY_LOWRATE: 60 {BEATS}/MIN
MDC_IDC_SET_BRADY_MAX_SENSOR_RATE: 130 {BEATS}/MIN
MDC_IDC_SET_BRADY_MAX_TRACKING_RATE: 130 {BEATS}/MIN
MDC_IDC_SET_BRADY_MODE: NORMAL
MDC_IDC_SET_BRADY_PAV_DELAY_LOW: 180 MS
MDC_IDC_SET_BRADY_SAV_DELAY_LOW: 150 MS
MDC_IDC_SET_LEADCHNL_RA_PACING_AMPLITUDE: 1.5 V
MDC_IDC_SET_LEADCHNL_RA_PACING_CAPTURE_MODE: NORMAL
MDC_IDC_SET_LEADCHNL_RA_PACING_POLARITY: NORMAL
MDC_IDC_SET_LEADCHNL_RA_PACING_PULSEWIDTH: 0.4 MS
MDC_IDC_SET_LEADCHNL_RA_SENSING_POLARITY: NORMAL
MDC_IDC_SET_LEADCHNL_RA_SENSING_SENSITIVITY: 0.5 MV
MDC_IDC_SET_LEADCHNL_RV_PACING_AMPLITUDE: 1.5 V
MDC_IDC_SET_LEADCHNL_RV_PACING_CAPTURE_MODE: NORMAL
MDC_IDC_SET_LEADCHNL_RV_PACING_POLARITY: NORMAL
MDC_IDC_SET_LEADCHNL_RV_PACING_PULSEWIDTH: 0.4 MS
MDC_IDC_SET_LEADCHNL_RV_SENSING_POLARITY: NORMAL
MDC_IDC_SET_LEADCHNL_RV_SENSING_SENSITIVITY: 2 MV
MDC_IDC_SET_ZONE_DETECTION_INTERVAL: 333.33 MS
MDC_IDC_SET_ZONE_DETECTION_INTERVAL: 400 MS
MDC_IDC_SET_ZONE_TYPE: NORMAL
MDC_IDC_SET_ZONE_TYPE: NORMAL
MDC_IDC_STAT_AT_BURDEN_PERCENT: 11.8 %
MDC_IDC_STAT_AT_DTM_END: NORMAL
MDC_IDC_STAT_AT_DTM_START: NORMAL
MDC_IDC_STAT_AT_MODE_SW_COUNT: 222
MDC_IDC_STAT_BRADY_AP_VP_PERCENT: 17 %
MDC_IDC_STAT_BRADY_AP_VS_PERCENT: 21 %
MDC_IDC_STAT_BRADY_AS_VP_PERCENT: 18 %
MDC_IDC_STAT_BRADY_AS_VS_PERCENT: 44 %
MDC_IDC_STAT_BRADY_DTM_END: NORMAL
MDC_IDC_STAT_BRADY_DTM_START: NORMAL
MDC_IDC_STAT_EPISODE_RECENT_COUNT: 2
MDC_IDC_STAT_EPISODE_RECENT_COUNT: 96
MDC_IDC_STAT_EPISODE_RECENT_COUNT_DTM_END: NORMAL
MDC_IDC_STAT_EPISODE_RECENT_COUNT_DTM_END: NORMAL
MDC_IDC_STAT_EPISODE_RECENT_COUNT_DTM_START: NORMAL
MDC_IDC_STAT_EPISODE_RECENT_COUNT_DTM_START: NORMAL
MDC_IDC_STAT_EPISODE_TYPE: NORMAL
MDC_IDC_STAT_EPISODE_TYPE: NORMAL

## 2023-01-07 DIAGNOSIS — F51.01 PRIMARY INSOMNIA: ICD-10-CM

## 2023-01-08 RX ORDER — ZOLPIDEM TARTRATE 5 MG/1
TABLET ORAL
Qty: 15 TABLET | Refills: 0 | Status: SHIPPED | OUTPATIENT
Start: 2023-01-08 | End: 2023-03-13

## 2023-01-11 ENCOUNTER — NURSE TRIAGE (OUTPATIENT)
Dept: INTERNAL MEDICINE | Facility: CLINIC | Age: 88
End: 2023-01-11

## 2023-01-11 NOTE — TELEPHONE ENCOUNTER
"Patient is calling and stated she has had mild diarrhea for approximately the last 2 weeks.  She stated every time she would eat and drink a meal, it would \"come right out\" through diarrhea approximately 1/2 hour to 2 hours.      She stated she had tried Imodium for 1-2 days and followed the directions per pamphlet,  then included pepto for a couple days approximately 2-3 times.  She verbalized that the imodium and the pepto did not seem to help much with the watery stools, only one time did she have a more formed bowel movement and it did give her a more dark color in the stool.    Patient stated she is not feeling dehydrated (denies symptoms per protocol as documented below), and she has included Gatorade at this time.  She stated she was unsure at which point she would need to be seen or have a medication prescribed to help with the diarrhea.    Advised patient home care per protocol.  Patient stated understanding.  Informed patient message would be sent to PCP for review.  Patient stated understanding.    Will forward to PCP for review.      Reason for Disposition    MILD diarrhea (e.g., 1-3 or more stools than normal in past 24 hours) diarrhea without known cause and present > 7 days    Additional Information    Negative: Shock suspected (e.g., cold/pale/clammy skin, too weak to stand, low BP, rapid pulse)    Negative: Difficult to awaken or acting confused (e.g., disoriented, slurred speech)    Negative: Sounds like a life-threatening emergency to the triager    Negative: Vomiting also present and worse than the diarrhea    Negative: Blood in stool and without diarrhea    Negative: SEVERE abdominal pain (e.g., excruciating) and present > 1 hour    Negative: SEVERE abdominal pain and age > 60 years    Negative: Bloody, black, or tarry bowel movements (Exception: chronic-unchanged black-grey bowel movements and is taking iron pills or Pepto-Bismol)    Negative: SEVERE diarrhea (e.g., 7 or more times / day more " "than normal) and age > 60 years    Negative: Constant abdominal pain lasting > 2 hours    Negative: Drinking very little and has signs of dehydration (e.g., no urine > 12 hours, very dry mouth, very lightheaded)    Negative: Patient sounds very sick or weak to the triager    Negative: SEVERE diarrhea (e.g., 7 or more times / day more than normal) and present > 24 hours (1 day)    Negative: MODERATE diarrhea (e.g., 4-6 times / day more than normal) and present > 48 hours (2 days)    Negative: MODERATE diarrhea (e.g., 4-6 times / day more than normal) and age > 70 years    Negative: Abdominal pain (Exception: Pain clears completely with each passage of diarrhea stool.)    Negative: Fever > 101 F (38.3 C)    Negative: Blood in the stool    Negative: Mucus or pus in stool has been present > 2 days and diarrhea is more than mild    Negative: Weak immune system (e.g., HIV positive, cancer chemo, splenectomy, organ transplant, chronic steroids)    Negative: Travel to a foreign country in past month    Negative: Recent antibiotic therapy (i.e., within last 2 months) and diarrhea present > 3 days since antibiotic was stopped    Negative: Recent hospitalization and diarrhea present > 3 days    Negative: Tube feedings (e.g., nasogastric, g-tube, j-tube)    Answer Assessment - Initial Assessment Questions  1. DIARRHEA SEVERITY: \"How bad is the diarrhea?\" \"How many more stools have you had in the past 24 hours than normal?\"     - NO DIARRHEA (SCALE 0)    - MILD (SCALE 1-3): Few loose or mushy BMs; increase of 1-3 stools over normal daily number of stools; mild increase in ostomy output.    -  MODERATE (SCALE 4-7): Increase of 4-6 stools daily over normal; moderate increase in ostomy output.  * SEVERE (SCALE 8-10; OR 'WORST POSSIBLE'): Increase of 7 or more stools daily over normal; moderate increase in ostomy output; incontinence.      3-4 times a day, \"as soon as I eat\"    2. ONSET: \"When did the diarrhea begin?\"       " "Approximately 2 weeks    3. BM CONSISTENCY: \"How loose or watery is the diarrhea?\"       Watery    4. VOMITING: \"Are you also vomiting?\" If Yes, ask: \"How many times in the past 24 hours?\"       No    5. ABDOMINAL PAIN: \"Are you having any abdominal pain?\" If Yes, ask: \"What does it feel like?\" (e.g., crampy, dull, intermittent, constant)       No    6. ABDOMINAL PAIN SEVERITY: If present, ask: \"How bad is the pain?\"  (e.g., Scale 1-10; mild, moderate, or severe)    - MILD (1-3): doesn't interfere with normal activities, abdomen soft and not tender to touch     - MODERATE (4-7): interferes with normal activities or awakens from sleep, abdomen tender to touch     - SEVERE (8-10): excruciating pain, doubled over, unable to do any normal activities        No    7. ORAL INTAKE: If vomiting, \"Have you been able to drink liquids?\" \"How much liquids have you had in the past 24 hours?\"      Seems every time \"I eat something it comes out\" - diarrhea     8. HYDRATION: \"Any signs of dehydration?\" (e.g., dry mouth [not just dry lips], too weak to stand, dizziness, new weight loss) \"When did you last urinate?\"      No    9. EXPOSURE: \"Have you traveled to a foreign country recently?\" \"Have you been exposed to anyone with diarrhea?\" \"Could you have eaten any food that was spoiled?\"      No    10. ANTIBIOTIC USE: \"Are you taking antibiotics now or have you taken antibiotics in the past 2 months?\"        No    11. OTHER SYMPTOMS: \"Do you have any other symptoms?\" (e.g., fever, blood in stool)        No    12. PREGNANCY: \"Is there any chance you are pregnant?\" \"When was your last menstrual period?\"        NA    Protocols used: DIARRHEA-A-OH    Kavya Simmons RN    "

## 2023-01-11 NOTE — TELEPHONE ENCOUNTER
As long as she is staying hydrated and not having other symptoms of pain, fever, vomiting.  I think she should reduce to a low-fat nondairy diet and if she is not better in the next week and should be doing stool tests and being seen.

## 2023-01-23 ENCOUNTER — HOSPITAL ENCOUNTER (EMERGENCY)
Facility: CLINIC | Age: 88
Discharge: HOME | End: 2023-01-23
Attending: FAMILY MEDICINE | Admitting: FAMILY MEDICINE
Payer: MEDICARE

## 2023-01-23 VITALS
WEIGHT: 133 LBS | SYSTOLIC BLOOD PRESSURE: 188 MMHG | TEMPERATURE: 98.4 F | BODY MASS INDEX: 22.13 KG/M2 | OXYGEN SATURATION: 98 % | HEART RATE: 65 BPM | DIASTOLIC BLOOD PRESSURE: 100 MMHG | RESPIRATION RATE: 16 BRPM

## 2023-01-23 DIAGNOSIS — E87.1 HYPONATREMIA: ICD-10-CM

## 2023-01-23 DIAGNOSIS — R19.7 DIARRHEA, UNSPECIFIED TYPE: ICD-10-CM

## 2023-01-23 LAB
ALBUMIN SERPL BCG-MCNC: 4.4 G/DL (ref 3.5–5.2)
ALP SERPL-CCNC: 68 U/L (ref 35–104)
ALT SERPL W P-5'-P-CCNC: 25 U/L (ref 10–35)
ANION GAP SERPL CALCULATED.3IONS-SCNC: 12 MMOL/L (ref 7–15)
AST SERPL W P-5'-P-CCNC: 25 U/L (ref 10–35)
BASOPHILS # BLD AUTO: 0 10E3/UL (ref 0–0.2)
BASOPHILS NFR BLD AUTO: 0 %
BILIRUB SERPL-MCNC: 0.9 MG/DL
BUN SERPL-MCNC: 17.9 MG/DL (ref 8–23)
CALCIUM SERPL-MCNC: 9 MG/DL (ref 8.8–10.2)
CHLORIDE SERPL-SCNC: 97 MMOL/L (ref 98–107)
CREAT SERPL-MCNC: 1 MG/DL (ref 0.51–0.95)
CRP SERPL-MCNC: <3 MG/L
DEPRECATED HCO3 PLAS-SCNC: 22 MMOL/L (ref 22–29)
EOSINOPHIL # BLD AUTO: 0.3 10E3/UL (ref 0–0.7)
EOSINOPHIL NFR BLD AUTO: 4 %
ERYTHROCYTE [DISTWIDTH] IN BLOOD BY AUTOMATED COUNT: 11.9 % (ref 10–15)
ERYTHROCYTE [SEDIMENTATION RATE] IN BLOOD BY WESTERGREN METHOD: 28 MM/HR (ref 0–30)
GFR SERPL CREATININE-BSD FRML MDRD: 55 ML/MIN/1.73M2
GLUCOSE SERPL-MCNC: 89 MG/DL (ref 70–99)
HCT VFR BLD AUTO: 39.3 % (ref 35–47)
HGB BLD-MCNC: 12.9 G/DL (ref 11.7–15.7)
IMM GRANULOCYTES # BLD: 0 10E3/UL
IMM GRANULOCYTES NFR BLD: 0 %
LYMPHOCYTES # BLD AUTO: 2.4 10E3/UL (ref 0.8–5.3)
LYMPHOCYTES NFR BLD AUTO: 33 %
MCH RBC QN AUTO: 30.4 PG (ref 26.5–33)
MCHC RBC AUTO-ENTMCNC: 32.8 G/DL (ref 31.5–36.5)
MCV RBC AUTO: 93 FL (ref 78–100)
MONOCYTES # BLD AUTO: 0.8 10E3/UL (ref 0–1.3)
MONOCYTES NFR BLD AUTO: 11 %
NEUTROPHILS # BLD AUTO: 3.7 10E3/UL (ref 1.6–8.3)
NEUTROPHILS NFR BLD AUTO: 52 %
NRBC # BLD AUTO: 0 10E3/UL
NRBC BLD AUTO-RTO: 0 /100
PLATELET # BLD AUTO: 184 10E3/UL (ref 150–450)
POTASSIUM SERPL-SCNC: 4.4 MMOL/L (ref 3.4–5.3)
PROT SERPL-MCNC: 7.3 G/DL (ref 6.4–8.3)
RBC # BLD AUTO: 4.25 10E6/UL (ref 3.8–5.2)
SODIUM SERPL-SCNC: 131 MMOL/L (ref 136–145)
WBC # BLD AUTO: 7.3 10E3/UL (ref 4–11)

## 2023-01-23 PROCEDURE — 99283 EMERGENCY DEPT VISIT LOW MDM: CPT | Performed by: FAMILY MEDICINE

## 2023-01-23 PROCEDURE — 86140 C-REACTIVE PROTEIN: CPT | Performed by: FAMILY MEDICINE

## 2023-01-23 PROCEDURE — 80053 COMPREHEN METABOLIC PANEL: CPT | Performed by: FAMILY MEDICINE

## 2023-01-23 PROCEDURE — 36415 COLL VENOUS BLD VENIPUNCTURE: CPT | Performed by: FAMILY MEDICINE

## 2023-01-23 PROCEDURE — 85652 RBC SED RATE AUTOMATED: CPT | Performed by: FAMILY MEDICINE

## 2023-01-23 PROCEDURE — 87506 IADNA-DNA/RNA PROBE TQ 6-11: CPT | Mod: GZ | Performed by: FAMILY MEDICINE

## 2023-01-23 PROCEDURE — 85025 COMPLETE CBC W/AUTO DIFF WBC: CPT | Performed by: FAMILY MEDICINE

## 2023-01-23 ASSESSMENT — ACTIVITIES OF DAILY LIVING (ADL): ADLS_ACUITY_SCORE: 35

## 2023-01-24 NOTE — ED TRIAGE NOTES
Pt here with diarrhea for 3 weeks.         Triage Assessment     Row Name 01/23/23 0174       Triage Assessment (Adult)    Airway WDL WDL       Respiratory WDL    Respiratory WDL WDL

## 2023-01-24 NOTE — DISCHARGE INSTRUCTIONS
Expect a call from Dr. Bates in the next couple of days once the stool cultures are back.  In the meantime you could try Metamucil a couple of times a day to see if that helps bring your stools back towards normal.  Return to the ED if you develop a fever over 100.4, bloody diarrhea, severe abdominal or any concerns.  It was a pleasure visiting with you tonight.  I hope this settles down quickly for you.    Thank you for choosing [facility]. We appreciate the opportunity to meet your urgent medical needs. Please let us know if we could have done anything to make your stay more satisfying.    After discharge, please closely monitor for any new or worsening symptoms. Return to the Emergency Department if you develop any acute worsening signs or symptoms.    If you had lab work, cultures or imaging studies done during your stay, the final results may still be pending. We will call you if your plan of care needs to change. However, if you are not improving as expected, please follow up with your primary care provider or clinic.     Start any prescription medications that were prescribed to you and take them as directed.     Please see additional handouts that may be pertinent to your condition.

## 2023-01-24 NOTE — ED PROVIDER NOTES
History     Chief Complaint   Patient presents with     Diarrhea     HPI  Audrey Ortega is a 87 year old female who presents to the ED tonight with diarrhea for the past 3 or 4 weeks.  Call her physician almost 2 weeks ago and he told her to follow a bland diet and if it persisted she should come in to be seen in but send stool for culture.  She has had no blood in the stools.  Has diarrhea every time she tries to eat or drink.  She has been following a bland diet. no fevers chills or sweats.  No nausea or vomiting.  She has not been on any antibiotics relief.recently.  She tried some Imodium and Pepto-Bismol.  No recent travel.  No unusual foods.  Still urinating well.  Lots of gurgling.     Allergies:  Allergies   Allergen Reactions     Ciprofloxacin Rash     Macrobid [Nitrofurantoin] Itching and Rash       Problem List:    Patient Active Problem List    Diagnosis Date Noted     Chronic kidney disease, stage 3 (H) 05/05/2021     Priority: Medium        Past Medical History:    No past medical history on file.    Past Surgical History:    Past Surgical History:   Procedure Laterality Date     PHACOEMULSIFICATION WITH STANDARD INTRAOCULAR LENS IMPLANT Right 7/16/2020    Procedure: Cataract surgery with intraocular lens implant;  Surgeon: Fortino Ford MD;  Location:  OR     PHACOEMULSIFICATION WITH STANDARD INTRAOCULAR LENS IMPLANT Left 7/30/2020    Procedure: Cataract Surgery Left Eye;  Surgeon: Max Cooley MD;  Location:  OR       Family History:    No family history on file.    Social History:  Marital Status:  Single [1]  Social History     Tobacco Use     Smoking status: Never     Smokeless tobacco: Never   Vaping Use     Vaping Use: Never used   Substance Use Topics     Alcohol use: Not Currently     Drug use: Never        Medications:    Ascorbic Acid (VITAMIN C) 500 MG CAPS  aspirin (ASA) 325 MG EC tablet  calcium 600 MG tablet  COMPRESSION STOCKINGS  lisinopril (ZESTRIL) 10 MG  tablet  magnesium 250 MG tablet  multivitamin w/minerals (THERA-VIT-M) tablet  Vitamin D, Cholecalciferol, 25 MCG (1000 UT) TABS  zolpidem (AMBIEN) 5 MG tablet          Review of Systems   All other systems reviewed and are negative.      Physical Exam   BP: (!) 195/96  Pulse: 66  Temp: 98.4  F (36.9  C)  Resp: 18  Weight: 60.3 kg (133 lb)  SpO2: 92 %      Physical Exam  Constitutional:       General: She is not in acute distress.     Appearance: Normal appearance.   Abdominal:      General: Abdomen is protuberant. Bowel sounds are increased.      Palpations: Abdomen is soft.      Tenderness: There is no abdominal tenderness. There is no right CVA tenderness or left CVA tenderness.   Neurological:      Mental Status: She is alert.         ED Course                 Procedures              Critical Care time:  none               Results for orders placed or performed during the hospital encounter of 01/23/23 (from the past 24 hour(s))   CBC with platelets differential    Narrative    The following orders were created for panel order CBC with platelets differential.  Procedure                               Abnormality         Status                     ---------                               -----------         ------                     CBC with platelets and d...[[id number]]                      Final result                 Please view results for these tests on the individual orders.   Comprehensive metabolic panel   Result Value Ref Range    Sodium 131 (L) 136 - 145 mmol/L    Potassium 4.4 3.4 - 5.3 mmol/L    Chloride 97 (L) 98 - 107 mmol/L    Carbon Dioxide (CO2) 22 22 - 29 mmol/L    Anion Gap 12 7 - 15 mmol/L    Urea Nitrogen 17.9 8.0 - 23.0 mg/dL    Creatinine 1.00 (H) 0.51 - 0.95 mg/dL    Calcium 9.0 8.8 - 10.2 mg/dL    Glucose 89 70 - 99 mg/dL    Alkaline Phosphatase 68 35 - 104 U/L    AST 25 10 - 35 U/L    ALT 25 10 - 35 U/L    Protein Total 7.3 6.4 - 8.3 g/dL    Albumin 4.4 3.5 - 5.2 g/dL    Bilirubin Total  0.9 <=1.2 mg/dL    GFR Estimate 55 (L) >60 mL/min/1.73m2   CRP inflammation   Result Value Ref Range    CRP Inflammation <3.00 <5.00 mg/L   Erythrocyte sedimentation rate auto   Result Value Ref Range    Erythrocyte Sedimentation Rate 28 0 - 30 mm/hr   CBC with platelets and differential   Result Value Ref Range    WBC Count 7.3 4.0 - 11.0 10e3/uL    RBC Count 4.25 3.80 - 5.20 10e6/uL    Hemoglobin 12.9 11.7 - 15.7 g/dL    Hematocrit 39.3 35.0 - 47.0 %    MCV 93 78 - 100 fL    MCH 30.4 26.5 - 33.0 pg    MCHC 32.8 31.5 - 36.5 g/dL    RDW 11.9 10.0 - 15.0 %    Platelet Count 184 150 - 450 10e3/uL    % Neutrophils 52 %    % Lymphocytes 33 %    % Monocytes 11 %    % Eosinophils 4 %    % Basophils 0 %    % Immature Granulocytes 0 %    NRBCs per 100 WBC 0 <1 /100    Absolute Neutrophils 3.7 1.6 - 8.3 10e3/uL    Absolute Lymphocytes 2.4 0.8 - 5.3 10e3/uL    Absolute Monocytes 0.8 0.0 - 1.3 10e3/uL    Absolute Eosinophils 0.3 0.0 - 0.7 10e3/uL    Absolute Basophils 0.0 0.0 - 0.2 10e3/uL    Absolute Immature Granulocytes 0.0 <=0.4 10e3/uL    Absolute NRBCs 0.0 10e3/uL       Medications - No data to display    Assessments & Plan (with Medical Decision Making)  87-year-old female with nonbloody diarrhea every time she would eat or drink for the past 3 or 4 weeks.  No recent antibiotics, travel or unusual foods.  She is still well-hydrated.  No abdominal tenderness.  White count is normal.  Sodium slightly low 131.  Stool sample was sent for culture.  She will try some Metamucil to see if that helps bring her stools back towards normal and follow-up with Dr. Bates later this week once the stool cultures are back.  Verbal and written discharge instructions given.  She is comfortable with this plan.     I have reviewed the nursing notes.    I have reviewed the findings, diagnosis, plan and need for follow up with the patient.       Medical Decision Making  The patient's presentation is strongly suggestive of an acute illness  with systemic symptoms.    The patient's evaluation involved:  review of external note(s) from 1 sources (Dr Bates's phone note from earlier this month)  ordering and/or review of 3+ test(s) in this encounter (see separate area of note for details)    The patient's management involved only low risk treatment.        New Prescriptions    No medications on file       Final diagnoses:   Diarrhea, unspecified type - x 3-4 weeks, culture pending   Hyponatremia - mild 131       1/23/2023   [facility] EMERGENCY DEPT     Jovanni Marsh MD  01/23/23 [id number]

## 2023-01-30 ENCOUNTER — TELEPHONE (OUTPATIENT)
Dept: INTERNAL MEDICINE | Facility: CLINIC | Age: 88
End: 2023-01-30
Payer: COMMERCIAL

## 2023-01-30 NOTE — TELEPHONE ENCOUNTER
Patient went to the ER last Monday.    She still has diarrhea.    Per last triage patient be seen if she is not better.    Appointment made.  Peyton Lopez RN on 1/30/2023 at 10:36 AM

## 2023-01-31 ENCOUNTER — OFFICE VISIT (OUTPATIENT)
Dept: INTERNAL MEDICINE | Facility: CLINIC | Age: 88
End: 2023-01-31
Payer: COMMERCIAL

## 2023-01-31 VITALS
TEMPERATURE: 96.2 F | HEART RATE: 66 BPM | SYSTOLIC BLOOD PRESSURE: 148 MMHG | OXYGEN SATURATION: 96 % | DIASTOLIC BLOOD PRESSURE: 80 MMHG | WEIGHT: 126.1 LBS | RESPIRATION RATE: 20 BRPM | HEIGHT: 64 IN | BODY MASS INDEX: 21.53 KG/M2

## 2023-01-31 DIAGNOSIS — R19.7 DIARRHEA, UNSPECIFIED TYPE: Primary | ICD-10-CM

## 2023-01-31 DIAGNOSIS — E87.1 HYPONATREMIA: ICD-10-CM

## 2023-01-31 PROCEDURE — 99214 OFFICE O/P EST MOD 30 MIN: CPT | Performed by: INTERNAL MEDICINE

## 2023-01-31 RX ORDER — LISINOPRIL 10 MG/1
10 TABLET ORAL 2 TIMES DAILY
Qty: 180 TABLET | Refills: 3 | Status: SHIPPED | OUTPATIENT
Start: 2023-01-31

## 2023-01-31 ASSESSMENT — PAIN SCALES - GENERAL: PAINLEVEL: NO PAIN (0)

## 2023-01-31 ASSESSMENT — ENCOUNTER SYMPTOMS: DIARRHEA: 1

## 2023-01-31 NOTE — PROGRESS NOTES
Assessment & Plan   Problem List Items Addressed This Visit    None  Visit Diagnoses     Diarrhea, unspecified type    -  Primary    Relevant Orders    CT Abdomen Pelvis w Contrast    C. difficile Toxin B PCR with reflex to C. difficile Antigen and Toxins A/B EIA    Hyponatremia        Relevant Medications    lisinopril (ZESTRIL) 10 MG tablet         Normally healthy woman who has had diarrhea for 5 to 6 weeks.  She was seen in the ER last week.  She had normal labs including normal pathogens.  She has a history of some hyponatremia and hypertension.  She is on lisinopril, instead of lisinopril-hydrochlorothiazide.  Due to her blood pressure we will increase her lisinopril to 10 mg twice daily.    For her diarrhea she been on a bland diet, tried Metamucil which helped a little bit.  But was taking it 3 times a day.  We will cut that down to once a day.  Go back to her regular low-fat diet, check for C. difficile and get a CT scan of her abdomen to evaluate for diverticulitis or any other cause of diarrhea.               No follow-ups on file.    Krish Bates MD  [facility]    Lyric Ventura is a 87 year old, presenting for the following health issues:  Diarrhea    Diarrhea    History of Present Illness       Reason for visit:  Diarea    She eats 2-3 servings of fruits and vegetables daily.She consumes 0 sweetened beverage(s) daily.She exercises with enough effort to increase her heart rate 20 to 29 minutes per day.  She exercises with enough effort to increase her heart rate 3 or less days per week.   She is taking medications regularly.     Diarrhea for 4 weeks or more. Gets some pain in the lower left back area, just started 3 days ago.   No blood, multiple times a day, thicker with metamucil.  No abdominal pains, usually eating causes diarrhea.  Taking her vitamins.      Only medication is lisinopril.  Evening bp is high 158/97 at home.     Nobody else is around or sick.   "        Review of Systems   Gastrointestinal: Positive for diarrhea.            Objective    BP (!) 148/80 (BP Location: Left arm)   Pulse 66   Temp (!) 96.2  F (35.7  C) (Temporal)   Resp 20   Ht 1.632 m (5' 4.25\")   Wt 57.2 kg (126 lb 1.6 oz)   SpO2 96%   BMI 21.48 kg/m    Body mass index is 21.48 kg/m .  Physical Exam   GENERAL: healthy, alert and no distress  NECK: no adenopathy, no asymmetry, masses, or scars and thyroid normal to palpation  RESP: lungs clear to auscultation - no rales, rhonchi or wheezes  CV: regular rate and rhythm, normal S1 S2, no S3 or S4, no murmur, click or rub, no peripheral edema and peripheral pulses strong  ABDOMEN: soft, nontender, no hepatosplenomegaly, no masses and bowel sounds normal  MS: no gross musculoskeletal defects noted, no edema                    "

## 2023-02-01 PROCEDURE — 87493 C DIFF AMPLIFIED PROBE: CPT | Performed by: INTERNAL MEDICINE

## 2023-02-02 ENCOUNTER — HOSPITAL ENCOUNTER (OUTPATIENT)
Dept: CT IMAGING | Facility: CLINIC | Age: 88
Discharge: HOME | End: 2023-02-02
Attending: INTERNAL MEDICINE | Admitting: INTERNAL MEDICINE
Payer: MEDICARE

## 2023-02-02 DIAGNOSIS — R19.7 DIARRHEA, UNSPECIFIED TYPE: ICD-10-CM

## 2023-02-02 LAB — C DIFF TOX B STL QL: NEGATIVE

## 2023-02-02 PROCEDURE — G1010 CDSM STANSON: HCPCS

## 2023-02-02 PROCEDURE — 250N000009 HC RX 250: Performed by: INTERNAL MEDICINE

## 2023-02-02 PROCEDURE — 74177 CT ABD & PELVIS W/CONTRAST: CPT | Mod: MG

## 2023-02-02 PROCEDURE — 250N000011 HC RX IP 250 OP 636: Performed by: INTERNAL MEDICINE

## 2023-02-02 RX ORDER — IOPAMIDOL 755 MG/ML
500 INJECTION, SOLUTION INTRAVASCULAR ONCE
Status: COMPLETED | OUTPATIENT
Start: 2023-02-02 | End: 2023-02-02

## 2023-02-02 RX ADMIN — IOPAMIDOL 65 ML: 755 INJECTION, SOLUTION INTRAVENOUS at 14:20

## 2023-02-02 RX ADMIN — SODIUM CHLORIDE 60 ML: 9 INJECTION, SOLUTION INTRAVENOUS at 14:20

## 2023-02-10 ENCOUNTER — TELEPHONE (OUTPATIENT)
Dept: INTERNAL MEDICINE | Facility: CLINIC | Age: 88
End: 2023-02-10
Payer: COMMERCIAL

## 2023-02-10 DIAGNOSIS — I10 BENIGN ESSENTIAL HYPERTENSION: Primary | ICD-10-CM

## 2023-02-10 RX ORDER — AMLODIPINE BESYLATE 5 MG/1
5 TABLET ORAL DAILY
Qty: 90 TABLET | Refills: 1 | Status: SHIPPED | OUTPATIENT
Start: 2023-02-10 | End: 2024-01-05

## 2023-02-10 NOTE — TELEPHONE ENCOUNTER
FYI - Status Update on Blood Pressures ([facility])    Who is Calling: patient    Update: Patient calling to state she has been taking the lisinopril 10 mg twice daily and her blood pressures are still running high.  Lowest 155/84, highest 191/78 (last office visit 1/31/23)    Does caller want a call/response back: Yes     Okay to leave a detailed message?: Yes at Home number on file [phone] (home)

## 2023-02-10 NOTE — TELEPHONE ENCOUNTER
She can add on amlodipine 5mg to help the bp. Still want to avoid hydrochlorothiazide with her lower sodium levels.

## 2023-02-27 ENCOUNTER — OFFICE VISIT (OUTPATIENT)
Dept: OBGYN | Facility: CLINIC | Age: 88
End: 2023-02-27
Payer: COMMERCIAL

## 2023-02-27 VITALS
WEIGHT: 127.4 LBS | SYSTOLIC BLOOD PRESSURE: 145 MMHG | BODY MASS INDEX: 21.7 KG/M2 | DIASTOLIC BLOOD PRESSURE: 80 MMHG | HEART RATE: 70 BPM

## 2023-02-27 DIAGNOSIS — Z53.9 ERRONEOUS ENCOUNTER--DISREGARD: Primary | ICD-10-CM

## 2023-02-27 NOTE — PROGRESS NOTES
This encounter was opened in error. Please disregard.    Will schedule follow up appt for Wednesday of this week and be seen in [place] 1:00

## 2023-02-27 NOTE — PATIENT INSTRUCTIONS
If you have any questions regarding your visit, Please contact your care team.     [employer] [facility]: [phone]    To Schedule an Appointment 24/7  Call: [phone]-[facility] HOURS TELEPHONE NUMBER     Tyler Mendez MD    Medical Assistant    [facility]-Surgery Scheduler  [facility]-Surgery Scheduler     Monday-[place]  1:00 pm-4:30 pm    Tuesday-[place]  7:30 am-4:30 pm    Wednesday-[place]  7:30 am-4:30 pm        Typical Surgery Days: Monday or Thursday       [address] [address]  [phone] appointment line  [phone] Fax    Imaging Scheduling all locations  [phone]    **Surgeries** Our Surgery Schedulers will contact you to schedule. If you do not receive a call within 3 business days, please call [phone].    If you need a medication refill, please contact your pharmacy. Please allow 3 business days for your refill to be completed.    As always, Thank you for trusting us with your healthcare needs!                   see additional instructions from your care team below

## 2023-02-28 NOTE — PROGRESS NOTES
SUBJECTIVE:                                                   Audrey Ortega is a 87 year old female who presents to clinic today for the following health issue(s):  No chief complaint on file.    Audrey is an 87-year-old who presents for pessary fitting.  She has been seen more than 1 occasion by myself and has a prominent cystocele.  We discussed care and removal and insertion however at this point she was content to have a pessary placed and a later date to discuss how to remove this as well as how to clean it reinserted.  At that point we will also discuss use of estrogen cream twice weekly for the first month basis.    Patient was fitted with initially #4 ring with diaphragm and that fell out when she is voiding and so it was replaced with a #5 ring with diaphragm.  She was able to tolerate that well and was able to void.  She notes that it did not hurt or have any pelvic discomfort with that and so she was discharged after the second fitting.    Assessment/plan:  Pelvic relaxation with prominent cystocele.  She is scheduled into the clinic to return in 2 weeks for reevaluation and removal and cleaning.  At that point if she is doing well with that we will discuss the use of estrogen cream as a benefit to vaginal mucosa health.

## 2023-03-01 ENCOUNTER — OFFICE VISIT (OUTPATIENT)
Dept: OBGYN | Facility: OTHER | Age: 88
End: 2023-03-01
Payer: COMMERCIAL

## 2023-03-01 VITALS
HEART RATE: 81 BPM | OXYGEN SATURATION: 94 % | WEIGHT: 126.8 LBS | SYSTOLIC BLOOD PRESSURE: 168 MMHG | BODY MASS INDEX: 21.6 KG/M2 | DIASTOLIC BLOOD PRESSURE: 85 MMHG

## 2023-03-01 DIAGNOSIS — N81.89 PELVIC RELAXATION: Primary | ICD-10-CM

## 2023-03-01 PROCEDURE — 99214 OFFICE O/P EST MOD 30 MIN: CPT | Performed by: OBSTETRICS & GYNECOLOGY

## 2023-03-01 NOTE — PATIENT INSTRUCTIONS
If you have any questions regarding your visit, Please contact your care team.     [employer] [facility]: [phone]    To Schedule an Appointment 24/7  [facility]: [phone]     Tyler Mendez MD    Medical Assistant    [facility]-Surgery Scheduler  [facility]-Surgery Scheduler     Monday-[place]  1:00 pm-4:30 pm    Tuesday-[place]  7:30 am-4:30 pm    Wednesday-[place]  7:30 am-4:30 pm        Typical Surgery Days: Monday or Thursday       [address] [address]  [phone] appointment line  [phone] Fax    Imaging Scheduling all locations  [phone]    **Surgeries** Our Surgery Schedulers will contact you to schedule. If you do not receive a call within 3 business days, please call [phone].    If you need a medication refill, please contact your pharmacy. Please allow 3 business days for your refill to be completed.    As always, Thank you for trusting us with your healthcare needs!                   see additional instructions from your care team below

## 2023-03-13 ENCOUNTER — OFFICE VISIT (OUTPATIENT)
Dept: OBGYN | Facility: CLINIC | Age: 88
End: 2023-03-13
Payer: COMMERCIAL

## 2023-03-13 VITALS
RESPIRATION RATE: 18 BRPM | OXYGEN SATURATION: 100 % | BODY MASS INDEX: 21.63 KG/M2 | DIASTOLIC BLOOD PRESSURE: 82 MMHG | SYSTOLIC BLOOD PRESSURE: 130 MMHG | TEMPERATURE: 95.9 F | WEIGHT: 127 LBS | HEART RATE: 72 BPM

## 2023-03-13 DIAGNOSIS — N81.89 PELVIC RELAXATION: Primary | ICD-10-CM

## 2023-03-13 PROCEDURE — 99212 OFFICE O/P EST SF 10 MIN: CPT | Performed by: OBSTETRICS & GYNECOLOGY

## 2023-03-13 ASSESSMENT — PAIN SCALES - GENERAL: PAINLEVEL: NO PAIN (0)

## 2023-03-13 NOTE — PROGRESS NOTES
I spent a total of 10 minutes on the care of Audrey on the day of service including 8 minutes face-to-face time with remainder in chart review, care coordination, documentation on the day of service.    Audrey is an 87-year-old who had a trial with the pessary 2 weeks ago and it went well in the clinic.  She notes that she was shoveling snow and fell out.  She was able to put it back in and she is taking it out and put her back in a number of times since then she tends to not sleep with it because she notes that it does not bother her at night in terms of the pelvic relaxation.    We discussed having exam today to to evaluate for where the spot or bleeding was and I discussed where the most likely place is for trauma and insertion or removal and she is pensive as to being examined and so I did not push the issue with her she notes that she has not spotted for the last couple days.  I did strongly encourage her that if she is having trouble in the future that she should come back and have it evaluated as well as examined.  She knew that at this point we were going to do an exam and also talk about Premarin cream.  She states that she does not have insurance to cover medications and so would not be likely to buy it anyway.  I suggest that as long as she keeps the pessary clean and is removing it frequently as an daily or every few days that she should be okay without the Premarin cream.    Examination:  Declined  Assessment pelvic relaxation with size 5 ring and diaphragm pessary which she seems to be able to put in a day ago without incident.   She has had some spotting.    Plan:  Return on a as needed basis.

## 2023-03-14 ENCOUNTER — TELEPHONE (OUTPATIENT)
Dept: CARDIOLOGY | Facility: CLINIC | Age: 88
End: 2023-03-14

## 2023-03-14 ENCOUNTER — ANCILLARY PROCEDURE (OUTPATIENT)
Dept: CARDIOLOGY | Facility: CLINIC | Age: 88
End: 2023-03-14
Attending: INTERNAL MEDICINE
Payer: COMMERCIAL

## 2023-03-14 DIAGNOSIS — I49.5 SICK SINUS SYNDROME (H): ICD-10-CM

## 2023-03-14 DIAGNOSIS — Z95.0 CARDIAC PACEMAKER IN SITU: ICD-10-CM

## 2023-03-14 PROCEDURE — 93280 PM DEVICE PROGR EVAL DUAL: CPT | Performed by: INTERNAL MEDICINE

## 2023-03-14 NOTE — TELEPHONE ENCOUNTER
Pt in for annual in clinic device check today. Pt presents in AF with V rates paced at 60bpm. Pt has had nearly 100% AF burden since December 2022. Pt has previously declined OAC, however burden significantly increased. Informed pt I would be reaching out to cardiologist for review and recommendations. Pt continues to take ASA daily. Will route to Dr. Jama. LUISA Harrell

## 2023-03-15 LAB
MDC_IDC_LEAD_IMPLANT_DT: NORMAL
MDC_IDC_LEAD_IMPLANT_DT: NORMAL
MDC_IDC_LEAD_LOCATION: NORMAL
MDC_IDC_LEAD_LOCATION: NORMAL
MDC_IDC_LEAD_LOCATION_DETAIL_1: NORMAL
MDC_IDC_LEAD_LOCATION_DETAIL_1: NORMAL
MDC_IDC_LEAD_MFG: NORMAL
MDC_IDC_LEAD_MFG: NORMAL
MDC_IDC_LEAD_MODEL: NORMAL
MDC_IDC_LEAD_MODEL: NORMAL
MDC_IDC_LEAD_POLARITY_TYPE: NORMAL
MDC_IDC_LEAD_POLARITY_TYPE: NORMAL
MDC_IDC_LEAD_SERIAL: NORMAL
MDC_IDC_LEAD_SERIAL: NORMAL
MDC_IDC_MSMT_BATTERY_DTM: NORMAL
MDC_IDC_MSMT_BATTERY_IMPEDANCE: 4436 OHM
MDC_IDC_MSMT_BATTERY_REMAINING_LONGEVITY: 13 MO
MDC_IDC_MSMT_BATTERY_STATUS: NORMAL
MDC_IDC_MSMT_BATTERY_VOLTAGE: 2.69 V
MDC_IDC_MSMT_LEADCHNL_RA_IMPEDANCE_VALUE: 372 OHM
MDC_IDC_MSMT_LEADCHNL_RA_PACING_THRESHOLD_AMPLITUDE: 0.5 V
MDC_IDC_MSMT_LEADCHNL_RA_PACING_THRESHOLD_PULSEWIDTH: 0.4 MS
MDC_IDC_MSMT_LEADCHNL_RA_SENSING_INTR_AMPL: 0.25 MV
MDC_IDC_MSMT_LEADCHNL_RV_IMPEDANCE_VALUE: 462 OHM
MDC_IDC_MSMT_LEADCHNL_RV_PACING_THRESHOLD_AMPLITUDE: 0.38 V
MDC_IDC_MSMT_LEADCHNL_RV_PACING_THRESHOLD_PULSEWIDTH: 0.4 MS
MDC_IDC_MSMT_LEADCHNL_RV_SENSING_INTR_AMPL: 4 MV
MDC_IDC_PG_IMPLANT_DTM: NORMAL
MDC_IDC_PG_MFG: NORMAL
MDC_IDC_PG_MODEL: NORMAL
MDC_IDC_PG_SERIAL: NORMAL
MDC_IDC_PG_TYPE: NORMAL
MDC_IDC_SESS_CLINIC_NAME: NORMAL
MDC_IDC_SESS_DTM: NORMAL
MDC_IDC_SESS_TYPE: NORMAL
MDC_IDC_SET_BRADY_AT_MODE_SWITCH_MODE: NORMAL
MDC_IDC_SET_BRADY_AT_MODE_SWITCH_RATE: 150 {BEATS}/MIN
MDC_IDC_SET_BRADY_LOWRATE: 60 {BEATS}/MIN
MDC_IDC_SET_BRADY_MAX_SENSOR_RATE: 130 {BEATS}/MIN
MDC_IDC_SET_BRADY_MAX_TRACKING_RATE: 130 {BEATS}/MIN
MDC_IDC_SET_BRADY_MODE: NORMAL
MDC_IDC_SET_BRADY_PAV_DELAY_LOW: 180 MS
MDC_IDC_SET_BRADY_SAV_DELAY_LOW: 150 MS
MDC_IDC_SET_LEADCHNL_RA_PACING_AMPLITUDE: 1.5 V
MDC_IDC_SET_LEADCHNL_RA_PACING_CAPTURE_MODE: NORMAL
MDC_IDC_SET_LEADCHNL_RA_PACING_POLARITY: NORMAL
MDC_IDC_SET_LEADCHNL_RA_PACING_PULSEWIDTH: 0.4 MS
MDC_IDC_SET_LEADCHNL_RA_SENSING_POLARITY: NORMAL
MDC_IDC_SET_LEADCHNL_RA_SENSING_SENSITIVITY: 0.18 MV
MDC_IDC_SET_LEADCHNL_RV_PACING_AMPLITUDE: 1.5 V
MDC_IDC_SET_LEADCHNL_RV_PACING_CAPTURE_MODE: NORMAL
MDC_IDC_SET_LEADCHNL_RV_PACING_POLARITY: NORMAL
MDC_IDC_SET_LEADCHNL_RV_PACING_PULSEWIDTH: 0.46 MS
MDC_IDC_SET_LEADCHNL_RV_SENSING_POLARITY: NORMAL
MDC_IDC_SET_LEADCHNL_RV_SENSING_SENSITIVITY: 2 MV
MDC_IDC_SET_ZONE_DETECTION_INTERVAL: 333.33 MS
MDC_IDC_SET_ZONE_DETECTION_INTERVAL: 400 MS
MDC_IDC_SET_ZONE_TYPE: NORMAL
MDC_IDC_SET_ZONE_TYPE: NORMAL
MDC_IDC_STAT_AT_BURDEN_PERCENT: 24.5 %
MDC_IDC_STAT_AT_DTM_END: NORMAL
MDC_IDC_STAT_AT_DTM_START: NORMAL
MDC_IDC_STAT_AT_MODE_SW_COUNT: 223
MDC_IDC_STAT_BRADY_AP_VP_PERCENT: 17 %
MDC_IDC_STAT_BRADY_AP_VS_PERCENT: 20 %
MDC_IDC_STAT_BRADY_AS_VP_PERCENT: 20 %
MDC_IDC_STAT_BRADY_AS_VS_PERCENT: 43 %
MDC_IDC_STAT_BRADY_DTM_END: NORMAL
MDC_IDC_STAT_BRADY_DTM_START: NORMAL
MDC_IDC_STAT_EPISODE_RECENT_COUNT: 2
MDC_IDC_STAT_EPISODE_RECENT_COUNT: 97
MDC_IDC_STAT_EPISODE_RECENT_COUNT_DTM_END: NORMAL
MDC_IDC_STAT_EPISODE_RECENT_COUNT_DTM_END: NORMAL
MDC_IDC_STAT_EPISODE_RECENT_COUNT_DTM_START: NORMAL
MDC_IDC_STAT_EPISODE_RECENT_COUNT_DTM_START: NORMAL
MDC_IDC_STAT_EPISODE_TYPE: NORMAL
MDC_IDC_STAT_EPISODE_TYPE: NORMAL

## 2023-04-18 ENCOUNTER — NURSE TRIAGE (OUTPATIENT)
Dept: CARDIOLOGY | Facility: CLINIC | Age: 88
End: 2023-04-18
Payer: COMMERCIAL

## 2023-04-18 NOTE — TELEPHONE ENCOUNTER
Dr. Jama has a 1pm follow up visit spot open for here in [place] on 4/26/23. Will see if that date and time will work for follow up visit.

## 2023-04-18 NOTE — TELEPHONE ENCOUNTER
"Received a call from the call center to discuss dizziness and shoulder pain.  Spoke to Audrey who says she was shoveling snow on Sunday and has been having right shoulder pain that goes into her neck and up into her head, and has trouble sleeping. She says she has taken ES Tylenol and rubbed some pain reliever on the area with some relief. She says she has not tried a warm pack, but has rice bags she will try.  She also says she has been having dizziness for about 1 month when lying in bed to sitting and standing. She says she feels unsteady but not like she may fall or pass out.  She says she took one dose of metoprolol last night because she felt her heart pounding and her heart rate was up to 80 bpm. She says she used to take metoprolol but had stopped taking it due to her BP getting too low. (Metoprolol is not on current medication list). She says her heart rates have not been >100 bpm.  She also mentions her feet and legs became more swollen when it was hot outside recently, but today they are \"pretty good\".   BP last night 150/90 HR 74.  This morning /68 HR 69.  BP on the call 158/72 HR 72 bpm.   Advised a message will be sent to [place] [facility] for follow up.  1. DESCRIPTION: \"Describe your dizziness.\" dizzy from lying to sitting and standing  2. LIGHTHEADED: \"Do you feel lightheaded?\" (e.g., somewhat faint, woozy, weak upon standing) unsteady  3. VERTIGO: \"Do you feel like either you or the room is spinning or tilting?\" (i.e. vertigo)   4. SEVERITY: \"How bad is it?\" \"Do you feel like you are going to faint?\" \"Can you stand and walk?\" yes, can walk. Mild to moderate severity  - MILD: Feels slightly dizzy, but walking normally.  - MODERATE: Feels unsteady when walking, but not falling; interferes with normal activities (e.g., school, work).  - SEVERE: Unable to walk without falling, or requires assistance to walk without falling; feels like passing out now.   5. ONSET: \"When did the dizziness begin?\" " "1 month ago  6. AGGRAVATING FACTORS: \"Does anything make it worse?\" (e.g., standing, change in head position)  7. HEART RATE: \"Can you tell me your heart rate?\" \"How many beats in 15 seconds?\" (Note: not all patients can do this) see above  8. CAUSE: \"What do you think is causing the dizziness?\"  9. RECURRENT SYMPTOM: \"Have you had dizziness before?\" If Yes, ask: \"When was the last time?\" \"What happened that time?\"  10. OTHER SYMPTOMS: \"Do you have any other symptoms?\" (e.g., fever, chest pain, vomiting, diarrhea, bleeding)      Additional Information    Negative: SEVERE dizziness (e.g., unable to stand, requires support to walk, feels like passing out now)    Protocols used: DIZZINESS-A-OH      "

## 2023-04-19 NOTE — TELEPHONE ENCOUNTER
Kaity Clemente Brenna M, RN; P [facility] Scheduling    Lm for pt to confirm appt. Thank You Kaity

## 2023-04-19 NOTE — TELEPHONE ENCOUNTER
Tried to reach to confirm visit with Dr. Jama next week 4/26/23 at 1pm but to also offer a visit with Brooklyn Walker NP today 4/19/23 at 1:15pm. Left detailed message with direct call back number.

## 2023-04-20 ENCOUNTER — HOSPITAL ENCOUNTER (EMERGENCY)
Facility: CLINIC | Age: 88
Discharge: ANOTHER HOSPITAL | End: 2023-04-21
Attending: PHYSICIAN ASSISTANT | Admitting: PHYSICIAN ASSISTANT
Payer: MEDICARE

## 2023-04-20 ENCOUNTER — APPOINTMENT (OUTPATIENT)
Dept: GENERAL RADIOLOGY | Facility: CLINIC | Age: 88
End: 2023-04-20
Attending: PHYSICIAN ASSISTANT
Payer: MEDICARE

## 2023-04-20 ENCOUNTER — APPOINTMENT (OUTPATIENT)
Dept: CT IMAGING | Facility: CLINIC | Age: 88
End: 2023-04-20
Attending: PHYSICIAN ASSISTANT
Payer: MEDICARE

## 2023-04-20 VITALS
WEIGHT: 128.5 LBS | BODY MASS INDEX: 21.89 KG/M2 | DIASTOLIC BLOOD PRESSURE: 73 MMHG | SYSTOLIC BLOOD PRESSURE: 152 MMHG | TEMPERATURE: 97.6 F | RESPIRATION RATE: 10 BRPM | HEART RATE: 65 BPM | OXYGEN SATURATION: 97 %

## 2023-04-20 DIAGNOSIS — R07.9 CHEST PAIN: ICD-10-CM

## 2023-04-20 DIAGNOSIS — I10 HTN (HYPERTENSION): ICD-10-CM

## 2023-04-20 LAB
ANION GAP SERPL CALCULATED.3IONS-SCNC: 12 MMOL/L (ref 7–15)
BASOPHILS # BLD AUTO: 0 10E3/UL (ref 0–0.2)
BASOPHILS NFR BLD AUTO: 0 %
BUN SERPL-MCNC: 20.4 MG/DL (ref 8–23)
CALCIUM SERPL-MCNC: 9.7 MG/DL (ref 8.8–10.2)
CHLORIDE SERPL-SCNC: 93 MMOL/L (ref 98–107)
CREAT SERPL-MCNC: 1 MG/DL (ref 0.51–0.95)
DEPRECATED HCO3 PLAS-SCNC: 25 MMOL/L (ref 22–29)
EOSINOPHIL # BLD AUTO: 0.4 10E3/UL (ref 0–0.7)
EOSINOPHIL NFR BLD AUTO: 4 %
ERYTHROCYTE [DISTWIDTH] IN BLOOD BY AUTOMATED COUNT: 14.4 % (ref 10–15)
GFR SERPL CREATININE-BSD FRML MDRD: 54 ML/MIN/1.73M2
GLUCOSE SERPL-MCNC: 98 MG/DL (ref 70–99)
HCT VFR BLD AUTO: 39 % (ref 35–47)
HGB BLD-MCNC: 12.5 G/DL (ref 11.7–15.7)
IMM GRANULOCYTES # BLD: 0 10E3/UL
IMM GRANULOCYTES NFR BLD: 0 %
LYMPHOCYTES # BLD AUTO: 3.2 10E3/UL (ref 0.8–5.3)
LYMPHOCYTES NFR BLD AUTO: 30 %
MCH RBC QN AUTO: 29 PG (ref 26.5–33)
MCHC RBC AUTO-ENTMCNC: 32.1 G/DL (ref 31.5–36.5)
MCV RBC AUTO: 91 FL (ref 78–100)
MONOCYTES # BLD AUTO: 1 10E3/UL (ref 0–1.3)
MONOCYTES NFR BLD AUTO: 9 %
NEUTROPHILS # BLD AUTO: 5.8 10E3/UL (ref 1.6–8.3)
NEUTROPHILS NFR BLD AUTO: 57 %
NRBC # BLD AUTO: 0 10E3/UL
NRBC BLD AUTO-RTO: 0 /100
PLATELET # BLD AUTO: 194 10E3/UL (ref 150–450)
POTASSIUM SERPL-SCNC: 5.1 MMOL/L (ref 3.4–5.3)
RBC # BLD AUTO: 4.31 10E6/UL (ref 3.8–5.2)
SODIUM SERPL-SCNC: 130 MMOL/L (ref 136–145)
TROPONIN T SERPL HS-MCNC: 19 NG/L
TROPONIN T SERPL HS-MCNC: 20 NG/L
WBC # BLD AUTO: 10.5 10E3/UL (ref 4–11)

## 2023-04-20 PROCEDURE — 84484 ASSAY OF TROPONIN QUANT: CPT | Performed by: PHYSICIAN ASSISTANT

## 2023-04-20 PROCEDURE — 93010 ELECTROCARDIOGRAM REPORT: CPT | Performed by: PHYSICIAN ASSISTANT

## 2023-04-20 PROCEDURE — 99285 EMERGENCY DEPT VISIT HI MDM: CPT | Mod: 25 | Performed by: PHYSICIAN ASSISTANT

## 2023-04-20 PROCEDURE — 36415 COLL VENOUS BLD VENIPUNCTURE: CPT | Performed by: PHYSICIAN ASSISTANT

## 2023-04-20 PROCEDURE — 85025 COMPLETE CBC W/AUTO DIFF WBC: CPT | Performed by: PHYSICIAN ASSISTANT

## 2023-04-20 PROCEDURE — 70450 CT HEAD/BRAIN W/O DYE: CPT | Mod: MF

## 2023-04-20 PROCEDURE — 93005 ELECTROCARDIOGRAM TRACING: CPT | Performed by: PHYSICIAN ASSISTANT

## 2023-04-20 PROCEDURE — 80048 BASIC METABOLIC PNL TOTAL CA: CPT | Performed by: PHYSICIAN ASSISTANT

## 2023-04-20 PROCEDURE — 71045 X-RAY EXAM CHEST 1 VIEW: CPT

## 2023-04-20 RX ORDER — NITROGLYCERIN 0.4 MG/1
0.4 TABLET SUBLINGUAL EVERY 5 MIN PRN
Status: DISCONTINUED | OUTPATIENT
Start: 2023-04-20 | End: 2023-04-21 | Stop reason: HOSPADM

## 2023-04-20 ASSESSMENT — ACTIVITIES OF DAILY LIVING (ADL)
ADLS_ACUITY_SCORE: 35
ADLS_ACUITY_SCORE: 35
ADLS_ACUITY_SCORE: 33

## 2023-04-20 NOTE — TELEPHONE ENCOUNTER
Spoke with patient to confirm visit next week with Dr. Jama on 4/26/23. Patient said her head, neck, and arm/shoulder are really bothering her. She has tried tylenol but didn't seem to do much and today about an hour and half ago she took ibuprofen. She gets some episodes of chest pain as well. Patient thought she hurt her arm/shoulder while shoveling snow last Sunday but symptoms have not improved. Instructed patient be seen in the ED for further work up for symptoms and if heart vs musculoskeletal related. Patient verbalized understanding.

## 2023-04-20 NOTE — ED TRIAGE NOTES
PT comes in with chest pain x3 days and headache x2 weeks. Is also SOB w/ activity and has dizziness. Has  Pacemaker.

## 2023-04-20 NOTE — ED PROVIDER NOTES
History     Chief Complaint   Patient presents with     Chest Pain       HPI  Audrey Ortega is a 88 year old female who presents to the emergency department complaining of chest pain.  The patient reports this started about 4 days ago after she was shoveling really heavy snow.  The pain was pretty bad that night and it has come and gone throughout the last few days.  She describes it as a tightness at rest that gets worse with exertion.  She also has shortness of breath with exertion.  She feels a little lightheaded when she stands up.  She has had no nausea but does complain of a significant posterior headache for the last few days.  She describes it as a pain in the back of her neck that goes up into the back of her head.  She has had no vertigo symptoms, or blurred vision.  No reported weakness in extremities.    She has a history of atrial fibrillation and sick sinus syndrome with permanent pacemaker.  She is on a full dose aspirin for anticoagulation and did take it today.    Allergies:  Allergies   Allergen Reactions     Ciprofloxacin Rash     Macrobid [Nitrofurantoin] Itching and Rash       Problem List:    Patient Active Problem List    Diagnosis Date Noted     Chronic kidney disease, stage 3 (H) 05/05/2021     Priority: Medium        Past Medical History:    History reviewed. No pertinent past medical history.    Past Surgical History:    Past Surgical History:   Procedure Laterality Date     PHACOEMULSIFICATION WITH STANDARD INTRAOCULAR LENS IMPLANT Right 7/16/2020    Procedure: Cataract surgery with intraocular lens implant;  Surgeon: Fortino Ford MD;  Location:  OR     PHACOEMULSIFICATION WITH STANDARD INTRAOCULAR LENS IMPLANT Left 7/30/2020    Procedure: Cataract Surgery Left Eye;  Surgeon: Max Cooley MD;  Location:  OR       Family History:    No family history on file.    Social History:  Marital Status:  Single [1]  Social History     Tobacco Use     Smoking status: Never      Smokeless tobacco: Never   Vaping Use     Vaping status: Never Used   Substance Use Topics     Alcohol use: Not Currently     Drug use: Never        Medications:    amLODIPine (NORVASC) 5 MG tablet  Ascorbic Acid (VITAMIN C) 500 MG CAPS  aspirin (ASA) 325 MG EC tablet  calcium 600 MG tablet  COMPRESSION STOCKINGS  lisinopril (ZESTRIL) 10 MG tablet  magnesium 250 MG tablet  multivitamin w/minerals (THERA-VIT-M) tablet  Vitamin D, Cholecalciferol, 25 MCG (1000 UT) TABS          Review of Systems   All other systems reviewed and are negative.        Physical Exam   BP: (!) 194/110  Pulse: 91  Temp: 97.6  F (36.4  C)  Resp: 18  Weight: 58.3 kg (128 lb 8 oz)  SpO2: 98 %      Physical Exam  Vitals and nursing note reviewed.   Constitutional:       General: She is not in acute distress.     Appearance: She is well-developed. She is not ill-appearing, toxic-appearing or diaphoretic.   HENT:      Head: Normocephalic and atraumatic.      Nose: Nose normal.      Mouth/Throat:      Mouth: Mucous membranes are moist.      Pharynx: Oropharynx is clear.   Eyes:      Extraocular Movements: Extraocular movements intact.      Conjunctiva/sclera: Conjunctivae normal.      Pupils: Pupils are equal, round, and reactive to light.   Cardiovascular:      Rate and Rhythm: Normal rate. Rhythm irregular.      Heart sounds: Normal heart sounds.   Pulmonary:      Effort: Pulmonary effort is normal. No respiratory distress.      Breath sounds: Normal breath sounds.   Abdominal:      General: Abdomen is flat.      Tenderness: There is no abdominal tenderness.   Musculoskeletal:         General: No deformity.      Cervical back: Neck supple. No tenderness.   Skin:     General: Skin is warm and dry.   Neurological:      General: No focal deficit present.      Mental Status: She is alert and oriented to person, place, and time. Mental status is at baseline.      Cranial Nerves: No cranial nerve deficit.      Motor: No weakness.   Psychiatric:          Mood and Affect: Mood normal.         Behavior: Behavior normal.            ED Course           Procedures              EKG Interpretation:      Interpreted by Dianna Jacobsen PA-C  Time reviewed: 1900  Symptoms at time of EKG: chest pain   Rhythm: atrial fibrillation - controlled  Rate: Normal  Axis: Normal  Ectopy: occasional PVCs  Conduction: normal  ST Segments/ T Waves: No acute ischemic changes  Q Waves: none  Comparison to prior: Unchanged    Clinical Impression: no acute changes, atrial fibrillation (chronic)           Results for orders placed or performed during the hospital encounter of 04/20/23 (from the past 24 hour(s))   CBC with platelets differential    Narrative    The following orders were created for panel order CBC with platelets differential.  Procedure                               Abnormality         Status                     ---------                               -----------         ------                     CBC with platelets and d...[[id number]]                      Final result                 Please view results for these tests on the individual orders.   Basic metabolic panel   Result Value Ref Range    Sodium 130 (L) 136 - 145 mmol/L    Potassium 5.1 3.4 - 5.3 mmol/L    Chloride 93 (L) 98 - 107 mmol/L    Carbon Dioxide (CO2) 25 22 - 29 mmol/L    Anion Gap 12 7 - 15 mmol/L    Urea Nitrogen 20.4 8.0 - 23.0 mg/dL    Creatinine 1.00 (H) 0.51 - 0.95 mg/dL    Calcium 9.7 8.8 - 10.2 mg/dL    Glucose 98 70 - 99 mg/dL    GFR Estimate 54 (L) >60 mL/min/1.73m2   Troponin T, High Sensitivity   Result Value Ref Range    Troponin T, High Sensitivity 20 (H) <=14 ng/L   CBC with platelets and differential   Result Value Ref Range    WBC Count 10.5 4.0 - 11.0 10e3/uL    RBC Count 4.31 3.80 - 5.20 10e6/uL    Hemoglobin 12.5 11.7 - 15.7 g/dL    Hematocrit 39.0 35.0 - 47.0 %    MCV 91 78 - 100 fL    MCH 29.0 26.5 - 33.0 pg    MCHC 32.1 31.5 - 36.5 g/dL    RDW 14.4 10.0 - 15.0 %    Platelet Count 194  150 - 450 10e3/uL    % Neutrophils 57 %    % Lymphocytes 30 %    % Monocytes 9 %    % Eosinophils 4 %    % Basophils 0 %    % Immature Granulocytes 0 %    NRBCs per 100 WBC 0 <1 /100    Absolute Neutrophils 5.8 1.6 - 8.3 10e3/uL    Absolute Lymphocytes 3.2 0.8 - 5.3 10e3/uL    Absolute Monocytes 1.0 0.0 - 1.3 10e3/uL    Absolute Eosinophils 0.4 0.0 - 0.7 10e3/uL    Absolute Basophils 0.0 0.0 - 0.2 10e3/uL    Absolute Immature Granulocytes 0.0 <=0.4 10e3/uL    Absolute NRBCs 0.0 10e3/uL   XR Chest Port 1 View    Narrative    EXAM: XR CHEST PORT 1 VIEW  LOCATION: [facility]  DATE/TIME: 4/20/2023 7:38 PM CDT    INDICATION: chest pain  COMPARISON: CT 07/31/2022      Impression    IMPRESSION: Stable enlargement of the cardiac silhouette with pacemaker leads overlying the right atrium and right ventricle. There is likely mild interstitial edema without definite airspace consolidation. No significant pleural effusion or   pneumothorax. No acute bony abnormality.   CT Head w/o Contrast    Narrative    EXAM: CT HEAD W/O CONTRAST  LOCATION: [facility]  DATE/TIME: 4/20/2023 7:59 PM CDT    INDICATION: headache  COMPARISON: None.  TECHNIQUE: Routine CT Head without IV contrast. Multiplanar reformats. Dose reduction techniques were used.    FINDINGS:  INTRACRANIAL CONTENTS: No intracranial hemorrhage, extraaxial collection, or mass effect.  No CT evidence of acute infarct. Mild presumed chronic small vessel ischemic changes. Mild generalized volume loss. No hydrocephalus.     VISUALIZED ORBITS/SINUSES/MASTOIDS: Prior bilateral cataract surgery. Visualized portions of the orbits are otherwise unremarkable. No paranasal sinus mucosal disease. No middle ear or mastoid effusion.    BONES/SOFT TISSUES: No acute abnormality.      Impression    IMPRESSION:  1.  No CT evidence for acute intracranial process.  2.  Brain atrophy and presumed chronic microvascular ischemic  changes as above.   Troponin T, High Sensitivity   Result Value Ref Range    Troponin T, High Sensitivity 19 (H) <=14 ng/L       Medications   nitroGLYcerin (NITROSTAT) sublingual tablet 0.4 mg (has no administration in time range)         Assessments & Plan (with Medical Decision Making)  Audrey Ortega is a 88 year old female who presented to the ED complaining of chest pain and posterior headache for the last 4 days.  Began after shoveling heavy snow and has recurred ever since then with exertion.  She has associated shortness of breath and lightheadedness.  On arrival to the ED her blood pressure was elevated but otherwise she had normal vital signs.  No acute abnormalities on exam today.  Patient reported mild chest tightness on arrival but that dissipated with rest.  She had already taken aspirin today so no aspirin given here.  EKG today showed atrial fibrillation in a controlled rate, no signs of ischemia.  Her initial troponin was elevated from normal at 20.  Sodium low at 130 but otherwise no significant electrolyte abnormalities.  CBC unremarkable.  Chest x-ray showed some mild interstitial edema but no acute abnormalities otherwise.  We did also CT her head given her headaches but this was also without acute findings.  Repeat troponin was stable at 19.  Patient remained asymptomatic at rest here however I am highly concerned that with this exertional chest tightness with shortness of breath that she is experiencing cardiac related pain and would benefit from observat on telemetry and a stress test prior to discharge.  Unfortunately we do not have beds available at our facility so she will need to be transferred.  Patient requested [facility] but unfortunately no beds were available.  We did find a bed at [facility] I spoke with Dr. Nolen, hospitalist, who accepted patient in transfer onto his service.  Patient will go by ambulance.  Staffed in the ED with Dr. Ward.   This is a  shared ER visit.  Dianna Jacobsen discussed the patient's condition and plan of care with me. I reviewed the patient's past medical history, symptoms of present illness, laboratory findings, and CXR and CT imaging results. I'm in agreement with the assessment and plan of care.  Tyler Ward  Physician  [facility] Emergency Room       I have reviewed the nursing notes.    I have reviewed the findings, diagnosis, plan and need for transfer with the patient.      Final diagnoses:   Chest pain   HTN (hypertension)     Note: Chart documentation done in part with Dragon Voice Recognition software. Although reviewed after completion, some word and grammatical errors may remain.     4/20/2023   [facility] EMERGENCY DEPT     Dianna Jacobsen PA-C  04/20/23 [id number]       Tyler Ward,   04/21/23 [id number]

## 2023-04-21 ASSESSMENT — ACTIVITIES OF DAILY LIVING (ADL): ADLS_ACUITY_SCORE: 35

## 2023-04-26 ENCOUNTER — OFFICE VISIT (OUTPATIENT)
Dept: CARDIOLOGY | Facility: CLINIC | Age: 88
End: 2023-04-26
Payer: COMMERCIAL

## 2023-04-26 VITALS
DIASTOLIC BLOOD PRESSURE: 78 MMHG | OXYGEN SATURATION: 98 % | HEIGHT: 65 IN | BODY MASS INDEX: 20.73 KG/M2 | WEIGHT: 124.4 LBS | HEART RATE: 82 BPM | SYSTOLIC BLOOD PRESSURE: 136 MMHG

## 2023-04-26 DIAGNOSIS — I48.0 PAROXYSMAL ATRIAL FIBRILLATION (H): ICD-10-CM

## 2023-04-26 DIAGNOSIS — R07.9 CHEST PAIN, UNSPECIFIED TYPE: Primary | ICD-10-CM

## 2023-04-26 DIAGNOSIS — I31.39 PERICARDIAL EFFUSION: ICD-10-CM

## 2023-04-26 PROCEDURE — 99214 OFFICE O/P EST MOD 30 MIN: CPT | Mod: 25 | Performed by: INTERNAL MEDICINE

## 2023-04-26 PROCEDURE — 93000 ELECTROCARDIOGRAM COMPLETE: CPT | Performed by: INTERNAL MEDICINE

## 2023-04-26 NOTE — LETTER
4/26/2023    Krish Bates MD  [address] [facility] [address]    RE: Audrey Ortega       Dear Colleague,     I had the pleasure of seeing Audrey Ortega in the [facility].  CARDIOLOGY CLINIC VISIT  DATE OF SERVICE:  April 26, 2023      PRIMARY CARE PHYSICIAN:  Krish Bates    HISTORY OF PRESENT ILLNESS:  Ms. Audrey Ortega, a pleasant 87 year old patient who has a past medical history significant for paroxysmal atrial fibrillation (declined anticoagulation), sick sinus syndrome status post permanent pacemaker in 2011 and hypertension who presents to clinic today for follow up.  She was last seen by me in July 2021.       In brief review, she has previously declined anticoagulation (unable to afford DOACs and warfarin caused bilateral shoulder pain). Previous device checks have been showing prolonged episodes of tachycardia thought to be atrial flutter or atrial tachycardia.     Previously, she noted paroxysmal palpitations (that she describes as pounding in her left ear) 2-3 times daily and when she lays down at night.  The on-call cardiologist, Dr. Roberts, reviewed her most recent device check that noted 60 recorded mode switches consistent with atrial arrhythmia on ventricular rates between 70 to 100 bpm.  She felt the this was likely indicating breakthrough atrial fibrillation and recommended metoprolol 25 mg twice daily. Due to hypotension Lisinopril was discontinued recently without further episodes. She also no longer has palpitations.     At our visit last year, she noted pain/aching in her right lower extremity and has prominent varicose veins on her shin.  She notes leg cramps at night.  She denies any lower extremity edema.  She denies any prior hx of blood clots, thrombophlebitis, bleeding varicosities.   She does get regular exercise.  She has not taken any over the counter medications for the discomfort.  She has not tried compression stockings.  A venous competency ultrasound  demonstrated no significant superficial vein insufficiency.  The varicose veins of her anterior shin did not demonstrate incompetence.        Since our last visit, Audery was hospitalized at [facility] for chest pain.  She underwent a coronary CTA which did not show obstructive disease.  An echocardiogram demonstrated  Normal LV function with moderate to severe MR, and severe TR.  There was a small to moderate pericardial effusion.      In follow up today, Audrey reports feeling well.   She states her chest pain has resolved and has not recurred.  She is quite active.   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    She is looking forward to getting back to her garden.      PAST MEDICAL HISTORY:  No past medical history on file.    MEDICATIONS:  Current Outpatient Medications   Medication    amLODIPine (NORVASC) 5 MG tablet    Ascorbic Acid (VITAMIN C) 500 MG CAPS    aspirin (ASA) 325 MG EC tablet    calcium 600 MG tablet    COMPRESSION STOCKINGS    lisinopril (ZESTRIL) 10 MG tablet    magnesium 250 MG tablet    multivitamin w/minerals (THERA-VIT-M) tablet    Vitamin D, Cholecalciferol, 25 MCG (1000 UT) TABS     No current facility-administered medications for this visit.       ALLERGIES:  Allergies   Allergen Reactions    Ciprofloxacin Rash    Macrobid [Nitrofurantoin] Itching and Rash       SOCIAL HISTORY:  I have reviewed this patient's social history and updated it with pertinent information if needed. Audrey Ortega  reports that she has never smoked. She has never used smokeless tobacco. She reports that she does not currently use alcohol. She reports that she does not use drugs.    FAMILY HISTORY:  I have reviewed this patient's family history and updated it with pertinent information if needed.   No family history on file.    REVIEW OF SYSTEMS:  A complete ROS was obtained and the pertinent positives are outlined in the history of present illness above.  The remainder of systems is negative.      PHYSICAL  EXAM:                     Vital Signs with Ranges     0 lbs 0 oz    Constitutional: awake, alert, no distress  Eyes: PERRL, sclera nonicteric  ENT: trachea midline  Respiratory: CTAB  Cardiovascular: RRR no m/r/g, no JVD  GI: nondistended, nontender, bowel sounds present  Lymph/Hematologic: no lymphadenopathy  Skin: dry, no rash  Musculoskeletal: good muscle tone, no edema bilaterally, mild chronic venous stasis changes bilaterally  Neurologic: no focal deficits  Neuropsychiatric: appropriate affact      ASSESSMENT:    Paroxysmal atrial fibrillation  Continues to decline anticoagulation, confirmed again in clinic today.  She has also declined Watchman  Most recent device notes frequent mode switches; controlled ventricular rates  Patient stopped metoprolol on her own due to hypotension     Hypertension    Currently on zestoretic. BP elevated in clinic today, but audrey reports BP's at goal at home with some soft blood pressures as well    Sick sinus syndrome  Status post permanent pacemaker in 2011  Follows with device clinic; normal device functoin    4.  Small to moderate pericardial effusion:  Incidentally noted on recent echocardiogram    5.   Chest pain:  Resolved.  Coronary CTA without obstructive CAD    6.  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  No symptoms attributable to valve disease, euvolemic      RECOMMENDATIONS:  -Reviewed recent hospitalization and findings with Audrey in detail.  -Plan for repeat echocardiogram in two weeks to ensure pericardial effusion is stable in size.  -Continue current cardiac meds; no changes made today.  Plan for follow up in 12 months or before than as needed.          Cassy Jama MD [facility]  April 26, 2023      Thank you for allowing me to participate in the care of your patient.      Sincerely,     Cassy Jama MD     [facility]  cc:   No referring provider defined for this  encounter.

## 2023-04-26 NOTE — PROGRESS NOTES
CARDIOLOGY CLINIC VISIT  DATE OF SERVICE:  April 26, 2023      PRIMARY CARE PHYSICIAN:  Krish Bates    HISTORY OF PRESENT ILLNESS:  Ms. Audrey Ortega, a pleasant 87 year old patient who has a past medical history significant for paroxysmal atrial fibrillation (declined anticoagulation), sick sinus syndrome status post permanent pacemaker in 2011 and hypertension who presents to clinic today for follow up.  She was last seen by me in July 2021.       In brief review, she has previously declined anticoagulation (unable to afford DOACs and warfarin caused bilateral shoulder pain). Previous device checks have been showing prolonged episodes of tachycardia thought to be atrial flutter or atrial tachycardia.     Previously, she noted paroxysmal palpitations (that she describes as pounding in her left ear) 2-3 times daily and when she lays down at night.  The on-call cardiologist, Dr. Roberts, reviewed her most recent device check that noted 60 recorded mode switches consistent with atrial arrhythmia on ventricular rates between 70 to 100 bpm.  She felt the this was likely indicating breakthrough atrial fibrillation and recommended metoprolol 25 mg twice daily. Due to hypotension Lisinopril was discontinued recently without further episodes. She also no longer has palpitations.     At our visit last year, she noted pain/aching in her right lower extremity and has prominent varicose veins on her shin.  She notes leg cramps at night.  She denies any lower extremity edema.  She denies any prior hx of blood clots, thrombophlebitis, bleeding varicosities.   She does get regular exercise.  She has not taken any over the counter medications for the discomfort.  She has not tried compression stockings.  A venous competency ultrasound demonstrated no significant superficial vein insufficiency.  The varicose veins of her anterior shin did not demonstrate incompetence.        Since our last visit, Audrey was hospitalized at [facility] for  chest pain.  She underwent a coronary CTA which did not show obstructive disease.  An echocardiogram demonstrated  Normal LV function with moderate to severe MR, and severe TR.  There was a small to moderate pericardial effusion.      In follow up today, Audrey reports feeling well.   She states her chest pain has resolved and has not recurred.  She is quite active.   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    She is looking forward to getting back to her garden.      PAST MEDICAL HISTORY:  No past medical history on file.    MEDICATIONS:  Current Outpatient Medications   Medication     amLODIPine (NORVASC) 5 MG tablet     Ascorbic Acid (VITAMIN C) 500 MG CAPS     aspirin (ASA) 325 MG EC tablet     calcium 600 MG tablet     COMPRESSION STOCKINGS     lisinopril (ZESTRIL) 10 MG tablet     magnesium 250 MG tablet     multivitamin w/minerals (THERA-VIT-M) tablet     Vitamin D, Cholecalciferol, 25 MCG (1000 UT) TABS     No current facility-administered medications for this visit.       ALLERGIES:  Allergies   Allergen Reactions     Ciprofloxacin Rash     Macrobid [Nitrofurantoin] Itching and Rash       SOCIAL HISTORY:  I have reviewed this patient's social history and updated it with pertinent information if needed. Audrey Ortega  reports that she has never smoked. She has never used smokeless tobacco. She reports that she does not currently use alcohol. She reports that she does not use drugs.    FAMILY HISTORY:  I have reviewed this patient's family history and updated it with pertinent information if needed.   No family history on file.    REVIEW OF SYSTEMS:  A complete ROS was obtained and the pertinent positives are outlined in the history of present illness above.  The remainder of systems is negative.      PHYSICAL EXAM:                     Vital Signs with Ranges     0 lbs 0 oz    Constitutional: awake, alert, no distress  Eyes: PERRL, sclera nonicteric  ENT: trachea midline  Respiratory:  CTAB  Cardiovascular: RRR no m/r/g, no JVD  GI: nondistended, nontender, bowel sounds present  Lymph/Hematologic: no lymphadenopathy  Skin: dry, no rash  Musculoskeletal: good muscle tone, no edema bilaterally, mild chronic venous stasis changes bilaterally  Neurologic: no focal deficits  Neuropsychiatric: appropriate affact      ASSESSMENT:    1. Paroxysmal atrial fibrillation    Continues to decline anticoagulation, confirmed again in clinic today.  She has also declined Watchman    Most recent device notes frequent mode switches; controlled ventricular rates    Patient stopped metoprolol on her own due to hypotension     2. Hypertension    Currently on zestoretic. BP elevated in clinic today, but audrey reports BP's at goal at home with some soft blood pressures as well    3. Sick sinus syndrome    Status post permanent pacemaker in 2011    Follows with device clinic; normal device functoin    4.  Small to moderate pericardial effusion:  Incidentally noted on recent echocardiogram    5.   Chest pain:  Resolved.  Coronary CTA without obstructive CAD    6.  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  No symptoms attributable to valve disease, euvolemic      RECOMMENDATIONS:  -Reviewed recent hospitalization and findings with Audrey in detail.  -Plan for repeat echocardiogram in two weeks to ensure pericardial effusion is stable in size.  -Continue current cardiac meds; no changes made today.  Plan for follow up in 12 months or before than as needed.          Cassy Jama MD [facility]  April 26, 2023

## 2023-04-27 ENCOUNTER — TELEPHONE (OUTPATIENT)
Dept: INTERNAL MEDICINE | Facility: CLINIC | Age: 88
End: 2023-04-27
Payer: COMMERCIAL

## 2023-04-27 NOTE — TELEPHONE ENCOUNTER
General Call    Contacts       Type Contact Phone/Fax    04/27/2023 10:09 AM CDT Phone (Incoming) Audrey Ortega CHERRI (Self) [phone] ()        Reason for Call:  Ongoing issues with Diarrhea    What are your questions or concerns:  Patient is requesting an appointment with her primary for further follow up for ongoing issues with diarrhea.  Patient stating everything just seems to go right through her when she eats.    Date of last appointment with provider: 1/31/23    Okay to leave a detailed message?: Yes at Home number on file [phone] ([place])

## 2023-04-27 NOTE — TELEPHONE ENCOUNTER
Patient was scheduled.     SAM JosephN, RN, PHN  [place]  April 27, 2023

## 2023-04-28 ENCOUNTER — OFFICE VISIT (OUTPATIENT)
Dept: INTERNAL MEDICINE | Facility: CLINIC | Age: 88
End: 2023-04-28
Payer: COMMERCIAL

## 2023-04-28 VITALS
OXYGEN SATURATION: 99 % | DIASTOLIC BLOOD PRESSURE: 84 MMHG | BODY MASS INDEX: 20.14 KG/M2 | HEART RATE: 74 BPM | TEMPERATURE: 97.2 F | SYSTOLIC BLOOD PRESSURE: 124 MMHG | WEIGHT: 121 LBS | RESPIRATION RATE: 16 BRPM

## 2023-04-28 DIAGNOSIS — R19.7 DIARRHEA, UNSPECIFIED TYPE: Primary | ICD-10-CM

## 2023-04-28 PROCEDURE — 99213 OFFICE O/P EST LOW 20 MIN: CPT | Performed by: INTERNAL MEDICINE

## 2023-04-28 RX ORDER — DIPHENOXYLATE HCL/ATROPINE 2.5-.025MG
1 TABLET ORAL 4 TIMES DAILY PRN
Qty: 60 TABLET | Refills: 0 | Status: SHIPPED | OUTPATIENT
Start: 2023-04-28

## 2023-04-28 ASSESSMENT — ENCOUNTER SYMPTOMS: DIARRHEA: 1

## 2023-04-28 NOTE — PROGRESS NOTES
Assessment & Plan   Problem List Items Addressed This Visit    None  Visit Diagnoses     Diarrhea, unspecified type    -  Primary    Relevant Medications    diphenoxylate-atropine (LOMOTIL) 2.5-0.025 MG tablet    Other Relevant Orders    Ova and Parasite Exam Routine    C. difficile Toxin B PCR with reflex to C. difficile Antigen and Toxins A/B EIA    Adult GI  Referral - Consult Only           Patient with diarrhea for the last 6 months.  No clear reason why it started she went from constipation and diarrhea.  She had a CT scan which was okay, enteric pathogens was negative C. difficile was negative.  She denies any new medications or medication cause.    She does little better with Metamucil.  Recommended she continue to take that we will treat her with some Lomotil to see if this helps.  We will check stool for C. difficile again and ova and parasites.  She just had labs through the emergency room and cardiology with a normal thyroid function negative Cryptosporidium.    At 88 I think she should have a colonoscopy but would like her to see GI so they can evaluate her and then do a colonoscopy if needed.  Patient's weight has gone down 5 pounds overall she is doing well but would like to give her more freedom and allow her to eat more.             Krish Bates MD  [facility] GUANACO Ventura is a 88 year old, presenting for the following health issues:  Diarrhea        4/28/2023     1:54 PM   Additional Questions   Roomed by Prudence Sosa     Diarrhea    History of Present Illness       Reason for visit:  Diarea    She eats 2-3 servings of fruits and vegetables daily.She consumes 0 sweetened beverage(s) daily.She exercises with enough effort to increase her heart rate 30 to 60 minutes per day.  She exercises with enough effort to increase her heart rate 6 days per week.   She is taking medications regularly.     Diarrhea, that start at the first of the year.  Nothing  before, usually constipated.  Goes when she eats, slimey and undigested food after eating.      CT scan was normal.     Metamucil works some for her.       2 dogs. Lives alone.  No travel.       no blood in the stool.     Labs were ok, thyroid was ok.     Weight is down 5 pounds.       Review of Systems   Gastrointestinal: Positive for diarrhea.          Objective    /84   Pulse 74   Temp 97.2  F (36.2  C) (Temporal)   Resp 16   Wt 54.9 kg (121 lb)   SpO2 99%   BMI 20.14 kg/m    Body mass index is 20.14 kg/m .  Physical Exam   NAD

## 2023-05-11 ENCOUNTER — APPOINTMENT (OUTPATIENT)
Dept: GENERAL RADIOLOGY | Facility: CLINIC | Age: 88
End: 2023-05-11
Attending: PHYSICIAN ASSISTANT
Payer: MEDICARE

## 2023-05-11 ENCOUNTER — HOSPITAL ENCOUNTER (EMERGENCY)
Facility: CLINIC | Age: 88
Discharge: HOME | End: 2023-05-11
Attending: PHYSICIAN ASSISTANT | Admitting: PHYSICIAN ASSISTANT
Payer: MEDICARE

## 2023-05-11 VITALS
TEMPERATURE: 98 F | RESPIRATION RATE: 18 BRPM | BODY MASS INDEX: 21 KG/M2 | DIASTOLIC BLOOD PRESSURE: 85 MMHG | OXYGEN SATURATION: 99 % | SYSTOLIC BLOOD PRESSURE: 159 MMHG | HEART RATE: 80 BPM | WEIGHT: 126.2 LBS

## 2023-05-11 DIAGNOSIS — S93.601A RIGHT FOOT SPRAIN: ICD-10-CM

## 2023-05-11 PROCEDURE — 99283 EMERGENCY DEPT VISIT LOW MDM: CPT | Performed by: PHYSICIAN ASSISTANT

## 2023-05-11 PROCEDURE — 73630 X-RAY EXAM OF FOOT: CPT | Mod: RT

## 2023-05-11 ASSESSMENT — ACTIVITIES OF DAILY LIVING (ADL): ADLS_ACUITY_SCORE: 39

## 2023-05-11 NOTE — ED TRIAGE NOTES
Pt reports slipping on a wooden step this morning, she is unsure what exactly happen but now has pain and swelling in the foot, she was able to get the lawn mowed prior to coming

## 2023-05-11 NOTE — ED PROVIDER NOTES
History     Chief Complaint   Patient presents with     Foot Pain     HPI  Audrey Ortega is a 88 year old female who presents for evaluation of an injury to her right foot that occurred this morning.  She stepped off of a wooden step and felt pain in the foot.  She had an inversion injury.  She did proceed to mow her lawn today regardless of the injury.  Then she noticed significant swelling and bruising.  Pain is rated 5 on a scale of 10 when she is trying to walk.  She is able to bear weight on the foot.  Denies any ankle or lower leg pain otherwise.  She has not taken anything for the pain.        Allergies:  Allergies   Allergen Reactions     Ciprofloxacin Rash     Macrobid [Nitrofurantoin] Itching and Rash       Problem List:    Patient Active Problem List    Diagnosis Date Noted     Chronic kidney disease, stage 3 (H) 05/05/2021     Priority: Medium        Past Medical History:    History reviewed. No pertinent past medical history.    Past Surgical History:    Past Surgical History:   Procedure Laterality Date     PHACOEMULSIFICATION WITH STANDARD INTRAOCULAR LENS IMPLANT Right 7/16/2020    Procedure: Cataract surgery with intraocular lens implant;  Surgeon: Fortino Ford MD;  Location: [facility] OR     PHACOEMULSIFICATION WITH STANDARD INTRAOCULAR LENS IMPLANT Left 7/30/2020    Procedure: Cataract Surgery Left Eye;  Surgeon: Max Cooley MD;  Location: [facility] OR       Family History:    No family history on file.    Social History:  Marital Status:  Single [1]  Social History     Tobacco Use     Smoking status: Never     Smokeless tobacco: Never   Vaping Use     Vaping status: Never Used   Substance Use Topics     Alcohol use: Not Currently     Drug use: Never        Medications:    amLODIPine (NORVASC) 5 MG tablet  Ascorbic Acid (VITAMIN C) 500 MG CAPS  aspirin (ASA) 325 MG EC tablet  calcium 600 MG tablet  COMPRESSION STOCKINGS  diphenoxylate-atropine (LOMOTIL) 2.5-0.025 MG tablet  lisinopril  (ZESTRIL) 10 MG tablet  magnesium 250 MG tablet  multivitamin w/minerals (THERA-VIT-M) tablet  Vitamin D, Cholecalciferol, 25 MCG (1000 UT) TABS          Review of Systems   All other systems reviewed and are negative.      Physical Exam   BP: (!) 159/85  Pulse: 80  Temp: 98  F (36.7  C)  Resp: 18  Weight: 57.2 kg (126 lb 3.2 oz)  SpO2: 99 %      Physical Exam  Vitals and nursing note reviewed.   Constitutional:       General: She is not in acute distress.     Appearance: She is not diaphoretic.   HENT:      Head: Normocephalic and atraumatic.      Right Ear: External ear normal.      Left Ear: External ear normal.      Nose: Nose normal.      Mouth/Throat:      Pharynx: No oropharyngeal exudate.   Eyes:      General: No scleral icterus.        Right eye: No discharge.         Left eye: No discharge.      Conjunctiva/sclera: Conjunctivae normal.      Pupils: Pupils are equal, round, and reactive to light.   Neck:      Thyroid: No thyromegaly.   Cardiovascular:      Rate and Rhythm: Normal rate and regular rhythm.      Heart sounds: Normal heart sounds. No murmur heard.  Pulmonary:      Effort: Pulmonary effort is normal. No respiratory distress.      Breath sounds: Normal breath sounds. No wheezing or rales.   Chest:      Chest wall: No tenderness.   Abdominal:      General: Bowel sounds are normal. There is no distension.      Palpations: Abdomen is soft. There is no mass.      Tenderness: There is no abdominal tenderness. There is no guarding or rebound.   Musculoskeletal:         General: No deformity. Normal range of motion.      Cervical back: Normal range of motion and neck supple.      Comments: Bruising and swelling of the right foot dorsally.  Tender to palpation over the mid second-fourth metatarsals.  No deformity.   normal cap refill.  Sensation intact to light touch.  Pain with any range of motion.  No heel tenderness.  No tenderness over the ankle joint.  No medial or lateral malleolus discomfort with  palpation.  Negative drawer sign.  Remainder of the lower leg nontender.  No palpable cord.  Negative Homans' sign.   Lymphadenopathy:      Cervical: No cervical adenopathy.   Skin:     General: Skin is warm and dry.      Capillary Refill: Capillary refill takes less than 2 seconds.      Findings: No erythema or rash.   Neurological:      Mental Status: She is alert and oriented to person, place, and time.      Cranial Nerves: No cranial nerve deficit.   Psychiatric:         Behavior: Behavior normal.         Thought Content: Thought content normal.         ED Course                 Procedures              Critical Care time:  none               Results for orders placed or performed during the hospital encounter of 05/11/23 (from the past 24 hour(s))   XR Foot Right 3 Views    Narrative    EXAM: XR FOOT RIGHT G/E 3 VIEWS  LOCATION: [facility]  DATE/TIME: 5/11/2023 6:30 PM CDT    INDICATION: right 2nd 4th mid metatarsal pain  COMPARISON: None.      Impression    IMPRESSION: Status post first metatarsal bunionectomy/cheilectomy. No acute fracture or malalignment. Mild degenerative changes throughout the foot. Osteopenia. Dorsal forefoot soft tissue swelling.       Medications - No data to display    Assessments & Plan (with Medical Decision Making)  Right foot sprain     88 year old female presents for evaluation of an injury to her right foot that happened this morning.  She stepped off of a wooden step wrong and felt pain in the foot during an inversion injury.  Denies any ankle pain.  On exam she has bruising and swelling of the dorsal foot.  Tender to palpation over the second-fourth metatarsal region.  Ankle joint nontender.  No other abnormalities.  X-ray displays no evidence for fracture.  I performed an independent review of the x-ray and agree with the radiology findings.  Discussed the findings with the patient.  She does have significant swelling and bruising.  Therefore, we  will treat this aggressively with an Ace bandage and a cam walker.  Podiatry recheck in a week.  If not improving, then advanced imaging could be performed with CT versus other.  We will leave this up to the discretion of podiatry.  Proper dosing for Tylenol reviewed.  RICE therapy discussed.  Patient in agreement.     I have reviewed the nursing notes.    I have reviewed the findings, diagnosis, plan and need for follow up with the patient.           Medical Decision Making  The patient's presentation was of moderate complexity (an acute complicated injury).    The patient's evaluation involved:  ordering and/or review of 1 test(s) in this encounter (see separate area of note for details)    The patient's management necessitated moderate risk (a decision regarding minor procedure (splinting) with risk factors of none).        New Prescriptions    No medications on file       Final diagnoses:   Right foot sprain     Disclaimer: This note consists of symbols derived from keyboarding, dictation and/or voice recognition software. As a result, there may be errors in the script that have gone undetected. Please consider this when interpreting information found in this chart.      5/11/2023   [facility] EMERGENCY DEPT     Manny Najera PA-C  05/11/23 [id number]

## 2023-05-11 NOTE — DISCHARGE INSTRUCTIONS
It was a pleasure working with you today!  I hope your condition improves rapidly!     Thankfully, the x-ray did not show any sign of fracture.  Please elevate your foot is much as possible to help with the swelling and discomfort.  You can open the cam walker and ice your foot for 20 minutes every couple hours for the next couple days.  Then switch to heat.  Use the Ace bandage and Cam walker at all times while you are up and about to support the foot as it heals.  The podiatry department should be contacting you tomorrow to line up a recheck appointment next week.  It is okay to use Tylenol 650 mg every 6 hours as needed for discomfort.

## 2023-05-18 ENCOUNTER — OFFICE VISIT (OUTPATIENT)
Dept: PODIATRY | Facility: CLINIC | Age: 88
End: 2023-05-18
Attending: PHYSICIAN ASSISTANT
Payer: COMMERCIAL

## 2023-05-18 VITALS
HEIGHT: 65 IN | WEIGHT: 126 LBS | DIASTOLIC BLOOD PRESSURE: 72 MMHG | SYSTOLIC BLOOD PRESSURE: 132 MMHG | BODY MASS INDEX: 20.99 KG/M2

## 2023-05-18 DIAGNOSIS — S93.491A SPRAIN OF ANTERIOR TALOFIBULAR LIGAMENT OF RIGHT ANKLE, INITIAL ENCOUNTER: Primary | ICD-10-CM

## 2023-05-18 PROCEDURE — 99203 OFFICE O/P NEW LOW 30 MIN: CPT | Performed by: PODIATRIST

## 2023-05-18 ASSESSMENT — PAIN SCALES - GENERAL: PAINLEVEL: EXTREME PAIN (8)

## 2023-05-18 NOTE — LETTER
5/18/2023         RE: Audrey Ortega  [address]  [facility] [address]        Dear Colleague,    Thank you for referring your patient, Audrey Ortega, to the [facility]. Please see a copy of my visit note below.    HPI:  Audrey Ortega is a 88 year old female who is seen in consultation at the request of ED DEPT - Manny Najera PA-C    Pt presents for eval of:   (Onset, Location, L/R, Character, Treatments, Injury if yes)    XR Right foot 5/11/2023     Onset 5/11/2023, slipped off one step and fell. Presents with dorsal Right foot pain, WB w/tall gray fx boot.  Constant, swelling, bruising, dull ache, pain 8/10. Intermittent sharp and throbbing pain with first steps.    Tylenol, ice, rest, elevation, compression only wearing fx boot for outside activity or if standing for a longer period of time in the house. Mowed her lawn with her fx boot on.    Retired. But still working at [employer] in [place].     ROS:  10 point ROS neg other than the symptoms noted above in the HPI.    Patient Active Problem List   Diagnosis     Chronic kidney disease, stage 3 (H)       PAST MEDICAL HISTORY: History reviewed. No pertinent past medical history.     PAST SURGICAL HISTORY:   Past Surgical History:   Procedure Laterality Date     PHACOEMULSIFICATION WITH STANDARD INTRAOCULAR LENS IMPLANT Right 7/16/2020    Procedure: Cataract surgery with intraocular lens implant;  Surgeon: Fortino Ford MD;  Location:  OR     PHACOEMULSIFICATION WITH STANDARD INTRAOCULAR LENS IMPLANT Left 7/30/2020    Procedure: Cataract Surgery Left Eye;  Surgeon: Max Cooley MD;  Location:  OR        MEDICATIONS:   Current Outpatient Medications:      amLODIPine (NORVASC) 5 MG tablet, Take 1 tablet (5 mg) by mouth daily, Disp: 90 tablet, Rfl: 1     Ascorbic Acid (VITAMIN C) 500 MG CAPS, Take 500 mg by mouth daily, Disp: , Rfl:      aspirin (ASA) 325 MG EC tablet, Take 325 mg by mouth, Disp: , Rfl:       calcium 600 MG tablet, Take 1 tablet by mouth daily, Disp: , Rfl:      diphenoxylate-atropine (LOMOTIL) 2.5-0.025 MG tablet, Take 1 tablet by mouth 4 times daily as needed for diarrhea, Disp: 60 tablet, Rfl: 0     lisinopril (ZESTRIL) 10 MG tablet, Take 1 tablet (10 mg) by mouth 2 times daily, Disp: 180 tablet, Rfl: 3     magnesium 250 MG tablet, Take 1 tablet by mouth daily, Disp: , Rfl:      multivitamin w/minerals (THERA-VIT-M) tablet, Take 1 tablet by mouth daily, Disp: , Rfl:      Vitamin D, Cholecalciferol, 25 MCG (1000 UT) TABS, Take 1,000 Units by mouth daily, Disp: , Rfl:      COMPRESSION STOCKINGS, 1 each daily (Patient not taking: Reported on 5/18/2023), Disp: 1 each, Rfl: 1     ALLERGIES:    Allergies   Allergen Reactions     Ciprofloxacin Rash     Macrobid [Nitrofurantoin] Itching and Rash        SOCIAL HISTORY:   Social History     Socioeconomic History     Marital status: Single     Spouse name: Not on file     Number of children: Not on file     Years of education: Not on file     Highest education level: Not on file   Occupational History     Not on file   Tobacco Use     Smoking status: Never     Smokeless tobacco: Never   Vaping Use     Vaping status: Never Used   Substance and Sexual Activity     Alcohol use: Not Currently     Drug use: Never     Sexual activity: Not Currently     Partners: Male   Other Topics Concern     Parent/sibling w/ CABG, MI or angioplasty before 65F 55M? No   Social History Narrative     Not on file     Social Determinants of Health     Financial Resource Strain: Not on file   Food Insecurity: Not on file   Transportation Needs: Not on file   Physical Activity: Not on file   Stress: Not on file   Social Connections: Not on file   Intimate Partner Violence: Not on file   Housing Stability: Not on file        FAMILY HISTORY: History reviewed. No pertinent family history.     EXAM:Vitals: /72 (BP Location: Left arm, Patient Position: Sitting, Cuff Size: Adult Regular)   " Ht 1.651 m (5' 5\")   Wt 57.2 kg (126 lb)   BMI 20.97 kg/m    BMI= Body mass index is 20.97 kg/m .    General appearance: Patient is alert and fully cooperative with history & exam.  No sign of distress is noted during the visit.     Psychiatric: Affect is pleasant & appropriate.  Patient appears motivated to improve health.     Respiratory: Breathing is regular & unlabored while sitting.     HEENT: Hearing is intact to spoken word.  Speech is clear.  No gross evidence of visual impairment that would impact ambulation.     Vascular: DP & PT pulses are intact & regular bilaterally.  No significant edema or varicosities noted.  CFT and skin temperature is normal to both lower extremities.     Neurologic: Lower extremity sensation is intact to light touch.  No evidence of weakness or contracture in the lower extremities.  No evidence of neuropathy.    Dermatologic: Skin is intact to both lower extremities with adequate texture, turgor and tone about the integument.  No paronychia or evidence of soft tissue infection is noted.     Musculoskeletal: Patient is ambulatory with fracture boot right ankle.  No gross ankle deformity noted.  No foot or ankle joint effusion is noted.  Guarded range of motion throughout the right ankle.    Radiographs 3 views of the right foot demonstrate no fracture or joint dislocation.     ASSESSMENT:       ICD-10-CM    1. Sprain of anterior talofibular ligament of right ankle, initial encounter  S93.491A            PLAN:  Reviewed [facility] in [facility].      5/18/2023   Recommend weight-bear to tolerance in the fracture boot.  Also recommend rest and sleep in the fracture boot to keep the ankle at 90 until pain and edema is resolved  Placed in recommend compression during the day 4 inch Ace was applied starting at the toes  Can be weightbearing as tolerated in the boot  She is working 6 hours/week at the [employer] and as long as she is ambulating safely she can continue this if " tolerated otherwise we can remove her from work as needed.  We discussed risk of falling as the boot makes her unstable.  Offered a walker and she absolutely refuses that.  We discussed potential risks and trip hazards at home as well as discussed appropriate showering and bathing techniques to prevent falls and keep her injured ankle safe.  All questions were answered  Follow-up again in 14 days.      Fortino Fields DPM                Again, thank you for allowing me to participate in the care of your patient.        Sincerely,        Fortino Fields DPM

## 2023-05-18 NOTE — NURSING NOTE
NO CHARGE - incorrect sized fx boot dispensed by ED DEPT 5/11/2023. Dispensed 1 Pneumatic Walking Brace, Size Small, with [facility] agreement signed by patient. Lay Larry CMA, May 18, 2023

## 2023-05-18 NOTE — PATIENT INSTRUCTIONS
Stay in the fracture boot even during rest.  Can be weightbearing to tolerance in the fracture boot.  Continue compression dressing during the day.

## 2023-05-18 NOTE — PROGRESS NOTES
Gastroenterology CLINIC VISIT, NEW PATIENT    CC/REFERRING PROVIDER: Krish Bates  REASON FOR CONSULTATION: diarrhea    HPI: 88 year old female was referred to GI clinic for evaluation of diarrhea for the last 6 months. Was having 3-4 loose stools a day, worse after a meal. She denies any other abdominal symptoms- no abdominal pain or bloating, no nausea or vomiting, no black stools, and no changes in appetite. Stated that she lost approximately 10 lbs over the past year.  No clear reason for stool changes. Used to have constipation before.   Stated that she was prescribed a fiber supplement and Lomotil by her primary care provider. Has been consuming more fiber. Said that she took only a few tablet of Lomitil and her diarrhea had resolved.  She has one soft stool a day, but complains that her stools are thin. Complains of sensation of incomplete evacuation. Does small enemas at times to empty her bowels.    ROS: 10pt ROS performed and otherwise negative.  Atrial fibrilaton  HTN  Pacemaker  Stage 3 CKD    PREVIOUS ABDOMINAL/GYNECOLOGIC SURGERIES:    Past Surgical History:   Procedure Laterality Date     PHACOEMULSIFICATION WITH STANDARD INTRAOCULAR LENS IMPLANT Right 7/16/2020    Procedure: Cataract surgery with intraocular lens implant;  Surgeon: Fortino Ford MD;  Location:  OR     PHACOEMULSIFICATION WITH STANDARD INTRAOCULAR LENS IMPLANT Left 7/30/2020    Procedure: Cataract Surgery Left Eye;  Surgeon: Max Cooley MD;  Location:  OR         PERTINENT MEDICATIONS:  Current Outpatient Medications   Medication Sig Dispense Refill     amLODIPine (NORVASC) 5 MG tablet Take 1 tablet (5 mg) by mouth daily 90 tablet 1     Ascorbic Acid (VITAMIN C) 500 MG CAPS Take 500 mg by mouth daily       aspirin (ASA) 325 MG EC tablet Take 325 mg by mouth       calcium 600 MG tablet Take 1 tablet by mouth daily       COMPRESSION STOCKINGS 1 each daily (Patient not taking: Reported on 5/18/2023) 1 each 1      diphenoxylate-atropine (LOMOTIL) 2.5-0.025 MG tablet Take 1 tablet by mouth 4 times daily as needed for diarrhea 60 tablet 0     lisinopril (ZESTRIL) 10 MG tablet Take 1 tablet (10 mg) by mouth 2 times daily 180 tablet 3     magnesium 250 MG tablet Take 1 tablet by mouth daily       multivitamin w/minerals (THERA-VIT-M) tablet Take 1 tablet by mouth daily       Vitamin D, Cholecalciferol, 25 MCG (1000 UT) TABS Take 1,000 Units by mouth daily         No other OTC/herbal/supplements reported by patient.    SOCIAL HISTORY:  Social History     Socioeconomic History     Marital status: Single     Spouse name: Not on file     Number of children: Not on file     Years of education: Not on file     Highest education level: Not on file   Occupational History     Not on file   Tobacco Use     Smoking status: Never     Smokeless tobacco: Never   Vaping Use     Vaping status: Never Used   Substance and Sexual Activity     Alcohol use: Not Currently     Drug use: Never     Sexual activity: Not Currently     Partners: Male   Other Topics Concern     Parent/sibling w/ CABG, MI or angioplasty before 65F 55M? No   Social History Narrative     Not on file     Social Determinants of Health     Financial Resource Strain: Not on file   Food Insecurity: Not on file   Transportation Needs: Not on file   Physical Activity: Not on file   Stress: Not on file   Social Connections: Not on file   Intimate Partner Violence: Not on file   Housing Stability: Not on file       FAMILY HISTORY:  Denies colon/panc/esophageal/other GI CA, no other Rae or other HPS-related Angel. No IBD/celiac, no other AI/liver/thyroid disease.    No family history on file.    PHYSICAL EXAMINATION:  Vitals reviewed  There were no vitals taken for this visit.    General: Patient appears well in no acute distress.   Skin: No visualized rash or lesions on visualized skin  Eyes: EOMI, no erythema, sclera icterus or discharge noted  Resp: breathing comfortably without  accessory muscle usage, speaking in full sentences, no cough. Lung sounds clear  Card: Regular and rhythmic S1 and S2. No gallop or rub. No murmur. No LE edema.  Abdomen: Active bowel sounds X 4 quadrants. Soft to palpation.  No guarding or rebound tenderness   MSK: Appears to have normal range of motion based on visualized movements. Patient has right CAM boot on. Stated that she twisted her foot.  Neurologic: No apparent tremors, facial movements symmetric  Psych: affect normal, alert and oriented    PERTINENT STUDIES Reviewed in EMR  2/2/2023 CT scan of abdomen  FINDINGS:   LOWER CHEST: 4 mm nodule right lower lobe series 2, image 5. Partially  visualized cardiomegaly, pacer leads, and mild pericardial fluid.   HEPATOBILIARY: In the inferior right hepatic lobe is a 4 mm  hyperenhancing nodule series 2, image 84. The common duct measures 6  mm, without visualized obstructing stone or mass.   PANCREAS: Normal.   SPLEEN: Normal.   ADRENAL GLANDS: Normal.   KIDNEYS/BLADDER: There are low density probable cysts in the kidneys,  measuring up to 8 mm in size, not requiring additional follow-up  imaging. No stones or hydronephrosis. Urinary bladder unremarkable.   BOWEL: Stomach and small bowel are unremarkable. Sigmoid  diverticulosis.   PELVIC ORGANS: Uterus and ovaries unremarkable. Prominent periuterine  vasculature and gonadal veins.   ADDITIONAL FINDINGS: No abdominopelvic lymphadenopathy. No large  ascites or free air. There is atherosclerosis in the aorta and major  branch vessels. No abdominal aortic aneurysm. Separate origins of the  common hepatic and splenic arteries from the abdominal aorta, a normal  variant.   MUSCULOSKELETAL: Normal.                                                                 IMPRESSION:   1.  By CT, no acute findings in the visualized abdomen or pelvis.   2.  There is a 4 mm right lower lobe pulmonary nodule.       ASSESSMENT/PLAN:  88 year old female  presented  to GI clinic for a  consultation on changes in her stool pattern from constipation to diarrhea since approximately 6 months ago. No recent infection, no changes in diet, no new medications, and no antibiotic therapy. She denies any other abdominal symptoms. No fever or chills. No changes in appetite. No bloating. No black stools. Was seen at the ER in January and then, by her PCP. Unremarkable CT scan of abdomen. C.diff screening came back negative.  Negative enteric panel. Said that she increased fiber intake and started taking Metamucil once a day. She also took a few doses of Lomtil. Additional stools studies were ordered by her PCP, but the patient could not collect the sample as her stools are no longer loose or liquid. Has small soft stool almost every time she voids. Said that her stools are thin and hard to evacuate- complains of incomplete emptying sensation. Uses enema as needed.   Discussed differential diagnosis with the patient. Possible chronic constipation with overflow diarrhea? Cannot exclude post-infectious diarrhea. Patient stated that she did not have colonoscopy before.  Recommended the following:  - start 1/2 to 1 scoopful of Miralax once a day;  - Continue to take a fiber supplement.  - Return to GI clinic if symptoms persist or recur.  - Hold magnesium if starts having diarrhea episodes.      ICD-10-CM    1. Abdominal bloating  R14.0       2. Diarrhea, unspecified type  R19.7 Adult GI  Referral - Consult Only      3. Diverticulosis of large intestine without hemorrhage  K57.30           Patient verbalized understanding and appreciation of care provided. Stated that all of the questions were answered to her/his satisfaction.    RTC as needed  Thank you for this consultation. It was a pleasure to participate in the care of this patient; please contact us with any further questions.    ZA Banks, FNP-C  [facility]  Gastroenterology Department  [place], [place]    This note was created with  [employer] voice recognition software, and while reviewed for accuracy, inadvertent minor typographic errors may occur. Please contact the provider if you have any questions.

## 2023-05-19 ENCOUNTER — OFFICE VISIT (OUTPATIENT)
Dept: GASTROENTEROLOGY | Facility: CLINIC | Age: 88
End: 2023-05-19
Attending: INTERNAL MEDICINE
Payer: COMMERCIAL

## 2023-05-19 VITALS
WEIGHT: 124 LBS | SYSTOLIC BLOOD PRESSURE: 138 MMHG | BODY MASS INDEX: 20.66 KG/M2 | HEIGHT: 65 IN | DIASTOLIC BLOOD PRESSURE: 76 MMHG

## 2023-05-19 DIAGNOSIS — R19.7 DIARRHEA, UNSPECIFIED TYPE: ICD-10-CM

## 2023-05-19 DIAGNOSIS — R14.0 ABDOMINAL BLOATING: Primary | ICD-10-CM

## 2023-05-19 DIAGNOSIS — K57.30 DIVERTICULOSIS OF LARGE INTESTINE WITHOUT HEMORRHAGE: ICD-10-CM

## 2023-05-19 PROCEDURE — 99204 OFFICE O/P NEW MOD 45 MIN: CPT | Performed by: NURSE PRACTITIONER

## 2023-05-19 RX ORDER — ZOLPIDEM TARTRATE 5 MG/1
2.5 TABLET ORAL
COMMUNITY
End: 2023-07-25

## 2023-05-19 ASSESSMENT — PAIN SCALES - GENERAL: PAINLEVEL: NO PAIN (0)

## 2023-05-19 NOTE — LETTER
5/19/2023         RE: Audrey Ortega  [address]  [facility] [address]        Dear Colleague,    Thank you for referring your patient, Audrey Ortega, to the [facility]. Please see a copy of my visit note below.    Gastroenterology CLINIC VISIT, NEW PATIENT    CC/REFERRING PROVIDER: Krish Bates  REASON FOR CONSULTATION: diarrhea    HPI: 88 year old female was referred to GI clinic for evaluation of diarrhea for the last 6 months. Was having 3-4 loose stools a day, worse after a meal. She denies any other abdominal symptoms- no abdominal pain or bloating, no nausea or vomiting, no black stools, and no changes in appetite. Stated that she lost approximately 10 lbs over the past year.  No clear reason for stool changes. Used to have constipation before.   Stated that she was prescribed a fiber supplement and Lomotil by her primary care provider. Has been consuming more fiber. Said that she took only a few tablet of Lomitil and her diarrhea had resolved.  She has one soft stool a day, but complains that her stools are thin. Complains of sensation of incomplete evacuation. Does small enemas at times to empty her bowels.    ROS: 10pt ROS performed and otherwise negative.  Atrial fibrilaton  HTN  Pacemaker  Stage 3 CKD    PREVIOUS ABDOMINAL/GYNECOLOGIC SURGERIES:    Past Surgical History:   Procedure Laterality Date     PHACOEMULSIFICATION WITH STANDARD INTRAOCULAR LENS IMPLANT Right 7/16/2020    Procedure: Cataract surgery with intraocular lens implant;  Surgeon: Fortino Ford MD;  Location:  OR     PHACOEMULSIFICATION WITH STANDARD INTRAOCULAR LENS IMPLANT Left 7/30/2020    Procedure: Cataract Surgery Left Eye;  Surgeon: Max Cooley MD;  Location:  OR         PERTINENT MEDICATIONS:  Current Outpatient Medications   Medication Sig Dispense Refill     amLODIPine (NORVASC) 5 MG tablet Take 1 tablet (5 mg) by mouth daily 90 tablet 1     Ascorbic Acid (VITAMIN C) 500 MG CAPS  Take 500 mg by mouth daily       aspirin (ASA) 325 MG EC tablet Take 325 mg by mouth       calcium 600 MG tablet Take 1 tablet by mouth daily       COMPRESSION STOCKINGS 1 each daily (Patient not taking: Reported on 5/18/2023) 1 each 1     diphenoxylate-atropine (LOMOTIL) 2.5-0.025 MG tablet Take 1 tablet by mouth 4 times daily as needed for diarrhea 60 tablet 0     lisinopril (ZESTRIL) 10 MG tablet Take 1 tablet (10 mg) by mouth 2 times daily 180 tablet 3     magnesium 250 MG tablet Take 1 tablet by mouth daily       multivitamin w/minerals (THERA-VIT-M) tablet Take 1 tablet by mouth daily       Vitamin D, Cholecalciferol, 25 MCG (1000 UT) TABS Take 1,000 Units by mouth daily         No other OTC/herbal/supplements reported by patient.    SOCIAL HISTORY:  Social History     Socioeconomic History     Marital status: Single     Spouse name: Not on file     Number of children: Not on file     Years of education: Not on file     Highest education level: Not on file   Occupational History     Not on file   Tobacco Use     Smoking status: Never     Smokeless tobacco: Never   Vaping Use     Vaping status: Never Used   Substance and Sexual Activity     Alcohol use: Not Currently     Drug use: Never     Sexual activity: Not Currently     Partners: Male   Other Topics Concern     Parent/sibling w/ CABG, MI or angioplasty before 65F 55M? No   Social History Narrative     Not on file     Social Determinants of Health     Financial Resource Strain: Not on file   Food Insecurity: Not on file   Transportation Needs: Not on file   Physical Activity: Not on file   Stress: Not on file   Social Connections: Not on file   Intimate Partner Violence: Not on file   Housing Stability: Not on file       FAMILY HISTORY:  Denies colon/panc/esophageal/other GI CA, no other Rae or other HPS-related Angel. No IBD/celiac, no other AI/liver/thyroid disease.    No family history on file.    PHYSICAL EXAMINATION:  Vitals reviewed  There were no  vitals taken for this visit.    General: Patient appears well in no acute distress.   Skin: No visualized rash or lesions on visualized skin  Eyes: EOMI, no erythema, sclera icterus or discharge noted  Resp: breathing comfortably without accessory muscle usage, speaking in full sentences, no cough. Lung sounds clear  Card: Regular and rhythmic S1 and S2. No gallop or rub. No murmur. No LE edema.  Abdomen: Active bowel sounds X 4 quadrants. Soft to palpation.  No guarding or rebound tenderness   MSK: Appears to have normal range of motion based on visualized movements. Patient has right CAM boot on. Stated that she twisted her foot.  Neurologic: No apparent tremors, facial movements symmetric  Psych: affect normal, alert and oriented    PERTINENT STUDIES Reviewed in EMR  2/2/2023 CT scan of abdomen  FINDINGS:   LOWER CHEST: 4 mm nodule right lower lobe series 2, image 5. Partially  visualized cardiomegaly, pacer leads, and mild pericardial fluid.   HEPATOBILIARY: In the inferior right hepatic lobe is a 4 mm  hyperenhancing nodule series 2, image 84. The common duct measures 6  mm, without visualized obstructing stone or mass.   PANCREAS: Normal.   SPLEEN: Normal.   ADRENAL GLANDS: Normal.   KIDNEYS/BLADDER: There are low density probable cysts in the kidneys,  measuring up to 8 mm in size, not requiring additional follow-up  imaging. No stones or hydronephrosis. Urinary bladder unremarkable.   BOWEL: Stomach and small bowel are unremarkable. Sigmoid  diverticulosis.   PELVIC ORGANS: Uterus and ovaries unremarkable. Prominent periuterine  vasculature and gonadal veins.   ADDITIONAL FINDINGS: No abdominopelvic lymphadenopathy. No large  ascites or free air. There is atherosclerosis in the aorta and major  branch vessels. No abdominal aortic aneurysm. Separate origins of the  common hepatic and splenic arteries from the abdominal aorta, a normal  variant.   MUSCULOSKELETAL: Normal.                                                                  IMPRESSION:   1.  By CT, no acute findings in the visualized abdomen or pelvis.   2.  There is a 4 mm right lower lobe pulmonary nodule.       ASSESSMENT/PLAN:  88 year old female  presented  to GI clinic for a consultation on changes in her stool pattern from constipation to diarrhea since approximately 6 months ago. No recent infection, no changes in diet, no new medications, and no antibiotic therapy. She denies any other abdominal symptoms. No fever or chills. No changes in appetite. No bloating. No black stools. Was seen at the ER in January and then, by her PCP. Unremarkable CT scan of abdomen. C.diff screening came back negative.  Negative enteric panel. Said that she increased fiber intake and started taking Metamucil once a day. She also took a few doses of Lomtil. Additional stools studies were ordered by her PCP, but the patient could not collect the sample as her stools are no longer loose or liquid. Has small soft stool almost every time she voids. Said that her stools are thin and hard to evacuate- complains of incomplete emptying sensation. Uses enema as needed.   Discussed differential diagnosis with the patient. Possible chronic constipation with overflow diarrhea? Cannot exclude post-infectious diarrhea. Patient stated that she did not have colonoscopy before.  Recommended the following:  - start 1/2 to 1 scoopful of Miralax once a day;  - Continue to take a fiber supplement.  - Return to GI clinic if symptoms persist or recur.  - Hold magnesium if starts having diarrhea episodes.      ICD-10-CM    1. Abdominal bloating  R14.0       2. Diarrhea, unspecified type  R19.7 Adult GI  Referral - Consult Only      3. Diverticulosis of large intestine without hemorrhage  K57.30           Patient verbalized understanding and appreciation of care provided. Stated that all of the questions were answered to her/his satisfaction.    RTC as needed  Thank you for this  consultation. It was a pleasure to participate in the care of this patient; please contact us with any further questions.    ZA Banks, DANYP-C  [facility]  Gastroenterology Department  [place], [place]    This note was created with Dragon voice recognition software, and while reviewed for accuracy, inadvertent minor typographic errors may occur. Please contact the provider if you have any questions.      Again, thank you for allowing me to participate in the care of your patient.        Sincerely,        ZA BANKS CNP

## 2023-05-19 NOTE — PATIENT INSTRUCTIONS
It was a pleasure taking care of you today.  I've included a brief summary of our discussion and care plan from today's visit below.  Please review this information with your primary care provider.  ______________________________________________________________________    My recommendations are summarized as follows:    I suspect you may experience post-infectious diarrhea or some malabsorption diarrhea. Please continue your fiber supplement- Metamucil.    2.  I suggest to try a small dose of Miralax to enhance bowel emptying. Miralax is a stool softener. Take half of a scoopful in the morning.    3. If your symptoms recur, please contact our GI clinic. We may order colonoscopy for further evaluation. Make sure you collect your stool specimen (only if your stools are loose) and bring it to the lab.    Return to GI Clinic as needed   ______________________________________________________________________    BLOATING AND GAS  Some people feel that they pass too much gas or burp too frequently, both of which can be a source of embarrassment and discomfort. The average adult produces about one to three pints of gas each day, which is passed through the anus 14 to 23 times per day. Burping occasionally before or after meals is also normal.  The amount of gas produced by the body depends upon your diet and other individual factors. However, most people who complain of excessive gas do not produce more gas than the average person. Instead, they are more aware of normal amounts of gas. On the other hand, certain foods and medical conditions can cause you to make excessive amounts of gas.    There are two primary sources of intestinal gas: gas that is ingested (mostly swallowed air) and gas that is produced by bacteria in the colon.   Air swallowing is the major source of gas in the stomach. It is normal to swallow a small amount of air when eating and drinking and when swallowing saliva. You may swallow larger amounts of air  when eating food rapidly, gulping liquids, chewing gum, or smoking.     Bacterial production -- The colon normally provides a home for billions of harmless bacteria, some of which support the health of the bowel. Certain carbohydrates are incompletely digested by enzymes in the stomach and intestines, allowing bacteria to digest them. For example, cabbage, [place] sprouts, and broccoli contain raffinose, a carbohydrate that is poorly digested. These foods tend to cause more gas and flatulence because the raffinose is digested by bacteria once it reaches the colon. The by-products of this process include odorless gases, such as carbon dioxide, hydrogen, and methane. Minor components of gas have an unpleasant odor, including trace amounts of sulfur.  Some people are not able to digest certain carbohydrates. A classic example is lactose, the major sugar contained in dairy products . Thus, consuming large amounts of lactose may lead to increased gas production, along with cramping and diarrhea.  Starch and soluble fiber can also contribute increase gas. Potatoes, corn, noodles, and wheat produce gas, while rice does not. Soluble fiber (found in oat bran, peas and other legumes, beans, and most fruit) also causes gas. Some laxatives contain soluble fiber and may cause gas, particularly during the first few weeks of use.   Certain diseases can also cause excessive bloating and gas. For example, people with diabetes or scleroderma may, over time, have slowing in the activity of the small intestine. This can lead to bacterial overgrowth within the bowel, with poor digestion of carbohydrates and other nutrients. However, even in the absence of apparent disease, some people tend to harbor large numbers of bacteria in their small bowel and are prone to develop excessive gas.   Most people with gas and bloating do not need to have any testing. However, symptoms such as diarrhea, weight loss, abdominal pain, anemia, blood in  "the stool, lack of appetite, fever, or vomiting can be warning signs of a more serious problem; people with one or more of these symptoms usually require testing.     Several measures can help to reduce bothersome gas.   Chronic, repeated belching can occur if you swallow large amounts of air (ie, aerophagia). Aerophagia is typically an unconscious process, and is often associated with emotional stress. Treatment focuses on decreasing air swallowing by reducing anxiety, when it is considered to be a cause, as well as on eating slowly without gulping and avoiding carbonated beverages, chewing gum, and smoking.   Diet recommendations --   Certain foods contain specific carbohydrates called \"FODMAPs\" (fermentable oligo-, di-, and monosaccharides and polyols). FODMAPs are poorly absorbed and can result in bloating and gas production in some people. For more information and a list of foods, please check the https://[employer] website      Avoid foods that appear to aggravate your symptoms. These may include milk and dairy products, certain fruits or vegetables, whole grains, artificial sweeteners, and/or carbonated beverages.If you are lactose intolerant, do not consume products that contain lactose;  you can use a lactose-digestive aid such as lactose-reduced milk or over-the-counter lactase supplement (eg, Lactaid tablets or liquid).    Over-the-counter medications -- Try an over-the-counter product that contains Simethicone, such as certain antacids (eg, Maalox Anti-Gas, Mylanta Gas, Gas-X, Phazyme). Also, you can try an over-the-counter product that contains activated charcoal (eg, CharcoCaps, CharcoAid) or Beano, which is an over-the-counter preparation that helps to breakdown certain complex carbohydrates. This treatment may be effective in reducing gas after eating beans or other vegetables that contain raffinose. Another option is  Pepto-Bismol to reduce the odor of unpleasant-smelling gas.   " ______________________________________________________________________    Who do I call with any questions after my visit?  Please be in touch if there are any further questions that arise following today's visit.  There are multiple ways to contact your gastroenterology care team.      During business hours, you may reach a Gastroenterology nurse at [phone], option 3.     To schedule or reschedule an appointment, please call [phone].   To schedule your imaging studies (CT, MRI, ultrasound)  call [phone] (or toll-free # [phone])  To schedule your lab work at [facility], please call [phone]    You can always send a secure message through eGifter.  eGifter messages are answered by your nurse or doctor typically within 24 hours.  Please allow extra time on weekends and holidays.      For urgent/emergent questions after business hours, you may reach the on-call GI Fellow by contacting the [facility]  at [phone].    In order for your refill to be processed in a timely fashion, it is your responsibility to ensure you follow the recommendations from your provider regarding your laboratory studies and follow up appointments.       How will I get the results of any tests ordered?    You will receive all of your results.  If you have signed up for coJuvot, any tests ordered at your visit will be available to you after your physician reviews them.  Typically this takes 1-2 weeks.  If there are urgent results that require a change in your care plan, your physician or nurse will call you to discuss the next steps.   What is eGifter?  eGifter is a secure way for you to access all of your healthcare records from the [facility].  It is a web based computer program, so you can sign on to it from any location.  It also allows you to send secure messages to your care team.  I recommend signing up for eGifter access if you have not already done so  and are comfortable with using a computer.    How to I schedule a follow-up visit?  If you did not schedule a follow-up visit today, please call [phone] to schedule a follow-up office visit.      Sincerely,  JEWELS Banks,  [facility],  Division of Gastroenterology   ([facility])

## 2023-05-19 NOTE — LETTER
5/19/2023        RE: Audrey Ortega  [address]  [facility] [address]        Gastroenterology CLINIC VISIT, NEW PATIENT    CC/REFERRING PROVIDER: Krish Bates  REASON FOR CONSULTATION: diarrhea    HPI: 88 year old female was referred to GI clinic for evaluation of diarrhea for the last 6 months. Was having 3-4 loose stools a day, worse after a meal. She denies any other abdominal symptoms- no abdominal pain or bloating, no nausea or vomiting, no black stools, and no changes in appetite. Stated that she lost approximately 10 lbs over the past year.  No clear reason for stool changes. Used to have constipation before.   Stated that she was prescribed a fiber supplement and Lomotil by her primary care provider. Has been consuming more fiber. Said that she took only a few tablet of Lomitil and her diarrhea had resolved.  She has one soft stool a day, but complains that her stools are thin. Complains of sensation of incomplete evacuation. Does small enemas at times to empty her bowels.    ROS: 10pt ROS performed and otherwise negative.  Atrial fibrilaton  HTN  Pacemaker  Stage 3 CKD    PREVIOUS ABDOMINAL/GYNECOLOGIC SURGERIES:    Past Surgical History:   Procedure Laterality Date     PHACOEMULSIFICATION WITH STANDARD INTRAOCULAR LENS IMPLANT Right 7/16/2020    Procedure: Cataract surgery with intraocular lens implant;  Surgeon: Fortino Ford MD;  Location:  OR     PHACOEMULSIFICATION WITH STANDARD INTRAOCULAR LENS IMPLANT Left 7/30/2020    Procedure: Cataract Surgery Left Eye;  Surgeon: Max Cooley MD;  Location:  OR         PERTINENT MEDICATIONS:  Current Outpatient Medications   Medication Sig Dispense Refill     amLODIPine (NORVASC) 5 MG tablet Take 1 tablet (5 mg) by mouth daily 90 tablet 1     Ascorbic Acid (VITAMIN C) 500 MG CAPS Take 500 mg by mouth daily       aspirin (ASA) 325 MG EC tablet Take 325 mg by mouth       calcium 600 MG tablet Take 1 tablet by mouth daily       COMPRESSION  STOCKINGS 1 each daily (Patient not taking: Reported on 5/18/2023) 1 each 1     diphenoxylate-atropine (LOMOTIL) 2.5-0.025 MG tablet Take 1 tablet by mouth 4 times daily as needed for diarrhea 60 tablet 0     lisinopril (ZESTRIL) 10 MG tablet Take 1 tablet (10 mg) by mouth 2 times daily 180 tablet 3     magnesium 250 MG tablet Take 1 tablet by mouth daily       multivitamin w/minerals (THERA-VIT-M) tablet Take 1 tablet by mouth daily       Vitamin D, Cholecalciferol, 25 MCG (1000 UT) TABS Take 1,000 Units by mouth daily         No other OTC/herbal/supplements reported by patient.    SOCIAL HISTORY:  Social History     Socioeconomic History     Marital status: Single     Spouse name: Not on file     Number of children: Not on file     Years of education: Not on file     Highest education level: Not on file   Occupational History     Not on file   Tobacco Use     Smoking status: Never     Smokeless tobacco: Never   Vaping Use     Vaping status: Never Used   Substance and Sexual Activity     Alcohol use: Not Currently     Drug use: Never     Sexual activity: Not Currently     Partners: Male   Other Topics Concern     Parent/sibling w/ CABG, MI or angioplasty before 65F 55M? No   Social History Narrative     Not on file     Social Determinants of Health     Financial Resource Strain: Not on file   Food Insecurity: Not on file   Transportation Needs: Not on file   Physical Activity: Not on file   Stress: Not on file   Social Connections: Not on file   Intimate Partner Violence: Not on file   Housing Stability: Not on file       FAMILY HISTORY:  Denies colon/panc/esophageal/other GI CA, no other Rae or other HPS-related Angel. No IBD/celiac, no other AI/liver/thyroid disease.    No family history on file.    PHYSICAL EXAMINATION:  Vitals reviewed  There were no vitals taken for this visit.    General: Patient appears well in no acute distress.   Skin: No visualized rash or lesions on visualized skin  Eyes: EOMI, no  erythema, sclera icterus or discharge noted  Resp: breathing comfortably without accessory muscle usage, speaking in full sentences, no cough. Lung sounds clear  Card: Regular and rhythmic S1 and S2. No gallop or rub. No murmur. No LE edema.  Abdomen: Active bowel sounds X 4 quadrants. Soft to palpation.  No guarding or rebound tenderness   MSK: Appears to have normal range of motion based on visualized movements. Patient has right CAM boot on. Stated that she twisted her foot.  Neurologic: No apparent tremors, facial movements symmetric  Psych: affect normal, alert and oriented    PERTINENT STUDIES Reviewed in EMR  2/2/2023 CT scan of abdomen  FINDINGS:   LOWER CHEST: 4 mm nodule right lower lobe series 2, image 5. Partially  visualized cardiomegaly, pacer leads, and mild pericardial fluid.   HEPATOBILIARY: In the inferior right hepatic lobe is a 4 mm  hyperenhancing nodule series 2, image 84. The common duct measures 6  mm, without visualized obstructing stone or mass.   PANCREAS: Normal.   SPLEEN: Normal.   ADRENAL GLANDS: Normal.   KIDNEYS/BLADDER: There are low density probable cysts in the kidneys,  measuring up to 8 mm in size, not requiring additional follow-up  imaging. No stones or hydronephrosis. Urinary bladder unremarkable.   BOWEL: Stomach and small bowel are unremarkable. Sigmoid  diverticulosis.   PELVIC ORGANS: Uterus and ovaries unremarkable. Prominent periuterine  vasculature and gonadal veins.   ADDITIONAL FINDINGS: No abdominopelvic lymphadenopathy. No large  ascites or free air. There is atherosclerosis in the aorta and major  branch vessels. No abdominal aortic aneurysm. Separate origins of the  common hepatic and splenic arteries from the abdominal aorta, a normal  variant.   MUSCULOSKELETAL: Normal.                                                                 IMPRESSION:   1.  By CT, no acute findings in the visualized abdomen or pelvis.   2.  There is a 4 mm right lower lobe pulmonary  nodule.       ASSESSMENT/PLAN:  88 year old female  presented  to GI clinic for a consultation on changes in her stool pattern from constipation to diarrhea since approximately 6 months ago. No recent infection, no changes in diet, no new medications, and no antibiotic therapy. She denies any other abdominal symptoms. No fever or chills. No changes in appetite. No bloating. No black stools. Was seen at the ER in January and then, by her PCP. Unremarkable CT scan of abdomen. C.diff screening came back negative.  Negative enteric panel. Said that she increased fiber intake and started taking Metamucil once a day. She also took a few doses of Lomtil. Additional stools studies were ordered by her PCP, but the patient could not collect the sample as her stools are no longer loose or liquid. Has small soft stool almost every time she voids. Said that her stools are thin and hard to evacuate- complains of incomplete emptying sensation. Uses enema as needed.   Discussed differential diagnosis with the patient. Possible chronic constipation with overflow diarrhea? Cannot exclude post-infectious diarrhea. Patient stated that she did not have colonoscopy before.  Recommended the following:  - start 1/2 to 1 scoopful of Miralax once a day;  - Continue to take a fiber supplement.  - Return to GI clinic if symptoms persist or recur.  - Hold magnesium if starts having diarrhea episodes.      ICD-10-CM    1. Abdominal bloating  R14.0       2. Diarrhea, unspecified type  R19.7 Adult GI  Referral - Consult Only      3. Diverticulosis of large intestine without hemorrhage  K57.30           Patient verbalized understanding and appreciation of care provided. Stated that all of the questions were answered to her/his satisfaction.    RTC as needed  Thank you for this consultation. It was a pleasure to participate in the care of this patient; please contact us with any further questions.    ZA Banks, FNP-C  [facility]  [place]  Gastroenterology Department  [place], [place]    This note was created with Dragon voice recognition software, and while reviewed for accuracy, inadvertent minor typographic errors may occur. Please contact the provider if you have any questions.      Sincerely,        ZA RANKIN CNP

## 2023-05-19 NOTE — LETTER
5/19/2023         RE: Audrey Ortega  [address]  [facility] [address]      Gastroenterology CLINIC VISIT, NEW PATIENT    CC/REFERRING PROVIDER: Krish Bates  REASON FOR CONSULTATION: diarrhea    HPI: 88 year old female was referred to GI clinic for evaluation of diarrhea for the last 6 months. Was having 3-4 loose stools a day, worse after a meal. She denies any other abdominal symptoms- no abdominal pain or bloating, no nausea or vomiting, no black stools, and no changes in appetite. Stated that she lost approximately 10 lbs over the past year.  No clear reason for stool changes. Used to have constipation before.   Stated that she was prescribed a fiber supplement and Lomotil by her primary care provider. Has been consuming more fiber. Said that she took only a few tablet of Lomitil and her diarrhea had resolved.  She has one soft stool a day, but complains that her stools are thin. Complains of sensation of incomplete evacuation. Does small enemas at times to empty her bowels.    ROS: 10pt ROS performed and otherwise negative.  Atrial fibrilaton  HTN  Pacemaker  Stage 3 CKD    PREVIOUS ABDOMINAL/GYNECOLOGIC SURGERIES:    Past Surgical History:   Procedure Laterality Date     PHACOEMULSIFICATION WITH STANDARD INTRAOCULAR LENS IMPLANT Right 7/16/2020    Procedure: Cataract surgery with intraocular lens implant;  Surgeon: Fortino Ford MD;  Location:  OR     PHACOEMULSIFICATION WITH STANDARD INTRAOCULAR LENS IMPLANT Left 7/30/2020    Procedure: Cataract Surgery Left Eye;  Surgeon: Max Cooley MD;  Location:  OR         PERTINENT MEDICATIONS:  Current Outpatient Medications   Medication Sig Dispense Refill     amLODIPine (NORVASC) 5 MG tablet Take 1 tablet (5 mg) by mouth daily 90 tablet 1     Ascorbic Acid (VITAMIN C) 500 MG CAPS Take 500 mg by mouth daily       aspirin (ASA) 325 MG EC tablet Take 325 mg by mouth       calcium 600 MG tablet Take 1 tablet by mouth daily       COMPRESSION  STOCKINGS 1 each daily (Patient not taking: Reported on 5/18/2023) 1 each 1     diphenoxylate-atropine (LOMOTIL) 2.5-0.025 MG tablet Take 1 tablet by mouth 4 times daily as needed for diarrhea 60 tablet 0     lisinopril (ZESTRIL) 10 MG tablet Take 1 tablet (10 mg) by mouth 2 times daily 180 tablet 3     magnesium 250 MG tablet Take 1 tablet by mouth daily       multivitamin w/minerals (THERA-VIT-M) tablet Take 1 tablet by mouth daily       Vitamin D, Cholecalciferol, 25 MCG (1000 UT) TABS Take 1,000 Units by mouth daily         No other OTC/herbal/supplements reported by patient.    SOCIAL HISTORY:  Social History     Socioeconomic History     Marital status: Single     Spouse name: Not on file     Number of children: Not on file     Years of education: Not on file     Highest education level: Not on file   Occupational History     Not on file   Tobacco Use     Smoking status: Never     Smokeless tobacco: Never   Vaping Use     Vaping status: Never Used   Substance and Sexual Activity     Alcohol use: Not Currently     Drug use: Never     Sexual activity: Not Currently     Partners: Male   Other Topics Concern     Parent/sibling w/ CABG, MI or angioplasty before 65F 55M? No   Social History Narrative     Not on file     Social Determinants of Health     Financial Resource Strain: Not on file   Food Insecurity: Not on file   Transportation Needs: Not on file   Physical Activity: Not on file   Stress: Not on file   Social Connections: Not on file   Intimate Partner Violence: Not on file   Housing Stability: Not on file       FAMILY HISTORY:  Denies colon/panc/esophageal/other GI CA, no other Rae or other HPS-related Angel. No IBD/celiac, no other AI/liver/thyroid disease.    No family history on file.    PHYSICAL EXAMINATION:  Vitals reviewed  There were no vitals taken for this visit.    General: Patient appears well in no acute distress.   Skin: No visualized rash or lesions on visualized skin  Eyes: EOMI, no  erythema, sclera icterus or discharge noted  Resp: breathing comfortably without accessory muscle usage, speaking in full sentences, no cough. Lung sounds clear  Card: Regular and rhythmic S1 and S2. No gallop or rub. No murmur. No LE edema.  Abdomen: Active bowel sounds X 4 quadrants. Soft to palpation.  No guarding or rebound tenderness   MSK: Appears to have normal range of motion based on visualized movements. Patient has right CAM boot on. Stated that she twisted her foot.  Neurologic: No apparent tremors, facial movements symmetric  Psych: affect normal, alert and oriented    PERTINENT STUDIES Reviewed in EMR  2/2/2023 CT scan of abdomen  FINDINGS:   LOWER CHEST: 4 mm nodule right lower lobe series 2, image 5. Partially  visualized cardiomegaly, pacer leads, and mild pericardial fluid.   HEPATOBILIARY: In the inferior right hepatic lobe is a 4 mm  hyperenhancing nodule series 2, image 84. The common duct measures 6  mm, without visualized obstructing stone or mass.   PANCREAS: Normal.   SPLEEN: Normal.   ADRENAL GLANDS: Normal.   KIDNEYS/BLADDER: There are low density probable cysts in the kidneys,  measuring up to 8 mm in size, not requiring additional follow-up  imaging. No stones or hydronephrosis. Urinary bladder unremarkable.   BOWEL: Stomach and small bowel are unremarkable. Sigmoid  diverticulosis.   PELVIC ORGANS: Uterus and ovaries unremarkable. Prominent periuterine  vasculature and gonadal veins.   ADDITIONAL FINDINGS: No abdominopelvic lymphadenopathy. No large  ascites or free air. There is atherosclerosis in the aorta and major  branch vessels. No abdominal aortic aneurysm. Separate origins of the  common hepatic and splenic arteries from the abdominal aorta, a normal  variant.   MUSCULOSKELETAL: Normal.                                                                 IMPRESSION:   1.  By CT, no acute findings in the visualized abdomen or pelvis.   2.  There is a 4 mm right lower lobe pulmonary  nodule.       ASSESSMENT/PLAN:  88 year old female  presented  to GI clinic for a consultation on changes in her stool pattern from constipation to diarrhea since approximately 6 months ago. No recent infection, no changes in diet, no new medications, and no antibiotic therapy. She denies any other abdominal symptoms. No fever or chills. No changes in appetite. No bloating. No black stools. Was seen at the ER in January and then, by her PCP. Unremarkable CT scan of abdomen. C.diff screening came back negative.  Negative enteric panel. Said that she increased fiber intake and started taking Metamucil once a day. She also took a few doses of Lomtil. Additional stools studies were ordered by her PCP, but the patient could not collect the sample as her stools are no longer loose or liquid. Has small soft stool almost every time she voids. Said that her stools are thin and hard to evacuate- complains of incomplete emptying sensation. Uses enema as needed.   Discussed differential diagnosis with the patient. Possible chronic constipation with overflow diarrhea? Cannot exclude post-infectious diarrhea. Patient stated that she did not have colonoscopy before.  Recommended the following:  - start 1/2 to 1 scoopful of Miralax once a day;  - Continue to take a fiber supplement.  - Return to GI clinic if symptoms persist or recur.  - Hold magnesium if starts having diarrhea episodes.      ICD-10-CM    1. Abdominal bloating  R14.0       2. Diarrhea, unspecified type  R19.7 Adult GI  Referral - Consult Only      3. Diverticulosis of large intestine without hemorrhage  K57.30           Patient verbalized understanding and appreciation of care provided. Stated that all of the questions were answered to her/his satisfaction.    RTC as needed  Thank you for this consultation. It was a pleasure to participate in the care of this patient; please contact us with any further questions.    ZA Banks, FNP-C  [facility]  Gastroenterology Department  [place], [place]    This note was created with Dragon voice recognition software, and while reviewed for accuracy, inadvertent minor typographic errors may occur. Please contact the provider if you have any questions.        ZA RANKIN CNP

## 2023-06-01 ENCOUNTER — OFFICE VISIT (OUTPATIENT)
Dept: PODIATRY | Facility: CLINIC | Age: 88
End: 2023-06-01
Payer: COMMERCIAL

## 2023-06-01 VITALS
DIASTOLIC BLOOD PRESSURE: 68 MMHG | HEIGHT: 65 IN | BODY MASS INDEX: 20.66 KG/M2 | WEIGHT: 124 LBS | SYSTOLIC BLOOD PRESSURE: 128 MMHG

## 2023-06-01 DIAGNOSIS — S93.491A SPRAIN OF ANTERIOR TALOFIBULAR LIGAMENT OF RIGHT ANKLE, INITIAL ENCOUNTER: Primary | ICD-10-CM

## 2023-06-01 PROCEDURE — 99213 OFFICE O/P EST LOW 20 MIN: CPT | Performed by: PODIATRIST

## 2023-06-01 ASSESSMENT — PAIN SCALES - GENERAL: PAINLEVEL: NO PAIN (0)

## 2023-06-01 NOTE — PATIENT INSTRUCTIONS
Procare double ankle strap or similar ankle strap.  Not a simple sock or sleeve .  This should fit in shoes and reduce ankle range of motion.     Follow up in 4 weeks if still pain.  Otherwise as needed.

## 2023-06-01 NOTE — LETTER
6/1/2023         RE: Audrey Ortega  [address]  [facility] [address]        Dear Colleague,    Thank you for referring your patient, Audrey Ortega, to the [facility]. Please see a copy of my visit note below.    Chief Complaint   Patient presents with     RECHECK     (3w) WB w/tall gray fx boot, reports only wearing fx boot for activity > 3 hours, not wearing at night @ 6/1/2023 OV, Right ATF ankle sprain, DOI 5/11/2023; XR R foot 5/11/2023; LOV 5/18/2023     Other     Presents alone 6/1/2023     HPI:  Audrey Ortega is a 88 year old female who is seen in consultation at the request of ED DEPT - Manny Najera PA-C    Pt presents for eval of:   (Onset, Location, L/R, Character, Treatments, Injury if yes)    XR Right foot 5/11/2023     Onset 5/11/2023, slipped off one step and fell. Presents with dorsal Right foot pain, WB w/tall gray fx boot.  Constant, swelling, bruising, dull ache, pain 8/10. Intermittent sharp and throbbing pain with first steps.    Tylenol, ice, rest, elevation, compression only wearing fx boot for outside activity or if standing for a longer period of time in the house. Mowed her lawn with her fx boot on.    Retired. But still working at [employer] in [place].     ROS:  10 point ROS neg other than the symptoms noted above in the HPI.    Patient Active Problem List   Diagnosis     Chronic kidney disease, stage 3 (H)       PAST MEDICAL HISTORY: History reviewed. No pertinent past medical history.     PAST SURGICAL HISTORY:   Past Surgical History:   Procedure Laterality Date     PHACOEMULSIFICATION WITH STANDARD INTRAOCULAR LENS IMPLANT Right 7/16/2020    Procedure: Cataract surgery with intraocular lens implant;  Surgeon: Fortino Ford MD;  Location:  OR     PHACOEMULSIFICATION WITH STANDARD INTRAOCULAR LENS IMPLANT Left 7/30/2020    Procedure: Cataract Surgery Left Eye;  Surgeon: aMx Cooley MD;  Location: [facility] OR        MEDICATIONS:   Current  Outpatient Medications:      amLODIPine (NORVASC) 5 MG tablet, Take 1 tablet (5 mg) by mouth daily, Disp: 90 tablet, Rfl: 1     Ascorbic Acid (VITAMIN C) 500 MG CAPS, Take 500 mg by mouth daily, Disp: , Rfl:      aspirin (ASA) 325 MG EC tablet, Take 325 mg by mouth, Disp: , Rfl:      calcium 600 MG tablet, Take 1 tablet by mouth daily, Disp: , Rfl:      diphenoxylate-atropine (LOMOTIL) 2.5-0.025 MG tablet, Take 1 tablet by mouth 4 times daily as needed for diarrhea, Disp: 60 tablet, Rfl: 0     lisinopril (ZESTRIL) 10 MG tablet, Take 1 tablet (10 mg) by mouth 2 times daily, Disp: 180 tablet, Rfl: 3     magnesium 250 MG tablet, Take 1 tablet by mouth daily, Disp: , Rfl:      multivitamin w/minerals (THERA-VIT-M) tablet, Take 1 tablet by mouth daily, Disp: , Rfl:      Vitamin D, Cholecalciferol, 25 MCG (1000 UT) TABS, Take 1,000 Units by mouth daily, Disp: , Rfl:      zolpidem (AMBIEN) 5 MG tablet, Take 2.5 mg by mouth nightly as needed, Disp: , Rfl:      COMPRESSION STOCKINGS, 1 each daily (Patient not taking: Reported on 5/18/2023), Disp: 1 each, Rfl: 1     ALLERGIES:    Allergies   Allergen Reactions     Ciprofloxacin Rash     Macrobid [Nitrofurantoin] Itching and Rash        SOCIAL HISTORY:   Social History     Socioeconomic History     Marital status: Single     Spouse name: Not on file     Number of children: Not on file     Years of education: Not on file     Highest education level: Not on file   Occupational History     Not on file   Tobacco Use     Smoking status: Never     Smokeless tobacco: Never   Vaping Use     Vaping status: Never Used   Substance and Sexual Activity     Alcohol use: Not Currently     Drug use: Never     Sexual activity: Not Currently     Partners: Male   Other Topics Concern     Parent/sibling w/ CABG, MI or angioplasty before 65F 55M? No   Social History Narrative     Not on file     Social Determinants of Health     Financial Resource Strain: Not on file   Food Insecurity: Not on file  "  Transportation Needs: Not on file   Physical Activity: Not on file   Stress: Not on file   Social Connections: Not on file   Intimate Partner Violence: Not on file   Housing Stability: Not on file        FAMILY HISTORY: History reviewed. No pertinent family history.     EXAM:Vitals: /68 (BP Location: Left arm, Patient Position: Sitting, Cuff Size: Adult Regular)   Ht 1.651 m (5' 5\")   Wt 56.2 kg (124 lb)   BMI 20.63 kg/m    BMI= Body mass index is 20.63 kg/m .    General appearance: Patient is alert and fully cooperative with history & exam.  No sign of distress is noted during the visit.     Psychiatric: Affect is pleasant & appropriate.  Patient appears motivated to improve health.     Respiratory: Breathing is regular & unlabored while sitting.     HEENT: Hearing is intact to spoken word.  Speech is clear.  No gross evidence of visual impairment that would impact ambulation.     Vascular: DP & PT pulses are intact & regular bilaterally.  No significant edema or varicosities noted.  CFT and skin temperature is normal to both lower extremities.     Neurologic: Lower extremity sensation is intact to light touch.  No evidence of weakness or contracture in the lower extremities.  No evidence of neuropathy.    Dermatologic: Skin is intact to both lower extremities with adequate texture, turgor and tone about the integument.  No paronychia or evidence of soft tissue infection is noted.     Musculoskeletal: Patient is ambulatory with fracture boot right ankle.  Subtle edema is noted about the right ankle and slightly guarded range of motion still but this is much more supple than a couple weeks ago    Radiographs 3 views 5/23 of the right foot demonstrate no fracture or joint dislocation.     ASSESSMENT:       ICD-10-CM    1. Sprain of anterior talofibular ligament of right ankle, initial encounter  S93.491A            PLAN:  Reviewed patient's chart in [facility].      5/18/2023   Recommend weight-bear to tolerance " in the fracture boot.  Also recommend rest and sleep in the fracture boot to keep the ankle at 90 until pain and edema is resolved  Placed in recommend compression during the day 4 inch Ace was applied starting at the toes  Can be weightbearing as tolerated in the boot  She is working 6 hours/week at the [employer] and as long as she is ambulating safely she can continue this if tolerated otherwise we can remove her from work as needed.  We discussed risk of falling as the boot makes her unstable.  Offered a walker and she absolutely refuses that.  We discussed potential risks and trip hazards at home as well as discussed appropriate showering and bathing techniques to prevent falls and keep her injured ankle safe.  All questions were answered  Follow-up again in 14 days.    6/1/2023  Patient has discontinued the fracture boot for activities at home.  Recommended ankle sports brace such as ProCare double ankle strap written instructions dispensed.  Activities as tolerated but no long periods of standing or walking or lifting for 3 more weeks then return to all activities without restrictions.  If there is any swelling or symptoms noted or limited range of motion still in the next 3 to 4 weeks recommend follow-up and repeat imaging of the foot and ankle at that time.  If this patient calls with increased pain or edema would recommend she go back to the fracture boot until follow-up with me.      Fortino Fields DPM      Again, thank you for allowing me to participate in the care of your patient.        Sincerely,        Fortino Fields DPM

## 2023-06-01 NOTE — PROGRESS NOTES
Chief Complaint   Patient presents with     RECHECK     (3w) WB w/tall gray fx boot, reports only wearing fx boot for activity > 3 hours, not wearing at night @ 6/1/2023 OV, Right ATF ankle sprain, DOI 5/11/2023; XR R foot 5/11/2023; LOV 5/18/2023     Other     Presents alone 6/1/2023     HPI:  Audrey Ortega is a 88 year old female who is seen in consultation at the request of ED DEPT - Manny Najera PA-C    Pt presents for eval of:   (Onset, Location, L/R, Character, Treatments, Injury if yes)    XR Right foot 5/11/2023     Onset 5/11/2023, slipped off one step and fell. Presents with dorsal Right foot pain, WB w/tall gray fx boot.  Constant, swelling, bruising, dull ache, pain 8/10. Intermittent sharp and throbbing pain with first steps.    Tylenol, ice, rest, elevation, compression only wearing fx boot for outside activity or if standing for a longer period of time in the house. Mowed her lawn with her fx boot on.    Retired. But still working at [employer] in [place].     ROS:  10 point ROS neg other than the symptoms noted above in the HPI.    Patient Active Problem List   Diagnosis     Chronic kidney disease, stage 3 (H)       PAST MEDICAL HISTORY: History reviewed. No pertinent past medical history.     PAST SURGICAL HISTORY:   Past Surgical History:   Procedure Laterality Date     PHACOEMULSIFICATION WITH STANDARD INTRAOCULAR LENS IMPLANT Right 7/16/2020    Procedure: Cataract surgery with intraocular lens implant;  Surgeon: Fortino Ford MD;  Location:  OR     PHACOEMULSIFICATION WITH STANDARD INTRAOCULAR LENS IMPLANT Left 7/30/2020    Procedure: Cataract Surgery Left Eye;  Surgeon: Max Cooley MD;  Location:  OR        MEDICATIONS:   Current Outpatient Medications:      amLODIPine (NORVASC) 5 MG tablet, Take 1 tablet (5 mg) by mouth daily, Disp: 90 tablet, Rfl: 1     Ascorbic Acid (VITAMIN C) 500 MG CAPS, Take 500 mg by mouth daily, Disp: , Rfl:      aspirin (ASA) 325 MG EC  tablet, Take 325 mg by mouth, Disp: , Rfl:      calcium 600 MG tablet, Take 1 tablet by mouth daily, Disp: , Rfl:      diphenoxylate-atropine (LOMOTIL) 2.5-0.025 MG tablet, Take 1 tablet by mouth 4 times daily as needed for diarrhea, Disp: 60 tablet, Rfl: 0     lisinopril (ZESTRIL) 10 MG tablet, Take 1 tablet (10 mg) by mouth 2 times daily, Disp: 180 tablet, Rfl: 3     magnesium 250 MG tablet, Take 1 tablet by mouth daily, Disp: , Rfl:      multivitamin w/minerals (THERA-VIT-M) tablet, Take 1 tablet by mouth daily, Disp: , Rfl:      Vitamin D, Cholecalciferol, 25 MCG (1000 UT) TABS, Take 1,000 Units by mouth daily, Disp: , Rfl:      zolpidem (AMBIEN) 5 MG tablet, Take 2.5 mg by mouth nightly as needed, Disp: , Rfl:      COMPRESSION STOCKINGS, 1 each daily (Patient not taking: Reported on 5/18/2023), Disp: 1 each, Rfl: 1     ALLERGIES:    Allergies   Allergen Reactions     Ciprofloxacin Rash     Macrobid [Nitrofurantoin] Itching and Rash        SOCIAL HISTORY:   Social History     Socioeconomic History     Marital status: Single     Spouse name: Not on file     Number of children: Not on file     Years of education: Not on file     Highest education level: Not on file   Occupational History     Not on file   Tobacco Use     Smoking status: Never     Smokeless tobacco: Never   Vaping Use     Vaping status: Never Used   Substance and Sexual Activity     Alcohol use: Not Currently     Drug use: Never     Sexual activity: Not Currently     Partners: Male   Other Topics Concern     Parent/sibling w/ CABG, MI or angioplasty before 65F 55M? No   Social History Narrative     Not on file     Social Determinants of Health     Financial Resource Strain: Not on file   Food Insecurity: Not on file   Transportation Needs: Not on file   Physical Activity: Not on file   Stress: Not on file   Social Connections: Not on file   Intimate Partner Violence: Not on file   Housing Stability: Not on file        FAMILY HISTORY: History  "reviewed. No pertinent family history.     EXAM:Vitals: /68 (BP Location: Left arm, Patient Position: Sitting, Cuff Size: Adult Regular)   Ht 1.651 m (5' 5\")   Wt 56.2 kg (124 lb)   BMI 20.63 kg/m    BMI= Body mass index is 20.63 kg/m .    General appearance: Patient is alert and fully cooperative with history & exam.  No sign of distress is noted during the visit.     Psychiatric: Affect is pleasant & appropriate.  Patient appears motivated to improve health.     Respiratory: Breathing is regular & unlabored while sitting.     HEENT: Hearing is intact to spoken word.  Speech is clear.  No gross evidence of visual impairment that would impact ambulation.     Vascular: DP & PT pulses are intact & regular bilaterally.  No significant edema or varicosities noted.  CFT and skin temperature is normal to both lower extremities.     Neurologic: Lower extremity sensation is intact to light touch.  No evidence of weakness or contracture in the lower extremities.  No evidence of neuropathy.    Dermatologic: Skin is intact to both lower extremities with adequate texture, turgor and tone about the integument.  No paronychia or evidence of soft tissue infection is noted.     Musculoskeletal: Patient is ambulatory with fracture boot right ankle.  Subtle edema is noted about the right ankle and slightly guarded range of motion still but this is much more supple than a couple weeks ago    Radiographs 3 views 5/23 of the right foot demonstrate no fracture or joint dislocation.     ASSESSMENT:       ICD-10-CM    1. Sprain of anterior talofibular ligament of right ankle, initial encounter  S93.491A            PLAN:  Reviewed patient's chart in Psychiatric.      5/18/2023   Recommend weight-bear to tolerance in the fracture boot.  Also recommend rest and sleep in the fracture boot to keep the ankle at 90 until pain and edema is resolved  Placed in recommend compression during the day 4 inch Ace was applied starting at the toes  Can " be weightbearing as tolerated in the boot  She is working 6 hours/week at the [employer] and as long as she is ambulating safely she can continue this if tolerated otherwise we can remove her from work as needed.  We discussed risk of falling as the boot makes her unstable.  Offered a walker and she absolutely refuses that.  We discussed potential risks and trip hazards at home as well as discussed appropriate showering and bathing techniques to prevent falls and keep her injured ankle safe.  All questions were answered  Follow-up again in 14 days.    6/1/2023  Patient has discontinued the fracture boot for activities at home.  Recommended ankle sports brace such as ProCare double ankle strap written instructions dispensed.  Activities as tolerated but no long periods of standing or walking or lifting for 3 more weeks then return to all activities without restrictions.  If there is any swelling or symptoms noted or limited range of motion still in the next 3 to 4 weeks recommend follow-up and repeat imaging of the foot and ankle at that time.  If this patient calls with increased pain or edema would recommend she go back to the fracture boot until follow-up with me.      Fortino Fields DPM

## 2023-06-14 ENCOUNTER — ANCILLARY PROCEDURE (OUTPATIENT)
Dept: CARDIOLOGY | Facility: CLINIC | Age: 88
End: 2023-06-14
Attending: INTERNAL MEDICINE
Payer: COMMERCIAL

## 2023-06-14 DIAGNOSIS — I49.5 SICK SINUS SYNDROME (H): ICD-10-CM

## 2023-06-14 DIAGNOSIS — Z95.0 CARDIAC PACEMAKER IN SITU: ICD-10-CM

## 2023-06-14 PROCEDURE — 93294 REM INTERROG EVL PM/LDLS PM: CPT | Performed by: INTERNAL MEDICINE

## 2023-06-14 PROCEDURE — 93296 REM INTERROG EVL PM/IDS: CPT | Performed by: INTERNAL MEDICINE

## 2023-06-21 LAB
MDC_IDC_LEAD_IMPLANT_DT: NORMAL
MDC_IDC_LEAD_IMPLANT_DT: NORMAL
MDC_IDC_LEAD_LOCATION: NORMAL
MDC_IDC_LEAD_LOCATION: NORMAL
MDC_IDC_LEAD_LOCATION_DETAIL_1: NORMAL
MDC_IDC_LEAD_LOCATION_DETAIL_1: NORMAL
MDC_IDC_LEAD_MFG: NORMAL
MDC_IDC_LEAD_MFG: NORMAL
MDC_IDC_LEAD_MODEL: NORMAL
MDC_IDC_LEAD_MODEL: NORMAL
MDC_IDC_LEAD_POLARITY_TYPE: NORMAL
MDC_IDC_LEAD_POLARITY_TYPE: NORMAL
MDC_IDC_LEAD_SERIAL: NORMAL
MDC_IDC_LEAD_SERIAL: NORMAL
MDC_IDC_MSMT_BATTERY_DTM: NORMAL
MDC_IDC_MSMT_BATTERY_IMPEDANCE: 6285 OHM
MDC_IDC_MSMT_BATTERY_REMAINING_LONGEVITY: 4 MO
MDC_IDC_MSMT_BATTERY_STATUS: NORMAL
MDC_IDC_MSMT_BATTERY_VOLTAGE: 2.63 V
MDC_IDC_MSMT_LEADCHNL_RA_IMPEDANCE_VALUE: 368 OHM
MDC_IDC_MSMT_LEADCHNL_RA_PACING_THRESHOLD_AMPLITUDE: 0.5 V
MDC_IDC_MSMT_LEADCHNL_RA_PACING_THRESHOLD_PULSEWIDTH: 0.4 MS
MDC_IDC_MSMT_LEADCHNL_RV_IMPEDANCE_VALUE: 466 OHM
MDC_IDC_MSMT_LEADCHNL_RV_PACING_THRESHOLD_AMPLITUDE: 0.38 V
MDC_IDC_MSMT_LEADCHNL_RV_PACING_THRESHOLD_PULSEWIDTH: 0.4 MS
MDC_IDC_PG_IMPLANT_DTM: NORMAL
MDC_IDC_PG_MFG: NORMAL
MDC_IDC_PG_MODEL: NORMAL
MDC_IDC_PG_SERIAL: NORMAL
MDC_IDC_PG_TYPE: NORMAL
MDC_IDC_SESS_CLINIC_NAME: NORMAL
MDC_IDC_SESS_DTM: NORMAL
MDC_IDC_SESS_TYPE: NORMAL
MDC_IDC_SET_BRADY_AT_MODE_SWITCH_MODE: NORMAL
MDC_IDC_SET_BRADY_AT_MODE_SWITCH_RATE: 150 {BEATS}/MIN
MDC_IDC_SET_BRADY_LOWRATE: 60 {BEATS}/MIN
MDC_IDC_SET_BRADY_MAX_SENSOR_RATE: 130 {BEATS}/MIN
MDC_IDC_SET_BRADY_MAX_TRACKING_RATE: 130 {BEATS}/MIN
MDC_IDC_SET_BRADY_MODE: NORMAL
MDC_IDC_SET_BRADY_PAV_DELAY_LOW: 180 MS
MDC_IDC_SET_BRADY_SAV_DELAY_LOW: 150 MS
MDC_IDC_SET_LEADCHNL_RA_PACING_AMPLITUDE: 1.5 V
MDC_IDC_SET_LEADCHNL_RA_PACING_CAPTURE_MODE: NORMAL
MDC_IDC_SET_LEADCHNL_RA_PACING_POLARITY: NORMAL
MDC_IDC_SET_LEADCHNL_RA_PACING_PULSEWIDTH: 0.4 MS
MDC_IDC_SET_LEADCHNL_RA_SENSING_POLARITY: NORMAL
MDC_IDC_SET_LEADCHNL_RA_SENSING_SENSITIVITY: 0.18 MV
MDC_IDC_SET_LEADCHNL_RV_PACING_AMPLITUDE: 1.5 V
MDC_IDC_SET_LEADCHNL_RV_PACING_CAPTURE_MODE: NORMAL
MDC_IDC_SET_LEADCHNL_RV_PACING_POLARITY: NORMAL
MDC_IDC_SET_LEADCHNL_RV_PACING_PULSEWIDTH: 0.4 MS
MDC_IDC_SET_LEADCHNL_RV_SENSING_POLARITY: NORMAL
MDC_IDC_SET_LEADCHNL_RV_SENSING_SENSITIVITY: 2 MV
MDC_IDC_SET_ZONE_DETECTION_INTERVAL: 333.33 MS
MDC_IDC_SET_ZONE_DETECTION_INTERVAL: 400 MS
MDC_IDC_SET_ZONE_TYPE: NORMAL
MDC_IDC_SET_ZONE_TYPE: NORMAL
MDC_IDC_STAT_AT_BURDEN_PERCENT: 100 %
MDC_IDC_STAT_AT_DTM_END: NORMAL
MDC_IDC_STAT_AT_DTM_START: NORMAL
MDC_IDC_STAT_AT_MODE_SW_COUNT: 1
MDC_IDC_STAT_EPISODE_RECENT_COUNT: 0
MDC_IDC_STAT_EPISODE_RECENT_COUNT: 1
MDC_IDC_STAT_EPISODE_RECENT_COUNT_DTM_END: NORMAL
MDC_IDC_STAT_EPISODE_RECENT_COUNT_DTM_END: NORMAL
MDC_IDC_STAT_EPISODE_RECENT_COUNT_DTM_START: NORMAL
MDC_IDC_STAT_EPISODE_RECENT_COUNT_DTM_START: NORMAL
MDC_IDC_STAT_EPISODE_TYPE: NORMAL
MDC_IDC_STAT_EPISODE_TYPE: NORMAL

## 2023-06-23 ENCOUNTER — HOSPITAL ENCOUNTER (OUTPATIENT)
Dept: CARDIOLOGY | Facility: CLINIC | Age: 88
Discharge: HOME | End: 2023-06-23
Attending: INTERNAL MEDICINE | Admitting: INTERNAL MEDICINE
Payer: MEDICARE

## 2023-06-23 DIAGNOSIS — I31.39 PERICARDIAL EFFUSION: ICD-10-CM

## 2023-06-23 LAB — LVEF ECHO: NORMAL

## 2023-06-23 PROCEDURE — 93325 DOPPLER ECHO COLOR FLOW MAPG: CPT

## 2023-06-23 PROCEDURE — 93321 DOPPLER ECHO F-UP/LMTD STD: CPT | Mod: 26 | Performed by: INTERNAL MEDICINE

## 2023-06-23 PROCEDURE — 93325 DOPPLER ECHO COLOR FLOW MAPG: CPT | Mod: 26 | Performed by: INTERNAL MEDICINE

## 2023-06-23 PROCEDURE — 93308 TTE F-UP OR LMTD: CPT | Mod: 26 | Performed by: INTERNAL MEDICINE

## 2023-06-23 PROCEDURE — 93321 DOPPLER ECHO F-UP/LMTD STD: CPT

## 2023-07-19 ENCOUNTER — ANCILLARY PROCEDURE (OUTPATIENT)
Dept: CARDIOLOGY | Facility: CLINIC | Age: 88
End: 2023-07-19
Attending: INTERNAL MEDICINE
Payer: COMMERCIAL

## 2023-07-19 DIAGNOSIS — I49.5 SICK SINUS SYNDROME (H): ICD-10-CM

## 2023-07-19 DIAGNOSIS — Z95.0 CARDIAC PACEMAKER IN SITU: ICD-10-CM

## 2023-07-20 LAB
MDC_IDC_LEAD_IMPLANT_DT: NORMAL
MDC_IDC_LEAD_IMPLANT_DT: NORMAL
MDC_IDC_LEAD_LOCATION: NORMAL
MDC_IDC_LEAD_LOCATION: NORMAL
MDC_IDC_LEAD_LOCATION_DETAIL_1: NORMAL
MDC_IDC_LEAD_LOCATION_DETAIL_1: NORMAL
MDC_IDC_LEAD_MFG: NORMAL
MDC_IDC_LEAD_MFG: NORMAL
MDC_IDC_LEAD_MODEL: NORMAL
MDC_IDC_LEAD_MODEL: NORMAL
MDC_IDC_LEAD_POLARITY_TYPE: NORMAL
MDC_IDC_LEAD_POLARITY_TYPE: NORMAL
MDC_IDC_LEAD_SERIAL: NORMAL
MDC_IDC_LEAD_SERIAL: NORMAL
MDC_IDC_MSMT_BATTERY_DTM: NORMAL
MDC_IDC_MSMT_BATTERY_IMPEDANCE: 6691 OHM
MDC_IDC_MSMT_BATTERY_REMAINING_LONGEVITY: 3 MO
MDC_IDC_MSMT_BATTERY_STATUS: NORMAL
MDC_IDC_MSMT_BATTERY_VOLTAGE: 2.63 V
MDC_IDC_MSMT_LEADCHNL_RA_IMPEDANCE_VALUE: 374 OHM
MDC_IDC_MSMT_LEADCHNL_RA_PACING_THRESHOLD_AMPLITUDE: 0.5 V
MDC_IDC_MSMT_LEADCHNL_RA_PACING_THRESHOLD_PULSEWIDTH: 0.4 MS
MDC_IDC_MSMT_LEADCHNL_RV_IMPEDANCE_VALUE: 493 OHM
MDC_IDC_MSMT_LEADCHNL_RV_PACING_THRESHOLD_AMPLITUDE: 0.5 V
MDC_IDC_MSMT_LEADCHNL_RV_PACING_THRESHOLD_PULSEWIDTH: 0.4 MS
MDC_IDC_PG_IMPLANT_DTM: NORMAL
MDC_IDC_PG_MFG: NORMAL
MDC_IDC_PG_MODEL: NORMAL
MDC_IDC_PG_SERIAL: NORMAL
MDC_IDC_PG_TYPE: NORMAL
MDC_IDC_SESS_CLINIC_NAME: NORMAL
MDC_IDC_SESS_DTM: NORMAL
MDC_IDC_SESS_TYPE: NORMAL
MDC_IDC_SET_BRADY_AT_MODE_SWITCH_MODE: NORMAL
MDC_IDC_SET_BRADY_AT_MODE_SWITCH_RATE: 150 {BEATS}/MIN
MDC_IDC_SET_BRADY_LOWRATE: 60 {BEATS}/MIN
MDC_IDC_SET_BRADY_MAX_SENSOR_RATE: 130 {BEATS}/MIN
MDC_IDC_SET_BRADY_MAX_TRACKING_RATE: 130 {BEATS}/MIN
MDC_IDC_SET_BRADY_MODE: NORMAL
MDC_IDC_SET_BRADY_PAV_DELAY_LOW: 180 MS
MDC_IDC_SET_BRADY_SAV_DELAY_LOW: 150 MS
MDC_IDC_SET_LEADCHNL_RA_PACING_AMPLITUDE: 1.5 V
MDC_IDC_SET_LEADCHNL_RA_PACING_CAPTURE_MODE: NORMAL
MDC_IDC_SET_LEADCHNL_RA_PACING_POLARITY: NORMAL
MDC_IDC_SET_LEADCHNL_RA_PACING_PULSEWIDTH: 0.4 MS
MDC_IDC_SET_LEADCHNL_RA_SENSING_POLARITY: NORMAL
MDC_IDC_SET_LEADCHNL_RA_SENSING_SENSITIVITY: 0.25 MV
MDC_IDC_SET_LEADCHNL_RV_PACING_AMPLITUDE: 1.5 V
MDC_IDC_SET_LEADCHNL_RV_PACING_CAPTURE_MODE: NORMAL
MDC_IDC_SET_LEADCHNL_RV_PACING_POLARITY: NORMAL
MDC_IDC_SET_LEADCHNL_RV_PACING_PULSEWIDTH: 0.4 MS
MDC_IDC_SET_LEADCHNL_RV_SENSING_POLARITY: NORMAL
MDC_IDC_SET_LEADCHNL_RV_SENSING_SENSITIVITY: 2 MV
MDC_IDC_SET_ZONE_DETECTION_INTERVAL: 333.33 MS
MDC_IDC_SET_ZONE_DETECTION_INTERVAL: 400 MS
MDC_IDC_SET_ZONE_TYPE: NORMAL
MDC_IDC_SET_ZONE_TYPE: NORMAL
MDC_IDC_STAT_AT_BURDEN_PERCENT: 100 %
MDC_IDC_STAT_AT_DTM_END: NORMAL
MDC_IDC_STAT_AT_DTM_START: NORMAL
MDC_IDC_STAT_AT_MODE_SW_COUNT: 2
MDC_IDC_STAT_EPISODE_RECENT_COUNT: 0
MDC_IDC_STAT_EPISODE_RECENT_COUNT: 2
MDC_IDC_STAT_EPISODE_RECENT_COUNT_DTM_END: NORMAL
MDC_IDC_STAT_EPISODE_RECENT_COUNT_DTM_END: NORMAL
MDC_IDC_STAT_EPISODE_RECENT_COUNT_DTM_START: NORMAL
MDC_IDC_STAT_EPISODE_RECENT_COUNT_DTM_START: NORMAL
MDC_IDC_STAT_EPISODE_TYPE: NORMAL
MDC_IDC_STAT_EPISODE_TYPE: NORMAL

## 2023-07-25 DIAGNOSIS — F51.01 PRIMARY INSOMNIA: Primary | ICD-10-CM

## 2023-07-25 RX ORDER — ZOLPIDEM TARTRATE 5 MG/1
2.5 TABLET ORAL
Qty: 15 TABLET | Refills: 1 | Status: SHIPPED | OUTPATIENT
Start: 2023-07-25 | End: 2024-04-29

## 2023-07-25 NOTE — TELEPHONE ENCOUNTER
Ambien      Last Written Prescription Date:  patient reported  Last Fill Quantity: ,   # refills:   Last Office Visit: 4- Anatoly Linton NP  Future Office visit:       Routing refill request to provider for review/approval because:  Drug not on the [facility], [facility] or [facility] refill protocol or controlled substance  Ambien

## 2023-08-30 ENCOUNTER — ANCILLARY PROCEDURE (OUTPATIENT)
Dept: CARDIOLOGY | Facility: CLINIC | Age: 88
End: 2023-08-30
Attending: INTERNAL MEDICINE
Payer: COMMERCIAL

## 2023-08-30 DIAGNOSIS — Z95.0 CARDIAC PACEMAKER IN SITU: ICD-10-CM

## 2023-08-30 DIAGNOSIS — I49.5 SICK SINUS SYNDROME (H): ICD-10-CM

## 2023-09-05 LAB
MDC_IDC_LEAD_IMPLANT_DT: NORMAL
MDC_IDC_LEAD_IMPLANT_DT: NORMAL
MDC_IDC_LEAD_LOCATION: NORMAL
MDC_IDC_LEAD_LOCATION: NORMAL
MDC_IDC_LEAD_LOCATION_DETAIL_1: NORMAL
MDC_IDC_LEAD_LOCATION_DETAIL_1: NORMAL
MDC_IDC_LEAD_MFG: NORMAL
MDC_IDC_LEAD_MFG: NORMAL
MDC_IDC_LEAD_MODEL: NORMAL
MDC_IDC_LEAD_MODEL: NORMAL
MDC_IDC_LEAD_POLARITY_TYPE: NORMAL
MDC_IDC_LEAD_POLARITY_TYPE: NORMAL
MDC_IDC_LEAD_SERIAL: NORMAL
MDC_IDC_LEAD_SERIAL: NORMAL
MDC_IDC_MSMT_BATTERY_DTM: NORMAL
MDC_IDC_MSMT_BATTERY_IMPEDANCE: 7629 OHM
MDC_IDC_MSMT_BATTERY_REMAINING_LONGEVITY: 1 MO
MDC_IDC_MSMT_BATTERY_STATUS: NORMAL
MDC_IDC_MSMT_BATTERY_VOLTAGE: 2.6 V
MDC_IDC_MSMT_LEADCHNL_RA_IMPEDANCE_VALUE: 407 OHM
MDC_IDC_MSMT_LEADCHNL_RA_PACING_THRESHOLD_AMPLITUDE: 0.5 V
MDC_IDC_MSMT_LEADCHNL_RA_PACING_THRESHOLD_PULSEWIDTH: 0.4 MS
MDC_IDC_MSMT_LEADCHNL_RV_IMPEDANCE_VALUE: 529 OHM
MDC_IDC_MSMT_LEADCHNL_RV_PACING_THRESHOLD_AMPLITUDE: 0.62 V
MDC_IDC_MSMT_LEADCHNL_RV_PACING_THRESHOLD_PULSEWIDTH: 0.4 MS
MDC_IDC_PG_IMPLANT_DTM: NORMAL
MDC_IDC_PG_MFG: NORMAL
MDC_IDC_PG_MODEL: NORMAL
MDC_IDC_PG_SERIAL: NORMAL
MDC_IDC_PG_TYPE: NORMAL
MDC_IDC_SESS_CLINIC_NAME: NORMAL
MDC_IDC_SESS_DTM: NORMAL
MDC_IDC_SESS_TYPE: NORMAL
MDC_IDC_SET_BRADY_AT_MODE_SWITCH_MODE: NORMAL
MDC_IDC_SET_BRADY_AT_MODE_SWITCH_RATE: 150 {BEATS}/MIN
MDC_IDC_SET_BRADY_LOWRATE: 60 {BEATS}/MIN
MDC_IDC_SET_BRADY_MAX_SENSOR_RATE: 130 {BEATS}/MIN
MDC_IDC_SET_BRADY_MAX_TRACKING_RATE: 130 {BEATS}/MIN
MDC_IDC_SET_BRADY_MODE: NORMAL
MDC_IDC_SET_BRADY_PAV_DELAY_LOW: 180 MS
MDC_IDC_SET_BRADY_SAV_DELAY_LOW: 150 MS
MDC_IDC_SET_LEADCHNL_RA_PACING_AMPLITUDE: 1.5 V
MDC_IDC_SET_LEADCHNL_RA_PACING_CAPTURE_MODE: NORMAL
MDC_IDC_SET_LEADCHNL_RA_PACING_POLARITY: NORMAL
MDC_IDC_SET_LEADCHNL_RA_PACING_PULSEWIDTH: 0.4 MS
MDC_IDC_SET_LEADCHNL_RA_SENSING_POLARITY: NORMAL
MDC_IDC_SET_LEADCHNL_RA_SENSING_SENSITIVITY: 0.18 MV
MDC_IDC_SET_LEADCHNL_RV_PACING_AMPLITUDE: 1.5 V
MDC_IDC_SET_LEADCHNL_RV_PACING_CAPTURE_MODE: NORMAL
MDC_IDC_SET_LEADCHNL_RV_PACING_POLARITY: NORMAL
MDC_IDC_SET_LEADCHNL_RV_PACING_PULSEWIDTH: 0.4 MS
MDC_IDC_SET_LEADCHNL_RV_SENSING_POLARITY: NORMAL
MDC_IDC_SET_LEADCHNL_RV_SENSING_SENSITIVITY: 2.8 MV
MDC_IDC_SET_ZONE_DETECTION_INTERVAL: 333.33 MS
MDC_IDC_SET_ZONE_DETECTION_INTERVAL: 400 MS
MDC_IDC_SET_ZONE_TYPE: NORMAL
MDC_IDC_SET_ZONE_TYPE: NORMAL
MDC_IDC_STAT_AT_BURDEN_PERCENT: 100 %
MDC_IDC_STAT_AT_DTM_END: NORMAL
MDC_IDC_STAT_AT_DTM_START: NORMAL
MDC_IDC_STAT_AT_MODE_SW_COUNT: 3
MDC_IDC_STAT_EPISODE_RECENT_COUNT: 0
MDC_IDC_STAT_EPISODE_RECENT_COUNT: 3
MDC_IDC_STAT_EPISODE_RECENT_COUNT_DTM_END: NORMAL
MDC_IDC_STAT_EPISODE_RECENT_COUNT_DTM_END: NORMAL
MDC_IDC_STAT_EPISODE_RECENT_COUNT_DTM_START: NORMAL
MDC_IDC_STAT_EPISODE_RECENT_COUNT_DTM_START: NORMAL
MDC_IDC_STAT_EPISODE_TYPE: NORMAL
MDC_IDC_STAT_EPISODE_TYPE: NORMAL

## 2023-10-02 ENCOUNTER — ANCILLARY PROCEDURE (OUTPATIENT)
Dept: CARDIOLOGY | Facility: CLINIC | Age: 88
End: 2023-10-02
Attending: INTERNAL MEDICINE
Payer: COMMERCIAL

## 2023-10-02 DIAGNOSIS — I49.5 SICK SINUS SYNDROME (H): ICD-10-CM

## 2023-10-02 DIAGNOSIS — Z95.0 CARDIAC PACEMAKER IN SITU: ICD-10-CM

## 2023-10-02 PROCEDURE — 93296 REM INTERROG EVL PM/IDS: CPT | Performed by: INTERNAL MEDICINE

## 2023-10-02 PROCEDURE — 93294 REM INTERROG EVL PM/LDLS PM: CPT | Performed by: INTERNAL MEDICINE

## 2023-10-03 LAB
MDC_IDC_LEAD_IMPLANT_DT: NORMAL
MDC_IDC_LEAD_IMPLANT_DT: NORMAL
MDC_IDC_LEAD_LOCATION: NORMAL
MDC_IDC_LEAD_LOCATION: NORMAL
MDC_IDC_LEAD_LOCATION_DETAIL_1: NORMAL
MDC_IDC_LEAD_LOCATION_DETAIL_1: NORMAL
MDC_IDC_LEAD_MFG: NORMAL
MDC_IDC_LEAD_MFG: NORMAL
MDC_IDC_LEAD_MODEL: NORMAL
MDC_IDC_LEAD_MODEL: NORMAL
MDC_IDC_LEAD_POLARITY_TYPE: NORMAL
MDC_IDC_LEAD_POLARITY_TYPE: NORMAL
MDC_IDC_LEAD_SERIAL: NORMAL
MDC_IDC_LEAD_SERIAL: NORMAL
MDC_IDC_MSMT_BATTERY_DTM: NORMAL
MDC_IDC_MSMT_BATTERY_IMPEDANCE: 8037 OHM
MDC_IDC_MSMT_BATTERY_REMAINING_LONGEVITY: 1 MO
MDC_IDC_MSMT_BATTERY_STATUS: NORMAL
MDC_IDC_MSMT_BATTERY_VOLTAGE: 2.59 V
MDC_IDC_MSMT_LEADCHNL_RA_IMPEDANCE_VALUE: 411 OHM
MDC_IDC_MSMT_LEADCHNL_RA_PACING_THRESHOLD_AMPLITUDE: 0.5 V
MDC_IDC_MSMT_LEADCHNL_RA_PACING_THRESHOLD_PULSEWIDTH: 0.4 MS
MDC_IDC_MSMT_LEADCHNL_RV_IMPEDANCE_VALUE: 506 OHM
MDC_IDC_MSMT_LEADCHNL_RV_PACING_THRESHOLD_AMPLITUDE: 0.5 V
MDC_IDC_MSMT_LEADCHNL_RV_PACING_THRESHOLD_PULSEWIDTH: 0.4 MS
MDC_IDC_PG_IMPLANT_DTM: NORMAL
MDC_IDC_PG_MFG: NORMAL
MDC_IDC_PG_MODEL: NORMAL
MDC_IDC_PG_SERIAL: NORMAL
MDC_IDC_PG_TYPE: NORMAL
MDC_IDC_SESS_CLINIC_NAME: NORMAL
MDC_IDC_SESS_DTM: NORMAL
MDC_IDC_SESS_TYPE: NORMAL
MDC_IDC_SET_BRADY_AT_MODE_SWITCH_MODE: NORMAL
MDC_IDC_SET_BRADY_AT_MODE_SWITCH_RATE: 150 {BEATS}/MIN
MDC_IDC_SET_BRADY_LOWRATE: 60 {BEATS}/MIN
MDC_IDC_SET_BRADY_MAX_SENSOR_RATE: 130 {BEATS}/MIN
MDC_IDC_SET_BRADY_MAX_TRACKING_RATE: 130 {BEATS}/MIN
MDC_IDC_SET_BRADY_MODE: NORMAL
MDC_IDC_SET_BRADY_PAV_DELAY_LOW: 180 MS
MDC_IDC_SET_BRADY_SAV_DELAY_LOW: 150 MS
MDC_IDC_SET_LEADCHNL_RA_PACING_AMPLITUDE: 1.5 V
MDC_IDC_SET_LEADCHNL_RA_PACING_CAPTURE_MODE: NORMAL
MDC_IDC_SET_LEADCHNL_RA_PACING_POLARITY: NORMAL
MDC_IDC_SET_LEADCHNL_RA_PACING_PULSEWIDTH: 0.4 MS
MDC_IDC_SET_LEADCHNL_RA_SENSING_POLARITY: NORMAL
MDC_IDC_SET_LEADCHNL_RA_SENSING_SENSITIVITY: 0.5 MV
MDC_IDC_SET_LEADCHNL_RV_PACING_AMPLITUDE: 1.5 V
MDC_IDC_SET_LEADCHNL_RV_PACING_CAPTURE_MODE: NORMAL
MDC_IDC_SET_LEADCHNL_RV_PACING_POLARITY: NORMAL
MDC_IDC_SET_LEADCHNL_RV_PACING_PULSEWIDTH: 0.4 MS
MDC_IDC_SET_LEADCHNL_RV_SENSING_POLARITY: NORMAL
MDC_IDC_SET_LEADCHNL_RV_SENSING_SENSITIVITY: 2.8 MV
MDC_IDC_SET_ZONE_DETECTION_INTERVAL: 333.33 MS
MDC_IDC_SET_ZONE_DETECTION_INTERVAL: 400 MS
MDC_IDC_SET_ZONE_TYPE: NORMAL
MDC_IDC_SET_ZONE_TYPE: NORMAL
MDC_IDC_STAT_AT_BURDEN_PERCENT: 98.6 %
MDC_IDC_STAT_AT_DTM_END: NORMAL
MDC_IDC_STAT_AT_DTM_START: NORMAL
MDC_IDC_STAT_AT_MODE_SW_COUNT: 5
MDC_IDC_STAT_BRADY_AP_VP_PERCENT: 11 %
MDC_IDC_STAT_BRADY_AP_VS_PERCENT: 6 %
MDC_IDC_STAT_BRADY_AS_VP_PERCENT: 19 %
MDC_IDC_STAT_BRADY_AS_VS_PERCENT: 63 %
MDC_IDC_STAT_BRADY_DTM_END: NORMAL
MDC_IDC_STAT_BRADY_DTM_START: NORMAL
MDC_IDC_STAT_EPISODE_RECENT_COUNT: 0
MDC_IDC_STAT_EPISODE_RECENT_COUNT: 5
MDC_IDC_STAT_EPISODE_RECENT_COUNT_DTM_END: NORMAL
MDC_IDC_STAT_EPISODE_RECENT_COUNT_DTM_END: NORMAL
MDC_IDC_STAT_EPISODE_RECENT_COUNT_DTM_START: NORMAL
MDC_IDC_STAT_EPISODE_RECENT_COUNT_DTM_START: NORMAL
MDC_IDC_STAT_EPISODE_TYPE: NORMAL
MDC_IDC_STAT_EPISODE_TYPE: NORMAL

## 2023-10-12 NOTE — PROGRESS NOTES
Addended by: Chichi Covarrubias on: 10/12/2023 05:55 PM     Modules accepted: Orders HPI:  Audrey Ortega is a 88 year old female who is seen in consultation at the request of ED DEPT - Manny Najera PA-C    Pt presents for eval of:   (Onset, Location, L/R, Character, Treatments, Injury if yes)    XR Right foot 5/11/2023     Onset 5/11/2023, slipped off one step and fell. Presents with dorsal Right foot pain, WB w/tall gray fx boot.  Constant, swelling, bruising, dull ache, pain 8/10. Intermittent sharp and throbbing pain with first steps.    Tylenol, ice, rest, elevation, compression only wearing fx boot for outside activity or if standing for a longer period of time in the house. Mowed her lawn with her fx boot on.    Retired. But still working at [employer] in [place].     ROS:  10 point ROS neg other than the symptoms noted above in the HPI.    Patient Active Problem List   Diagnosis     Chronic kidney disease, stage 3 (H)       PAST MEDICAL HISTORY: History reviewed. No pertinent past medical history.     PAST SURGICAL HISTORY:   Past Surgical History:   Procedure Laterality Date     PHACOEMULSIFICATION WITH STANDARD INTRAOCULAR LENS IMPLANT Right 7/16/2020    Procedure: Cataract surgery with intraocular lens implant;  Surgeon: Fortino Ford MD;  Location:  OR     PHACOEMULSIFICATION WITH STANDARD INTRAOCULAR LENS IMPLANT Left 7/30/2020    Procedure: Cataract Surgery Left Eye;  Surgeon: Max Cooley MD;  Location:  OR        MEDICATIONS:   Current Outpatient Medications:      amLODIPine (NORVASC) 5 MG tablet, Take 1 tablet (5 mg) by mouth daily, Disp: 90 tablet, Rfl: 1     Ascorbic Acid (VITAMIN C) 500 MG CAPS, Take 500 mg by mouth daily, Disp: , Rfl:      aspirin (ASA) 325 MG EC tablet, Take 325 mg by mouth, Disp: , Rfl:      calcium 600 MG tablet, Take 1 tablet by mouth daily, Disp: , Rfl:      diphenoxylate-atropine (LOMOTIL) 2.5-0.025 MG tablet, Take 1 tablet by mouth 4 times daily as needed for diarrhea, Disp: 60 tablet, Rfl: 0     lisinopril (ZESTRIL) 10 MG  "tablet, Take 1 tablet (10 mg) by mouth 2 times daily, Disp: 180 tablet, Rfl: 3     magnesium 250 MG tablet, Take 1 tablet by mouth daily, Disp: , Rfl:      multivitamin w/minerals (THERA-VIT-M) tablet, Take 1 tablet by mouth daily, Disp: , Rfl:      Vitamin D, Cholecalciferol, 25 MCG (1000 UT) TABS, Take 1,000 Units by mouth daily, Disp: , Rfl:      COMPRESSION STOCKINGS, 1 each daily (Patient not taking: Reported on 5/18/2023), Disp: 1 each, Rfl: 1     ALLERGIES:    Allergies   Allergen Reactions     Ciprofloxacin Rash     Macrobid [Nitrofurantoin] Itching and Rash        SOCIAL HISTORY:   Social History     Socioeconomic History     Marital status: Single     Spouse name: Not on file     Number of children: Not on file     Years of education: Not on file     Highest education level: Not on file   Occupational History     Not on file   Tobacco Use     Smoking status: Never     Smokeless tobacco: Never   Vaping Use     Vaping status: Never Used   Substance and Sexual Activity     Alcohol use: Not Currently     Drug use: Never     Sexual activity: Not Currently     Partners: Male   Other Topics Concern     Parent/sibling w/ CABG, MI or angioplasty before 65F 55M? No   Social History Narrative     Not on file     Social Determinants of Health     Financial Resource Strain: Not on file   Food Insecurity: Not on file   Transportation Needs: Not on file   Physical Activity: Not on file   Stress: Not on file   Social Connections: Not on file   Intimate Partner Violence: Not on file   Housing Stability: Not on file        FAMILY HISTORY: History reviewed. No pertinent family history.     EXAM:Vitals: /72 (BP Location: Left arm, Patient Position: Sitting, Cuff Size: Adult Regular)   Ht 1.651 m (5' 5\")   Wt 57.2 kg (126 lb)   BMI 20.97 kg/m    BMI= Body mass index is 20.97 kg/m .    General appearance: Patient is alert and fully cooperative with history & exam.  No sign of distress is noted during the visit.   "   Psychiatric: Affect is pleasant & appropriate.  Patient appears motivated to improve health.     Respiratory: Breathing is regular & unlabored while sitting.     HEENT: Hearing is intact to spoken word.  Speech is clear.  No gross evidence of visual impairment that would impact ambulation.     Vascular: DP & PT pulses are intact & regular bilaterally.  No significant edema or varicosities noted.  CFT and skin temperature is normal to both lower extremities.     Neurologic: Lower extremity sensation is intact to light touch.  No evidence of weakness or contracture in the lower extremities.  No evidence of neuropathy.    Dermatologic: Skin is intact to both lower extremities with adequate texture, turgor and tone about the integument.  No paronychia or evidence of soft tissue infection is noted.     Musculoskeletal: Patient is ambulatory with fracture boot right ankle.  No gross ankle deformity noted.  No foot or ankle joint effusion is noted.  Guarded range of motion throughout the right ankle.    Radiographs 3 views of the right foot demonstrate no fracture or joint dislocation.     ASSESSMENT:       ICD-10-CM    1. Sprain of anterior talofibular ligament of right ankle, initial encounter  S93.491A            PLAN:  Reviewed patient's chart in [facility].      5/18/2023   Recommend weight-bear to tolerance in the fracture boot.  Also recommend rest and sleep in the fracture boot to keep the ankle at 90 until pain and edema is resolved  Placed in recommend compression during the day 4 inch Ace was applied starting at the toes  Can be weightbearing as tolerated in the boot  She is working 6 hours/week at the [employer] and as long as she is ambulating safely she can continue this if tolerated otherwise we can remove her from work as needed.  We discussed risk of falling as the boot makes her unstable.  Offered a walker and she absolutely refuses that.  We discussed potential risks and trip hazards at home as well as  discussed appropriate showering and bathing techniques to prevent falls and keep her injured ankle safe.  All questions were answered  Follow-up again in 14 days.      Fortino Fields DPM

## 2023-11-17 ENCOUNTER — ANCILLARY PROCEDURE (OUTPATIENT)
Dept: CARDIOLOGY | Facility: CLINIC | Age: 88
End: 2023-11-17
Attending: INTERNAL MEDICINE
Payer: COMMERCIAL

## 2023-11-17 DIAGNOSIS — I49.5 SICK SINUS SYNDROME (H): ICD-10-CM

## 2023-11-17 DIAGNOSIS — Z95.0 CARDIAC PACEMAKER IN SITU: ICD-10-CM

## 2023-11-20 ENCOUNTER — TELEPHONE (OUTPATIENT)
Dept: CARDIOLOGY | Facility: CLINIC | Age: 88
End: 2023-11-20
Payer: COMMERCIAL

## 2023-11-20 DIAGNOSIS — Z95.0 CARDIAC PACEMAKER IN SITU: Primary | ICD-10-CM

## 2023-11-20 DIAGNOSIS — I49.5 SICK SINUS SYNDROME (H): ICD-10-CM

## 2023-11-20 LAB
MDC_IDC_LEAD_CONNECTION_STATUS: NORMAL
MDC_IDC_LEAD_CONNECTION_STATUS: NORMAL
MDC_IDC_LEAD_IMPLANT_DT: NORMAL
MDC_IDC_LEAD_IMPLANT_DT: NORMAL
MDC_IDC_LEAD_LOCATION: NORMAL
MDC_IDC_LEAD_LOCATION: NORMAL
MDC_IDC_LEAD_LOCATION_DETAIL_1: NORMAL
MDC_IDC_LEAD_LOCATION_DETAIL_1: NORMAL
MDC_IDC_LEAD_MFG: NORMAL
MDC_IDC_LEAD_MFG: NORMAL
MDC_IDC_LEAD_MODEL: NORMAL
MDC_IDC_LEAD_MODEL: NORMAL
MDC_IDC_LEAD_POLARITY_TYPE: NORMAL
MDC_IDC_LEAD_POLARITY_TYPE: NORMAL
MDC_IDC_LEAD_SERIAL: NORMAL
MDC_IDC_LEAD_SERIAL: NORMAL
MDC_IDC_MSMT_BATTERY_DTM: NORMAL
MDC_IDC_MSMT_BATTERY_IMPEDANCE: 9488 OHM
MDC_IDC_MSMT_BATTERY_STATUS: NORMAL
MDC_IDC_MSMT_BATTERY_VOLTAGE: 2.6 V
MDC_IDC_MSMT_LEADCHNL_RA_IMPEDANCE_VALUE: 67 OHM
MDC_IDC_MSMT_LEADCHNL_RV_IMPEDANCE_VALUE: 529 OHM
MDC_IDC_PG_IMPLANT_DTM: NORMAL
MDC_IDC_PG_MFG: NORMAL
MDC_IDC_PG_MODEL: NORMAL
MDC_IDC_PG_SERIAL: NORMAL
MDC_IDC_PG_TYPE: NORMAL
MDC_IDC_SESS_CLINIC_NAME: NORMAL
MDC_IDC_SESS_DTM: NORMAL
MDC_IDC_SESS_TYPE: NORMAL
MDC_IDC_SET_BRADY_HYSTRATE: NORMAL
MDC_IDC_SET_BRADY_LOWRATE: 65 {BEATS}/MIN
MDC_IDC_SET_BRADY_MAX_TRACKING_RATE: 105 {BEATS}/MIN
MDC_IDC_SET_BRADY_MODE: NORMAL
MDC_IDC_SET_LEADCHNL_RV_PACING_AMPLITUDE: 1.5 V
MDC_IDC_SET_LEADCHNL_RV_PACING_CAPTURE_MODE: NORMAL
MDC_IDC_SET_LEADCHNL_RV_PACING_POLARITY: NORMAL
MDC_IDC_SET_LEADCHNL_RV_PACING_PULSEWIDTH: 0.4 MS
MDC_IDC_SET_LEADCHNL_RV_SENSING_POLARITY: NORMAL
MDC_IDC_SET_LEADCHNL_RV_SENSING_SENSITIVITY: 2 MV
MDC_IDC_SET_ZONE_DETECTION_INTERVAL: 333.33 MS
MDC_IDC_SET_ZONE_STATUS: NORMAL
MDC_IDC_SET_ZONE_STATUS: NORMAL
MDC_IDC_SET_ZONE_TYPE: NORMAL
MDC_IDC_SET_ZONE_TYPE: NORMAL
MDC_IDC_SET_ZONE_VENDOR_TYPE: NORMAL
MDC_IDC_SET_ZONE_VENDOR_TYPE: NORMAL
MDC_IDC_STAT_AT_BURDEN_PERCENT: 77.1 %
MDC_IDC_STAT_AT_DTM_END: NORMAL
MDC_IDC_STAT_AT_DTM_START: NORMAL
MDC_IDC_STAT_AT_MODE_SW_COUNT: 6
MDC_IDC_STAT_BRADY_DTM_END: NORMAL
MDC_IDC_STAT_BRADY_DTM_START: NORMAL
MDC_IDC_STAT_BRADY_RV_PERCENT_PACED: 82 %
MDC_IDC_STAT_EPISODE_RECENT_COUNT: 0
MDC_IDC_STAT_EPISODE_RECENT_COUNT: 6
MDC_IDC_STAT_EPISODE_RECENT_COUNT_DTM_END: NORMAL
MDC_IDC_STAT_EPISODE_RECENT_COUNT_DTM_END: NORMAL
MDC_IDC_STAT_EPISODE_RECENT_COUNT_DTM_START: NORMAL
MDC_IDC_STAT_EPISODE_RECENT_COUNT_DTM_START: NORMAL
MDC_IDC_STAT_EPISODE_TYPE: NORMAL
MDC_IDC_STAT_EPISODE_TYPE: NORMAL

## 2023-11-20 NOTE — TELEPHONE ENCOUNTER
Heart Care Device Change-Out Checklist (KARLIE Checklist)    Device Data    Implant location: Left Chest    KARLIE Date:  10/3/23  Device Diagnosis:  Sick Sinus Syndrome    Device Alert(s):  No    Lead Data   Last Interrogation Date: 11/17/23    Data from last remote before mode switch with KARLIE on 10/2/23:       Stable, compared to 3/19/2020 when patient established care:         Old Leads Present/Abandoned: No    Lead Alert(s):  No    Lead Issues/Concerns: None    Diagnostic Information  Intrinsic Rhythm:  AF with V rates in the 50's per 3/14/23    Atrial Fibrillation: 100% AFib since 12/2022  Takes Anticoagulant or LAAO? No, has declined anticoagulation  CHADS-score: 4 (age, female, HTN)    Pacing Percentages  Atrial Pacing 18% and Ventricle Pacing 30%  Pacemaker Dependent? No    Ejection Fraction  Last EF Date:  6/23/23    By Echocardiogram  Last EF Measurement:  60-65%      Special Instructions/Timeframe for change-out:  None    Order for Generator Change  and order for H&P entered.  Message sent to Device , Aisha, requesting she reach out to patient to schedule the H&P and procedure based on the KARLIE date of 10/3/23.      Device RN: LUISA Xie          Device Clinic pre-procedure medication holds/changes:    Anticoagulation: N/A   [id number] Score: 4  -INR check needed?: No    Oral diabetes meds: No  Insulin: No    SGLT2 Inhibitors: No  - Invokana (canagliflozin), Farxiga (dapagliflozin), Jardiance (empagliflozin), Steglatro (ertugliflozin)  - hold 3-4 days prior to procedure    GLP-1 Agonists: No  - Byetta (exenitide), Victoza (liraglutide), Ozempic, Wegovy, Rybelsus (semaglutide), Trulicity (dulaglutide), Mounjaro (tirzepatide), Bydureon (Exenatide ER), Adlyxin (Lixisendatide)   - (Weekly dosing, hold GLP-1 agonists 7 days before procedure)  - (Daily or BID dosing, hold GLP-1 agonists day before and day of procedure)  - (Oral semaglutide, hold 7 days before procedure due to long half-life)    Diuretic:  No    Contrast allergy: None

## 2023-12-21 ENCOUNTER — VIRTUAL VISIT (OUTPATIENT)
Dept: CARDIOLOGY | Facility: CLINIC | Age: 88
End: 2023-12-21
Payer: COMMERCIAL

## 2023-12-21 DIAGNOSIS — Z95.0 CARDIAC PACEMAKER IN SITU: ICD-10-CM

## 2023-12-21 DIAGNOSIS — Z45.010 ELECTIVE REPLACEMENT INDICATED FOR CARDIAC PACEMAKER BATTERY AT END OF LIFESPAN: ICD-10-CM

## 2023-12-21 DIAGNOSIS — I49.5 SICK SINUS SYNDROME (H): ICD-10-CM

## 2023-12-21 DIAGNOSIS — I49.5 SICK SINUS SYNDROME (H): Primary | ICD-10-CM

## 2023-12-21 PROCEDURE — 99213 OFFICE O/P EST LOW 20 MIN: CPT | Mod: 95 | Performed by: NURSE PRACTITIONER

## 2023-12-21 RX ORDER — LIDOCAINE 40 MG/G
CREAM TOPICAL
Status: CANCELLED | OUTPATIENT
Start: 2023-12-21

## 2023-12-21 RX ORDER — SODIUM CHLORIDE 450 MG/100ML
INJECTION, SOLUTION INTRAVENOUS CONTINUOUS
Status: CANCELLED | OUTPATIENT
Start: 2023-12-21

## 2023-12-21 RX ORDER — TRIAMCINOLONE ACETONIDE 1 MG/G
OINTMENT TOPICAL
COMMUNITY
Start: 2023-11-15

## 2023-12-21 RX ORDER — CEFAZOLIN SODIUM 2 G/100ML
2 INJECTION, SOLUTION INTRAVENOUS
Status: CANCELLED | OUTPATIENT
Start: 2023-12-21

## 2023-12-21 NOTE — LETTER
"12/21/2023    Krish Bates MD  [address] [place] [id number]    RE: Audrey Ortega       Dear Colleague,     I had the pleasure of seeing Audrey Ortega in the [facility].  Audrey is a 88 year old who is being evaluated via a billable video visit.    Vitals - Patient Reported  Systolic (Patient Reported): 138  Diastolic (Patient Reported): 78  Weight (Patient Reported): 54.4 kg (120 lb)  Height (Patient Reported): 165.1 cm (5' 5\")  BMI (Based on Pt Reported Ht/Wt): 19.97  Pulse (Patient Reported): 76    Review Of Systems  Skin: red blotches on skin, itching  Eyes:Ears/Nose/Throat: NEGATIVE  Respiratory: NEGATIVE  Cardiovascular:  energy level good, reports taking amlodipine and lisinopril PRN due to low BP readings at home  Gastrointestinal: NEGATIVE  Genitourinary:NEGATIVE   Musculoskeletal: NEGATIVE  Neurologic: NEGATIVE  Psychiatric: difficulty sleeping  Hematologic/Lymphatic/Immunologic: NEGATIVE  Endocrine:  NEGATIVE    Johanna Garcia LPN    How would you like to obtain your AVS? Mail a copy  If the video visit is dropped, the invitation should be resent by: Text to cell phone: [phone]  Will anyone else be joining your video visit? No        Video-Visit Details    Type of service:  Video Visit   Video Start Time: 0739  Video End Time:0750    Originating Location (pt. Location): Home    Distant Location (provider location):  On-site  Platform used for Video Visit: [facility]      Electrophysiology Clinic Progress Note  Audrey Ortega MRN# [id number]   YOB: 1935 Age: 88 year old     Primary cardiologist: Dr. Jama    Reason for visit: PPM     History of presenting illness:    Audrey Ortega is a pleasant 88 year old patient with past medical history significant for:    Paroxysmal atrial fibrillation: previous declined AC (unable to afford DOACs and warfarin caused bilateral shoulder pain)  Sick sinus syndrome: S/p dual chamber PPM in 2011 ([employer])   Hypertension    Today " the patient returns for an attempted video visit that had to be changed to phone due to technical difficulties to discuss a permanent pacemaker generator change as her device reached KARLIE on 10/3/2023.  Historically Audrey has declined anticoagulation as DOACs were unaffordable and warfarin caused bilateral shoulder pain.  Her recent device check from 10/2023 showed that she was in mode switch 98% of the time with controlled rates.    We discussed the risks/benefits of the procedure were discussed in detail with the patient.  I explained there is an approximately 2-3% risk of serious complication.  Potential complication include but not limited to infection, pneumothorax, lead dislodgment requiring revision, DVT, cardiac perforation, bleeding and other unforeseen issues.    Her son will be driving her to [place] for the procedure.  Provided the device clinic phone number to the patient's if she has any questions moving forward.    Diagnotic studies:  Device check (10/2/2023): AP 18%,  30%. Mode switch 98.6% of the time. AF with CVR.   Echocardiogram (6/2023): LVEF 60-65% normal RV size and function. Severe biatrial enlargement. Moderate MR, moderate to moderate-severe 2-3 TR.              Assessment and Plan:     ASSESSMENT:    Sick sinus syndrome  S/p dual chamber PPM in 2011   Device reached KARLIE    Paroxysmal atrial fibrillation   [id number] score 4 (age++, gender, hypertension)  Continues to decline anticoagulation    Hypertension  BP well-controlled  Not utilizing scheduled medications and only taking amlodipine and lisinopril as needed.    Valvular disease  Echocardiogram (6/2023): LVEF 60-65% normal RV size and function. Severe biatrial enlargement. Moderate MR, moderate to moderate-severe 2-3 TR.     PLAN:     Proceed with generator change   Please check in at 8:30 am for a 11:00 am procedure on 12/29/2023.  Please have nothing to eat or drink after midnight.  Clear liquids up to 2 hours prior to check in.    Please shower the morning of the procedure, and then put on a clean shirt in order to help prevent infection.   Need post-procedure transportation and 24 hour post procedure monitoring post sedation  Asked pt to take temperature the morning of the procedure and call [facility] at [phone] if it is above 100.0  Please follow up with device clinic     Orders this Visit:  No orders of the defined types were placed in this encounter.    Orders Placed This Encounter   Medications    triamcinolone (KENALOG) 0.1 % external ointment     Sig: APPLY TO AFFECTED AREAS TWICE DAILY FOR 2 WEEKS     There are no discontinued medications.    Today's clinic visit entailed:  Review of the result(s) of each unique test - Echo, device check  20 minutes spent by me on the date of the encounter doing chart review, history and exam, documentation and further activities per the note  Provider  Link to [facility] Help Grid     The level of medical decision making during this visit was of moderate complexity.           Physical Exam:     General Appearance:  No distress, normal body habitus, upright.    ENT/Mouth:  Membranes moist, no nasal discharge or bleeding gums. Normal head shape, no evidence of injury or laceration.    EYES:  No scleral icterus, normal conjunctivae    Neurologic:  Normal arm motion bilateral, no tremors. No evidence of focal defect.    Psychiatric:  Alert and oriented x3, calm         Medications:     Current Outpatient Medications   Medication Sig Dispense Refill    amLODIPine (NORVASC) 5 MG tablet Take 1 tablet (5 mg) by mouth daily (Patient taking differently: Take 5 mg by mouth daily as needed) 90 tablet 1    Ascorbic Acid (VITAMIN C) 500 MG CAPS Take 500 mg by mouth daily      aspirin (ASA) 325 MG EC tablet Take 325 mg by mouth      calcium 600 MG tablet Take 1 tablet by mouth daily      COMPRESSION STOCKINGS 1 each daily 1 each 1    diphenoxylate-atropine (LOMOTIL) 2.5-0.025 MG tablet Take 1 tablet by mouth 4  times daily as needed for diarrhea 60 tablet 0    lisinopril (ZESTRIL) 10 MG tablet Take 1 tablet (10 mg) by mouth 2 times daily (Patient taking differently: Take 10 mg by mouth 2 times daily PRN) 180 tablet 3    magnesium 250 MG tablet Take 1 tablet by mouth daily      multivitamin w/minerals (THERA-VIT-M) tablet Take 1 tablet by mouth daily      triamcinolone (KENALOG) 0.1 % external ointment APPLY TO AFFECTED AREAS TWICE DAILY FOR 2 WEEKS      Vitamin D, Cholecalciferol, 25 MCG (1000 UT) TABS Take 1,000 Units by mouth daily      zolpidem (AMBIEN) 5 MG tablet Take 0.5 tablets (2.5 mg) by mouth nightly as needed 15 tablet 1       No family history on file.    Social History     Socioeconomic History    Marital status: Single     Spouse name: Not on file    Number of children: Not on file    Years of education: Not on file    Highest education level: Not on file   Occupational History    Not on file   Tobacco Use    Smoking status: Never    Smokeless tobacco: Never   Vaping Use    Vaping Use: Never used   Substance and Sexual Activity    Alcohol use: Not Currently    Drug use: Never    Sexual activity: Not Currently     Partners: Male   Other Topics Concern    Parent/sibling w/ CABG, MI or angioplasty before 65F 55M? No   Social History Narrative    Not on file     Social Determinants of Health     Financial Resource Strain: Not on file   Food Insecurity: Not on file   Transportation Needs: Not on file   Physical Activity: Not on file   Stress: Not on file   Social Connections: Not on file   Interpersonal Safety: Not on file   Housing Stability: Not on file            Past Medical History:   No past medical history on file.           Past Surgical History:     Past Surgical History:   Procedure Laterality Date    PHACOEMULSIFICATION WITH STANDARD INTRAOCULAR LENS IMPLANT Right 7/16/2020    Procedure: Cataract surgery with intraocular lens implant;  Surgeon: Fortino Ford MD;  Location:  OR     "PHACOEMULSIFICATION WITH STANDARD INTRAOCULAR LENS IMPLANT Left 7/30/2020    Procedure: Cataract Surgery Left Eye;  Surgeon: Max Cooley MD;  Location:  OR              Allergies:   Ciprofloxacin and Macrobid [nitrofurantoin]       Data:   Laboratory tests:    No lab results found.    Invalid input(s): \"CMP\", \"CBC\"    Lab Results   Component Value Date    WBC 10.5 04/20/2023    WBC 7.7 04/20/2020    RBC 4.31 04/20/2023    RBC 4.25 04/20/2020    HGB 12.5 04/20/2023    HGB 13.6 04/20/2020    HCT 39.0 04/20/2023    HCT 39.1 04/20/2020    MCV 91 04/20/2023    MCV 92 04/20/2020    MCH 29.0 04/20/2023    MCH 32.0 04/20/2020    MCHC 32.1 04/20/2023    MCHC 34.8 04/20/2020    RDW 14.4 04/20/2023    RDW 11.7 04/20/2020     04/20/2023     04/20/2020       Lab Results   Component Value Date     (L) 04/20/2023     (L) 05/05/2021    POTASSIUM 5.1 04/20/2023    POTASSIUM 4.9 09/15/2022    POTASSIUM 4.5 05/05/2021    CHLORIDE 93 (L) 04/20/2023    CHLORIDE 105 09/15/2022    CHLORIDE 97 05/05/2021    CO2 25 04/20/2023    CO2 26 09/15/2022    CO2 29 05/05/2021    ANIONGAP 12 04/20/2023    ANIONGAP 5 09/15/2022    ANIONGAP 6 05/05/2021    GLC 98 04/20/2023    GLC 88 09/15/2022    GLC 96 05/05/2021    BUN 20.4 04/20/2023    BUN 27 09/15/2022    BUN 31 (H) 05/05/2021    CR 1.00 (H) 04/20/2023    CR 1.20 (H) 05/05/2021    GFRESTIMATED 54 (L) 04/20/2023    GFRESTIMATED 41 (L) 05/05/2021    GFRESTBLACK 48 (L) 05/05/2021    HO 9.7 04/20/2023    HO 9.4 05/05/2021      Lab Results   Component Value Date    AST 25 01/23/2023    AST 17 05/05/2021    ALT 25 01/23/2023    ALT 22 05/05/2021       No results found for: \"A1C\"    No results found for: \"INR\"    ZA Chairez CNP  [facility]  Pager: [phone]  RN phone: [phone]      Thank you for allowing me to participate in the care of your patient.      Sincerely,     ZA Chairez CNP     [facility] " [facility]  cc:   Sae Beth MD  [address]  [place] [id number]

## 2023-12-21 NOTE — PROGRESS NOTES
"Audrey is a 88 year old who is being evaluated via a billable video visit.    Vitals - Patient Reported  Systolic (Patient Reported): 138  Diastolic (Patient Reported): 78  Weight (Patient Reported): 54.4 kg (120 lb)  Height (Patient Reported): 165.1 cm (5' 5\")  BMI (Based on Pt Reported Ht/Wt): 19.97  Pulse (Patient Reported): 76    Review Of Systems  Skin: red blotches on skin, itching  Eyes:Ears/Nose/Throat: NEGATIVE  Respiratory: NEGATIVE  Cardiovascular:  energy level good, reports taking amlodipine and lisinopril PRN due to low BP readings at home  Gastrointestinal: NEGATIVE  Genitourinary:NEGATIVE   Musculoskeletal: NEGATIVE  Neurologic: NEGATIVE  Psychiatric: difficulty sleeping  Hematologic/Lymphatic/Immunologic: NEGATIVE  Endocrine:  NEGATIVE    Johanna Garcia LPN    How would you like to obtain your AVS? Mail a copy  If the video visit is dropped, the invitation should be resent by: Text to cell phone: [phone]  Will anyone else be joining your video visit? No        Video-Visit Details    Type of service:  Video Visit   Video Start Time: 0739  Video End Time:0750    Originating Location (pt. Location): Home    Distant Location (provider location):  On-site  Platform used for Video Visit: [facility]      Electrophysiology Clinic Progress Note  Audrey Ortgea MRN# [id number]   YOB: 1935 Age: 88 year old     Primary cardiologist: Dr. Jama    Reason for visit: PPM     History of presenting illness:    Audrey Ortega is a pleasant 88 year old patient with past medical history significant for:    Paroxysmal atrial fibrillation: previous declined AC (unable to afford DOACs and warfarin caused bilateral shoulder pain)  Sick sinus syndrome: S/p dual chamber PPM in 2011 ([employer]tr[employer])   Hypertension    Today the patient returns for an attempted video visit that had to be changed to phone due to technical difficulties to discuss a permanent pacemaker generator change as her device reached KARLIE on " 10/3/2023.  Historically Audrey has declined anticoagulation as DOACs were unaffordable and warfarin caused bilateral shoulder pain.  Her recent device check from 10/2023 showed that she was in mode switch 98% of the time with controlled rates.    We discussed the risks/benefits of the procedure were discussed in detail with the patient.  I explained there is an approximately 2-3% risk of serious complication.  Potential complication include but not limited to infection, pneumothorax, lead dislodgment requiring revision, DVT, cardiac perforation, bleeding and other unforeseen issues.    Her son will be driving her to [place]: [place] for the procedure.  Provided the device clinic phone number to the patient's if she has any questions moving forward.    Diagnotic studies:  Device check (10/2/2023): AP 18%,  30%. Mode switch 98.6% of the time. AF with CVR.   Echocardiogram (6/2023): LVEF 60-65% normal RV size and function. Severe biatrial enlargement. Moderate MR, moderate to moderate-severe 2-3 TR.              Assessment and Plan:     ASSESSMENT:    Sick sinus syndrome  S/p dual chamber PPM in 2011   Device reached KARLIE    Paroxysmal atrial fibrillation   [id number] score 4 (age++, gender, hypertension)  Continues to decline anticoagulation    Hypertension  BP well-controlled  Not utilizing scheduled medications and only taking amlodipine and lisinopril as needed.    Valvular disease  Echocardiogram (6/2023): LVEF 60-65% normal RV size and function. Severe biatrial enlargement. Moderate MR, moderate to moderate-severe 2-3 TR.     PLAN:     Proceed with generator change   Please check in at 8:30 am for a 11:00 am procedure on 12/29/2023.  Please have nothing to eat or drink after midnight.  Clear liquids up to 2 hours prior to check in.   Please shower the morning of the procedure, and then put on a clean shirt in order to help prevent infection.   Need post-procedure transportation and 24 hour post procedure monitoring  post sedation  Asked pt to take temperature the morning of the procedure and call [facility] at [phone] if it is above 100.0  Please follow up with device clinic     Orders this Visit:  No orders of the defined types were placed in this encounter.    Orders Placed This Encounter   Medications    triamcinolone (KENALOG) 0.1 % external ointment     Sig: APPLY TO AFFECTED AREAS TWICE DAILY FOR 2 WEEKS     There are no discontinued medications.    Today's clinic visit entailed:  Review of the result(s) of each unique test - Echo, device check  20 minutes spent by me on the date of the encounter doing chart review, history and exam, documentation and further activities per the note  Provider  Link to MDM Help Grid     The level of medical decision making during this visit was of moderate complexity.           Physical Exam:     General Appearance:  No distress, normal body habitus, upright.    ENT/Mouth:  Membranes moist, no nasal discharge or bleeding gums. Normal head shape, no evidence of injury or laceration.    EYES:  No scleral icterus, normal conjunctivae    Neurologic:  Normal arm motion bilateral, no tremors. No evidence of focal defect.    Psychiatric:  Alert and oriented x3, calm         Medications:     Current Outpatient Medications   Medication Sig Dispense Refill    amLODIPine (NORVASC) 5 MG tablet Take 1 tablet (5 mg) by mouth daily (Patient taking differently: Take 5 mg by mouth daily as needed) 90 tablet 1    Ascorbic Acid (VITAMIN C) 500 MG CAPS Take 500 mg by mouth daily      aspirin (ASA) 325 MG EC tablet Take 325 mg by mouth      calcium 600 MG tablet Take 1 tablet by mouth daily      COMPRESSION STOCKINGS 1 each daily 1 each 1    diphenoxylate-atropine (LOMOTIL) 2.5-0.025 MG tablet Take 1 tablet by mouth 4 times daily as needed for diarrhea 60 tablet 0    lisinopril (ZESTRIL) 10 MG tablet Take 1 tablet (10 mg) by mouth 2 times daily (Patient taking differently: Take 10 mg by mouth 2 times  daily PRN) 180 tablet 3    magnesium 250 MG tablet Take 1 tablet by mouth daily      multivitamin w/minerals (THERA-VIT-M) tablet Take 1 tablet by mouth daily      triamcinolone (KENALOG) 0.1 % external ointment APPLY TO AFFECTED AREAS TWICE DAILY FOR 2 WEEKS      Vitamin D, Cholecalciferol, 25 MCG (1000 UT) TABS Take 1,000 Units by mouth daily      zolpidem (AMBIEN) 5 MG tablet Take 0.5 tablets (2.5 mg) by mouth nightly as needed 15 tablet 1       No family history on file.    Social History     Socioeconomic History    Marital status: Single     Spouse name: Not on file    Number of children: Not on file    Years of education: Not on file    Highest education level: Not on file   Occupational History    Not on file   Tobacco Use    Smoking status: Never    Smokeless tobacco: Never   Vaping Use    Vaping Use: Never used   Substance and Sexual Activity    Alcohol use: Not Currently    Drug use: Never    Sexual activity: Not Currently     Partners: Male   Other Topics Concern    Parent/sibling w/ CABG, MI or angioplasty before 65F 55M? No   Social History Narrative    Not on file     Social Determinants of Health     Financial Resource Strain: Not on file   Food Insecurity: Not on file   Transportation Needs: Not on file   Physical Activity: Not on file   Stress: Not on file   Social Connections: Not on file   Interpersonal Safety: Not on file   Housing Stability: Not on file            Past Medical History:   No past medical history on file.           Past Surgical History:     Past Surgical History:   Procedure Laterality Date    PHACOEMULSIFICATION WITH STANDARD INTRAOCULAR LENS IMPLANT Right 7/16/2020    Procedure: Cataract surgery with intraocular lens implant;  Surgeon: Fortino Ford MD;  Location: [facility] OR    PHACOEMULSIFICATION WITH STANDARD INTRAOCULAR LENS IMPLANT Left 7/30/2020    Procedure: Cataract Surgery Left Eye;  Surgeon: Max Cooley MD;  Location: [facility] OR              Allergies:  "  Ciprofloxacin and Macrobid [nitrofurantoin]       Data:   Laboratory tests:    No lab results found.    Invalid input(s): \"CMP\", \"CBC\"    Lab Results   Component Value Date    WBC 10.5 04/20/2023    WBC 7.7 04/20/2020    RBC 4.31 04/20/2023    RBC 4.25 04/20/2020    HGB 12.5 04/20/2023    HGB 13.6 04/20/2020    HCT 39.0 04/20/2023    HCT 39.1 04/20/2020    MCV 91 04/20/2023    MCV 92 04/20/2020    MCH 29.0 04/20/2023    MCH 32.0 04/20/2020    MCHC 32.1 04/20/2023    MCHC 34.8 04/20/2020    RDW 14.4 04/20/2023    RDW 11.7 04/20/2020     04/20/2023     04/20/2020       Lab Results   Component Value Date     (L) 04/20/2023     (L) 05/05/2021    POTASSIUM 5.1 04/20/2023    POTASSIUM 4.9 09/15/2022    POTASSIUM 4.5 05/05/2021    CHLORIDE 93 (L) 04/20/2023    CHLORIDE 105 09/15/2022    CHLORIDE 97 05/05/2021    CO2 25 04/20/2023    CO2 26 09/15/2022    CO2 29 05/05/2021    ANIONGAP 12 04/20/2023    ANIONGAP 5 09/15/2022    ANIONGAP 6 05/05/2021    GLC 98 04/20/2023    GLC 88 09/15/2022    GLC 96 05/05/2021    BUN 20.4 04/20/2023    BUN 27 09/15/2022    BUN 31 (H) 05/05/2021    CR 1.00 (H) 04/20/2023    CR 1.20 (H) 05/05/2021    GFRESTIMATED 54 (L) 04/20/2023    GFRESTIMATED 41 (L) 05/05/2021    GFRESTBLACK 48 (L) 05/05/2021    HO 9.7 04/20/2023    HO 9.4 05/05/2021      Lab Results   Component Value Date    AST 25 01/23/2023    AST 17 05/05/2021    ALT 25 01/23/2023    ALT 22 05/05/2021       No results found for: \"A1C\"    No results found for: \"INR\"    ZA Chairez [facility]  Pager: [phone]  RN phone: [phone]  "

## 2023-12-21 NOTE — PROGRESS NOTES
Left non-detailed voicemail requesting a call back to Device RN line to go over instructions listed below.  ________________________________________________    Pre-procedure instructions for PPM gen change on 12/29/23 at 11:00am:    Anticoagulation: N/A   Oral diabetes meds: N/A  Insulin: N/A  SGLT2 Inhibitors: N/A  - Invokana (canagliflozin), Farxiga (dapagliflozin), Jardiance (empagliflozin), Steglatro (ertugliflozin)  - hold 3-4 days prior to procedure  GLP-1 Agonists: N/A  - Byetta (exenitide), Victoza (liraglutide), Ozempic (semaglutide), Trulicity (dulaglutide), Mounjaro (tirzepatide), Bydureon (Exenatide ER), Adlyxin (Lixisendatide)   - (Weekly dosing, hold GLP-1 agonists 7 days before procedure)  - (Daily or BID dosing, hold GLP-1 agonists day before and day of procedure)  - (Oral semaglutide, hold 7 days before procedure due to long half-life)  Diuretic: N/A  Contrast allergy: N/A    NPO 8 hours prior to arrival time (12:30am)  May have clear liquids 2 hours prior to arrival time (6:30am)    -Shower the morning of the procedure, and then put on a clean shirt in order to help prevent infection.     -Post-procedure transportation and 24 hours monitoring set up. Please call before coming in if plans change.  With limited bed availability due to COVID, overnight hospital stays will be allowed for clinical reasons only.    -No driving for 24 hours post procedure due to sedation.     -Take temperature the morning of the procedure and call [facility] at [phone] if it is above 100.0.  Also call [facility] if pt has any new cold symptoms evening prior or AM of procedure.     -Review arrival time (8:30am) and location.     Pt verbalized understanding of instructions.     LUISA Rodriguez

## 2023-12-21 NOTE — PATIENT INSTRUCTIONS
Today's Recommendations    Please check in at 8:30 am for a 11:00 am procedure on 12/29/2023.  Please have nothing to eat or drink after midnight.  Clear liquids up to 2 hours prior to check in.   Please shower the morning of the procedure, and then put on a clean shirt in order to help prevent infection.   Need post-procedure transportation and 24 hour post procedure monitoring post sedation  Asked pt to take temperature the morning of the procedure and call [facility] at [phone] if it is above 100.0  Please follow up with device clinic    Please send [employer] message or call [phone] to the RN team with questions or concerns.     Scheduling and after hours number [phone]    ZA Keyes, CNP

## 2023-12-26 NOTE — PROGRESS NOTES
Called and discussed all patient pre procedure instructions. Pt verb understanding. Questions answered.  Calli MORAN

## 2023-12-29 ENCOUNTER — HOSPITAL ENCOUNTER (OUTPATIENT)
Facility: CLINIC | Age: 88
Discharge: HOME | End: 2023-12-29
Admitting: INTERNAL MEDICINE
Payer: MEDICARE

## 2023-12-29 VITALS
SYSTOLIC BLOOD PRESSURE: 174 MMHG | TEMPERATURE: 98 F | WEIGHT: 126 LBS | RESPIRATION RATE: 16 BRPM | BODY MASS INDEX: 20.99 KG/M2 | OXYGEN SATURATION: 100 % | DIASTOLIC BLOOD PRESSURE: 95 MMHG | HEIGHT: 65 IN | HEART RATE: 61 BPM

## 2023-12-29 DIAGNOSIS — Z95.0 CARDIAC PACEMAKER IN SITU: ICD-10-CM

## 2023-12-29 DIAGNOSIS — I49.5 SICK SINUS SYNDROME (H): ICD-10-CM

## 2023-12-29 DIAGNOSIS — Z45.010 ELECTIVE REPLACEMENT INDICATED FOR CARDIAC PACEMAKER BATTERY AT END OF LIFESPAN: ICD-10-CM

## 2023-12-29 LAB
ANION GAP SERPL CALCULATED.3IONS-SCNC: 6 MMOL/L (ref 7–15)
BUN SERPL-MCNC: 18.5 MG/DL (ref 8–23)
CALCIUM SERPL-MCNC: 9.6 MG/DL (ref 8.8–10.2)
CHLORIDE SERPL-SCNC: 97 MMOL/L (ref 98–107)
CREAT SERPL-MCNC: 1.24 MG/DL (ref 0.51–0.95)
DEPRECATED HCO3 PLAS-SCNC: 28 MMOL/L (ref 22–29)
EGFRCR SERPLBLD CKD-EPI 2021: 42 ML/MIN/1.73M2
ERYTHROCYTE [DISTWIDTH] IN BLOOD BY AUTOMATED COUNT: 12.5 % (ref 10–15)
GLUCOSE SERPL-MCNC: 93 MG/DL (ref 70–99)
HCT VFR BLD AUTO: 41 % (ref 35–47)
HGB BLD-MCNC: 14.1 G/DL (ref 11.7–15.7)
MCH RBC QN AUTO: 32 PG (ref 26.5–33)
MCHC RBC AUTO-ENTMCNC: 34.4 G/DL (ref 31.5–36.5)
MCV RBC AUTO: 93 FL (ref 78–100)
PLATELET # BLD AUTO: 191 10E3/UL (ref 150–450)
POTASSIUM SERPL-SCNC: 4.5 MMOL/L (ref 3.4–5.3)
RBC # BLD AUTO: 4.41 10E6/UL (ref 3.8–5.2)
SODIUM SERPL-SCNC: 131 MMOL/L (ref 135–145)
WBC # BLD AUTO: 7.7 10E3/UL (ref 4–11)

## 2023-12-29 PROCEDURE — 99152 MOD SED SAME PHYS/QHP 5/>YRS: CPT | Performed by: INTERNAL MEDICINE

## 2023-12-29 PROCEDURE — 80048 BASIC METABOLIC PNL TOTAL CA: CPT | Performed by: INTERNAL MEDICINE

## 2023-12-29 PROCEDURE — 258N000002 HC RX IP 258 OP 250: Performed by: INTERNAL MEDICINE

## 2023-12-29 PROCEDURE — 250N000011 HC RX IP 250 OP 636: Performed by: INTERNAL MEDICINE

## 2023-12-29 PROCEDURE — 250N000009 HC RX 250: Performed by: INTERNAL MEDICINE

## 2023-12-29 PROCEDURE — 999N000071 HC STATISTIC HEART CATH LAB OR EP LAB

## 2023-12-29 PROCEDURE — 33213 INSERT PULSE GEN DUAL LEADS: CPT | Performed by: INTERNAL MEDICINE

## 2023-12-29 PROCEDURE — 36415 COLL VENOUS BLD VENIPUNCTURE: CPT | Performed by: INTERNAL MEDICINE

## 2023-12-29 PROCEDURE — C1785 PMKR, DUAL, RATE-RESP: HCPCS | Performed by: INTERNAL MEDICINE

## 2023-12-29 PROCEDURE — 272N000001 HC OR GENERAL SUPPLY STERILE: Performed by: INTERNAL MEDICINE

## 2023-12-29 PROCEDURE — 85014 HEMATOCRIT: CPT | Performed by: INTERNAL MEDICINE

## 2023-12-29 PROCEDURE — 33228 REMV&REPLC PM GEN DUAL LEAD: CPT | Performed by: INTERNAL MEDICINE

## 2023-12-29 DEVICE — PACEMAKER AZURE MRI XT DR: Type: IMPLANTABLE DEVICE | Status: FUNCTIONAL

## 2023-12-29 RX ORDER — BUPIVACAINE HYDROCHLORIDE 2.5 MG/ML
INJECTION, SOLUTION EPIDURAL; INFILTRATION; INTRACAUDAL
Status: DISCONTINUED | OUTPATIENT
Start: 2023-12-29 | End: 2023-12-29 | Stop reason: HOSPADM

## 2023-12-29 RX ORDER — OXYCODONE AND ACETAMINOPHEN 5; 325 MG/1; MG/1
1 TABLET ORAL EVERY 4 HOURS PRN
Status: DISCONTINUED | OUTPATIENT
Start: 2023-12-29 | End: 2023-12-29 | Stop reason: HOSPADM

## 2023-12-29 RX ORDER — LIDOCAINE 40 MG/G
CREAM TOPICAL
Status: DISCONTINUED | OUTPATIENT
Start: 2023-12-29 | End: 2023-12-29 | Stop reason: HOSPADM

## 2023-12-29 RX ORDER — FENTANYL CITRATE 50 UG/ML
INJECTION, SOLUTION INTRAMUSCULAR; INTRAVENOUS
Status: DISCONTINUED | OUTPATIENT
Start: 2023-12-29 | End: 2023-12-29 | Stop reason: HOSPADM

## 2023-12-29 RX ORDER — NALOXONE HYDROCHLORIDE 0.4 MG/ML
0.2 INJECTION, SOLUTION INTRAMUSCULAR; INTRAVENOUS; SUBCUTANEOUS
Status: DISCONTINUED | OUTPATIENT
Start: 2023-12-29 | End: 2023-12-29 | Stop reason: HOSPADM

## 2023-12-29 RX ORDER — SODIUM CHLORIDE 450 MG/100ML
INJECTION, SOLUTION INTRAVENOUS CONTINUOUS
Status: DISCONTINUED | OUTPATIENT
Start: 2023-12-29 | End: 2023-12-29 | Stop reason: HOSPADM

## 2023-12-29 RX ORDER — NALOXONE HYDROCHLORIDE 0.4 MG/ML
0.4 INJECTION, SOLUTION INTRAMUSCULAR; INTRAVENOUS; SUBCUTANEOUS
Status: DISCONTINUED | OUTPATIENT
Start: 2023-12-29 | End: 2023-12-29 | Stop reason: HOSPADM

## 2023-12-29 RX ORDER — CEFAZOLIN SODIUM 2 G/100ML
2 INJECTION, SOLUTION INTRAVENOUS
Status: COMPLETED | OUTPATIENT
Start: 2023-12-29 | End: 2023-12-29

## 2023-12-29 RX ADMIN — CEFAZOLIN SODIUM 2 G: 2 INJECTION, SOLUTION INTRAVENOUS at 10:28

## 2023-12-29 RX ADMIN — SODIUM CHLORIDE: 4.5 INJECTION, SOLUTION INTRAVENOUS at 09:49

## 2023-12-29 ASSESSMENT — ACTIVITIES OF DAILY LIVING (ADL)
ADLS_ACUITY_SCORE: 35
ADLS_ACUITY_SCORE: 35

## 2023-12-29 NOTE — Clinical Note
Hemodynamic equipment used: 5 lead ECG, [employer] With 3 Leads, Machine BP Cuff and pulse oximeter probe.

## 2023-12-29 NOTE — PROGRESS NOTES
[facility] Admission Nursing Note    Patient Information  Name: Audrey Ortega  Age: 88 year old  Reason for admission: ppm gen change  [facility] arrival time: 0830    Visitor Information  Name: Rio     Patient Admission/Assessment   Pre-procedure assessment complete: Yes  If abnormal assessment/labs, provider notified: N/A  NPO: Yes  Medications held per instructions/orders: N/A  Consent: deferred to MD  Patient oriented to room: Yes  Education/questions answered: Yes  Plan/other: Proceed as planned per orders    Discharge Planning  Discharge name/phone number: Rio [phone]  Overnight post sedation caregiver: Rio  Discharge location: home    Jaqueline Diaz RN

## 2023-12-29 NOTE — PROGRESS NOTES
[facility] Post Procedure Note    Patient Information  Name: Audrey Ortega  Age: 88 year old    Post Procedure  Time patient returned to [facility]: 1115  Concerns/abnormal assessment: none at this time  If abnormal assessment, provider notified: N/A  Plan/Other: Proceed as planned per orders    Jaqueline Diaz, LUISA

## 2023-12-29 NOTE — DISCHARGE INSTRUCTIONS
Pacemaker/ICD Generator Change Discharge Instructions    After you go home:    Have an adult stay with you until tomorrow.  You may resume your normal diet.       For 24 hours - due to the sedation you received:  Relax and take it easy.  Do NOT make any important or legal decisions.  Do NOT drive or operate machines at home or at work.  Do NOT drink alcohol.    Care of Chest Incision:    Keep the bandage on at least 3 days. You may remove the dressing on Tuesday 1/2/24. Change it only if it gets loose or soaked. If you need to change it, use 4x4-inch gauze and a large clear bandage.   If there is a pressure dressing (gauze & tape) - 24 hours after your procedure you may remove ONLY the top dressing. Leave the bottom dressing on.  Leave the strips of tape on. They will fall off on their own, or we will remove them at your first check-up.  Check your wound daily for signs of infection, such as increased redness, severe swelling or draining. Fever may also be a sign of infection. Call us if you see any of these signs.  If there are no signs of infection, you may shower after the bandage comes off in 3 days. If you take a tub bath, keep the wound dry.  No soaking the incision (swimming pool, bathtub, hot tub) for 2 weeks.  You may have mild to medium pain for 3 to 5 days. Take Acetaminophen (Tylenol) or Ibuprofen (Advil) for the pain. If the pain persists or is severe, call us.    Activity:    You can begin to use your arm as it feels comfortable to you.  No driving for one day & limit to necessary driving for one week.    Bleeding:    If you start bleeding from the incision site, sit down and press firmly on the site for 10 minutes.   Once bleeding stops, call [facility] as soon as you can.       Call 911 right away if you have heavy bleeding or bleeding that does not stop.      Medicines:    Take your medications, including blood thinners, unless your provider tells you not to.  If you have stopped any other  medicines, check with your provider about when to restart them.    Follow Up Appointments:    Follow up with Device Clinic at [facility] of patient preference in 7-10 days.    Call the clinic if:    You have a large or growing hard lump around the site.  The site is red, swollen, hot or tender.  Blood or fluid is draining from the site.  You have chills or a fever greater than 101 F (38 C).  You feel dizzy or light-headed.  Questions or concerns.      Telling others about your device:    Before you leave the hospital, you will receive a temporary ID card. A permanent card will be mailed to you about 6 to 8 weeks later. Always carry the ID card with you. It has important details about your device.  You may also get a Medical Alert bracelet or tag that says you have a pacemaker or a defibrillator (ICD).  Go to [email or web address].   Always tell doctors, dentists and other care providers that you have a device implanted in you.  Let us know before you plan any surgeries. Your care team must take special steps to keep you safe during certain procedures. These steps will depend on the type of device you have. Your provider will need to see your ID card. They may need to call us for instructions.    Device Safety:    Please refer to device  s booklet for further information.        [facility] [place]:    [phone] [facility] (7 days a week)

## 2023-12-29 NOTE — PROGRESS NOTES
[facility] Discharge Nursing Note    Patient Information  Name: Audrey Ortega  Age: 88 year old    Discharge Education:  Discharge instructions reviewed: Yes  Additional education/resources provided: [employer] rep provided education and device materials to pt  Patient/patient representative verbalizes understanding: Yes  Patient discharging on new medications: No  Medication education completed: N/A    Discharge Plans:   Discharge location: home  Discharge ride contacted: Yes  Approximate discharge time: 1215    Discharge Criteria:  Discharge criteria met and vital signs stable: Yes    Patient Belongs:  Patient belongings returned to patient: Yes    Jaqueline Diaz RN

## 2024-01-02 ENCOUNTER — TELEPHONE (OUTPATIENT)
Dept: CARDIOLOGY | Facility: CLINIC | Age: 89
End: 2024-01-02
Payer: COMMERCIAL

## 2024-01-02 DIAGNOSIS — I49.5 SICK SINUS SYNDROME (H): Primary | ICD-10-CM

## 2024-01-02 DIAGNOSIS — Z95.0 CARDIAC PACEMAKER IN SITU: ICD-10-CM

## 2024-01-02 DIAGNOSIS — Z45.010 ELECTIVE REPLACEMENT INDICATED FOR CARDIAC PACEMAKER BATTERY AT END OF LIFESPAN: ICD-10-CM

## 2024-01-02 NOTE — TELEPHONE ENCOUNTER
Post device implant discharge phone call.    Reviewed the following:  - Remove outer dressing 3 days after implant. May shower after outer dressing removed.   - Leave steri-strips in place, will be removed at 1 week device check  -Watch for redness, drainage, warmth, or fever. Call device clinic if any signs of infection.     1 week device check scheduled: 1/11/2024 @ 1:00 [place]    Pt states understanding of all instructions.    Calli MORAN

## 2024-01-02 NOTE — TELEPHONE ENCOUNTER
VM received from patient:  pt would like a call back concerning the bandage on her PPM site    Called patient back: Reviewed bandage and steristrips. Instructed we will remove the steri strips at her 1 week visit.   Calli MORAN

## 2024-01-04 DIAGNOSIS — I10 BENIGN ESSENTIAL HYPERTENSION: ICD-10-CM

## 2024-01-05 RX ORDER — AMLODIPINE BESYLATE 5 MG/1
5 TABLET ORAL DAILY PRN
Qty: 90 TABLET | Refills: 1 | Status: SHIPPED | OUTPATIENT
Start: 2024-01-05 | End: 2024-04-29

## 2024-01-11 ENCOUNTER — ANCILLARY PROCEDURE (OUTPATIENT)
Dept: CARDIOLOGY | Facility: CLINIC | Age: 89
End: 2024-01-11
Attending: INTERNAL MEDICINE
Payer: COMMERCIAL

## 2024-01-11 DIAGNOSIS — I49.5 SICK SINUS SYNDROME (H): ICD-10-CM

## 2024-01-11 DIAGNOSIS — Z45.010 ELECTIVE REPLACEMENT INDICATED FOR CARDIAC PACEMAKER BATTERY AT END OF LIFESPAN: ICD-10-CM

## 2024-01-11 DIAGNOSIS — Z95.0 CARDIAC PACEMAKER IN SITU: ICD-10-CM

## 2024-01-11 PROCEDURE — 93280 PM DEVICE PROGR EVAL DUAL: CPT | Performed by: INTERNAL MEDICINE

## 2024-01-15 LAB
MDC_IDC_EPISODE_DTM: NORMAL
MDC_IDC_EPISODE_DURATION: 1006 S
MDC_IDC_EPISODE_DURATION: 1109 S
MDC_IDC_EPISODE_DURATION: 1393 S
MDC_IDC_EPISODE_DURATION: 1425 S
MDC_IDC_EPISODE_DURATION: 1528 S
MDC_IDC_EPISODE_DURATION: 1624 S
MDC_IDC_EPISODE_DURATION: 181 S
MDC_IDC_EPISODE_DURATION: 182 S
MDC_IDC_EPISODE_DURATION: 182 S
MDC_IDC_EPISODE_DURATION: 193 S
MDC_IDC_EPISODE_DURATION: 198 S
MDC_IDC_EPISODE_DURATION: 204 S
MDC_IDC_EPISODE_DURATION: 206 S
MDC_IDC_EPISODE_DURATION: 221 S
MDC_IDC_EPISODE_DURATION: 2218 S
MDC_IDC_EPISODE_DURATION: 225 S
MDC_IDC_EPISODE_DURATION: 227 S
MDC_IDC_EPISODE_DURATION: 228 S
MDC_IDC_EPISODE_DURATION: 228 S
MDC_IDC_EPISODE_DURATION: 229 S
MDC_IDC_EPISODE_DURATION: 233 S
MDC_IDC_EPISODE_DURATION: 2476 S
MDC_IDC_EPISODE_DURATION: 252 S
MDC_IDC_EPISODE_DURATION: 260 S
MDC_IDC_EPISODE_DURATION: 272 S
MDC_IDC_EPISODE_DURATION: 2912 S
MDC_IDC_EPISODE_DURATION: 315 S
MDC_IDC_EPISODE_DURATION: 339 S
MDC_IDC_EPISODE_DURATION: 341 S
MDC_IDC_EPISODE_DURATION: 3421 S
MDC_IDC_EPISODE_DURATION: 355 S
MDC_IDC_EPISODE_DURATION: 3552 S
MDC_IDC_EPISODE_DURATION: 358 S
MDC_IDC_EPISODE_DURATION: 390 S
MDC_IDC_EPISODE_DURATION: 410 S
MDC_IDC_EPISODE_DURATION: 412 S
MDC_IDC_EPISODE_DURATION: 437 S
MDC_IDC_EPISODE_DURATION: 48 S
MDC_IDC_EPISODE_DURATION: 497 S
MDC_IDC_EPISODE_DURATION: 550 S
MDC_IDC_EPISODE_DURATION: 650 S
MDC_IDC_EPISODE_DURATION: 679 S
MDC_IDC_EPISODE_DURATION: 721 S
MDC_IDC_EPISODE_DURATION: 761 S
MDC_IDC_EPISODE_DURATION: NORMAL S
MDC_IDC_EPISODE_ID: 10
MDC_IDC_EPISODE_ID: 11
MDC_IDC_EPISODE_ID: 12
MDC_IDC_EPISODE_ID: 13
MDC_IDC_EPISODE_ID: 14
MDC_IDC_EPISODE_ID: 15
MDC_IDC_EPISODE_ID: 16
MDC_IDC_EPISODE_ID: 17
MDC_IDC_EPISODE_ID: 18
MDC_IDC_EPISODE_ID: 19
MDC_IDC_EPISODE_ID: 20
MDC_IDC_EPISODE_ID: 21
MDC_IDC_EPISODE_ID: 22
MDC_IDC_EPISODE_ID: 23
MDC_IDC_EPISODE_ID: 24
MDC_IDC_EPISODE_ID: 25
MDC_IDC_EPISODE_ID: 26
MDC_IDC_EPISODE_ID: 27
MDC_IDC_EPISODE_ID: 28
MDC_IDC_EPISODE_ID: 29
MDC_IDC_EPISODE_ID: 30
MDC_IDC_EPISODE_ID: 31
MDC_IDC_EPISODE_ID: 32
MDC_IDC_EPISODE_ID: 33
MDC_IDC_EPISODE_ID: 34
MDC_IDC_EPISODE_ID: 35
MDC_IDC_EPISODE_ID: 36
MDC_IDC_EPISODE_ID: 37
MDC_IDC_EPISODE_ID: 38
MDC_IDC_EPISODE_ID: 39
MDC_IDC_EPISODE_ID: 40
MDC_IDC_EPISODE_ID: 41
MDC_IDC_EPISODE_ID: 42
MDC_IDC_EPISODE_ID: 43
MDC_IDC_EPISODE_ID: 44
MDC_IDC_EPISODE_ID: 45
MDC_IDC_EPISODE_ID: 46
MDC_IDC_EPISODE_ID: 47
MDC_IDC_EPISODE_ID: 48
MDC_IDC_EPISODE_ID: 49
MDC_IDC_EPISODE_ID: 5
MDC_IDC_EPISODE_ID: 50
MDC_IDC_EPISODE_ID: 51
MDC_IDC_EPISODE_ID: 52
MDC_IDC_EPISODE_ID: 53
MDC_IDC_EPISODE_ID: 54
MDC_IDC_EPISODE_ID: 6
MDC_IDC_EPISODE_ID: 7
MDC_IDC_EPISODE_ID: 8
MDC_IDC_EPISODE_ID: 9
MDC_IDC_EPISODE_TYPE: NORMAL
MDC_IDC_EPISODE_TYPE_INDUCED: NO
MDC_IDC_LEAD_CONNECTION_STATUS: NORMAL
MDC_IDC_LEAD_CONNECTION_STATUS: NORMAL
MDC_IDC_LEAD_IMPLANT_DT: NORMAL
MDC_IDC_LEAD_IMPLANT_DT: NORMAL
MDC_IDC_LEAD_LOCATION: NORMAL
MDC_IDC_LEAD_LOCATION: NORMAL
MDC_IDC_LEAD_LOCATION_DETAIL_1: NORMAL
MDC_IDC_LEAD_LOCATION_DETAIL_1: NORMAL
MDC_IDC_LEAD_MFG: NORMAL
MDC_IDC_LEAD_MFG: NORMAL
MDC_IDC_LEAD_MODEL: NORMAL
MDC_IDC_LEAD_MODEL: NORMAL
MDC_IDC_LEAD_POLARITY_TYPE: NORMAL
MDC_IDC_LEAD_POLARITY_TYPE: NORMAL
MDC_IDC_LEAD_SERIAL: NORMAL
MDC_IDC_LEAD_SERIAL: NORMAL
MDC_IDC_MSMT_BATTERY_DTM: NORMAL
MDC_IDC_MSMT_BATTERY_REMAINING_LONGEVITY: 173 MO
MDC_IDC_MSMT_BATTERY_RRT_TRIGGER: 2.62
MDC_IDC_MSMT_BATTERY_STATUS: NORMAL
MDC_IDC_MSMT_BATTERY_VOLTAGE: 3.23 V
MDC_IDC_MSMT_LEADCHNL_RA_IMPEDANCE_VALUE: 304 OHM
MDC_IDC_MSMT_LEADCHNL_RA_IMPEDANCE_VALUE: 380 OHM
MDC_IDC_MSMT_LEADCHNL_RA_PACING_THRESHOLD_AMPLITUDE: 0.5 V
MDC_IDC_MSMT_LEADCHNL_RA_PACING_THRESHOLD_PULSEWIDTH: 0.4 MS
MDC_IDC_MSMT_LEADCHNL_RA_SENSING_INTR_AMPL: 2.62 MV
MDC_IDC_MSMT_LEADCHNL_RA_SENSING_INTR_AMPL: 3.75 MV
MDC_IDC_MSMT_LEADCHNL_RV_IMPEDANCE_VALUE: 380 OHM
MDC_IDC_MSMT_LEADCHNL_RV_IMPEDANCE_VALUE: 456 OHM
MDC_IDC_MSMT_LEADCHNL_RV_PACING_THRESHOLD_AMPLITUDE: 0.62 V
MDC_IDC_MSMT_LEADCHNL_RV_PACING_THRESHOLD_PULSEWIDTH: 0.4 MS
MDC_IDC_MSMT_LEADCHNL_RV_SENSING_INTR_AMPL: 2.5 MV
MDC_IDC_MSMT_LEADCHNL_RV_SENSING_INTR_AMPL: 7.88 MV
MDC_IDC_PG_IMPLANT_DTM: NORMAL
MDC_IDC_PG_MFG: NORMAL
MDC_IDC_PG_MODEL: NORMAL
MDC_IDC_PG_SERIAL: NORMAL
MDC_IDC_PG_TYPE: NORMAL
MDC_IDC_SESS_CLINIC_NAME: NORMAL
MDC_IDC_SESS_DTM: NORMAL
MDC_IDC_SESS_TYPE: NORMAL
MDC_IDC_SET_BRADY_AT_MODE_SWITCH_RATE: 171 {BEATS}/MIN
MDC_IDC_SET_BRADY_HYSTRATE: NORMAL
MDC_IDC_SET_BRADY_LOWRATE: 60 {BEATS}/MIN
MDC_IDC_SET_BRADY_MAX_SENSOR_RATE: 130 {BEATS}/MIN
MDC_IDC_SET_BRADY_MAX_TRACKING_RATE: 130 {BEATS}/MIN
MDC_IDC_SET_BRADY_MODE: NORMAL
MDC_IDC_SET_BRADY_PAV_DELAY_LOW: 180 MS
MDC_IDC_SET_BRADY_SAV_DELAY_LOW: 150 MS
MDC_IDC_SET_LEADCHNL_RA_PACING_AMPLITUDE: 1.5 V
MDC_IDC_SET_LEADCHNL_RA_PACING_ANODE_ELECTRODE_1: NORMAL
MDC_IDC_SET_LEADCHNL_RA_PACING_ANODE_LOCATION_1: NORMAL
MDC_IDC_SET_LEADCHNL_RA_PACING_CAPTURE_MODE: NORMAL
MDC_IDC_SET_LEADCHNL_RA_PACING_CATHODE_ELECTRODE_1: NORMAL
MDC_IDC_SET_LEADCHNL_RA_PACING_CATHODE_LOCATION_1: NORMAL
MDC_IDC_SET_LEADCHNL_RA_PACING_POLARITY: NORMAL
MDC_IDC_SET_LEADCHNL_RA_PACING_PULSEWIDTH: 0.4 MS
MDC_IDC_SET_LEADCHNL_RA_SENSING_ANODE_ELECTRODE_1: NORMAL
MDC_IDC_SET_LEADCHNL_RA_SENSING_ANODE_LOCATION_1: NORMAL
MDC_IDC_SET_LEADCHNL_RA_SENSING_CATHODE_ELECTRODE_1: NORMAL
MDC_IDC_SET_LEADCHNL_RA_SENSING_CATHODE_LOCATION_1: NORMAL
MDC_IDC_SET_LEADCHNL_RA_SENSING_POLARITY: NORMAL
MDC_IDC_SET_LEADCHNL_RA_SENSING_SENSITIVITY: 0.3 MV
MDC_IDC_SET_LEADCHNL_RV_PACING_AMPLITUDE: 2 V
MDC_IDC_SET_LEADCHNL_RV_PACING_ANODE_ELECTRODE_1: NORMAL
MDC_IDC_SET_LEADCHNL_RV_PACING_ANODE_LOCATION_1: NORMAL
MDC_IDC_SET_LEADCHNL_RV_PACING_CAPTURE_MODE: NORMAL
MDC_IDC_SET_LEADCHNL_RV_PACING_CATHODE_ELECTRODE_1: NORMAL
MDC_IDC_SET_LEADCHNL_RV_PACING_CATHODE_LOCATION_1: NORMAL
MDC_IDC_SET_LEADCHNL_RV_PACING_POLARITY: NORMAL
MDC_IDC_SET_LEADCHNL_RV_PACING_PULSEWIDTH: 0.4 MS
MDC_IDC_SET_LEADCHNL_RV_SENSING_ANODE_ELECTRODE_1: NORMAL
MDC_IDC_SET_LEADCHNL_RV_SENSING_ANODE_LOCATION_1: NORMAL
MDC_IDC_SET_LEADCHNL_RV_SENSING_CATHODE_ELECTRODE_1: NORMAL
MDC_IDC_SET_LEADCHNL_RV_SENSING_CATHODE_LOCATION_1: NORMAL
MDC_IDC_SET_LEADCHNL_RV_SENSING_POLARITY: NORMAL
MDC_IDC_SET_LEADCHNL_RV_SENSING_SENSITIVITY: 0.9 MV
MDC_IDC_SET_ZONE_DETECTION_INTERVAL: 350 MS
MDC_IDC_SET_ZONE_DETECTION_INTERVAL: 400 MS
MDC_IDC_SET_ZONE_STATUS: NORMAL
MDC_IDC_SET_ZONE_STATUS: NORMAL
MDC_IDC_SET_ZONE_TYPE: NORMAL
MDC_IDC_SET_ZONE_VENDOR_TYPE: NORMAL
MDC_IDC_STAT_AT_BURDEN_PERCENT: 6.3 %
MDC_IDC_STAT_AT_DTM_END: NORMAL
MDC_IDC_STAT_AT_DTM_START: NORMAL
MDC_IDC_STAT_BRADY_AP_VP_PERCENT: 4.87 %
MDC_IDC_STAT_BRADY_AP_VS_PERCENT: 16.79 %
MDC_IDC_STAT_BRADY_AS_VP_PERCENT: 7 %
MDC_IDC_STAT_BRADY_AS_VS_PERCENT: 71.31 %
MDC_IDC_STAT_BRADY_DTM_END: NORMAL
MDC_IDC_STAT_BRADY_DTM_START: NORMAL
MDC_IDC_STAT_BRADY_RA_PERCENT_PACED: 20.75 %
MDC_IDC_STAT_BRADY_RV_PERCENT_PACED: 16.1 %
MDC_IDC_STAT_EPISODE_RECENT_COUNT: 0
MDC_IDC_STAT_EPISODE_RECENT_COUNT: 54
MDC_IDC_STAT_EPISODE_RECENT_COUNT_DTM_END: NORMAL
MDC_IDC_STAT_EPISODE_RECENT_COUNT_DTM_START: NORMAL
MDC_IDC_STAT_EPISODE_TOTAL_COUNT: 0
MDC_IDC_STAT_EPISODE_TOTAL_COUNT: 54
MDC_IDC_STAT_EPISODE_TOTAL_COUNT_DTM_END: NORMAL
MDC_IDC_STAT_EPISODE_TOTAL_COUNT_DTM_START: NORMAL
MDC_IDC_STAT_EPISODE_TYPE: NORMAL
MDC_IDC_STAT_TACHYTHERAPY_RECENT_DTM_END: NORMAL
MDC_IDC_STAT_TACHYTHERAPY_RECENT_DTM_START: NORMAL
MDC_IDC_STAT_TACHYTHERAPY_TOTAL_DTM_END: NORMAL
MDC_IDC_STAT_TACHYTHERAPY_TOTAL_DTM_START: NORMAL

## 2024-04-24 ENCOUNTER — OFFICE VISIT (OUTPATIENT)
Dept: CARDIOLOGY | Facility: CLINIC | Age: 89
End: 2024-04-24
Attending: INTERNAL MEDICINE
Payer: COMMERCIAL

## 2024-04-24 VITALS
DIASTOLIC BLOOD PRESSURE: 72 MMHG | SYSTOLIC BLOOD PRESSURE: 144 MMHG | RESPIRATION RATE: 16 BRPM | WEIGHT: 129.5 LBS | OXYGEN SATURATION: 97 % | HEIGHT: 65 IN | HEART RATE: 76 BPM | BODY MASS INDEX: 21.58 KG/M2

## 2024-04-24 DIAGNOSIS — I48.0 PAROXYSMAL ATRIAL FIBRILLATION (H): ICD-10-CM

## 2024-04-24 PROCEDURE — G2211 COMPLEX E/M VISIT ADD ON: HCPCS | Performed by: INTERNAL MEDICINE

## 2024-04-24 PROCEDURE — 99214 OFFICE O/P EST MOD 30 MIN: CPT | Performed by: INTERNAL MEDICINE

## 2024-04-24 ASSESSMENT — PAIN SCALES - GENERAL: PAINLEVEL: NO PAIN (0)

## 2024-04-24 NOTE — PROGRESS NOTES
CARDIOLOGY CLINIC VISIT  DATE OF SERVICE:  April 24, 2024      PRIMARY CARE PHYSICIAN:  Krish Bates    HISTORY OF PRESENT ILLNESS:  Ms. Audrey Ortega, a pleasant 89 year old patient who has a past medical history significant for paroxysmal atrial fibrillation (declined anticoagulation), sick sinus syndrome status post permanent pacemaker in 2011 and hypertension who presents to clinic today for follow up.  She was last seen by Brooklyn Rollins CNP in December 2023.       In brief review, she has previously declined anticoagulation (unable to afford DOACs and warfarin caused bilateral shoulder pain). Previous device checks have been showing prolonged episodes of tachycardia thought to be atrial flutter or atrial tachycardia.    In 2023, Audrey was hospitalized at [facility] for chest pain.  She underwent a coronary CTA which did not show obstructive disease.  An echocardiogram demonstrated  Normal LV function with moderate to severe MR, and severe TR.  There was a small to moderate pericardial effusion. Follow up echocardiogram demonstrated normal LV function with moderate MR, moderate-severe TR and small circumferential pericardial effusion.       Since our last visit, she had her pacemaker generator change without complications.  She reports feeling well.  She denies any chest pain, chest discomfort or shortness of breath.  She denies any palpitations.  She is very active and enjoys gardening.        PAST MEDICAL HISTORY:  No past medical history on file.    MEDICATIONS:  Current Outpatient Medications   Medication Sig Dispense Refill    Ascorbic Acid (VITAMIN C) 500 MG CAPS Take 500 mg by mouth daily      aspirin (ASA) 325 MG EC tablet Take 325 mg by mouth      calcium 600 MG tablet Take 1 tablet by mouth daily      COMPRESSION STOCKINGS 1 each daily 1 each 1    lisinopril (ZESTRIL) 10 MG tablet Take 1 tablet (10 mg) by mouth 2 times daily (Patient taking differently: Take 10 mg by mouth 2 times daily PRN) 180 tablet 3     magnesium 250 MG tablet Take 1 tablet by mouth daily      multivitamin w/minerals (THERA-VIT-M) tablet Take 1 tablet by mouth daily      triamcinolone (KENALOG) 0.1 % external ointment APPLY TO AFFECTED AREAS TWICE DAILY FOR 2 WEEKS      Vitamin D, Cholecalciferol, 25 MCG (1000 UT) TABS Take 1,000 Units by mouth daily      amLODIPine (NORVASC) 5 MG tablet Take 1 tablet (5 mg) by mouth daily as needed (Patient not taking: Reported on 4/24/2024) 90 tablet 1    diphenoxylate-atropine (LOMOTIL) 2.5-0.025 MG tablet Take 1 tablet by mouth 4 times daily as needed for diarrhea 60 tablet 0    zolpidem (AMBIEN) 5 MG tablet Take 0.5 tablets (2.5 mg) by mouth nightly as needed (Patient not taking: Reported on 4/24/2024) 15 tablet 1     No current facility-administered medications for this visit.       ALLERGIES:  Allergies   Allergen Reactions    Ciprofloxacin Rash    Macrobid [Nitrofurantoin] Itching and Rash       SOCIAL HISTORY:  I have reviewed this patient's social history and updated it with pertinent information if needed. Audrey Ortega  reports that she has never smoked. She has never used smokeless tobacco. She reports that she does not currently use alcohol. She reports that she does not use drugs.    FAMILY HISTORY:  I have reviewed this patient's family history and updated it with pertinent information if needed.   No family history on file.    REVIEW OF SYSTEMS:  A complete ROS was obtained and the pertinent positives are outlined in the history of present illness above.  The remainder of systems is negative.      PHYSICAL EXAM:                     Vital Signs with Ranges     129 lbs 8 oz    Constitutional: awake, alert, no distress  Eyes: PERRL, sclera nonicteric  ENT: trachea midline  Respiratory: CTAB  Cardiovascular: RRR no m/r/g, no JVD  GI: nondistended, nontender, bowel sounds present  Lymph/Hematologic: no lymphadenopathy  Skin: dry, no rash  Musculoskeletal: good muscle tone, no edema bilaterally, mild  chronic venous stasis changes bilaterally  Neurologic: no focal deficits  Neuropsychiatric: appropriate affact    Pacemaker interrogation dated 1/11/24 reviewed personally  [employer] Kristy (JUAN CARLOS) 7-10 day Post Pacemaker Device Check - post gen change  Patient seen in clinic for device evaluation and iterative programming.   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 Slight bruising to pocket.     Atrial Arrhythmia: 6.3% mode switch burden. 54 episodes recorded, 50 episodes saved to logbook. 1 EGM shows aflutter with A rates 205 bpm and V rates controlled. The remaining EGMs show atrial tach with A rates 150-190's and V rates controlled. Pt has history of AF/flutter and has declined AC. Pt does not notice symptoms of her atrial arrhythmias.    Ventricular Arrhythmia: 0    Setting Change: adjusted alerts per clinic protocol.     Care Plan: Scheduled remote in 3 months.  OV with Dr. Jama due 4/2024. Discussed use of new remote monitor. Ordered return kit from [employer] website for old remote monitor. Discussed incision care.   EC RN  I have reviewed and interpreted the device interrogation, settings, programming and nurse's summary. The device is functioning within normal device parameters. I agree with the current findings, assessment and plan.           ASSESSMENT:    Paroxysmal atrial fibrillation  Continues to decline anticoagulation, confirmed again in clinic today.  She has also declined Watchman  Most recent device notes frequent mode switches; controlled ventricular rates  Has not required rate control; metoprolol resulted in hypotensoin     Hypertension          Mildly elevated in clinic;  reports BP at goal at home on lisinopril    Sick sinus syndrome  Status post permanent pacemaker in 2011  Follows with device clinic; normal device functoin    4.  Small to moderate pericardial effusion:  Incidental finding, stable    5.   Chest pain:  Resolved.  Coronary CTA without obstructive CAD    6.  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  No symptoms attributable to valve disease, euvolemic      RECOMMENDATIONS:  -Audrey is doing very well from a cardiac standpoint and remains very active for her age.  She will check her BP at home over the next week and contact my office with the readings  -Plan for follow up in one year with Brooklyn Jama MD [facility]  April 24, 2024    I spent a total of 30 minutes providing patient care.  This time spent includes chart review, time spent with the patient during the clinic visit and time spent afterwards providing necessary documentation.

## 2024-04-24 NOTE — PATIENT INSTRUCTIONS
-Check BP at home over next week, call Dr. Jama's office with readings  -Plan for follow up in one year with Brooklyn Walker CNP

## 2024-04-24 NOTE — LETTER
4/24/2024    Krish Bates MD  [address] [facility] [id number]    RE: Audrey Ortega       Dear Colleague,     I had the pleasure of seeing Audrey Ortega in the [facility].  CARDIOLOGY CLINIC VISIT  DATE OF SERVICE:  April 24, 2024      PRIMARY CARE PHYSICIAN:  Krish Bates    HISTORY OF PRESENT ILLNESS:  Ms. Audrey Ortega, a pleasant 89 year old patient who has a past medical history significant for paroxysmal atrial fibrillation (declined anticoagulation), sick sinus syndrome status post permanent pacemaker in 2011 and hypertension who presents to clinic today for follow up.  She was last seen by Brooklyn Rollins CNP in December 2023.       In brief review, she has previously declined anticoagulation (unable to afford DOACs and warfarin caused bilateral shoulder pain). Previous device checks have been showing prolonged episodes of tachycardia thought to be atrial flutter or atrial tachycardia.    In 2023, Audrey was hospitalized at [facility] for chest pain.  She underwent a coronary CTA which did not show obstructive disease.  An echocardiogram demonstrated  Normal LV function with moderate to severe MR, and severe TR.  There was a small to moderate pericardial effusion. Follow up echocardiogram demonstrated normal LV function with moderate MR, moderate-severe TR and small circumferential pericardial effusion.       Since our last visit, she had her pacemaker generator change without complications.  She reports feeling well.  She denies any chest pain, chest discomfort or shortness of breath.  She denies any palpitations.  She is very active and enjoys gardening.        PAST MEDICAL HISTORY:  No past medical history on file.    MEDICATIONS:  Current Outpatient Medications   Medication Sig Dispense Refill    Ascorbic Acid (VITAMIN C) 500 MG CAPS Take 500 mg by mouth daily      aspirin (ASA) 325 MG EC tablet Take 325 mg by mouth      calcium 600 MG tablet Take 1 tablet by mouth daily      COMPRESSION  STOCKINGS 1 each daily 1 each 1    lisinopril (ZESTRIL) 10 MG tablet Take 1 tablet (10 mg) by mouth 2 times daily (Patient taking differently: Take 10 mg by mouth 2 times daily PRN) 180 tablet 3    magnesium 250 MG tablet Take 1 tablet by mouth daily      multivitamin w/minerals (THERA-VIT-M) tablet Take 1 tablet by mouth daily      triamcinolone (KENALOG) 0.1 % external ointment APPLY TO AFFECTED AREAS TWICE DAILY FOR 2 WEEKS      Vitamin D, Cholecalciferol, 25 MCG (1000 UT) TABS Take 1,000 Units by mouth daily      amLODIPine (NORVASC) 5 MG tablet Take 1 tablet (5 mg) by mouth daily as needed (Patient not taking: Reported on 4/24/2024) 90 tablet 1    diphenoxylate-atropine (LOMOTIL) 2.5-0.025 MG tablet Take 1 tablet by mouth 4 times daily as needed for diarrhea 60 tablet 0    zolpidem (AMBIEN) 5 MG tablet Take 0.5 tablets (2.5 mg) by mouth nightly as needed (Patient not taking: Reported on 4/24/2024) 15 tablet 1     No current facility-administered medications for this visit.       ALLERGIES:  Allergies   Allergen Reactions    Ciprofloxacin Rash    Macrobid [Nitrofurantoin] Itching and Rash       SOCIAL HISTORY:  I have reviewed this patient's social history and updated it with pertinent information if needed. Audrey Ortega  reports that she has never smoked. She has never used smokeless tobacco. She reports that she does not currently use alcohol. She reports that she does not use drugs.    FAMILY HISTORY:  I have reviewed this patient's family history and updated it with pertinent information if needed.   No family history on file.    REVIEW OF SYSTEMS:  A complete ROS was obtained and the pertinent positives are outlined in the history of present illness above.  The remainder of systems is negative.      PHYSICAL EXAM:                     Vital Signs with Ranges     129 lbs 8 oz    Constitutional: awake, alert, no distress  Eyes: PERRL, sclera nonicteric  ENT: trachea midline  Respiratory: CTAB  Cardiovascular:  RRR no m/r/g, no JVD  GI: nondistended, nontender, bowel sounds present  Lymph/Hematologic: no lymphadenopathy  Skin: dry, no rash  Musculoskeletal: good muscle tone, no edema bilaterally, mild chronic venous stasis changes bilaterally  Neurologic: no focal deficits  Neuropsychiatric: appropriate affact    Pacemaker interrogation dated 1/11/24 reviewed personally  [employer] Kristy (D) 7-10 day Post Pacemaker Device Check - post gen change  Patient seen in clinic for device evaluation and iterative programming.   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 Slight bruising to pocket.     Atrial Arrhythmia: 6.3% mode switch burden. 54 episodes recorded, 50 episodes saved to logbook. 1 EGM shows aflutter with A rates 205 bpm and V rates controlled. The remaining EGMs show atrial tach with A rates 150-190's and V rates controlled. Pt has history of AF/flutter and has declined AC. Pt does not notice symptoms of her atrial arrhythmias.    Ventricular Arrhythmia: 0    Setting Change: adjusted alerts per clinic protocol.     Care Plan: Scheduled remote in 3 months.  OV with Dr. Jama due 4/2024. Discussed use of new remote monitor. Ordered return kit from [employer] website for old remote monitor. Discussed incision care.   EC RN  I have reviewed and interpreted the device interrogation, settings, programming and nurse's summary. The device is functioning within normal device parameters. I agree with the current findings, assessment and plan.           ASSESSMENT:    Paroxysmal atrial fibrillation  Continues to decline anticoagulation, confirmed again in clinic today.  She has also declined Watchman  Most  recent device notes frequent mode switches; controlled ventricular rates  Has not required rate control; metoprolol resulted in hypotensoin     Hypertension          Mildly elevated in clinic; reports BP at goal at home on lisinopril    Sick sinus syndrome  Status post permanent pacemaker in 2011  Follows with device clinic; normal device functoin    4.  Small to moderate pericardial effusion:  Incidental finding, stable    5.   Chest pain:  Resolved.  Coronary CTA without obstructive CAD    6.  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  No symptoms attributable to valve disease, euvolemic      RECOMMENDATIONS:  -Audrey is doing very well from a cardiac standpoint and remains very active for her age.  She will check her BP at home over the next week and contact my office with the readings  -Plan for follow up in one year with Brooklyn Jama MD [facility]  April 24, 2024    I spent a total of 30 minutes providing patient care.  This time spent includes chart review, time spent with the patient during the clinic visit and time spent afterwards providing necessary documentation.    Thank you for allowing me to participate in the care of your patient.      Sincerely,     Cassy Jama MD     [facility]  cc:   Cassy Jama MD  [id number] [address]

## 2024-04-25 ENCOUNTER — ANCILLARY PROCEDURE (OUTPATIENT)
Dept: CARDIOLOGY | Facility: CLINIC | Age: 89
End: 2024-04-25
Attending: INTERNAL MEDICINE
Payer: COMMERCIAL

## 2024-04-25 DIAGNOSIS — I49.5 SICK SINUS SYNDROME (H): ICD-10-CM

## 2024-04-25 DIAGNOSIS — Z95.0 CARDIAC PACEMAKER IN SITU: ICD-10-CM

## 2024-04-25 PROCEDURE — 93296 REM INTERROG EVL PM/IDS: CPT | Performed by: INTERNAL MEDICINE

## 2024-04-25 PROCEDURE — 93294 REM INTERROG EVL PM/LDLS PM: CPT | Performed by: INTERNAL MEDICINE

## 2024-04-26 LAB
MDC_IDC_EPISODE_DTM: NORMAL
MDC_IDC_EPISODE_DURATION: 1004 S
MDC_IDC_EPISODE_DURATION: 126 S
MDC_IDC_EPISODE_DURATION: 1356 S
MDC_IDC_EPISODE_DURATION: 1534 S
MDC_IDC_EPISODE_DURATION: 1629 S
MDC_IDC_EPISODE_DURATION: 181 S
MDC_IDC_EPISODE_DURATION: 183 S
MDC_IDC_EPISODE_DURATION: 184 S
MDC_IDC_EPISODE_DURATION: 186 S
MDC_IDC_EPISODE_DURATION: 192 S
MDC_IDC_EPISODE_DURATION: 22 S
MDC_IDC_EPISODE_DURATION: 246 S
MDC_IDC_EPISODE_DURATION: 2467 S
MDC_IDC_EPISODE_DURATION: 2628 S
MDC_IDC_EPISODE_DURATION: 2783 S
MDC_IDC_EPISODE_DURATION: 356 S
MDC_IDC_EPISODE_DURATION: 36 S
MDC_IDC_EPISODE_DURATION: 37 S
MDC_IDC_EPISODE_DURATION: 3826 S
MDC_IDC_EPISODE_DURATION: 4326 S
MDC_IDC_EPISODE_DURATION: 47 S
MDC_IDC_EPISODE_DURATION: 48 S
MDC_IDC_EPISODE_DURATION: 497 S
MDC_IDC_EPISODE_DURATION: 545 S
MDC_IDC_EPISODE_DURATION: 5704 S
MDC_IDC_EPISODE_DURATION: 58 S
MDC_IDC_EPISODE_DURATION: 6077 S
MDC_IDC_EPISODE_DURATION: 67 S
MDC_IDC_EPISODE_DURATION: 6904 S
MDC_IDC_EPISODE_DURATION: 7076 S
MDC_IDC_EPISODE_DURATION: 741 S
MDC_IDC_EPISODE_DURATION: 801 S
MDC_IDC_EPISODE_DURATION: 82 S
MDC_IDC_EPISODE_DURATION: 84 S
MDC_IDC_EPISODE_DURATION: 914 S
MDC_IDC_EPISODE_DURATION: 9540 S
MDC_IDC_EPISODE_DURATION: 962 S
MDC_IDC_EPISODE_DURATION: NORMAL S
MDC_IDC_EPISODE_ID: 361
MDC_IDC_EPISODE_ID: 362
MDC_IDC_EPISODE_ID: 363
MDC_IDC_EPISODE_ID: 364
MDC_IDC_EPISODE_ID: 365
MDC_IDC_EPISODE_ID: 366
MDC_IDC_EPISODE_ID: 367
MDC_IDC_EPISODE_ID: 368
MDC_IDC_EPISODE_ID: 369
MDC_IDC_EPISODE_ID: 370
MDC_IDC_EPISODE_ID: 371
MDC_IDC_EPISODE_ID: 372
MDC_IDC_EPISODE_ID: 373
MDC_IDC_EPISODE_ID: 374
MDC_IDC_EPISODE_ID: 375
MDC_IDC_EPISODE_ID: 376
MDC_IDC_EPISODE_ID: 377
MDC_IDC_EPISODE_ID: 378
MDC_IDC_EPISODE_ID: 379
MDC_IDC_EPISODE_ID: 380
MDC_IDC_EPISODE_ID: 381
MDC_IDC_EPISODE_ID: 382
MDC_IDC_EPISODE_ID: 383
MDC_IDC_EPISODE_ID: 384
MDC_IDC_EPISODE_ID: 385
MDC_IDC_EPISODE_ID: 386
MDC_IDC_EPISODE_ID: 387
MDC_IDC_EPISODE_ID: 388
MDC_IDC_EPISODE_ID: 389
MDC_IDC_EPISODE_ID: 390
MDC_IDC_EPISODE_ID: 391
MDC_IDC_EPISODE_ID: 392
MDC_IDC_EPISODE_ID: 393
MDC_IDC_EPISODE_ID: 394
MDC_IDC_EPISODE_ID: 395
MDC_IDC_EPISODE_ID: 396
MDC_IDC_EPISODE_ID: 397
MDC_IDC_EPISODE_ID: 398
MDC_IDC_EPISODE_ID: 399
MDC_IDC_EPISODE_ID: 400
MDC_IDC_EPISODE_ID: 401
MDC_IDC_EPISODE_ID: 402
MDC_IDC_EPISODE_ID: 403
MDC_IDC_EPISODE_ID: 404
MDC_IDC_EPISODE_ID: 405
MDC_IDC_EPISODE_ID: 406
MDC_IDC_EPISODE_ID: 407
MDC_IDC_EPISODE_ID: 408
MDC_IDC_EPISODE_ID: 409
MDC_IDC_EPISODE_ID: 410
MDC_IDC_EPISODE_ID: 411
MDC_IDC_EPISODE_TYPE: NORMAL
MDC_IDC_LEAD_CONNECTION_STATUS: NORMAL
MDC_IDC_LEAD_CONNECTION_STATUS: NORMAL
MDC_IDC_LEAD_IMPLANT_DT: NORMAL
MDC_IDC_LEAD_IMPLANT_DT: NORMAL
MDC_IDC_LEAD_LOCATION: NORMAL
MDC_IDC_LEAD_LOCATION: NORMAL
MDC_IDC_LEAD_LOCATION_DETAIL_1: NORMAL
MDC_IDC_LEAD_LOCATION_DETAIL_1: NORMAL
MDC_IDC_LEAD_MFG: NORMAL
MDC_IDC_LEAD_MFG: NORMAL
MDC_IDC_LEAD_MODEL: NORMAL
MDC_IDC_LEAD_MODEL: NORMAL
MDC_IDC_LEAD_POLARITY_TYPE: NORMAL
MDC_IDC_LEAD_POLARITY_TYPE: NORMAL
MDC_IDC_LEAD_SERIAL: NORMAL
MDC_IDC_LEAD_SERIAL: NORMAL
MDC_IDC_MSMT_BATTERY_DTM: NORMAL
MDC_IDC_MSMT_BATTERY_REMAINING_LONGEVITY: 160 MO
MDC_IDC_MSMT_BATTERY_RRT_TRIGGER: 2.62
MDC_IDC_MSMT_BATTERY_STATUS: NORMAL
MDC_IDC_MSMT_BATTERY_VOLTAGE: 3.2 V
MDC_IDC_MSMT_LEADCHNL_RA_IMPEDANCE_VALUE: 266 OHM
MDC_IDC_MSMT_LEADCHNL_RA_IMPEDANCE_VALUE: 342 OHM
MDC_IDC_MSMT_LEADCHNL_RA_PACING_THRESHOLD_AMPLITUDE: 0.5 V
MDC_IDC_MSMT_LEADCHNL_RA_PACING_THRESHOLD_PULSEWIDTH: 0.4 MS
MDC_IDC_MSMT_LEADCHNL_RA_SENSING_INTR_AMPL: 2.38 MV
MDC_IDC_MSMT_LEADCHNL_RA_SENSING_INTR_AMPL: 2.38 MV
MDC_IDC_MSMT_LEADCHNL_RV_IMPEDANCE_VALUE: 342 OHM
MDC_IDC_MSMT_LEADCHNL_RV_IMPEDANCE_VALUE: 418 OHM
MDC_IDC_MSMT_LEADCHNL_RV_PACING_THRESHOLD_AMPLITUDE: 0.5 V
MDC_IDC_MSMT_LEADCHNL_RV_PACING_THRESHOLD_PULSEWIDTH: 0.4 MS
MDC_IDC_MSMT_LEADCHNL_RV_SENSING_INTR_AMPL: 2.5 MV
MDC_IDC_MSMT_LEADCHNL_RV_SENSING_INTR_AMPL: 2.5 MV
MDC_IDC_PG_IMPLANT_DTM: NORMAL
MDC_IDC_PG_MFG: NORMAL
MDC_IDC_PG_MODEL: NORMAL
MDC_IDC_PG_SERIAL: NORMAL
MDC_IDC_PG_TYPE: NORMAL
MDC_IDC_SESS_CLINIC_NAME: NORMAL
MDC_IDC_SESS_DTM: NORMAL
MDC_IDC_SESS_TYPE: NORMAL
MDC_IDC_SET_BRADY_AT_MODE_SWITCH_RATE: 171 {BEATS}/MIN
MDC_IDC_SET_BRADY_HYSTRATE: NORMAL
MDC_IDC_SET_BRADY_LOWRATE: 60 {BEATS}/MIN
MDC_IDC_SET_BRADY_MAX_SENSOR_RATE: 130 {BEATS}/MIN
MDC_IDC_SET_BRADY_MAX_TRACKING_RATE: 130 {BEATS}/MIN
MDC_IDC_SET_BRADY_MODE: NORMAL
MDC_IDC_SET_BRADY_PAV_DELAY_LOW: 180 MS
MDC_IDC_SET_BRADY_SAV_DELAY_LOW: 150 MS
MDC_IDC_SET_LEADCHNL_RA_PACING_AMPLITUDE: 1.5 V
MDC_IDC_SET_LEADCHNL_RA_PACING_ANODE_ELECTRODE_1: NORMAL
MDC_IDC_SET_LEADCHNL_RA_PACING_ANODE_LOCATION_1: NORMAL
MDC_IDC_SET_LEADCHNL_RA_PACING_CAPTURE_MODE: NORMAL
MDC_IDC_SET_LEADCHNL_RA_PACING_CATHODE_ELECTRODE_1: NORMAL
MDC_IDC_SET_LEADCHNL_RA_PACING_CATHODE_LOCATION_1: NORMAL
MDC_IDC_SET_LEADCHNL_RA_PACING_POLARITY: NORMAL
MDC_IDC_SET_LEADCHNL_RA_PACING_PULSEWIDTH: 0.4 MS
MDC_IDC_SET_LEADCHNL_RA_SENSING_ANODE_ELECTRODE_1: NORMAL
MDC_IDC_SET_LEADCHNL_RA_SENSING_ANODE_LOCATION_1: NORMAL
MDC_IDC_SET_LEADCHNL_RA_SENSING_CATHODE_ELECTRODE_1: NORMAL
MDC_IDC_SET_LEADCHNL_RA_SENSING_CATHODE_LOCATION_1: NORMAL
MDC_IDC_SET_LEADCHNL_RA_SENSING_POLARITY: NORMAL
MDC_IDC_SET_LEADCHNL_RA_SENSING_SENSITIVITY: 0.3 MV
MDC_IDC_SET_LEADCHNL_RV_PACING_AMPLITUDE: 2 V
MDC_IDC_SET_LEADCHNL_RV_PACING_ANODE_ELECTRODE_1: NORMAL
MDC_IDC_SET_LEADCHNL_RV_PACING_ANODE_LOCATION_1: NORMAL
MDC_IDC_SET_LEADCHNL_RV_PACING_CAPTURE_MODE: NORMAL
MDC_IDC_SET_LEADCHNL_RV_PACING_CATHODE_ELECTRODE_1: NORMAL
MDC_IDC_SET_LEADCHNL_RV_PACING_CATHODE_LOCATION_1: NORMAL
MDC_IDC_SET_LEADCHNL_RV_PACING_POLARITY: NORMAL
MDC_IDC_SET_LEADCHNL_RV_PACING_PULSEWIDTH: 0.4 MS
MDC_IDC_SET_LEADCHNL_RV_SENSING_ANODE_ELECTRODE_1: NORMAL
MDC_IDC_SET_LEADCHNL_RV_SENSING_ANODE_LOCATION_1: NORMAL
MDC_IDC_SET_LEADCHNL_RV_SENSING_CATHODE_ELECTRODE_1: NORMAL
MDC_IDC_SET_LEADCHNL_RV_SENSING_CATHODE_LOCATION_1: NORMAL
MDC_IDC_SET_LEADCHNL_RV_SENSING_POLARITY: NORMAL
MDC_IDC_SET_LEADCHNL_RV_SENSING_SENSITIVITY: 0.9 MV
MDC_IDC_SET_ZONE_DETECTION_INTERVAL: 350 MS
MDC_IDC_SET_ZONE_DETECTION_INTERVAL: 400 MS
MDC_IDC_SET_ZONE_STATUS: NORMAL
MDC_IDC_SET_ZONE_STATUS: NORMAL
MDC_IDC_SET_ZONE_TYPE: NORMAL
MDC_IDC_SET_ZONE_VENDOR_TYPE: NORMAL
MDC_IDC_STAT_AT_BURDEN_PERCENT: 26.2 %
MDC_IDC_STAT_AT_DTM_END: NORMAL
MDC_IDC_STAT_AT_DTM_START: NORMAL
MDC_IDC_STAT_BRADY_AP_VP_PERCENT: 11.86 %
MDC_IDC_STAT_BRADY_AP_VS_PERCENT: 32.98 %
MDC_IDC_STAT_BRADY_AS_VP_PERCENT: 8.02 %
MDC_IDC_STAT_BRADY_AS_VS_PERCENT: 47.04 %
MDC_IDC_STAT_BRADY_DTM_END: NORMAL
MDC_IDC_STAT_BRADY_DTM_START: NORMAL
MDC_IDC_STAT_BRADY_RA_PERCENT_PACED: 34.56 %
MDC_IDC_STAT_BRADY_RV_PERCENT_PACED: 37.39 %
MDC_IDC_STAT_EPISODE_RECENT_COUNT: 0
MDC_IDC_STAT_EPISODE_RECENT_COUNT: 337
MDC_IDC_STAT_EPISODE_RECENT_COUNT_DTM_END: NORMAL
MDC_IDC_STAT_EPISODE_RECENT_COUNT_DTM_START: NORMAL
MDC_IDC_STAT_EPISODE_TOTAL_COUNT: 0
MDC_IDC_STAT_EPISODE_TOTAL_COUNT: 411
MDC_IDC_STAT_EPISODE_TOTAL_COUNT_DTM_END: NORMAL
MDC_IDC_STAT_EPISODE_TOTAL_COUNT_DTM_START: NORMAL
MDC_IDC_STAT_EPISODE_TYPE: NORMAL
MDC_IDC_STAT_TACHYTHERAPY_RECENT_DTM_END: NORMAL
MDC_IDC_STAT_TACHYTHERAPY_RECENT_DTM_START: NORMAL
MDC_IDC_STAT_TACHYTHERAPY_TOTAL_DTM_END: NORMAL
MDC_IDC_STAT_TACHYTHERAPY_TOTAL_DTM_START: NORMAL

## 2024-05-07 ENCOUNTER — TELEPHONE (OUTPATIENT)
Dept: CARDIOLOGY | Facility: CLINIC | Age: 89
End: 2024-05-07
Payer: COMMERCIAL

## 2024-05-07 DIAGNOSIS — E87.1 HYPONATREMIA: ICD-10-CM

## 2024-05-07 NOTE — TELEPHONE ENCOUNTER
Pt. Called and left voicemail stating she called to give the nurse her BP readings from the last week.   ASSESSMENT:     Paroxysmal atrial fibrillation  Continues to decline anticoagulation, confirmed again in clinic today.  She has also declined Watchman  Most recent device notes frequent mode switches; controlled ventricular rates  Has not required rate control; metoprolol resulted in hypotensoin     Hypertension                   Mildly elevated in clinic; reports BP at goal at home on lisinopril     Sick sinus syndrome  Status post permanent pacemaker in 2011  Follows with device clinic; normal device functoin     4.  Small to moderate pericardial effusion:  Incidental finding, stable     5.   Chest pain:  Resolved.  Coronary CTA without obstructive CAD     6.  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  No symptoms attributable to valve disease, euvolemic        RECOMMENDATIONS:  -Audrey is doing very well from a cardiac standpoint and remains very active for her age.  She will check her BP at home over the next week and contact my office with the readings  -Plan for follow up in one year with Brooklyn Jama MD [facility]  April 24, 2024

## 2024-05-07 NOTE — TELEPHONE ENCOUNTER
Called patient to get blood pressure, pulse readings.     4/26 - 113/66, 90  4/27 - 119/68, 71  4/28 - 146/71, 74  4/29 - 139/77, 85  4/30 - 134/70, 64  5/1 - 138/66, 67  5/2 - 137/70, 71    After exercise/walking 108/62, 71 - Will feel a little weak     Patient takes Lisinopril 10mg once a day. If her blood pressure is low she does not take lisinopril at all. If her blood pressure is high she says she will then take amlodipine 5mg PRN.     Will forward this onto Dr. Jama to review.

## 2024-05-13 NOTE — TELEPHONE ENCOUNTER
BP's at goal.  Agree with plan.    Cassy Jama MD     Unable to reach patient. Left detailed message with direct call back number.    yes

## 2024-05-31 DIAGNOSIS — F51.01 PRIMARY INSOMNIA: ICD-10-CM

## 2024-05-31 RX ORDER — ZOLPIDEM TARTRATE 5 MG/1
TABLET ORAL
Qty: 15 TABLET | Refills: 0 | Status: SHIPPED | OUTPATIENT
Start: 2024-05-31

## 2024-08-19 ENCOUNTER — ANCILLARY PROCEDURE (OUTPATIENT)
Dept: CARDIOLOGY | Facility: CLINIC | Age: 89
End: 2024-08-19
Attending: INTERNAL MEDICINE
Payer: COMMERCIAL

## 2024-08-19 DIAGNOSIS — Z95.0 CARDIAC PACEMAKER IN SITU: ICD-10-CM

## 2024-08-19 DIAGNOSIS — I49.5 SICK SINUS SYNDROME (H): ICD-10-CM

## 2024-08-19 PROCEDURE — 93294 REM INTERROG EVL PM/LDLS PM: CPT | Performed by: INTERNAL MEDICINE

## 2024-08-19 PROCEDURE — 93296 REM INTERROG EVL PM/IDS: CPT | Performed by: INTERNAL MEDICINE

## 2024-08-20 LAB
MDC_IDC_EPISODE_DTM: NORMAL
MDC_IDC_EPISODE_DURATION: 1359 S
MDC_IDC_EPISODE_DURATION: 1794 S
MDC_IDC_EPISODE_DURATION: 180 S
MDC_IDC_EPISODE_DURATION: 181 S
MDC_IDC_EPISODE_DURATION: 182 S
MDC_IDC_EPISODE_DURATION: 183 S
MDC_IDC_EPISODE_DURATION: 184 S
MDC_IDC_EPISODE_DURATION: 188 S
MDC_IDC_EPISODE_DURATION: 1940 S
MDC_IDC_EPISODE_DURATION: 195 S
MDC_IDC_EPISODE_DURATION: 1969 S
MDC_IDC_EPISODE_DURATION: 198 S
MDC_IDC_EPISODE_DURATION: 199 S
MDC_IDC_EPISODE_DURATION: 207 S
MDC_IDC_EPISODE_DURATION: 208 S
MDC_IDC_EPISODE_DURATION: 210 S
MDC_IDC_EPISODE_DURATION: 225 S
MDC_IDC_EPISODE_DURATION: 230 S
MDC_IDC_EPISODE_DURATION: 2409 S
MDC_IDC_EPISODE_DURATION: 244 S
MDC_IDC_EPISODE_DURATION: 26 S
MDC_IDC_EPISODE_DURATION: 263 S
MDC_IDC_EPISODE_DURATION: 2665 S
MDC_IDC_EPISODE_DURATION: 309 S
MDC_IDC_EPISODE_DURATION: 364 S
MDC_IDC_EPISODE_DURATION: 393 S
MDC_IDC_EPISODE_DURATION: 405 S
MDC_IDC_EPISODE_DURATION: 433 S
MDC_IDC_EPISODE_DURATION: 442 S
MDC_IDC_EPISODE_DURATION: 509 S
MDC_IDC_EPISODE_DURATION: 518 S
MDC_IDC_EPISODE_DURATION: 554 S
MDC_IDC_EPISODE_DURATION: 6512 S
MDC_IDC_EPISODE_DURATION: 6997 S
MDC_IDC_EPISODE_DURATION: 70 S
MDC_IDC_EPISODE_DURATION: 702 S
MDC_IDC_EPISODE_DURATION: 704 S
MDC_IDC_EPISODE_DURATION: 709 S
MDC_IDC_EPISODE_DURATION: 902 S
MDC_IDC_EPISODE_DURATION: 955 S
MDC_IDC_EPISODE_DURATION: NORMAL S
MDC_IDC_EPISODE_DURATION: NORMAL S
MDC_IDC_EPISODE_ID: 854
MDC_IDC_EPISODE_ID: 855
MDC_IDC_EPISODE_ID: 856
MDC_IDC_EPISODE_ID: 857
MDC_IDC_EPISODE_ID: 858
MDC_IDC_EPISODE_ID: 859
MDC_IDC_EPISODE_ID: 860
MDC_IDC_EPISODE_ID: 861
MDC_IDC_EPISODE_ID: 862
MDC_IDC_EPISODE_ID: 863
MDC_IDC_EPISODE_ID: 864
MDC_IDC_EPISODE_ID: 865
MDC_IDC_EPISODE_ID: 866
MDC_IDC_EPISODE_ID: 867
MDC_IDC_EPISODE_ID: 868
MDC_IDC_EPISODE_ID: 869
MDC_IDC_EPISODE_ID: 870
MDC_IDC_EPISODE_ID: 871
MDC_IDC_EPISODE_ID: 872
MDC_IDC_EPISODE_ID: 873
MDC_IDC_EPISODE_ID: 874
MDC_IDC_EPISODE_ID: 875
MDC_IDC_EPISODE_ID: 876
MDC_IDC_EPISODE_ID: 877
MDC_IDC_EPISODE_ID: 878
MDC_IDC_EPISODE_ID: 879
MDC_IDC_EPISODE_ID: 880
MDC_IDC_EPISODE_ID: 881
MDC_IDC_EPISODE_ID: 882
MDC_IDC_EPISODE_ID: 883
MDC_IDC_EPISODE_ID: 884
MDC_IDC_EPISODE_ID: 885
MDC_IDC_EPISODE_ID: 886
MDC_IDC_EPISODE_ID: 887
MDC_IDC_EPISODE_ID: 888
MDC_IDC_EPISODE_ID: 889
MDC_IDC_EPISODE_ID: 890
MDC_IDC_EPISODE_ID: 891
MDC_IDC_EPISODE_ID: 892
MDC_IDC_EPISODE_ID: 893
MDC_IDC_EPISODE_ID: 894
MDC_IDC_EPISODE_ID: 895
MDC_IDC_EPISODE_ID: 896
MDC_IDC_EPISODE_ID: 897
MDC_IDC_EPISODE_ID: 898
MDC_IDC_EPISODE_ID: 899
MDC_IDC_EPISODE_ID: 900
MDC_IDC_EPISODE_ID: 901
MDC_IDC_EPISODE_ID: 902
MDC_IDC_EPISODE_ID: 903
MDC_IDC_EPISODE_TYPE: NORMAL
MDC_IDC_LEAD_CONNECTION_STATUS: NORMAL
MDC_IDC_LEAD_CONNECTION_STATUS: NORMAL
MDC_IDC_LEAD_IMPLANT_DT: NORMAL
MDC_IDC_LEAD_IMPLANT_DT: NORMAL
MDC_IDC_LEAD_LOCATION: NORMAL
MDC_IDC_LEAD_LOCATION: NORMAL
MDC_IDC_LEAD_LOCATION_DETAIL_1: NORMAL
MDC_IDC_LEAD_LOCATION_DETAIL_1: NORMAL
MDC_IDC_LEAD_MFG: NORMAL
MDC_IDC_LEAD_MFG: NORMAL
MDC_IDC_LEAD_MODEL: NORMAL
MDC_IDC_LEAD_MODEL: NORMAL
MDC_IDC_LEAD_POLARITY_TYPE: NORMAL
MDC_IDC_LEAD_POLARITY_TYPE: NORMAL
MDC_IDC_LEAD_SERIAL: NORMAL
MDC_IDC_LEAD_SERIAL: NORMAL
MDC_IDC_MSMT_BATTERY_DTM: NORMAL
MDC_IDC_MSMT_BATTERY_REMAINING_LONGEVITY: 153 MO
MDC_IDC_MSMT_BATTERY_RRT_TRIGGER: 2.62
MDC_IDC_MSMT_BATTERY_STATUS: NORMAL
MDC_IDC_MSMT_BATTERY_VOLTAGE: 3.15 V
MDC_IDC_MSMT_LEADCHNL_RA_IMPEDANCE_VALUE: 285 OHM
MDC_IDC_MSMT_LEADCHNL_RA_IMPEDANCE_VALUE: 361 OHM
MDC_IDC_MSMT_LEADCHNL_RA_PACING_THRESHOLD_AMPLITUDE: 0.5 V
MDC_IDC_MSMT_LEADCHNL_RA_PACING_THRESHOLD_PULSEWIDTH: 0.4 MS
MDC_IDC_MSMT_LEADCHNL_RA_SENSING_INTR_AMPL: 3.25 MV
MDC_IDC_MSMT_LEADCHNL_RA_SENSING_INTR_AMPL: 3.25 MV
MDC_IDC_MSMT_LEADCHNL_RV_IMPEDANCE_VALUE: 361 OHM
MDC_IDC_MSMT_LEADCHNL_RV_IMPEDANCE_VALUE: 437 OHM
MDC_IDC_MSMT_LEADCHNL_RV_PACING_THRESHOLD_AMPLITUDE: 0.62 V
MDC_IDC_MSMT_LEADCHNL_RV_PACING_THRESHOLD_PULSEWIDTH: 0.4 MS
MDC_IDC_MSMT_LEADCHNL_RV_SENSING_INTR_AMPL: 7.5 MV
MDC_IDC_MSMT_LEADCHNL_RV_SENSING_INTR_AMPL: 7.5 MV
MDC_IDC_PG_IMPLANT_DTM: NORMAL
MDC_IDC_PG_MFG: NORMAL
MDC_IDC_PG_MODEL: NORMAL
MDC_IDC_PG_SERIAL: NORMAL
MDC_IDC_PG_TYPE: NORMAL
MDC_IDC_SESS_CLINIC_NAME: NORMAL
MDC_IDC_SESS_DTM: NORMAL
MDC_IDC_SESS_TYPE: NORMAL
MDC_IDC_SET_BRADY_AT_MODE_SWITCH_RATE: 171 {BEATS}/MIN
MDC_IDC_SET_BRADY_HYSTRATE: NORMAL
MDC_IDC_SET_BRADY_LOWRATE: 60 {BEATS}/MIN
MDC_IDC_SET_BRADY_MAX_SENSOR_RATE: 130 {BEATS}/MIN
MDC_IDC_SET_BRADY_MAX_TRACKING_RATE: 130 {BEATS}/MIN
MDC_IDC_SET_BRADY_MODE: NORMAL
MDC_IDC_SET_BRADY_PAV_DELAY_LOW: 180 MS
MDC_IDC_SET_BRADY_SAV_DELAY_LOW: 150 MS
MDC_IDC_SET_LEADCHNL_RA_PACING_AMPLITUDE: 1.5 V
MDC_IDC_SET_LEADCHNL_RA_PACING_ANODE_ELECTRODE_1: NORMAL
MDC_IDC_SET_LEADCHNL_RA_PACING_ANODE_LOCATION_1: NORMAL
MDC_IDC_SET_LEADCHNL_RA_PACING_CAPTURE_MODE: NORMAL
MDC_IDC_SET_LEADCHNL_RA_PACING_CATHODE_ELECTRODE_1: NORMAL
MDC_IDC_SET_LEADCHNL_RA_PACING_CATHODE_LOCATION_1: NORMAL
MDC_IDC_SET_LEADCHNL_RA_PACING_POLARITY: NORMAL
MDC_IDC_SET_LEADCHNL_RA_PACING_PULSEWIDTH: 0.4 MS
MDC_IDC_SET_LEADCHNL_RA_SENSING_ANODE_ELECTRODE_1: NORMAL
MDC_IDC_SET_LEADCHNL_RA_SENSING_ANODE_LOCATION_1: NORMAL
MDC_IDC_SET_LEADCHNL_RA_SENSING_CATHODE_ELECTRODE_1: NORMAL
MDC_IDC_SET_LEADCHNL_RA_SENSING_CATHODE_LOCATION_1: NORMAL
MDC_IDC_SET_LEADCHNL_RA_SENSING_POLARITY: NORMAL
MDC_IDC_SET_LEADCHNL_RA_SENSING_SENSITIVITY: 0.3 MV
MDC_IDC_SET_LEADCHNL_RV_PACING_AMPLITUDE: 2 V
MDC_IDC_SET_LEADCHNL_RV_PACING_ANODE_ELECTRODE_1: NORMAL
MDC_IDC_SET_LEADCHNL_RV_PACING_ANODE_LOCATION_1: NORMAL
MDC_IDC_SET_LEADCHNL_RV_PACING_CAPTURE_MODE: NORMAL
MDC_IDC_SET_LEADCHNL_RV_PACING_CATHODE_ELECTRODE_1: NORMAL
MDC_IDC_SET_LEADCHNL_RV_PACING_CATHODE_LOCATION_1: NORMAL
MDC_IDC_SET_LEADCHNL_RV_PACING_POLARITY: NORMAL
MDC_IDC_SET_LEADCHNL_RV_PACING_PULSEWIDTH: 0.4 MS
MDC_IDC_SET_LEADCHNL_RV_SENSING_ANODE_ELECTRODE_1: NORMAL
MDC_IDC_SET_LEADCHNL_RV_SENSING_ANODE_LOCATION_1: NORMAL
MDC_IDC_SET_LEADCHNL_RV_SENSING_CATHODE_ELECTRODE_1: NORMAL
MDC_IDC_SET_LEADCHNL_RV_SENSING_CATHODE_LOCATION_1: NORMAL
MDC_IDC_SET_LEADCHNL_RV_SENSING_POLARITY: NORMAL
MDC_IDC_SET_LEADCHNL_RV_SENSING_SENSITIVITY: 0.9 MV
MDC_IDC_SET_ZONE_DETECTION_INTERVAL: 350 MS
MDC_IDC_SET_ZONE_DETECTION_INTERVAL: 400 MS
MDC_IDC_SET_ZONE_STATUS: NORMAL
MDC_IDC_SET_ZONE_STATUS: NORMAL
MDC_IDC_SET_ZONE_TYPE: NORMAL
MDC_IDC_SET_ZONE_VENDOR_TYPE: NORMAL
MDC_IDC_STAT_AT_BURDEN_PERCENT: 9.6 %
MDC_IDC_STAT_AT_DTM_END: NORMAL
MDC_IDC_STAT_AT_DTM_START: NORMAL
MDC_IDC_STAT_BRADY_AP_VP_PERCENT: 29.26 %
MDC_IDC_STAT_BRADY_AP_VS_PERCENT: 30.04 %
MDC_IDC_STAT_BRADY_AS_VP_PERCENT: 14.88 %
MDC_IDC_STAT_BRADY_AS_VS_PERCENT: 25.8 %
MDC_IDC_STAT_BRADY_DTM_END: NORMAL
MDC_IDC_STAT_BRADY_DTM_START: NORMAL
MDC_IDC_STAT_BRADY_RA_PERCENT_PACED: 54.1 %
MDC_IDC_STAT_BRADY_RV_PERCENT_PACED: 47.22 %
MDC_IDC_STAT_EPISODE_RECENT_COUNT: 0
MDC_IDC_STAT_EPISODE_RECENT_COUNT: 492
MDC_IDC_STAT_EPISODE_RECENT_COUNT_DTM_END: NORMAL
MDC_IDC_STAT_EPISODE_RECENT_COUNT_DTM_START: NORMAL
MDC_IDC_STAT_EPISODE_TOTAL_COUNT: 0
MDC_IDC_STAT_EPISODE_TOTAL_COUNT: 903
MDC_IDC_STAT_EPISODE_TOTAL_COUNT_DTM_END: NORMAL
MDC_IDC_STAT_EPISODE_TOTAL_COUNT_DTM_START: NORMAL
MDC_IDC_STAT_EPISODE_TYPE: NORMAL
MDC_IDC_STAT_TACHYTHERAPY_RECENT_DTM_END: NORMAL
MDC_IDC_STAT_TACHYTHERAPY_RECENT_DTM_START: NORMAL
MDC_IDC_STAT_TACHYTHERAPY_TOTAL_DTM_END: NORMAL
MDC_IDC_STAT_TACHYTHERAPY_TOTAL_DTM_START: NORMAL

## 2024-10-29 ENCOUNTER — OFFICE VISIT (OUTPATIENT)
Dept: INTERNAL MEDICINE | Facility: CLINIC | Age: 89
End: 2024-10-29
Payer: COMMERCIAL

## 2024-10-29 VITALS
BODY MASS INDEX: 20.66 KG/M2 | SYSTOLIC BLOOD PRESSURE: 119 MMHG | HEIGHT: 65 IN | WEIGHT: 124 LBS | HEART RATE: 74 BPM | RESPIRATION RATE: 16 BRPM | TEMPERATURE: 97 F | DIASTOLIC BLOOD PRESSURE: 70 MMHG | OXYGEN SATURATION: 98 %

## 2024-10-29 DIAGNOSIS — N18.31 STAGE 3A CHRONIC KIDNEY DISEASE (H): ICD-10-CM

## 2024-10-29 DIAGNOSIS — I10 BENIGN ESSENTIAL HYPERTENSION: ICD-10-CM

## 2024-10-29 DIAGNOSIS — G47.00 INSOMNIA, UNSPECIFIED TYPE: ICD-10-CM

## 2024-10-29 DIAGNOSIS — E87.1 HYPONATREMIA: ICD-10-CM

## 2024-10-29 DIAGNOSIS — Z00.00 MEDICARE ANNUAL WELLNESS VISIT, SUBSEQUENT: Primary | ICD-10-CM

## 2024-10-29 LAB
ALBUMIN SERPL BCG-MCNC: 4.4 G/DL (ref 3.5–5.2)
ALP SERPL-CCNC: 72 U/L (ref 40–150)
ALT SERPL W P-5'-P-CCNC: 21 U/L (ref 0–50)
ANION GAP SERPL CALCULATED.3IONS-SCNC: 10 MMOL/L (ref 7–15)
AST SERPL W P-5'-P-CCNC: 26 U/L (ref 0–45)
BILIRUB SERPL-MCNC: 1 MG/DL
BUN SERPL-MCNC: 23.4 MG/DL (ref 8–23)
CALCIUM SERPL-MCNC: 9.7 MG/DL (ref 8.8–10.4)
CHLORIDE SERPL-SCNC: 93 MMOL/L (ref 98–107)
CHOLEST SERPL-MCNC: 303 MG/DL
CREAT SERPL-MCNC: 1.16 MG/DL (ref 0.51–0.95)
CREAT UR-MCNC: 70.7 MG/DL
EGFRCR SERPLBLD CKD-EPI 2021: 45 ML/MIN/1.73M2
FASTING STATUS PATIENT QL REPORTED: YES
FASTING STATUS PATIENT QL REPORTED: YES
GLUCOSE SERPL-MCNC: 92 MG/DL (ref 70–99)
HCO3 SERPL-SCNC: 26 MMOL/L (ref 22–29)
HDLC SERPL-MCNC: 84 MG/DL
HGB BLD-MCNC: 13.9 G/DL (ref 11.7–15.7)
LDLC SERPL CALC-MCNC: 200 MG/DL
MICROALBUMIN UR-MCNC: <12 MG/L
MICROALBUMIN/CREAT UR: NORMAL MG/G{CREAT}
NONHDLC SERPL-MCNC: 219 MG/DL
POTASSIUM SERPL-SCNC: 5.1 MMOL/L (ref 3.4–5.3)
PROT SERPL-MCNC: 7.7 G/DL (ref 6.4–8.3)
SODIUM SERPL-SCNC: 129 MMOL/L (ref 135–145)
TRIGL SERPL-MCNC: 96 MG/DL

## 2024-10-29 PROCEDURE — 80061 LIPID PANEL: CPT | Performed by: INTERNAL MEDICINE

## 2024-10-29 PROCEDURE — G0008 ADMIN INFLUENZA VIRUS VAC: HCPCS | Performed by: INTERNAL MEDICINE

## 2024-10-29 PROCEDURE — 80053 COMPREHEN METABOLIC PANEL: CPT | Performed by: INTERNAL MEDICINE

## 2024-10-29 PROCEDURE — 99214 OFFICE O/P EST MOD 30 MIN: CPT | Mod: 25 | Performed by: INTERNAL MEDICINE

## 2024-10-29 PROCEDURE — 82570 ASSAY OF URINE CREATININE: CPT | Performed by: INTERNAL MEDICINE

## 2024-10-29 PROCEDURE — 90662 IIV NO PRSV INCREASED AG IM: CPT | Performed by: INTERNAL MEDICINE

## 2024-10-29 PROCEDURE — 36415 COLL VENOUS BLD VENIPUNCTURE: CPT | Performed by: INTERNAL MEDICINE

## 2024-10-29 PROCEDURE — 85018 HEMOGLOBIN: CPT | Performed by: INTERNAL MEDICINE

## 2024-10-29 PROCEDURE — 91320 SARSCV2 VAC 30MCG TRS-SUC IM: CPT | Performed by: INTERNAL MEDICINE

## 2024-10-29 PROCEDURE — 82043 UR ALBUMIN QUANTITATIVE: CPT | Performed by: INTERNAL MEDICINE

## 2024-10-29 PROCEDURE — G0439 PPPS, SUBSEQ VISIT: HCPCS | Performed by: INTERNAL MEDICINE

## 2024-10-29 PROCEDURE — 90480 ADMN SARSCOV2 VAC 1/ONLY CMP: CPT | Performed by: INTERNAL MEDICINE

## 2024-10-29 RX ORDER — TRAZODONE HYDROCHLORIDE 50 MG/1
50 TABLET, FILM COATED ORAL AT BEDTIME
Qty: 90 TABLET | Refills: 3 | Status: SHIPPED | OUTPATIENT
Start: 2024-10-29

## 2024-10-29 RX ORDER — LISINOPRIL 10 MG/1
10 TABLET ORAL DAILY
Qty: 90 TABLET | Refills: 3 | Status: SHIPPED | OUTPATIENT
Start: 2024-10-29

## 2024-10-29 ASSESSMENT — PAIN SCALES - GENERAL: PAINLEVEL_OUTOF10: NO PAIN (0)

## 2024-10-29 ASSESSMENT — SOCIAL DETERMINANTS OF HEALTH (SDOH): HOW OFTEN DO YOU GET TOGETHER WITH FRIENDS OR RELATIVES?: TWICE A WEEK

## 2024-10-29 NOTE — PROGRESS NOTES
Preventive Care Visit  [facility]  Krish Bates MD, Internal Medicine  Oct 29, 2024      Assessment & Plan   Problem List Items Addressed This Visit       Chronic kidney disease, stage 3 (H)    Relevant Orders    Albumin Random Urine Quantitative with Creat Ratio    Hemoglobin    Comprehensive metabolic panel (BMP + Alb, Alk Phos, ALT, AST, Total. Bili, TP)    OFFICE/OUTPT VISIT,EST,LEVL III (Completed)     Other Visit Diagnoses       Medicare annual wellness visit, subsequent    -  Primary    Relevant Orders    Lipid panel reflex to direct LDL Non-fasting    Benign essential hypertension        Relevant Medications    lisinopril (ZESTRIL) 10 MG tablet    Other Relevant Orders    Comprehensive metabolic panel (BMP + Alb, Alk Phos, ALT, AST, Total. Bili, TP)    OFFICE/OUTPT VISIT,EST,LEVL III (Completed)    Hyponatremia        Relevant Medications    lisinopril (ZESTRIL) 10 MG tablet    Other Relevant Orders    Comprehensive metabolic panel (BMP + Alb, Alk Phos, ALT, AST, Total. Bili, TP)    OFFICE/OUTPT VISIT,EST,LEVL III (Completed)    Insomnia, unspecified type        Relevant Medications    traZODone (DESYREL) 50 MG tablet    Other Relevant Orders    OFFICE/OUTPT VISIT,EST,LEVL III (Completed)           Patient is here for Medicare wellness exam.  Overall she is doing well she lives on her own, and takes care of her yard work, snow shoveling, drives.  Her son is her next contact and she will do a advance directive with him on it.    No longer doing mammograms or colonoscopies  She will get a flu shot and COVID shot today.  Memory is good.    We did discuss making sure she is got some options in case something happened or if she gets weaker or worse in the future recommended she have her cell phone with all the time and have somebody around if she is doing activities such as being up on a ladder.    Other issues addressed insomnia I do not want her on Ambien we will put her on  some trazodone to help her mind racing at night.  She is instructed to take this daily.    Hypertension blood pressure is better at home we will take that reading.  She takes her lisinopril as needed would like her to take her lisinopril every day if her blood pressures over 100 teen she will do 10 mg at night.    Hyponatremia we will recheck her electrolytes today.        Patient has been advised of split billing requirements and indicates understanding: Yes       Counseling  Appropriate preventive services were addressed with this patient via screening, questionnaire, or discussion as appropriate for fall prevention, nutrition, physical activity, Tobacco-use cessation, social engagement, weight loss and cognition.  Checklist reviewing preventive services available has been given to the patient.  Reviewed patient's diet, addressing concerns and/or questions.   Discussed possible causes of fatigue. Information on urinary incontinence and treatment options given to patient.     FUTURE APPOINTMENTS:       - Follow-up for annual visit or as needed      Lyric Ventura is a 89 year old, presenting for the following:  Physical (Hx of low sodium.)        10/29/2024     9:49 AM   Additional Questions   Roomed by Angelique VERONICA  Doing well, walks 20 minutes a day in her basement    BP runs normal at home can be 88/50, last night 119/60     Trouble with sleep, mind keeps racing.      Still working at the [employer], [employer].     Lives in her house, alone. Drives.    Does all her own work.     Health Care Directive  Patient does not have a Health Care Directive: Discussed advance care planning with patient; information given to patient to review.      10/29/2024   General Health   How would you rate your overall physical health? Good   Feel stress (tense, anxious, or unable to sleep) To some extent      (!) STRESS CONCERN      10/29/2024   Nutrition   Diet: Regular (no restrictions)            8/19/2022   Exercise    Frequency of exercise: 4-5 days/week            10/29/2024   Social Factors   Frequency of gathering with friends or relatives Twice a week   Worry food won't last until get money to buy more No   Food not last or not have enough money for food? No   Do you have housing? (Housing is defined as stable permanent housing and does not include staying ouside in a car, in a tent, in an abandoned building, in an overnight shelter, or couch-surfing.) Yes   Are you worried about losing your housing? No   Lack of transportation? No   Unable to get utilities (heat,electricity)? No            10/29/2024   Fall Risk   Fallen 2 or more times in the past year? No     No    Trouble with walking or balance? No     No        Patient-reported    Multiple values from one day are sorted in reverse-chronological order          10/29/2024   Activities of Daily Living- Home Safety   Needs help with the following daily activites None of the above   Safety concerns in the home None of the above            10/29/2024   Dental   Dentist two times every year? Yes            10/29/2024   Hearing Screening   Hearing concerns? None of the above            10/29/2024   Driving Risk Screening   Patient/family members have concerns about driving No            10/29/2024   General Alertness/Fatigue Screening   Have you been more tired than usual lately? (!) YES            10/29/2024   Urinary Incontinence Screening   Bothered by leaking urine in past 6 months Yes            10/29/2024   TB Screening   Were you born outside of the [place]? No      Today's PHQ-2 Score:       4/24/2024     1:30 PM   PHQ-2 ( 1999 [employer])   Q1: Little interest or pleasure in doing things 0   Q2: Feeling down, depressed or hopeless 0   PHQ-2 Score 0   PHQ-2 Total Score (12-17 Years)- Positive if 3 or more points; Administer PHQ-A if positive 0         10/29/2024   Substance Use   Alcohol more than 3/day or more than 7/wk No   Do you have a current opioid prescription? No   How  severe/bad is pain from 1 to 10? 0/10 (No Pain)   Do you use any other substances recreationally? No    (!) DECLINE       Multiple values from one day are sorted in reverse-chronological order     Social History     Tobacco Use    Smoking status: Never    Smokeless tobacco: Never   Vaping Use    Vaping status: Never Used   Substance Use Topics    Alcohol use: Not Currently    Drug use: Never      Mammogram Screening - After age 74- determine frequency with patient based on health status, life expectancy and patient goals    Reviewed and updated as needed this visit by Provider                    Lab work is in process  Current providers sharing in care for this patient include:  Patient Care Team:  Krish Bates MD as PCP - General (Internal Medicine)  Krish Bates MD as Assigned PCP  Hanna Martínez APRN CNP as Nurse Practitioner (Gastroenterology)  Fortino Fields DPM as Assigned Surgical Provider  Brooklyn Walker APRN CNP as Assigned Heart and Vascular Provider    The following health maintenance items are reviewed in Epic and correct as of today:  Health Maintenance   Topic Date Due    DEXA  Never done    LIPID  Never done    ZOSTER IMMUNIZATION (1 of 2) Never done    RSV VACCINE (1 - 1-dose 75+ series) Never done    MEDICARE ANNUAL WELLNESS VISIT  08/19/2023    MICROALBUMIN  08/19/2023    ANNUAL REVIEW OF HM ORDERS  08/19/2023    INFLUENZA VACCINE (1) Never done    COVID-19 Vaccine (6 - 2024-25 season) 09/01/2024    HEMOGLOBIN  12/29/2024    BMP  12/29/2024    FALL RISK ASSESSMENT  10/29/2025    ADVANCE CARE PLANNING  08/19/2027    DTAP/TDAP/TD IMMUNIZATION (2 - Td or Tdap) 12/27/2029    PHQ-2 (once per calendar year)  Completed    Pneumococcal Vaccine: 65+ Years  Completed    URINALYSIS  Completed    HPV IMMUNIZATION  Aged Out    MENINGITIS IMMUNIZATION  Aged Out    RSV MONOCLONAL ANTIBODY  Aged Out         Review of Systems  CONSTITUTIONAL: NEGATIVE for fever, chills, change in  "weight  INTEGUMENTARY/SKIN: NEGATIVE for worrisome rashes, moles or lesions  EYES: NEGATIVE for vision changes or irritation  ENT/MOUTH: NEGATIVE for ear, mouth and throat problems  RESP: NEGATIVE for significant cough or SOB  BREAST: NEGATIVE for masses, tenderness or discharge  CV: NEGATIVE for chest pain, palpitations or peripheral edema  GI: NEGATIVE for nausea, abdominal pain, heartburn, or change in bowel habits  : NEGATIVE for frequency, dysuria, or hematuria  MUSCULOSKELETAL: NEGATIVE for significant arthralgias or myalgia  NEURO: NEGATIVE for weakness, dizziness or paresthesias  ENDOCRINE: NEGATIVE for temperature intolerance, skin/hair changes  HEME: NEGATIVE for bleeding problems  PSYCHIATRIC: NEGATIVE for changes in mood or affect     Objective    Exam  /70   Pulse 74   Temp 97  F (36.1  C) (Temporal)   Resp 16   Ht 1.638 m (5' 4.5\")   Wt 56.2 kg (124 lb)   SpO2 98%   BMI 20.96 kg/m     Estimated body mass index is 20.96 kg/m  as calculated from the following:    Height as of this encounter: 1.638 m (5' 4.5\").    Weight as of this encounter: 56.2 kg (124 lb).    Physical Exam  GENERAL: alert and no distress  EYES: Eyes grossly normal to inspection, PERRL and conjunctivae and sclerae normal  HENT: ear canals and TM's normal, nose and mouth without ulcers or lesions  NECK: no adenopathy, no asymmetry, masses, or scars  RESP: lungs clear to auscultation - no rales, rhonchi or wheezes  CV: regular rate and rhythm, normal S1 S2, no S3 or S4, no murmur, click or rub, no peripheral edema  ABDOMEN: soft, nontender, no hepatosplenomegaly, no masses and bowel sounds normal  MS: no gross musculoskeletal defects noted, no edema  SKIN: no suspicious lesions or rashes  NEURO: Normal strength and tone, mentation intact and speech normal  PSYCH: mentation appears normal, affect normal/bright               10/29/2024   Mini Cog   Clock Draw Score 2 Normal    0 Abnormal   3 Item Recall 3 objects recalled    " 3 objects recalled   Mini Cog Total Score 5    3       Multiple values from one day are sorted in reverse-chronological order              Signed Electronically by: Krish Bates MD

## 2024-11-19 ENCOUNTER — ANCILLARY PROCEDURE (OUTPATIENT)
Dept: CARDIOLOGY | Facility: CLINIC | Age: 89
End: 2024-11-19
Attending: INTERNAL MEDICINE
Payer: COMMERCIAL

## 2024-11-19 DIAGNOSIS — Z95.0 CARDIAC PACEMAKER IN SITU: Primary | ICD-10-CM

## 2024-11-19 DIAGNOSIS — I49.5 SICK SINUS SYNDROME (H): ICD-10-CM

## 2024-11-19 DIAGNOSIS — Z95.0 CARDIAC PACEMAKER IN SITU: ICD-10-CM

## 2024-11-19 LAB
MDC_IDC_EPISODE_DTM: NORMAL
MDC_IDC_EPISODE_DURATION: 1158 S
MDC_IDC_EPISODE_DURATION: 128 S
MDC_IDC_EPISODE_DURATION: 1348 S
MDC_IDC_EPISODE_DURATION: 142 S
MDC_IDC_EPISODE_DURATION: 1517 S
MDC_IDC_EPISODE_DURATION: 1542 S
MDC_IDC_EPISODE_DURATION: 1550 S
MDC_IDC_EPISODE_DURATION: 16 S
MDC_IDC_EPISODE_DURATION: 181 S
MDC_IDC_EPISODE_DURATION: 182 S
MDC_IDC_EPISODE_DURATION: 184 S
MDC_IDC_EPISODE_DURATION: 184 S
MDC_IDC_EPISODE_DURATION: 1928 S
MDC_IDC_EPISODE_DURATION: 195 S
MDC_IDC_EPISODE_DURATION: 199 S
MDC_IDC_EPISODE_DURATION: 218 S
MDC_IDC_EPISODE_DURATION: 225 S
MDC_IDC_EPISODE_DURATION: 284 S
MDC_IDC_EPISODE_DURATION: 284 S
MDC_IDC_EPISODE_DURATION: 286 S
MDC_IDC_EPISODE_DURATION: 289 S
MDC_IDC_EPISODE_DURATION: 321 S
MDC_IDC_EPISODE_DURATION: 354 S
MDC_IDC_EPISODE_DURATION: 37 S
MDC_IDC_EPISODE_DURATION: 376 S
MDC_IDC_EPISODE_DURATION: 3925 S
MDC_IDC_EPISODE_DURATION: 40 S
MDC_IDC_EPISODE_DURATION: 431 S
MDC_IDC_EPISODE_DURATION: 436 S
MDC_IDC_EPISODE_DURATION: 440 S
MDC_IDC_EPISODE_DURATION: 451 S
MDC_IDC_EPISODE_DURATION: 500 S
MDC_IDC_EPISODE_DURATION: 5225 S
MDC_IDC_EPISODE_DURATION: 609 S
MDC_IDC_EPISODE_DURATION: 660 S
MDC_IDC_EPISODE_DURATION: 710 S
MDC_IDC_EPISODE_DURATION: 730 S
MDC_IDC_EPISODE_DURATION: 741 S
MDC_IDC_EPISODE_DURATION: 7542 S
MDC_IDC_EPISODE_DURATION: 787 S
MDC_IDC_EPISODE_DURATION: 825 S
MDC_IDC_EPISODE_DURATION: 845 S
MDC_IDC_EPISODE_DURATION: 95 S
MDC_IDC_EPISODE_DURATION: 98 S
MDC_IDC_EPISODE_DURATION: NORMAL S
MDC_IDC_EPISODE_ID: 1098
MDC_IDC_EPISODE_ID: 1099
MDC_IDC_EPISODE_ID: 1100
MDC_IDC_EPISODE_ID: 1101
MDC_IDC_EPISODE_ID: 1102
MDC_IDC_EPISODE_ID: 1103
MDC_IDC_EPISODE_ID: 1104
MDC_IDC_EPISODE_ID: 1105
MDC_IDC_EPISODE_ID: 1106
MDC_IDC_EPISODE_ID: 1107
MDC_IDC_EPISODE_ID: 1108
MDC_IDC_EPISODE_ID: 1109
MDC_IDC_EPISODE_ID: 1110
MDC_IDC_EPISODE_ID: 1111
MDC_IDC_EPISODE_ID: 1112
MDC_IDC_EPISODE_ID: 1113
MDC_IDC_EPISODE_ID: 1114
MDC_IDC_EPISODE_ID: 1115
MDC_IDC_EPISODE_ID: 1116
MDC_IDC_EPISODE_ID: 1117
MDC_IDC_EPISODE_ID: 1118
MDC_IDC_EPISODE_ID: 1119
MDC_IDC_EPISODE_ID: 1120
MDC_IDC_EPISODE_ID: 1121
MDC_IDC_EPISODE_ID: 1122
MDC_IDC_EPISODE_ID: 1123
MDC_IDC_EPISODE_ID: 1124
MDC_IDC_EPISODE_ID: 1125
MDC_IDC_EPISODE_ID: 1126
MDC_IDC_EPISODE_ID: 1127
MDC_IDC_EPISODE_ID: 1128
MDC_IDC_EPISODE_ID: 1129
MDC_IDC_EPISODE_ID: 1130
MDC_IDC_EPISODE_ID: 1131
MDC_IDC_EPISODE_ID: 1132
MDC_IDC_EPISODE_ID: 1133
MDC_IDC_EPISODE_ID: 1134
MDC_IDC_EPISODE_ID: 1135
MDC_IDC_EPISODE_ID: 1136
MDC_IDC_EPISODE_ID: 1137
MDC_IDC_EPISODE_ID: 1138
MDC_IDC_EPISODE_ID: 1139
MDC_IDC_EPISODE_ID: 1140
MDC_IDC_EPISODE_ID: 1141
MDC_IDC_EPISODE_ID: 1142
MDC_IDC_EPISODE_ID: 1143
MDC_IDC_EPISODE_ID: 1144
MDC_IDC_EPISODE_ID: 1145
MDC_IDC_EPISODE_ID: 1146
MDC_IDC_EPISODE_ID: 1147
MDC_IDC_EPISODE_TYPE: NORMAL
MDC_IDC_LEAD_CONNECTION_STATUS: NORMAL
MDC_IDC_LEAD_CONNECTION_STATUS: NORMAL
MDC_IDC_LEAD_IMPLANT_DT: NORMAL
MDC_IDC_LEAD_IMPLANT_DT: NORMAL
MDC_IDC_LEAD_LOCATION: NORMAL
MDC_IDC_LEAD_LOCATION: NORMAL
MDC_IDC_LEAD_LOCATION_DETAIL_1: NORMAL
MDC_IDC_LEAD_LOCATION_DETAIL_1: NORMAL
MDC_IDC_LEAD_MFG: NORMAL
MDC_IDC_LEAD_MFG: NORMAL
MDC_IDC_LEAD_MODEL: NORMAL
MDC_IDC_LEAD_MODEL: NORMAL
MDC_IDC_LEAD_POLARITY_TYPE: NORMAL
MDC_IDC_LEAD_POLARITY_TYPE: NORMAL
MDC_IDC_LEAD_SERIAL: NORMAL
MDC_IDC_LEAD_SERIAL: NORMAL
MDC_IDC_MSMT_BATTERY_DTM: NORMAL
MDC_IDC_MSMT_BATTERY_REMAINING_LONGEVITY: 147 MO
MDC_IDC_MSMT_BATTERY_RRT_TRIGGER: 2.62
MDC_IDC_MSMT_BATTERY_STATUS: NORMAL
MDC_IDC_MSMT_BATTERY_VOLTAGE: 3.1 V
MDC_IDC_MSMT_LEADCHNL_RA_IMPEDANCE_VALUE: 285 OHM
MDC_IDC_MSMT_LEADCHNL_RA_IMPEDANCE_VALUE: 380 OHM
MDC_IDC_MSMT_LEADCHNL_RA_PACING_THRESHOLD_AMPLITUDE: 0.62 V
MDC_IDC_MSMT_LEADCHNL_RA_PACING_THRESHOLD_PULSEWIDTH: 0.4 MS
MDC_IDC_MSMT_LEADCHNL_RA_SENSING_INTR_AMPL: 3.38 MV
MDC_IDC_MSMT_LEADCHNL_RA_SENSING_INTR_AMPL: 3.38 MV
MDC_IDC_MSMT_LEADCHNL_RV_IMPEDANCE_VALUE: 361 OHM
MDC_IDC_MSMT_LEADCHNL_RV_IMPEDANCE_VALUE: 437 OHM
MDC_IDC_MSMT_LEADCHNL_RV_PACING_THRESHOLD_AMPLITUDE: 0.5 V
MDC_IDC_MSMT_LEADCHNL_RV_PACING_THRESHOLD_PULSEWIDTH: 0.4 MS
MDC_IDC_MSMT_LEADCHNL_RV_SENSING_INTR_AMPL: 6.5 MV
MDC_IDC_MSMT_LEADCHNL_RV_SENSING_INTR_AMPL: 6.5 MV
MDC_IDC_PG_IMPLANT_DTM: NORMAL
MDC_IDC_PG_MFG: NORMAL
MDC_IDC_PG_MODEL: NORMAL
MDC_IDC_PG_SERIAL: NORMAL
MDC_IDC_PG_TYPE: NORMAL
MDC_IDC_SESS_CLINIC_NAME: NORMAL
MDC_IDC_SESS_DTM: NORMAL
MDC_IDC_SESS_TYPE: NORMAL
MDC_IDC_SET_BRADY_AT_MODE_SWITCH_RATE: 171 {BEATS}/MIN
MDC_IDC_SET_BRADY_HYSTRATE: NORMAL
MDC_IDC_SET_BRADY_LOWRATE: 60 {BEATS}/MIN
MDC_IDC_SET_BRADY_MAX_SENSOR_RATE: 130 {BEATS}/MIN
MDC_IDC_SET_BRADY_MAX_TRACKING_RATE: 130 {BEATS}/MIN
MDC_IDC_SET_BRADY_MODE: NORMAL
MDC_IDC_SET_BRADY_PAV_DELAY_LOW: 180 MS
MDC_IDC_SET_BRADY_SAV_DELAY_LOW: 150 MS
MDC_IDC_SET_LEADCHNL_RA_PACING_AMPLITUDE: 1.5 V
MDC_IDC_SET_LEADCHNL_RA_PACING_ANODE_ELECTRODE_1: NORMAL
MDC_IDC_SET_LEADCHNL_RA_PACING_ANODE_LOCATION_1: NORMAL
MDC_IDC_SET_LEADCHNL_RA_PACING_CAPTURE_MODE: NORMAL
MDC_IDC_SET_LEADCHNL_RA_PACING_CATHODE_ELECTRODE_1: NORMAL
MDC_IDC_SET_LEADCHNL_RA_PACING_CATHODE_LOCATION_1: NORMAL
MDC_IDC_SET_LEADCHNL_RA_PACING_POLARITY: NORMAL
MDC_IDC_SET_LEADCHNL_RA_PACING_PULSEWIDTH: 0.4 MS
MDC_IDC_SET_LEADCHNL_RA_SENSING_ANODE_ELECTRODE_1: NORMAL
MDC_IDC_SET_LEADCHNL_RA_SENSING_ANODE_LOCATION_1: NORMAL
MDC_IDC_SET_LEADCHNL_RA_SENSING_CATHODE_ELECTRODE_1: NORMAL
MDC_IDC_SET_LEADCHNL_RA_SENSING_CATHODE_LOCATION_1: NORMAL
MDC_IDC_SET_LEADCHNL_RA_SENSING_POLARITY: NORMAL
MDC_IDC_SET_LEADCHNL_RA_SENSING_SENSITIVITY: 0.3 MV
MDC_IDC_SET_LEADCHNL_RV_PACING_AMPLITUDE: 2 V
MDC_IDC_SET_LEADCHNL_RV_PACING_ANODE_ELECTRODE_1: NORMAL
MDC_IDC_SET_LEADCHNL_RV_PACING_ANODE_LOCATION_1: NORMAL
MDC_IDC_SET_LEADCHNL_RV_PACING_CAPTURE_MODE: NORMAL
MDC_IDC_SET_LEADCHNL_RV_PACING_CATHODE_ELECTRODE_1: NORMAL
MDC_IDC_SET_LEADCHNL_RV_PACING_CATHODE_LOCATION_1: NORMAL
MDC_IDC_SET_LEADCHNL_RV_PACING_POLARITY: NORMAL
MDC_IDC_SET_LEADCHNL_RV_PACING_PULSEWIDTH: 0.4 MS
MDC_IDC_SET_LEADCHNL_RV_SENSING_ANODE_ELECTRODE_1: NORMAL
MDC_IDC_SET_LEADCHNL_RV_SENSING_ANODE_LOCATION_1: NORMAL
MDC_IDC_SET_LEADCHNL_RV_SENSING_CATHODE_ELECTRODE_1: NORMAL
MDC_IDC_SET_LEADCHNL_RV_SENSING_CATHODE_LOCATION_1: NORMAL
MDC_IDC_SET_LEADCHNL_RV_SENSING_POLARITY: NORMAL
MDC_IDC_SET_LEADCHNL_RV_SENSING_SENSITIVITY: 0.9 MV
MDC_IDC_SET_ZONE_DETECTION_INTERVAL: 350 MS
MDC_IDC_SET_ZONE_DETECTION_INTERVAL: 400 MS
MDC_IDC_SET_ZONE_STATUS: NORMAL
MDC_IDC_SET_ZONE_STATUS: NORMAL
MDC_IDC_SET_ZONE_TYPE: NORMAL
MDC_IDC_SET_ZONE_VENDOR_TYPE: NORMAL
MDC_IDC_STAT_AT_BURDEN_PERCENT: 7.3 %
MDC_IDC_STAT_AT_DTM_END: NORMAL
MDC_IDC_STAT_AT_DTM_START: NORMAL
MDC_IDC_STAT_BRADY_AP_VP_PERCENT: 43.03 %
MDC_IDC_STAT_BRADY_AP_VS_PERCENT: 25.67 %
MDC_IDC_STAT_BRADY_AS_VP_PERCENT: 14.83 %
MDC_IDC_STAT_BRADY_AS_VS_PERCENT: 16.46 %
MDC_IDC_STAT_BRADY_DTM_END: NORMAL
MDC_IDC_STAT_BRADY_DTM_START: NORMAL
MDC_IDC_STAT_BRADY_RA_PERCENT_PACED: 63.95 %
MDC_IDC_STAT_BRADY_RV_PERCENT_PACED: 59.91 %
MDC_IDC_STAT_EPISODE_RECENT_COUNT: 0
MDC_IDC_STAT_EPISODE_RECENT_COUNT: 244
MDC_IDC_STAT_EPISODE_RECENT_COUNT_DTM_END: NORMAL
MDC_IDC_STAT_EPISODE_RECENT_COUNT_DTM_START: NORMAL
MDC_IDC_STAT_EPISODE_TOTAL_COUNT: 0
MDC_IDC_STAT_EPISODE_TOTAL_COUNT: 1147
MDC_IDC_STAT_EPISODE_TOTAL_COUNT_DTM_END: NORMAL
MDC_IDC_STAT_EPISODE_TOTAL_COUNT_DTM_START: NORMAL
MDC_IDC_STAT_EPISODE_TYPE: NORMAL
MDC_IDC_STAT_TACHYTHERAPY_RECENT_DTM_END: NORMAL
MDC_IDC_STAT_TACHYTHERAPY_RECENT_DTM_START: NORMAL
MDC_IDC_STAT_TACHYTHERAPY_TOTAL_DTM_END: NORMAL
MDC_IDC_STAT_TACHYTHERAPY_TOTAL_DTM_START: NORMAL

## 2024-11-19 PROCEDURE — 93296 REM INTERROG EVL PM/IDS: CPT | Performed by: INTERNAL MEDICINE

## 2024-11-19 PROCEDURE — 93294 REM INTERROG EVL PM/LDLS PM: CPT | Performed by: INTERNAL MEDICINE

## 2024-12-10 ENCOUNTER — HOSPITAL ENCOUNTER (EMERGENCY)
Facility: CLINIC | Age: 89
Discharge: HOME | End: 2024-12-10
Attending: EMERGENCY MEDICINE | Admitting: EMERGENCY MEDICINE
Payer: MEDICARE

## 2024-12-10 ENCOUNTER — APPOINTMENT (OUTPATIENT)
Dept: CT IMAGING | Facility: CLINIC | Age: 89
End: 2024-12-10
Attending: EMERGENCY MEDICINE
Payer: MEDICARE

## 2024-12-10 VITALS
BODY MASS INDEX: 20.45 KG/M2 | TEMPERATURE: 97.4 F | RESPIRATION RATE: 18 BRPM | OXYGEN SATURATION: 97 % | HEART RATE: 80 BPM | WEIGHT: 121 LBS | DIASTOLIC BLOOD PRESSURE: 100 MMHG | SYSTOLIC BLOOD PRESSURE: 188 MMHG

## 2024-12-10 DIAGNOSIS — K57.92 DIVERTICULITIS: ICD-10-CM

## 2024-12-10 LAB
ADV 40+41 DNA STL QL NAA+NON-PROBE: NEGATIVE
ANION GAP SERPL CALCULATED.3IONS-SCNC: 11 MMOL/L (ref 7–15)
ASTRO TYP 1-8 RNA STL QL NAA+NON-PROBE: NEGATIVE
BASOPHILS # BLD AUTO: 0 10E3/UL (ref 0–0.2)
BASOPHILS NFR BLD AUTO: 0 %
BUN SERPL-MCNC: 14.1 MG/DL (ref 8–23)
C CAYETANENSIS DNA STL QL NAA+NON-PROBE: NEGATIVE
CALCIUM SERPL-MCNC: 9.5 MG/DL (ref 8.8–10.4)
CAMPYLOBACTER DNA SPEC NAA+PROBE: NEGATIVE
CHLORIDE SERPL-SCNC: 92 MMOL/L (ref 98–107)
CREAT SERPL-MCNC: 1.05 MG/DL (ref 0.51–0.95)
CRYPTOSP DNA STL QL NAA+NON-PROBE: NEGATIVE
E COLI O157 DNA STL QL NAA+NON-PROBE: ABNORMAL
E HISTOLYT DNA STL QL NAA+NON-PROBE: NEGATIVE
EAEC ASTA GENE ISLT QL NAA+PROBE: NEGATIVE
EC STX1+STX2 GENES STL QL NAA+NON-PROBE: NEGATIVE
EGFRCR SERPLBLD CKD-EPI 2021: 51 ML/MIN/1.73M2
EOSINOPHIL # BLD AUTO: 0.1 10E3/UL (ref 0–0.7)
EOSINOPHIL NFR BLD AUTO: 1 %
EPEC EAE GENE STL QL NAA+NON-PROBE: NEGATIVE
ERYTHROCYTE [DISTWIDTH] IN BLOOD BY AUTOMATED COUNT: 11.9 % (ref 10–15)
ETEC LTA+ST1A+ST1B TOX ST NAA+NON-PROBE: NEGATIVE
G LAMBLIA DNA STL QL NAA+NON-PROBE: NEGATIVE
GLUCOSE SERPL-MCNC: 98 MG/DL (ref 70–99)
HCO3 SERPL-SCNC: 24 MMOL/L (ref 22–29)
HCT VFR BLD AUTO: 37.5 % (ref 35–47)
HGB BLD-MCNC: 13 G/DL (ref 11.7–15.7)
IMM GRANULOCYTES # BLD: 0 10E3/UL
IMM GRANULOCYTES NFR BLD: 0 %
LYMPHOCYTES # BLD AUTO: 2.5 10E3/UL (ref 0.8–5.3)
LYMPHOCYTES NFR BLD AUTO: 25 %
MCH RBC QN AUTO: 32.1 PG (ref 26.5–33)
MCHC RBC AUTO-ENTMCNC: 34.7 G/DL (ref 31.5–36.5)
MCV RBC AUTO: 93 FL (ref 78–100)
MONOCYTES # BLD AUTO: 1.2 10E3/UL (ref 0–1.3)
MONOCYTES NFR BLD AUTO: 12 %
NEUTROPHILS # BLD AUTO: 6.2 10E3/UL (ref 1.6–8.3)
NEUTROPHILS NFR BLD AUTO: 62 %
NOROVIRUS GI+II RNA STL QL NAA+NON-PROBE: POSITIVE
NRBC # BLD AUTO: 0 10E3/UL
NRBC BLD AUTO-RTO: 0 /100
P SHIGELLOIDES DNA STL QL NAA+NON-PROBE: NEGATIVE
PLATELET # BLD AUTO: 219 10E3/UL (ref 150–450)
POTASSIUM SERPL-SCNC: 4.3 MMOL/L (ref 3.4–5.3)
RBC # BLD AUTO: 4.05 10E6/UL (ref 3.8–5.2)
RVA RNA STL QL NAA+NON-PROBE: NEGATIVE
SALMONELLA SP RPOD STL QL NAA+PROBE: NEGATIVE
SAPO I+II+IV+V RNA STL QL NAA+NON-PROBE: NEGATIVE
SHIGELLA SP+EIEC IPAH ST NAA+NON-PROBE: NEGATIVE
SODIUM SERPL-SCNC: 127 MMOL/L (ref 135–145)
V CHOLERAE DNA SPEC QL NAA+PROBE: NEGATIVE
VIBRIO DNA SPEC NAA+PROBE: NEGATIVE
WBC # BLD AUTO: 10.1 10E3/UL (ref 4–11)
Y ENTEROCOL DNA STL QL NAA+PROBE: NEGATIVE

## 2024-12-10 PROCEDURE — 99284 EMERGENCY DEPT VISIT MOD MDM: CPT | Performed by: EMERGENCY MEDICINE

## 2024-12-10 PROCEDURE — 80048 BASIC METABOLIC PNL TOTAL CA: CPT | Performed by: EMERGENCY MEDICINE

## 2024-12-10 PROCEDURE — 74176 CT ABD & PELVIS W/O CONTRAST: CPT | Mod: MG

## 2024-12-10 PROCEDURE — 87507 IADNA-DNA/RNA PROBE TQ 12-25: CPT | Performed by: EMERGENCY MEDICINE

## 2024-12-10 PROCEDURE — 85004 AUTOMATED DIFF WBC COUNT: CPT | Performed by: EMERGENCY MEDICINE

## 2024-12-10 PROCEDURE — 36415 COLL VENOUS BLD VENIPUNCTURE: CPT | Performed by: EMERGENCY MEDICINE

## 2024-12-10 PROCEDURE — G1010 CDSM STANSON: HCPCS

## 2024-12-10 PROCEDURE — 85014 HEMATOCRIT: CPT | Performed by: EMERGENCY MEDICINE

## 2024-12-10 ASSESSMENT — COLUMBIA-SUICIDE SEVERITY RATING SCALE - C-SSRS
6. HAVE YOU EVER DONE ANYTHING, STARTED TO DO ANYTHING, OR PREPARED TO DO ANYTHING TO END YOUR LIFE?: NO
1. IN THE PAST MONTH, HAVE YOU WISHED YOU WERE DEAD OR WISHED YOU COULD GO TO SLEEP AND NOT WAKE UP?: NO
2. HAVE YOU ACTUALLY HAD ANY THOUGHTS OF KILLING YOURSELF IN THE PAST MONTH?: NO

## 2024-12-10 ASSESSMENT — ACTIVITIES OF DAILY LIVING (ADL)
ADLS_ACUITY_SCORE: 47
ADLS_ACUITY_SCORE: 47
ADLS_ACUITY_SCORE: 43

## 2024-12-10 NOTE — ED TRIAGE NOTES
Pt here with abdominal pain and diarrhea for 2 weeks         Triage Assessment (Adult)       Row Name 12/10/24 2800          Triage Assessment    Airway WDL WDL        Respiratory WDL    Respiratory WDL WDL

## 2024-12-10 NOTE — ED PROVIDER NOTES
History     Chief Complaint   Patient presents with    Abdominal Pain    Diarrhea     HPI  Audrey Ortega is a 89 year old female who presents with some lower abdominal bloating.  She reports some explosive stools 3-4 times a day over the last 2 weeks.  No fever.  No vomiting.  No urinary symptoms.  No history of abdominal trouble or surgery she reports.  No history of diverticulitis she can remember.    Allergies:  Allergies   Allergen Reactions    Ciprofloxacin Rash    Macrobid [Nitrofurantoin] Itching and Rash       Problem List:    Patient Active Problem List    Diagnosis Date Noted    Chronic kidney disease, stage 3 (H) 05/05/2021     Priority: Medium        Past Medical History:    No past medical history on file.    Past Surgical History:    Past Surgical History:   Procedure Laterality Date    EP PACEMAKER GENERATOR REPLACEMENT- DUAL N/A 12/29/2023    Procedure: Pacemaker Generator Replacement Dual;  Surgeon: Sae Peguero MD;  Location:  [facility]ARD[facility] CATH LAB    PHACOEMULSIFICATION WITH STANDARD INTRAOCULAR LENS IMPLANT Right 7/16/2020    Procedure: Cataract surgery with intraocular lens implant;  Surgeon: Fortino Ford MD;  Location: [facility] OR    PHACOEMULSIFICATION WITH STANDARD INTRAOCULAR LENS IMPLANT Left 7/30/2020    Procedure: Cataract Surgery Left Eye;  Surgeon: Max Cooley MD;  Location: [facility] OR       Family History:    No family history on file.    Social History:  Marital Status:  Single [1]  Social History     Tobacco Use    Smoking status: Never    Smokeless tobacco: Never   Vaping Use    Vaping status: Never Used   Substance Use Topics    Alcohol use: Not Currently    Drug use: Never        Medications:    amoxicillin-clavulanate (AUGMENTIN) 875-125 MG tablet  Ascorbic Acid (VITAMIN C) 500 MG CAPS  aspirin (ASA) 325 MG EC tablet  calcium 600 MG tablet  COMPRESSION STOCKINGS  lisinopril (ZESTRIL) 10 MG tablet  magnesium 250 MG tablet  multivitamin w/minerals (THERA-VIT-M)  tablet  traZODone (DESYREL) 50 MG tablet  triamcinolone (KENALOG) 0.1 % external ointment  Vitamin D, Cholecalciferol, 25 MCG (1000 UT) TABS  zolpidem (AMBIEN) 5 MG tablet          Review of Systems  All other systems are reviewed and are negative    Physical Exam   BP: (!) 188/100  Pulse: 80  Temp: 97.4  F (36.3  C)  Resp: 18  Weight: 54.9 kg (121 lb)  SpO2: 97 %      Physical Exam  Vitals and nursing note reviewed.   Constitutional:       General: She is not in acute distress.     Appearance: She is well-developed. She is not diaphoretic.   HENT:      Head: Normocephalic and atraumatic.      Nose: Nose normal.      Mouth/Throat:      Mouth: Mucous membranes are moist.      Pharynx: Oropharynx is clear.   Eyes:      General: No scleral icterus.     Conjunctiva/sclera: Conjunctivae normal.   Cardiovascular:      Rate and Rhythm: Normal rate and regular rhythm.      Heart sounds: Normal heart sounds. No murmur heard.  Pulmonary:      Effort: Pulmonary effort is normal. No respiratory distress.      Breath sounds: No stridor. No wheezing or rales.   Abdominal:      General: Abdomen is flat. There is no distension.      Palpations: Abdomen is soft. There is no mass.      Tenderness: There is abdominal tenderness (mild) in the right lower quadrant. There is no guarding or rebound.   Musculoskeletal:         General: No tenderness.      Cervical back: Normal range of motion and neck supple.   Skin:     General: Skin is warm and dry.      Coloration: Skin is not pale.      Findings: No erythema or rash.   Neurological:      General: No focal deficit present.      Mental Status: She is alert.   Psychiatric:         Mood and Affect: Mood normal.         ED Course        Procedures                  Results for orders placed or performed during the hospital encounter of 12/10/24 (from the past 24 hours)   CBC with platelets differential    Narrative    The following orders were created for panel order CBC with platelets  differential.  Procedure                               Abnormality         Status                     ---------                               -----------         ------                     CBC with platelets and d...[[id number]]                      Final result                 Please view results for these tests on the individual orders.   Basic metabolic panel   Result Value Ref Range    Sodium 127 (L) 135 - 145 mmol/L    Potassium 4.3 3.4 - 5.3 mmol/L    Chloride 92 (L) 98 - 107 mmol/L    Carbon Dioxide (CO2) 24 22 - 29 mmol/L    Anion Gap 11 7 - 15 mmol/L    Urea Nitrogen 14.1 8.0 - 23.0 mg/dL    Creatinine 1.05 (H) 0.51 - 0.95 mg/dL    GFR Estimate 51 (L) >60 mL/min/1.73m2    Calcium 9.5 8.8 - 10.4 mg/dL    Glucose 98 70 - 99 mg/dL   CBC with platelets and differential   Result Value Ref Range    WBC Count 10.1 4.0 - 11.0 10e3/uL    RBC Count 4.05 3.80 - 5.20 10e6/uL    Hemoglobin 13.0 11.7 - 15.7 g/dL    Hematocrit 37.5 35.0 - 47.0 %    MCV 93 78 - 100 fL    MCH 32.1 26.5 - 33.0 pg    MCHC 34.7 31.5 - 36.5 g/dL    RDW 11.9 10.0 - 15.0 %    Platelet Count 219 150 - 450 10e3/uL    % Neutrophils 62 %    % Lymphocytes 25 %    % Monocytes 12 %    % Eosinophils 1 %    % Basophils 0 %    % Immature Granulocytes 0 %    NRBCs per 100 WBC 0 <1 /100    Absolute Neutrophils 6.2 1.6 - 8.3 10e3/uL    Absolute Lymphocytes 2.5 0.8 - 5.3 10e3/uL    Absolute Monocytes 1.2 0.0 - 1.3 10e3/uL    Absolute Eosinophils 0.1 0.0 - 0.7 10e3/uL    Absolute Basophils 0.0 0.0 - 0.2 10e3/uL    Absolute Immature Granulocytes 0.0 <=0.4 10e3/uL    Absolute NRBCs 0.0 10e3/uL   CT Abdomen Pelvis w/o Contrast    Narrative    EXAM: CT ABDOMEN PELVIS W/O CONTRAST  LOCATION: [facility]  DATE: 12/10/2024    INDICATION: Lower abdominal pain.  COMPARISON: CT abdomen pelvis 2/2/2023.  TECHNIQUE: CT scan of the abdomen and pelvis was performed without IV contrast. Multiplanar reformats were obtained. Dose reduction  techniques were used.  CONTRAST: None.    FINDINGS:     LOWER CHEST: Cardiomegaly. Partially visualized CIED. Trace pericardial effusion.    HEPATOBILIARY: No calcified gallstones or biliary ductal dilation.    PANCREAS: Normal.    SPLEEN: Normal.    ADRENAL GLANDS: Normal.    KIDNEYS/BLADDER: Small left renal cyst, which does not require follow-up. No urinary calculi or hydronephrosis. Normal bladder.    BOWEL: Acute uncomplicated sigmoid diverticulitis. There is localized inflammation surrounding the inflamed sigmoid diverticulum and thickening of the adjacent fascia. No abscess or free intraperitoneal air. No bowel obstruction or small bowel   inflammation. Normal appendix.    LYMPH NODES: No enlarged lymph nodes.    VASCULATURE: Moderate aortobiiliac atherosclerosis. No abdominal aortic aneurysm.    PELVIC ORGANS: Pessary within the vagina. No adnexal mass.    MUSCULOSKELETAL: Tiny fat-containing periumbilical hernia. Diffusely demineralized bones. Multilevel degenerative changes of the spine. Grade 1 anterolisthesis of L3 on L4 related to facet arthropathy.      Impression    IMPRESSION:     1.  Acute uncomplicated sigmoid diverticulitis.       Medications - No data to display    Assessments & Plan (with Medical Decision Making)  89-year-old female with acute uncomplicated diverticulitis by CT scan.  Initiated on Augmentin.  Follow-up in clinic if not improving in a week.     I have reviewed the nursing notes.    I have reviewed the findings, diagnosis, plan and need for follow up with the patient.        New Prescriptions    AMOXICILLIN-CLAVULANATE (AUGMENTIN) 875-125 MG TABLET    Take 1 tablet by mouth 2 times daily for 7 days.       Final diagnoses:   Diverticulitis       12/10/2024   [facility] EMERGENCY DEPT       Corey English MD  12/10/24 1920

## 2024-12-11 ENCOUNTER — TELEPHONE (OUTPATIENT)
Dept: NURSING | Facility: CLINIC | Age: 89
End: 2024-12-11
Payer: COMMERCIAL

## 2024-12-11 NOTE — TELEPHONE ENCOUNTER
"[facility]    Reason for call: Lab Result Notification     Lab Result (including Rx patient on, if applicable).  If culture, copy of lab report at bottom.  Lab Result:   Component      Latest Ref Rng 12/10/2024  5:45 PM   Norovirus Gl/Gll      Negative  Positive !       Legend:  ! Abnormal    ED Rx: amoxicillin-clavulanate (AUGMENTIN) 875-125 MG tablet - Take 1 tablet by mouth 2 times daily for 7 days (For Diverticulitis)    Creatinine Level (mg/dl)   Creatinine   Date Value Ref Range Status   12/10/2024 1.05 (H) 0.51 - 0.95 mg/dL Final   05/05/2021 1.20 (H) 0.52 - 1.04 mg/dL Final    Creatinine clearance (ml/min), if applicable    Serum creatinine: 1.05 mg/dL (H) 12/10/24 1745  Estimated creatinine clearance: 32.1 mL/min (A)     ED Symptoms: Presented to the ED with lower abdominal bloating, diarrhea, and abdominal tenderness.     Current Symptoms: Unable to assess. Patient stated she was at work and \"I don't want to talk right now.\" States that she will call us back when she gets home.     RN Recommendations/Instructions per [place] ED lab result protocol:   [facility] ED lab result protocol utilized: Enteric bacteria (Norovirus)  Continue antibiotic/medication as prescribed: Amoxicillin - Prescribed for diverticulitis.     Reached patient but patient did not want to talk at the time of call.     Letter pended to be sent via [employer]PS mail.     NUZHAT CARRILLO RN           "

## 2024-12-11 NOTE — LETTER
December 11, 2024        Audrey Ortega  [address]  [place] [address]          Dear Audrey Ortega:    You were seen in the [facility] Emergency Department at [facility] NURSE ADVISORS on 12/11/2024.  We are unable to reach you by phone, so we are sending you this letter.     It is important that you call [facility] Emergency Department lab result nurse at [phone], as we have information to relay to you AND/OR we MAY have to make some changes in your treatment.    Best time to call back is between 9AM and 5:30PM, 7 days a week.      Sincerely,     [facility] Emergency Department Lab Result RN  [phone]

## 2024-12-11 NOTE — TELEPHONE ENCOUNTER
[facility]    Reason for call: Lab Result Notification     Lab Result (including Rx patient on, if applicable).  If culture, copy of lab report at bottom.  Lab Result:   Component      Latest Ref Rng 12/10/2024  5:45 PM   Norovirus Gl/Gll      Negative  Positive !       Legend:  ! Abnormal        Patient's current Symptoms:   One episode of diarrhea this morning    RN Recommendations/Instructions per [place] ED lab result protocol:   [facility] ED lab result protocol utilized: Norovirus    Patient/care giver notified to contact your PCP clinic or return to the Emergency department if your:  Symptoms return.  Symptoms worsen or other concerning symptoms.    Payam Segovia RN

## 2024-12-16 ENCOUNTER — HOSPITAL ENCOUNTER (INPATIENT)
Facility: CLINIC | Age: 89
LOS: 3 days | Discharge: NURSING, REHAB OR LONG-TERM CARE | End: 2024-12-19
Attending: FAMILY MEDICINE | Admitting: STUDENT IN AN ORGANIZED HEALTH CARE EDUCATION/TRAINING PROGRAM
Payer: MEDICARE

## 2024-12-16 ENCOUNTER — DOCUMENTATION ONLY (OUTPATIENT)
Dept: CARDIOLOGY | Facility: CLINIC | Age: 89
End: 2024-12-16

## 2024-12-16 ENCOUNTER — APPOINTMENT (OUTPATIENT)
Dept: GENERAL RADIOLOGY | Facility: CLINIC | Age: 89
DRG: 522 | End: 2024-12-16
Attending: ORTHOPAEDIC SURGERY
Payer: MEDICARE

## 2024-12-16 ENCOUNTER — ANESTHESIA EVENT (OUTPATIENT)
Dept: SURGERY | Facility: CLINIC | Age: 89
End: 2024-12-16
Payer: MEDICARE

## 2024-12-16 ENCOUNTER — APPOINTMENT (OUTPATIENT)
Dept: CT IMAGING | Facility: CLINIC | Age: 89
DRG: 522 | End: 2024-12-16
Attending: FAMILY MEDICINE
Payer: MEDICARE

## 2024-12-16 ENCOUNTER — ANESTHESIA (OUTPATIENT)
Dept: SURGERY | Facility: CLINIC | Age: 89
End: 2024-12-16
Payer: MEDICARE

## 2024-12-16 ENCOUNTER — APPOINTMENT (OUTPATIENT)
Dept: GENERAL RADIOLOGY | Facility: CLINIC | Age: 89
DRG: 522 | End: 2024-12-16
Attending: FAMILY MEDICINE
Payer: MEDICARE

## 2024-12-16 ENCOUNTER — TRANSFERRED RECORDS (OUTPATIENT)
Dept: HEALTH INFORMATION MANAGEMENT | Facility: CLINIC | Age: 89
End: 2024-12-16

## 2024-12-16 DIAGNOSIS — S72.001A HIP FRACTURE, RIGHT, CLOSED, INITIAL ENCOUNTER (H): ICD-10-CM

## 2024-12-16 DIAGNOSIS — Z96.641 HISTORY OF HEMIARTHROPLASTY OF RIGHT HIP: Primary | ICD-10-CM

## 2024-12-16 DIAGNOSIS — F51.01 PRIMARY INSOMNIA: ICD-10-CM

## 2024-12-16 DIAGNOSIS — S72.001A CLOSED DISPLACED FRACTURE OF RIGHT FEMORAL NECK (H): ICD-10-CM

## 2024-12-16 DIAGNOSIS — S01.111A LACERATION OF RIGHT EYEBROW, INITIAL ENCOUNTER: ICD-10-CM

## 2024-12-16 DIAGNOSIS — R55 SYNCOPE, UNSPECIFIED SYNCOPE TYPE: ICD-10-CM

## 2024-12-16 PROBLEM — R51.9 HEADACHE: Status: ACTIVE | Noted: 2023-04-21

## 2024-12-16 PROBLEM — I20.0 UNSTABLE ANGINA (H): Status: ACTIVE | Noted: 2023-04-21

## 2024-12-16 PROBLEM — R19.5 LOOSE STOOLS: Status: ACTIVE | Noted: 2023-04-21

## 2024-12-16 PROBLEM — I49.5 SICK SINUS SYNDROME (H): Status: ACTIVE | Noted: 2024-12-16

## 2024-12-16 PROBLEM — Z95.0 PACEMAKER: Status: ACTIVE | Noted: 2017-10-13

## 2024-12-16 PROBLEM — I48.91 ATRIAL FIBRILLATION (H): Status: ACTIVE | Noted: 2017-10-13

## 2024-12-16 PROBLEM — E87.1 HYPONATREMIA: Status: ACTIVE | Noted: 2023-04-21

## 2024-12-16 PROBLEM — E86.0 DEHYDRATION: Status: ACTIVE | Noted: 2024-12-16

## 2024-12-16 PROBLEM — I10 HTN (HYPERTENSION): Status: ACTIVE | Noted: 2017-10-13

## 2024-12-16 LAB
ANION GAP SERPL CALCULATED.3IONS-SCNC: 15 MMOL/L (ref 7–15)
ATRIAL RATE - MUSE: 70 BPM
ATRIAL RATE - MUSE: NORMAL BPM
ATRIAL RATE - MUSE: NORMAL BPM
BASOPHILS # BLD AUTO: 0 10E3/UL (ref 0–0.2)
BASOPHILS NFR BLD AUTO: 0 %
BUN SERPL-MCNC: 13.5 MG/DL (ref 8–23)
CALCIUM SERPL-MCNC: 9.5 MG/DL (ref 8.8–10.4)
CHLORIDE SERPL-SCNC: 90 MMOL/L (ref 98–107)
CREAT SERPL-MCNC: 1.31 MG/DL (ref 0.51–0.95)
DIASTOLIC BLOOD PRESSURE - MUSE: NORMAL MMHG
EGFRCR SERPLBLD CKD-EPI 2021: 39 ML/MIN/1.73M2
EOSINOPHIL # BLD AUTO: 0.1 10E3/UL (ref 0–0.7)
EOSINOPHIL NFR BLD AUTO: 1 %
ERYTHROCYTE [DISTWIDTH] IN BLOOD BY AUTOMATED COUNT: 11.7 % (ref 10–15)
GLUCOSE BLDC GLUCOMTR-MCNC: 99 MG/DL (ref 70–99)
GLUCOSE SERPL-MCNC: 122 MG/DL (ref 70–99)
HCO3 SERPL-SCNC: 21 MMOL/L (ref 22–29)
HCT VFR BLD AUTO: 36.9 % (ref 35–47)
HGB BLD-MCNC: 12.8 G/DL (ref 11.7–15.7)
IMM GRANULOCYTES # BLD: 0.1 10E3/UL
IMM GRANULOCYTES NFR BLD: 1 %
INTERPRETATION ECG - MUSE: NORMAL
LYMPHOCYTES # BLD AUTO: 1.4 10E3/UL (ref 0.8–5.3)
LYMPHOCYTES NFR BLD AUTO: 21 %
MCH RBC QN AUTO: 31.7 PG (ref 26.5–33)
MCHC RBC AUTO-ENTMCNC: 34.7 G/DL (ref 31.5–36.5)
MCV RBC AUTO: 91 FL (ref 78–100)
MONOCYTES # BLD AUTO: 0.5 10E3/UL (ref 0–1.3)
MONOCYTES NFR BLD AUTO: 7 %
NEUTROPHILS # BLD AUTO: 4.8 10E3/UL (ref 1.6–8.3)
NEUTROPHILS NFR BLD AUTO: 71 %
NRBC # BLD AUTO: 0 10E3/UL
NRBC BLD AUTO-RTO: 0 /100
P AXIS - MUSE: 47 DEGREES
P AXIS - MUSE: NORMAL DEGREES
P AXIS - MUSE: NORMAL DEGREES
PLATELET # BLD AUTO: 264 10E3/UL (ref 150–450)
POTASSIUM SERPL-SCNC: 4.1 MMOL/L (ref 3.4–5.3)
PR INTERVAL - MUSE: 212 MS
PR INTERVAL - MUSE: NORMAL MS
PR INTERVAL - MUSE: NORMAL MS
QRS DURATION - MUSE: 132 MS
QRS DURATION - MUSE: 92 MS
QRS DURATION - MUSE: 94 MS
QT - MUSE: 422 MS
QT - MUSE: 426 MS
QT - MUSE: 466 MS
QTC - MUSE: 435 MS
QTC - MUSE: 439 MS
QTC - MUSE: 503 MS
R AXIS - MUSE: 117 DEGREES
R AXIS - MUSE: 62 DEGREES
R AXIS - MUSE: 72 DEGREES
RBC # BLD AUTO: 4.04 10E6/UL (ref 3.8–5.2)
SODIUM SERPL-SCNC: 126 MMOL/L (ref 135–145)
SYSTOLIC BLOOD PRESSURE - MUSE: NORMAL MMHG
T AXIS - MUSE: -47 DEGREES
T AXIS - MUSE: 72 DEGREES
T AXIS - MUSE: 78 DEGREES
TROPONIN T SERPL HS-MCNC: 23 NG/L
TROPONIN T SERPL HS-MCNC: 33 NG/L
VENTRICULAR RATE- MUSE: 64 BPM
VENTRICULAR RATE- MUSE: 64 BPM
VENTRICULAR RATE- MUSE: 70 BPM
WBC # BLD AUTO: 6.8 10E3/UL (ref 4–11)

## 2024-12-16 PROCEDURE — 250N000009 HC RX 250: Performed by: ORTHOPAEDIC SURGERY

## 2024-12-16 PROCEDURE — 250N000011 HC RX IP 250 OP 636: Performed by: NURSE ANESTHETIST, CERTIFIED REGISTERED

## 2024-12-16 PROCEDURE — C1776 JOINT DEVICE (IMPLANTABLE): HCPCS | Performed by: ORTHOPAEDIC SURGERY

## 2024-12-16 PROCEDURE — 93010 ELECTROCARDIOGRAM REPORT: CPT | Mod: XU | Performed by: FAMILY MEDICINE

## 2024-12-16 PROCEDURE — 73502 X-RAY EXAM HIP UNI 2-3 VIEWS: CPT

## 2024-12-16 PROCEDURE — 99285 EMERGENCY DEPT VISIT HI MDM: CPT | Mod: 25 | Performed by: FAMILY MEDICINE

## 2024-12-16 PROCEDURE — 93005 ELECTROCARDIOGRAM TRACING: CPT | Performed by: FAMILY MEDICINE

## 2024-12-16 PROCEDURE — 710N000010 HC RECOVERY PHASE 1, LEVEL 2, PER MIN: Performed by: ORTHOPAEDIC SURGERY

## 2024-12-16 PROCEDURE — 84484 ASSAY OF TROPONIN QUANT: CPT | Performed by: FAMILY MEDICINE

## 2024-12-16 PROCEDURE — 999N000179 XR SURGERY CARM FLUORO LESS THAN 5 MIN W STILLS: Mod: TC

## 2024-12-16 PROCEDURE — 250N000011 HC RX IP 250 OP 636: Performed by: ORTHOPAEDIC SURGERY

## 2024-12-16 PROCEDURE — 120N000001 HC R&B MED SURG/OB

## 2024-12-16 PROCEDURE — G1010 CDSM STANSON: HCPCS

## 2024-12-16 PROCEDURE — 85048 AUTOMATED LEUKOCYTE COUNT: CPT | Performed by: FAMILY MEDICINE

## 2024-12-16 PROCEDURE — 250N000009 HC RX 250: Performed by: NURSE ANESTHETIST, CERTIFIED REGISTERED

## 2024-12-16 PROCEDURE — 360N000077 HC SURGERY LEVEL 4, PER MIN: Performed by: ORTHOPAEDIC SURGERY

## 2024-12-16 PROCEDURE — 85004 AUTOMATED DIFF WBC COUNT: CPT | Performed by: FAMILY MEDICINE

## 2024-12-16 PROCEDURE — 12013 RPR F/E/E/N/L/M 2.6-5.0 CM: CPT | Performed by: FAMILY MEDICINE

## 2024-12-16 PROCEDURE — 36415 COLL VENOUS BLD VENIPUNCTURE: CPT | Performed by: FAMILY MEDICINE

## 2024-12-16 PROCEDURE — 250N000011 HC RX IP 250 OP 636: Performed by: FAMILY MEDICINE

## 2024-12-16 PROCEDURE — 80048 BASIC METABOLIC PNL TOTAL CA: CPT | Performed by: FAMILY MEDICINE

## 2024-12-16 PROCEDURE — 250N000026 HC DESFLURANE, PER MIN: Performed by: ORTHOPAEDIC SURGERY

## 2024-12-16 PROCEDURE — 258N000003 HC RX IP 258 OP 636: Performed by: NURSE ANESTHETIST, CERTIFIED REGISTERED

## 2024-12-16 PROCEDURE — 93005 ELECTROCARDIOGRAM TRACING: CPT | Mod: 76 | Performed by: FAMILY MEDICINE

## 2024-12-16 PROCEDURE — 93010 ELECTROCARDIOGRAM REPORT: CPT | Mod: 76 | Performed by: FAMILY MEDICINE

## 2024-12-16 PROCEDURE — 0HQ1XZZ REPAIR FACE SKIN, EXTERNAL APPROACH: ICD-10-PCS | Performed by: FAMILY MEDICINE

## 2024-12-16 PROCEDURE — 96374 THER/PROPH/DIAG INJ IV PUSH: CPT | Performed by: FAMILY MEDICINE

## 2024-12-16 PROCEDURE — 272N000001 HC OR GENERAL SUPPLY STERILE: Performed by: ORTHOPAEDIC SURGERY

## 2024-12-16 PROCEDURE — 258N000003 HC RX IP 258 OP 636: Performed by: PHYSICIAN ASSISTANT

## 2024-12-16 PROCEDURE — 99223 1ST HOSP IP/OBS HIGH 75: CPT | Performed by: STUDENT IN AN ORGANIZED HEALTH CARE EDUCATION/TRAINING PROGRAM

## 2024-12-16 PROCEDURE — 258N000001 HC RX 258: Performed by: ORTHOPAEDIC SURGERY

## 2024-12-16 PROCEDURE — 370N000017 HC ANESTHESIA TECHNICAL FEE, PER MIN: Performed by: ORTHOPAEDIC SURGERY

## 2024-12-16 PROCEDURE — 258N000003 HC RX IP 258 OP 636: Performed by: STUDENT IN AN ORGANIZED HEALTH CARE EDUCATION/TRAINING PROGRAM

## 2024-12-16 PROCEDURE — 27236 TREAT THIGH FRACTURE: CPT | Mod: RT | Performed by: ORTHOPAEDIC SURGERY

## 2024-12-16 PROCEDURE — 0SRR01A REPLACEMENT OF RIGHT HIP JOINT, FEMORAL SURFACE WITH METAL SYNTHETIC SUBSTITUTE, UNCEMENTED, OPEN APPROACH: ICD-10-PCS | Performed by: ORTHOPAEDIC SURGERY

## 2024-12-16 PROCEDURE — 250N000013 HC RX MED GY IP 250 OP 250 PS 637: Performed by: PHYSICIAN ASSISTANT

## 2024-12-16 PROCEDURE — 99223 1ST HOSP IP/OBS HIGH 75: CPT | Mod: 57 | Performed by: ORTHOPAEDIC SURGERY

## 2024-12-16 DEVICE — IMP HEAD STRK FEMORAL UHR BIPOLAR 28X45MM UH1-45-28: Type: IMPLANTABLE DEVICE | Site: HIP | Status: FUNCTIONAL

## 2024-12-16 DEVICE — IMPLANTABLE DEVICE: Type: IMPLANTABLE DEVICE | Site: HIP | Status: FUNCTIONAL

## 2024-12-16 DEVICE — IMP HEAD FEMORAL STRK V40 VITALIUM COCR 28MM +0MM 6260-5-128: Type: IMPLANTABLE DEVICE | Site: HIP | Status: FUNCTIONAL

## 2024-12-16 RX ORDER — ONDANSETRON 2 MG/ML
4 INJECTION INTRAMUSCULAR; INTRAVENOUS EVERY 30 MIN PRN
Status: DISCONTINUED | OUTPATIENT
Start: 2024-12-16 | End: 2024-12-16 | Stop reason: HOSPADM

## 2024-12-16 RX ORDER — ACETAMINOPHEN 325 MG/1
650 TABLET ORAL EVERY 4 HOURS PRN
Status: DISCONTINUED | OUTPATIENT
Start: 2024-12-19 | End: 2024-12-16

## 2024-12-16 RX ORDER — ACETAMINOPHEN 325 MG/1
650 TABLET ORAL EVERY 4 HOURS PRN
Qty: 100 TABLET | Refills: 0 | Status: SHIPPED | OUTPATIENT
Start: 2024-12-16 | End: 2024-12-18

## 2024-12-16 RX ORDER — AMOXICILLIN 250 MG
1-2 CAPSULE ORAL 2 TIMES DAILY
Qty: 30 TABLET | Refills: 0 | Status: SHIPPED | OUTPATIENT
Start: 2024-12-16 | End: 2024-12-18

## 2024-12-16 RX ORDER — NALOXONE HYDROCHLORIDE 0.4 MG/ML
0.1 INJECTION, SOLUTION INTRAMUSCULAR; INTRAVENOUS; SUBCUTANEOUS
Status: DISCONTINUED | OUTPATIENT
Start: 2024-12-16 | End: 2024-12-16 | Stop reason: HOSPADM

## 2024-12-16 RX ORDER — ONDANSETRON 4 MG/1
4 TABLET, ORALLY DISINTEGRATING ORAL EVERY 30 MIN PRN
Status: DISCONTINUED | OUTPATIENT
Start: 2024-12-16 | End: 2024-12-16 | Stop reason: HOSPADM

## 2024-12-16 RX ORDER — HYDROMORPHONE HCL IN WATER/PF 6 MG/30 ML
0.1 PATIENT CONTROLLED ANALGESIA SYRINGE INTRAVENOUS
Status: DISCONTINUED | OUTPATIENT
Start: 2024-12-16 | End: 2024-12-19 | Stop reason: HOSPADM

## 2024-12-16 RX ORDER — POLYETHYLENE GLYCOL 3350 17 G/17G
17 POWDER, FOR SOLUTION ORAL DAILY
Status: DISCONTINUED | OUTPATIENT
Start: 2024-12-17 | End: 2024-12-19 | Stop reason: HOSPADM

## 2024-12-16 RX ORDER — AMOXICILLIN 250 MG
1 CAPSULE ORAL 2 TIMES DAILY PRN
Status: DISCONTINUED | OUTPATIENT
Start: 2024-12-16 | End: 2024-12-16

## 2024-12-16 RX ORDER — TRANEXAMIC ACID 650 MG/1
1950 TABLET ORAL ONCE
Status: DISCONTINUED | OUTPATIENT
Start: 2024-12-16 | End: 2024-12-16 | Stop reason: HOSPADM

## 2024-12-16 RX ORDER — NALOXONE HYDROCHLORIDE 0.4 MG/ML
0.2 INJECTION, SOLUTION INTRAMUSCULAR; INTRAVENOUS; SUBCUTANEOUS
Status: DISCONTINUED | OUTPATIENT
Start: 2024-12-16 | End: 2024-12-16

## 2024-12-16 RX ORDER — ONDANSETRON 4 MG/1
4 TABLET, ORALLY DISINTEGRATING ORAL EVERY 6 HOURS PRN
Status: DISCONTINUED | OUTPATIENT
Start: 2024-12-16 | End: 2024-12-19 | Stop reason: HOSPADM

## 2024-12-16 RX ORDER — ONDANSETRON 2 MG/ML
INJECTION INTRAMUSCULAR; INTRAVENOUS PRN
Status: DISCONTINUED | OUTPATIENT
Start: 2024-12-16 | End: 2024-12-16

## 2024-12-16 RX ORDER — CEFAZOLIN SODIUM 1 G/3ML
1 INJECTION, POWDER, FOR SOLUTION INTRAMUSCULAR; INTRAVENOUS EVERY 8 HOURS
Status: COMPLETED | OUTPATIENT
Start: 2024-12-17 | End: 2024-12-17

## 2024-12-16 RX ORDER — CEFAZOLIN SODIUM/WATER 2 G/20 ML
2 SYRINGE (ML) INTRAVENOUS SEE ADMIN INSTRUCTIONS
Status: DISCONTINUED | OUTPATIENT
Start: 2024-12-16 | End: 2024-12-18

## 2024-12-16 RX ORDER — ACETAMINOPHEN 325 MG/1
975 TABLET ORAL EVERY 8 HOURS
Status: DISCONTINUED | OUTPATIENT
Start: 2024-12-16 | End: 2024-12-19 | Stop reason: HOSPADM

## 2024-12-16 RX ORDER — HYDROMORPHONE HCL IN WATER/PF 6 MG/30 ML
0.2 PATIENT CONTROLLED ANALGESIA SYRINGE INTRAVENOUS
Status: DISCONTINUED | OUTPATIENT
Start: 2024-12-16 | End: 2024-12-16

## 2024-12-16 RX ORDER — ONDANSETRON 2 MG/ML
4 INJECTION INTRAMUSCULAR; INTRAVENOUS EVERY 6 HOURS PRN
Status: DISCONTINUED | OUTPATIENT
Start: 2024-12-16 | End: 2024-12-19 | Stop reason: HOSPADM

## 2024-12-16 RX ORDER — LIDOCAINE 40 MG/G
CREAM TOPICAL
Status: DISCONTINUED | OUTPATIENT
Start: 2024-12-16 | End: 2024-12-19 | Stop reason: HOSPADM

## 2024-12-16 RX ORDER — ALBUTEROL SULFATE 0.83 MG/ML
2.5 SOLUTION RESPIRATORY (INHALATION) EVERY 4 HOURS PRN
Status: DISCONTINUED | OUTPATIENT
Start: 2024-12-16 | End: 2024-12-16 | Stop reason: HOSPADM

## 2024-12-16 RX ORDER — MEPERIDINE HYDROCHLORIDE 25 MG/ML
12.5 INJECTION INTRAMUSCULAR; INTRAVENOUS; SUBCUTANEOUS EVERY 5 MIN PRN
Status: DISCONTINUED | OUTPATIENT
Start: 2024-12-16 | End: 2024-12-16 | Stop reason: HOSPADM

## 2024-12-16 RX ORDER — ASPIRIN 325 MG
325 TABLET, DELAYED RELEASE (ENTERIC COATED) ORAL DAILY
Status: DISCONTINUED | OUTPATIENT
Start: 2024-12-17 | End: 2024-12-19 | Stop reason: HOSPADM

## 2024-12-16 RX ORDER — HYDROMORPHONE HCL IN WATER/PF 6 MG/30 ML
0.2 PATIENT CONTROLLED ANALGESIA SYRINGE INTRAVENOUS EVERY 5 MIN PRN
Status: DISCONTINUED | OUTPATIENT
Start: 2024-12-16 | End: 2024-12-16 | Stop reason: HOSPADM

## 2024-12-16 RX ORDER — CALCIUM CARBONATE 500 MG/1
1000 TABLET, CHEWABLE ORAL 4 TIMES DAILY PRN
Status: DISCONTINUED | OUTPATIENT
Start: 2024-12-16 | End: 2024-12-16

## 2024-12-16 RX ORDER — ACETAMINOPHEN 650 MG/1
650 SUPPOSITORY RECTAL EVERY 4 HOURS PRN
Status: DISCONTINUED | OUTPATIENT
Start: 2024-12-16 | End: 2024-12-16

## 2024-12-16 RX ORDER — LIDOCAINE HYDROCHLORIDE 20 MG/ML
INJECTION, SOLUTION INFILTRATION; PERINEURAL PRN
Status: DISCONTINUED | OUTPATIENT
Start: 2024-12-16 | End: 2024-12-16

## 2024-12-16 RX ORDER — LABETALOL HYDROCHLORIDE 5 MG/ML
10 INJECTION, SOLUTION INTRAVENOUS
Status: DISCONTINUED | OUTPATIENT
Start: 2024-12-16 | End: 2024-12-16 | Stop reason: HOSPADM

## 2024-12-16 RX ORDER — NALOXONE HYDROCHLORIDE 0.4 MG/ML
0.4 INJECTION, SOLUTION INTRAMUSCULAR; INTRAVENOUS; SUBCUTANEOUS
Status: DISCONTINUED | OUTPATIENT
Start: 2024-12-16 | End: 2024-12-16

## 2024-12-16 RX ORDER — OXYCODONE HYDROCHLORIDE 5 MG/1
2.5-5 TABLET ORAL EVERY 4 HOURS PRN
Qty: 26 TABLET | Refills: 0 | Status: SHIPPED | OUTPATIENT
Start: 2024-12-16 | End: 2024-12-18

## 2024-12-16 RX ORDER — OXYCODONE HYDROCHLORIDE 5 MG/1
5 TABLET ORAL EVERY 4 HOURS PRN
Status: DISCONTINUED | OUTPATIENT
Start: 2024-12-16 | End: 2024-12-19 | Stop reason: HOSPADM

## 2024-12-16 RX ORDER — ACETAMINOPHEN 325 MG/1
650 TABLET ORAL EVERY 4 HOURS PRN
Status: DISCONTINUED | OUTPATIENT
Start: 2024-12-16 | End: 2024-12-16

## 2024-12-16 RX ORDER — SODIUM CHLORIDE, SODIUM LACTATE, POTASSIUM CHLORIDE, CALCIUM CHLORIDE 600; 310; 30; 20 MG/100ML; MG/100ML; MG/100ML; MG/100ML
INJECTION, SOLUTION INTRAVENOUS CONTINUOUS
Status: DISCONTINUED | OUTPATIENT
Start: 2024-12-16 | End: 2024-12-17

## 2024-12-16 RX ORDER — ONDANSETRON 4 MG/1
4 TABLET, ORALLY DISINTEGRATING ORAL EVERY 6 HOURS PRN
Status: DISCONTINUED | OUTPATIENT
Start: 2024-12-16 | End: 2024-12-16

## 2024-12-16 RX ORDER — FENTANYL CITRATE 50 UG/ML
25 INJECTION, SOLUTION INTRAMUSCULAR; INTRAVENOUS EVERY 5 MIN PRN
Status: DISCONTINUED | OUTPATIENT
Start: 2024-12-16 | End: 2024-12-16 | Stop reason: HOSPADM

## 2024-12-16 RX ORDER — CEFAZOLIN SODIUM/WATER 2 G/20 ML
2 SYRINGE (ML) INTRAVENOUS
Status: COMPLETED | OUTPATIENT
Start: 2024-12-16 | End: 2024-12-16

## 2024-12-16 RX ORDER — FAMOTIDINE 20 MG/1
20 TABLET, FILM COATED ORAL 2 TIMES DAILY
Status: DISCONTINUED | OUTPATIENT
Start: 2024-12-16 | End: 2024-12-17

## 2024-12-16 RX ORDER — FENTANYL CITRATE 50 UG/ML
25 INJECTION, SOLUTION INTRAMUSCULAR; INTRAVENOUS
Status: DISCONTINUED | OUTPATIENT
Start: 2024-12-16 | End: 2024-12-16

## 2024-12-16 RX ORDER — PROPOFOL 10 MG/ML
INJECTION, EMULSION INTRAVENOUS CONTINUOUS PRN
Status: DISCONTINUED | OUTPATIENT
Start: 2024-12-16 | End: 2024-12-16

## 2024-12-16 RX ORDER — SODIUM CHLORIDE 9 MG/ML
INJECTION, SOLUTION INTRAVENOUS CONTINUOUS
Status: DISCONTINUED | OUTPATIENT
Start: 2024-12-16 | End: 2024-12-16

## 2024-12-16 RX ORDER — FENTANYL CITRATE 50 UG/ML
INJECTION, SOLUTION INTRAMUSCULAR; INTRAVENOUS PRN
Status: DISCONTINUED | OUTPATIENT
Start: 2024-12-16 | End: 2024-12-16

## 2024-12-16 RX ORDER — PROPOFOL 10 MG/ML
INJECTION, EMULSION INTRAVENOUS PRN
Status: DISCONTINUED | OUTPATIENT
Start: 2024-12-16 | End: 2024-12-16

## 2024-12-16 RX ORDER — AMOXICILLIN 250 MG
2 CAPSULE ORAL 2 TIMES DAILY PRN
Status: DISCONTINUED | OUTPATIENT
Start: 2024-12-16 | End: 2024-12-16

## 2024-12-16 RX ORDER — VANCOMYCIN HYDROCHLORIDE 1 G/20ML
INJECTION, POWDER, LYOPHILIZED, FOR SOLUTION INTRAVENOUS PRN
Status: DISCONTINUED | OUTPATIENT
Start: 2024-12-16 | End: 2024-12-16 | Stop reason: HOSPADM

## 2024-12-16 RX ORDER — ONDANSETRON 2 MG/ML
4 INJECTION INTRAMUSCULAR; INTRAVENOUS EVERY 6 HOURS PRN
Status: DISCONTINUED | OUTPATIENT
Start: 2024-12-16 | End: 2024-12-16

## 2024-12-16 RX ORDER — FENTANYL CITRATE 50 UG/ML
50 INJECTION, SOLUTION INTRAMUSCULAR; INTRAVENOUS EVERY 5 MIN PRN
Status: DISCONTINUED | OUTPATIENT
Start: 2024-12-16 | End: 2024-12-16 | Stop reason: HOSPADM

## 2024-12-16 RX ORDER — BISACODYL 10 MG
10 SUPPOSITORY, RECTAL RECTAL DAILY PRN
Status: DISCONTINUED | OUTPATIENT
Start: 2024-12-16 | End: 2024-12-19 | Stop reason: HOSPADM

## 2024-12-16 RX ORDER — AMOXICILLIN 250 MG
1 CAPSULE ORAL 2 TIMES DAILY
Status: DISCONTINUED | OUTPATIENT
Start: 2024-12-16 | End: 2024-12-19 | Stop reason: HOSPADM

## 2024-12-16 RX ORDER — LIDOCAINE 40 MG/G
CREAM TOPICAL
Status: DISCONTINUED | OUTPATIENT
Start: 2024-12-16 | End: 2024-12-16

## 2024-12-16 RX ORDER — DEXAMETHASONE SODIUM PHOSPHATE 10 MG/ML
4 INJECTION, SOLUTION INTRAMUSCULAR; INTRAVENOUS
Status: DISCONTINUED | OUTPATIENT
Start: 2024-12-16 | End: 2024-12-16 | Stop reason: HOSPADM

## 2024-12-16 RX ORDER — SODIUM CHLORIDE, SODIUM LACTATE, POTASSIUM CHLORIDE, CALCIUM CHLORIDE 600; 310; 30; 20 MG/100ML; MG/100ML; MG/100ML; MG/100ML
INJECTION, SOLUTION INTRAVENOUS CONTINUOUS PRN
Status: DISCONTINUED | OUTPATIENT
Start: 2024-12-16 | End: 2024-12-16

## 2024-12-16 RX ORDER — SODIUM CHLORIDE, SODIUM LACTATE, POTASSIUM CHLORIDE, CALCIUM CHLORIDE 600; 310; 30; 20 MG/100ML; MG/100ML; MG/100ML; MG/100ML
INJECTION, SOLUTION INTRAVENOUS CONTINUOUS
Status: DISCONTINUED | OUTPATIENT
Start: 2024-12-16 | End: 2024-12-16 | Stop reason: HOSPADM

## 2024-12-16 RX ORDER — CALCIUM CARBONATE 500 MG/1
500 TABLET, CHEWABLE ORAL 4 TIMES DAILY PRN
Status: DISCONTINUED | OUTPATIENT
Start: 2024-12-16 | End: 2024-12-19 | Stop reason: HOSPADM

## 2024-12-16 RX ORDER — HYDROMORPHONE HYDROCHLORIDE 1 MG/ML
0.5 INJECTION, SOLUTION INTRAMUSCULAR; INTRAVENOUS; SUBCUTANEOUS EVERY 5 MIN PRN
Status: DISCONTINUED | OUTPATIENT
Start: 2024-12-16 | End: 2024-12-16 | Stop reason: HOSPADM

## 2024-12-16 RX ORDER — PROCHLORPERAZINE MALEATE 5 MG/1
5 TABLET ORAL EVERY 6 HOURS PRN
Status: DISCONTINUED | OUTPATIENT
Start: 2024-12-16 | End: 2024-12-19 | Stop reason: HOSPADM

## 2024-12-16 RX ORDER — HYDROMORPHONE HCL IN WATER/PF 6 MG/30 ML
0.2 PATIENT CONTROLLED ANALGESIA SYRINGE INTRAVENOUS
Status: DISCONTINUED | OUTPATIENT
Start: 2024-12-16 | End: 2024-12-19 | Stop reason: HOSPADM

## 2024-12-16 RX ADMIN — TRANEXAMIC ACID 1 G: 1 INJECTION, SOLUTION INTRAVENOUS at 19:44

## 2024-12-16 RX ADMIN — Medication 1 LOZENGE: at 23:37

## 2024-12-16 RX ADMIN — SODIUM CHLORIDE: 9 INJECTION, SOLUTION INTRAVENOUS at 15:56

## 2024-12-16 RX ADMIN — FENTANYL CITRATE 25 MCG: 50 INJECTION, SOLUTION INTRAMUSCULAR; INTRAVENOUS at 21:20

## 2024-12-16 RX ADMIN — LIDOCAINE HYDROCHLORIDE 40 MG: 20 INJECTION, SOLUTION INFILTRATION; PERINEURAL at 18:40

## 2024-12-16 RX ADMIN — FENTANYL CITRATE 50 MCG: 50 INJECTION INTRAMUSCULAR; INTRAVENOUS at 18:31

## 2024-12-16 RX ADMIN — PHENYLEPHRINE HYDROCHLORIDE 100 MCG: 10 INJECTION INTRAVENOUS at 19:04

## 2024-12-16 RX ADMIN — PROPOFOL 80 MCG/KG/MIN: 10 INJECTION, EMULSION INTRAVENOUS at 18:42

## 2024-12-16 RX ADMIN — FENTANYL CITRATE 25 MCG: 50 INJECTION INTRAMUSCULAR; INTRAVENOUS at 20:23

## 2024-12-16 RX ADMIN — HYDROMORPHONE HYDROCHLORIDE 0.2 MG: 0.2 INJECTION, SOLUTION INTRAMUSCULAR; INTRAVENOUS; SUBCUTANEOUS at 16:05

## 2024-12-16 RX ADMIN — PHENYLEPHRINE HYDROCHLORIDE 100 MCG: 10 INJECTION INTRAVENOUS at 19:47

## 2024-12-16 RX ADMIN — PROPOFOL 150 MG: 10 INJECTION, EMULSION INTRAVENOUS at 18:40

## 2024-12-16 RX ADMIN — FENTANYL CITRATE 25 MCG: 50 INJECTION INTRAMUSCULAR; INTRAVENOUS at 13:42

## 2024-12-16 RX ADMIN — PHENYLEPHRINE HYDROCHLORIDE 100 MCG: 10 INJECTION INTRAVENOUS at 18:51

## 2024-12-16 RX ADMIN — FENTANYL CITRATE 25 MCG: 50 INJECTION INTRAMUSCULAR; INTRAVENOUS at 20:13

## 2024-12-16 RX ADMIN — ROCURONIUM BROMIDE 50 MG: 50 INJECTION, SOLUTION INTRAVENOUS at 18:40

## 2024-12-16 RX ADMIN — SENNOSIDES AND DOCUSATE SODIUM 1 TABLET: 8.6; 5 TABLET ORAL at 23:37

## 2024-12-16 RX ADMIN — Medication 1 G: at 20:13

## 2024-12-16 RX ADMIN — SODIUM CHLORIDE, POTASSIUM CHLORIDE, SODIUM LACTATE AND CALCIUM CHLORIDE: 600; 310; 30; 20 INJECTION, SOLUTION INTRAVENOUS at 22:46

## 2024-12-16 RX ADMIN — FAMOTIDINE 20 MG: 20 TABLET, FILM COATED ORAL at 23:37

## 2024-12-16 RX ADMIN — PHENYLEPHRINE HYDROCHLORIDE 100 MCG: 10 INJECTION INTRAVENOUS at 20:05

## 2024-12-16 RX ADMIN — PHENYLEPHRINE HYDROCHLORIDE 100 MCG: 10 INJECTION INTRAVENOUS at 19:07

## 2024-12-16 RX ADMIN — PHENYLEPHRINE HYDROCHLORIDE 100 MCG: 10 INJECTION INTRAVENOUS at 18:41

## 2024-12-16 RX ADMIN — ONDANSETRON 4 MG: 2 INJECTION INTRAMUSCULAR; INTRAVENOUS at 20:12

## 2024-12-16 RX ADMIN — SODIUM CHLORIDE, POTASSIUM CHLORIDE, SODIUM LACTATE AND CALCIUM CHLORIDE: 600; 310; 30; 20 INJECTION, SOLUTION INTRAVENOUS at 18:32

## 2024-12-16 RX ADMIN — ACETAMINOPHEN 975 MG: 325 TABLET, FILM COATED ORAL at 23:37

## 2024-12-16 RX ADMIN — Medication 2 G: at 18:46

## 2024-12-16 RX ADMIN — PHENYLEPHRINE HYDROCHLORIDE 100 MCG: 10 INJECTION INTRAVENOUS at 20:16

## 2024-12-16 ASSESSMENT — ACTIVITIES OF DAILY LIVING (ADL)
CONCENTRATING,_REMEMBERING_OR_MAKING_DECISIONS_DIFFICULTY: NO
CHANGE_IN_FUNCTIONAL_STATUS_SINCE_ONSET_OF_CURRENT_ILLNESS/INJURY: YES
ADLS_ACUITY_SCORE: 45
TOILETING_ISSUES: NO
WEAR_GLASSES_OR_BLIND: NO
ADLS_ACUITY_SCORE: 19
WALKING_OR_CLIMBING_STAIRS_DIFFICULTY: NO
ADLS_ACUITY_SCORE: 19
ADLS_ACUITY_SCORE: 19
NUMBER_OF_TIMES_PATIENT_HAS_FALLEN_WITHIN_LAST_SIX_MONTHS: 2
ADLS_ACUITY_SCORE: 19
DOING_ERRANDS_INDEPENDENTLY_DIFFICULTY: NO
DIFFICULTY_EATING/SWALLOWING: NO
ADLS_ACUITY_SCORE: 45
ADLS_ACUITY_SCORE: 19
HEARING_DIFFICULTY_OR_DEAF: NO
ADLS_ACUITY_SCORE: 45
ADLS_ACUITY_SCORE: 19
ADLS_ACUITY_SCORE: 45
FALL_HISTORY_WITHIN_LAST_SIX_MONTHS: YES
DIFFICULTY_COMMUNICATING: NO
ADLS_ACUITY_SCORE: 19
ADLS_ACUITY_SCORE: 19
DRESSING/BATHING_DIFFICULTY: NO

## 2024-12-16 ASSESSMENT — COLUMBIA-SUICIDE SEVERITY RATING SCALE - C-SSRS
2. HAVE YOU ACTUALLY HAD ANY THOUGHTS OF KILLING YOURSELF IN THE PAST MONTH?: NO
1. IN THE PAST MONTH, HAVE YOU WISHED YOU WERE DEAD OR WISHED YOU COULD GO TO SLEEP AND NOT WAKE UP?: NO
6. HAVE YOU EVER DONE ANYTHING, STARTED TO DO ANYTHING, OR PREPARED TO DO ANYTHING TO END YOUR LIFE?: NO
2. HAVE YOU ACTUALLY HAD ANY THOUGHTS OF KILLING YOURSELF IN THE PAST MONTH?: NO
1. IN THE PAST MONTH, HAVE YOU WISHED YOU WERE DEAD OR WISHED YOU COULD GO TO SLEEP AND NOT WAKE UP?: NO
6. HAVE YOU EVER DONE ANYTHING, STARTED TO DO ANYTHING, OR PREPARED TO DO ANYTHING TO END YOUR LIFE?: NO

## 2024-12-16 ASSESSMENT — ENCOUNTER SYMPTOMS: DYSRHYTHMIAS: 1

## 2024-12-16 NOTE — ED NOTES
SDS Charge, Ceci KUMAR notified patient will be transferred to [facility] before surgery. Chula Patel RN

## 2024-12-16 NOTE — H&P
[facility]    History and Physical - Hospitalist Service       Date of Admission:  12/16/2024    Assessment & Plan      Audrey Ortega is a 89 year old female with a medical history of sick sinus syndrome status post pacemaker placement in 2011, history of paroxysmal atrial fibrillation, not on anticoagulation, essential hypertension, CKD, who was brought in after a fall. Patient was in her usual state of health this morning, besides recent diagnosis of diverticulitis after having diarrhea 6 days ago.  While at her place and employment, she went to the restroom and after urinating felt very lightheaded, stood up from the toilet, fell forward.  Does not remember falling.  Patient was able to stand up, and unfortunately she fell again on her right side.  After the second fall, she could not get up on her own. Overall, syncopal episode, preceded by loss of consciousness, during micturition, causing a fall.  Patient was found to have an acute fracture of the right femoral neck and was admitted for workup of syncope and for operative management of the fracture.  Of note, 6 days ago on December 10, patient was having abdominal pain and diarrhea, was diagnosed with diverticulitis and started on Augmentin.     Acute fracture of the femoral neck, right  -- Surgery was consulted in the emergency room, plan is to take patient to the OR at 5:30 PM  -- Management as per surgery  -- Ancef preoperative and intraoperative  -- N.p.o.  -- Pain management -overall, patient is not pain unless she moves, Dilaudid as needed preoperatively as needed    Surgical clearance:  History of sick sinus syndrome status post pacemaker placement in 2011, history of paroxysmal atrial fibrillation, not on anticoagulation, essential hypertension, CKD.  Patient had a cardiac workup in April 2023 -  coronary CTA without significant disease.  Echocardiogram showed normal LV function with moderate MR, moderate to severe TR.  Reviewed cardiology notes.  Patient has declined anticoagulation. For her pacemaker, she does follow with the device clinic, but pacemaker interrogated every 6 months and has had normal device function.  Pacemaker interrogated today in the emergency room.  Her valvular disease has not required intervention.  Her next cardiology follow-up is in April 2025.  The syncopal episode is concerning, plan is for syncopal workup during this hospital stay.  Patient is cleared for surgery tonight.     Fall  Lightheadedness  Syncopal episode  Patient was in her usual state of health this morning, besides recent diagnosis of diverticulitis after having diarrhea 6 days ago.  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  Patient was able to stand up, and unfortunately she fell again on her right side.  After the second fall, she could not get up on her own.  She has a history of a similar episode a few years ago.  Patient denies sensation of the room spinning, denies loss of bowel bladder function or biting her tongue afterwards.  In the emergency room a CT head was done without evidence of acute intracranial hemorrhage, mass or herniation.  -- With loss of consciousness preceding fall, transient, self terminating  -- Happened during micturition, therefore strong suspicion of reflex mediated syncope due to orthostatic hypotension, possibly due to volume depletion in light of recent diarrhea  -- Differential diagnosis of cardiac syncope due to underlying arrhythmia, valvular disease  -- Echocardiogram tomorrow  -- Cardiac monitoring overnight    Hyponatremia, acute on chronic  Hypochloremia, acute on chronic  Usually sodium is between 127-131, today sodium is 126  Usually chloride is between 92 and 97, today chloride is 90  -- Likely due to volume depletion from gastrointestinal losses from recent diarrhea  -- 0.9% NS 5 mL an hour  -- Repeat  electrolytes tomorrow morning    Recent diagnosis of diverticulitis  Recent diarrhea  -- Patient is finishing up a course of Augmentin 1 tablet 2 times daily for 7 days that was started on December 10, 2024  -- Patient completed 6 days of treatment prior to being admitted    Sick sinus syndrome status post pacemaker placement  History of atrial fibrillation, not on anticoagulation    Essential hypertension  -- Holding home lisinopril 10 mg, restart postoperatively depending on postoperative blood pressures    CKD, stage III  -- Creatinine is slightly increased from baseline 1.31  -- Continue to monitor  -- Repeat labs tomorrow    Troponinemia, likely secondary to CKD    Insomnia  -- Restart zolpidem postoperatively when appropriate      Diet: NPO per Anesthesia Guidelines for Procedure/Surgery Except for: MedsNPO prior to surgery   DVT Prophylaxis: Pneumatic Compression Devices  Ulrich Catheter: Not present  Lines: None     Cardiac Monitoring: ACTIVE order. Indication: Syncope- high cardiac risk (48 hours)  Code Status: No CPR- Do NOT Intubate    Clinically Significant Risk Factors Present on Admission         # Hyponatremia: Lowest Na = 126 mmol/L in last 2 days, will monitor as appropriate  # Hypochloremia: Lowest Cl = 90 mmol/L in last 2 days, will monitor as appropriate        # Drug Induced Platelet Defect: home medication list includes an antiplatelet medication   # Hypertension: Home medication list includes antihypertensive(s)                # Pacemaker present       Disposition Plan     Medically Ready for Discharge: Anticipated in 2-4 Days           Jane Porter MD  Hospitalist Service  [facility]  Securely message with [employer] (more info)  Text page via [employer] Paging/Directory     ______________________________________________________________________    Chief Complaint   Fall, loss of consciousness    History is obtained from the patient    History of Present Illness    Audrey Ortega is a 89 year old female with a medical history of sick sinus syndrome status post pacemaker placement in 2011, history of paroxysmal atrial fibrillation, not on anticoagulation, essential hypertension, CKD, who was brought in after a syncopal episode, preceded by loss of consciousness, during micturition, causing a fall. Patient was in her usual state of health this morning, besides recent diagnosis of diverticulitis after having diarrhea 6 days ago.  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  Patient was able to stand up, and unfortunately she fell again on her right side.  After the second fall, she could not get up on her own.  She has a history of a similar episode a few years ago.  Patient denied sensation of the room spinning, denied loss of bowel bladder function or biting her tongue afterwards.  In the emergency room a CT head was done without evidence of acute intracranial hemorrhage, mass or herniation.  A right hip x-ray revealed a fracture of the femoral head.      Past Medical History    History reviewed. No pertinent past medical history.    Past Surgical History   Past Surgical History:   Procedure Laterality Date    EP PACEMAKER GENERATOR REPLACEMENT- DUAL N/A 12/29/2023    Procedure: Pacemaker Generator Replacement Dual;  Surgeon: Sae Peguero MD;  Location:  [facility]    PHACOEMULSIFICATION WITH STANDARD INTRAOCULAR LENS IMPLANT Right 7/16/2020    Procedure: Cataract surgery with intraocular lens implant;  Surgeon: Fortino Ford MD;  Location:  OR    PHACOEMULSIFICATION WITH STANDARD INTRAOCULAR LENS IMPLANT Left 7/30/2020    Procedure: Cataract Surgery Left Eye;  Surgeon: Max Cooley MD;  Location: [facility] OR       Prior to Admission Medications   Prior to Admission Medications   Prescriptions Last Dose Informant Patient Reported? Taking?   Ascorbic Acid (VITAMIN  C) 500 MG CAPS 12/15/2024 Morning  Yes Yes   Sig: Take 500 mg by mouth daily   COMPRESSION STOCKINGS 2024 Morning  No Yes   Si each daily   Vitamin D, Cholecalciferol, 25 MCG (1000 UT) TABS 12/15/2024 Morning  Yes Yes   Sig: Take 1,000 Units by mouth daily   amoxicillin-clavulanate (AUGMENTIN) 875-125 MG tablet 2024 at  7:00 AM  No Yes   Sig: Take 1 tablet by mouth 2 times daily for 7 days.   aspirin (ASA) 325 MG EC tablet 2024 at  7:00 AM  Yes Yes   Sig: Take 325 mg by mouth   calcium 600 MG tablet 12/15/2024 Morning  Yes Yes   Sig: Take 1 tablet by mouth daily   lisinopril (ZESTRIL) 10 MG tablet 2024 at  7:00 AM  No Yes   Sig: Take 1 tablet (10 mg) by mouth daily.   magnesium 250 MG tablet 12/15/2024 Morning  Yes Yes   Sig: Take 1 tablet by mouth daily   multivitamin w/minerals (THERA-VIT-M) tablet 12/15/2024 Morning  Yes Yes   Sig: Take 1 tablet by mouth daily   polyethylene glycol-propylene glycol (SYSTANE ULTRA) 0.4-0.3 % SOLN ophthalmic solution 12/15/2024 Bedtime  Yes Yes   Sig: Place 1 drop into both eyes 2 times daily.   zolpidem (AMBIEN) 5 MG tablet Past Week Bedtime  No Yes   Sig: TAKE 1/2 (ONE-HALF) TABLET BY MOUTH NIGHTLY AS NEEDED   Patient taking differently: Take 2.5 mg by mouth nightly as needed for sleep.      Facility-Administered Medications: None        Review of Systems    CONSTITUTIONAL: NEGATIVE for fever, chills, change in weight  ENT/MOUTH: NEGATIVE for ear, mouth and throat problems  RESP: NEGATIVE for significant cough or SOB  CV: NEGATIVE for chest pain, palpitations or peripheral edema    Social History   I have reviewed this patient's social history and updated it with pertinent information if needed.  Social History     Tobacco Use    Smoking status: Never    Smokeless tobacco: Never   Vaping Use    Vaping status: Never Used   Substance Use Topics    Alcohol use: Not Currently    Drug use: Never       Allergies   Allergies   Allergen Reactions     Ciprofloxacin Rash    Macrobid [Nitrofurantoin] Itching and Rash        Physical Exam   Vital Signs: Temp: 97.7  F (36.5  C) Temp src: Oral BP: (!) 181/77 Pulse: 69   Resp: 16 SpO2: 100 % O2 Device: None (Room air)    Weight: 119 lbs 14.88 oz    Constitutional: awake, alert, cooperative, no apparent distress, and appears stated age  ENT: Laceration status post repair over right eye  Respiratory: No increased work of breathing, good air exchange, clear to auscultation bilaterally, no crackles or wheezing  Cardiovascular: Regular rate and rhythm  GI: Bowel sounds hyperactive, soft, nontender  Musculoskeletal: Right lower extremity is shortened, tenderness over right hip, sensation intact strength intact  Neurologic: Awake, alert, oriented to name, place and time.  Cranial nerves II-XII are grossly intact.   Neuropsychiatric: General: normal, calm, and normal eye contact    Medical Decision Making       80 MINUTES SPENT BY ME on the date of service doing chart review, history, exam, documentation & further activities per the note.      Data     I have personally reviewed the following data over the past 24 hrs:    6.8  \   12.8   / 264     126 (L) 90 (L) 13.5 /  122 (H)   4.1 21 (L) 1.31 (H) \     Trop: 23 (H) BNP: N/A       Imaging results reviewed over the past 24 hrs:   Recent Results (from the past 24 hours)   Head CT w/o contrast    Narrative    CT SCAN OF THE HEAD WITHOUT CONTRAST   12/16/2024 12:10 PM     HISTORY: syncope, head injury    TECHNIQUE:  Axial images of the head and coronal reformations without  IV contrast material. Radiation dose for this scan was reduced using  automated exposure control, adjustment of the mA and/or kV according  to patient size, or iterative reconstruction technique.    COMPARISON: 4/20/2023    FINDINGS: There is no evidence of intracranial hemorrhage, mass, acute  infarct or anomaly. The ventricles are normal in size, shape and  configuration. Mild diffuse parenchymal volume  loss. Mild patchy  periventricular white matter hypodensities which are nonspecific, but  likely related to chronic microvascular ischemic disease. Intracranial  atherosclerotic calcifications.    The visualized portions of the sinuses and mastoids appear normal. The  bony calvarium and bones of the skull base appear intact.       Impression    IMPRESSION:     1. No evidence of acute intracranial hemorrhage, mass, or herniation.  2. Mild diffuse parenchymal volume loss and white matter changes  likely due to chronic microvascular ischemic disease.      CHRISTY OSEGUERA MD         SYSTEM ID:  [id number]   XR Hip Right 2-3 Views    Narrative    XR HIP RIGHT 2-3 VIEWS   12/16/2024 12:30 PM     HISTORY: fall, right hip pain  COMPARISON: None.       Impression    IMPRESSION: Acute fracture of the femoral neck near the subcapital  region with anterior displacement distal fragment by approximately one  bone width. Diffuse bony demineralization. No right hip joint space  narrowing. Degenerative changes lumbar spine and right SI joint.    KARL CÁRDENAS MD         SYSTEM ID:  [id number]

## 2024-12-16 NOTE — PROGRESS NOTES
S-(situation): Patient arrives to room 267 via cart from ED    B-(background): Right hip fracture    A-(assessment): Pt is alert and oriented. Reports pain only with movement. Unable to straighten right leg due to pain. She denies any numbness or tingling. Sutures in place to right eyebrow with small abrasion below the area. Scattered bruising. Surgery planned for this evening.     R-(recommendations): Orders reviewed with patient. Will monitor patient per MD orders.     Inpatient nursing criteria listed below were met:    Health care directives status obtained and documented: Yes  VTE ordered/documented: Yes  Skin issues/needs documented:Yes  Isolation addressed and Signage used: NA  Fall Prevention: Care plan updated Yes Education given and documented Yes  Care Plan initiated and Co-Morbidities added: Yes  Education Assessment documented:Yes  Admission Education Documented: Yes  If present CAUTI/CLABI Education done: NA  New medication patient education completed and documented (Possible Side Effects of Common Medications handout): Yes  Allergies Reviewed: Yes  Admission Medication Reconciliation completed: Yes  Home medications if not able to send immediately home with family stored here: NA  Reminder note placed in discharge instructions regarding home meds: NA  Individualized care needs/preferences addressed and charted: No  Provider Notified that patient has arrived to the unit: Yes

## 2024-12-16 NOTE — ED TRIAGE NOTES
PT presents by EMS following a syncopal episode at the toilet today while out in the [employer] store, woke up on the ground and tried to stand up then fell again, PT with right hip pain. PT on Aspirin. PT A&O x 4 on arrival. PT noted with laceration to right upper brow and skin tear to right cheek. Denies any Headache or Neck pain.

## 2024-12-16 NOTE — ANESTHESIA PREPROCEDURE EVALUATION
Anesthesia Pre-Procedure Evaluation    Patient: Audrey Ortega   MRN: [id number] : 1935        Procedure : Procedure(s):  HEMIARTHROPLASTY, right HIP, BIPOLAR          History reviewed. No pertinent past medical history.   Past Surgical History:   Procedure Laterality Date    EP PACEMAKER GENERATOR REPLACEMENT- DUAL N/A 2023    Procedure: Pacemaker Generator Replacement Dual;  Surgeon: Sae Peguero MD;  Location:  [facility]    PHACOEMULSIFICATION WITH STANDARD INTRAOCULAR LENS IMPLANT Right 2020    Procedure: Cataract surgery with intraocular lens implant;  Surgeon: Fortino Ford MD;  Location: [facility] OR    PHACOEMULSIFICATION WITH STANDARD INTRAOCULAR LENS IMPLANT Left 2020    Procedure: Cataract Surgery Left Eye;  Surgeon: Max Cooley MD;  Location: [facility] OR      Allergies   Allergen Reactions    Ciprofloxacin Rash    Macrobid [Nitrofurantoin] Itching and Rash      Social History     Tobacco Use    Smoking status: Never    Smokeless tobacco: Never   Substance Use Topics    Alcohol use: Not Currently      Wt Readings from Last 1 Encounters:   24 58.6 kg (129 lb 1.6 oz)        Anesthesia Evaluation   Pt has had prior anesthetic. Type: MAC.    History of anesthetic complications  - PONV.      ROS/MED HX  ENT/Pulmonary:  - neg pulmonary ROS     Neurologic: Comment: Patient fell today while on the toilet, got light headed.       Cardiovascular: Comment: dual-chamber pacemaker generator. [employer] placed     (+) Dyslipidemia hypertension- -   -  - -   Taking blood thinners Pt has not received instructions:         fainting (syncope). pacemaker, Reason placed: sick sinus syndrome. type: Dual,  - Patient is dependent on pacemaker.      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  3. Severe bi-atrial enlargement.  4. Detailed  semi-quantitative valvular assessment was not performed on the  present study. However, visually there appears to be moderate (2+) mitral  regurgitation and moderate to moderate-severe (2-3+) tricuspid regurgitation,  both of which are central jets.  5. IVC is normal size with preserved respiratory variation.  6. Small circumferential pericardial effusion, primarily located over the RV.  No echo indications of tamponade.     For comparison, the report of the outside TTE from 4/21/23 describes 3+ MR and  4+ TR, small-moderate pericardial effusion, and dilated IVC with abnormal  respiratory variation.  ______________________________________________________________________________  Left Ventricle  The left ventricle is normal in size. There is normal left ventricular wall  thickness. Left ventricular systolic function is normal. The visual ejection  fraction is 60-65%. Left ventricular diastolic function is not assessable.  Normal left ventricular wall motion.     Right Ventricle  The right ventricle is grossly normal size. The right ventricular systolic  function is normal. There is a pacemaker lead in the right ventricle.     Atria  There is severe biatrial enlargement. There is a catheter/pacemaker lead seen  in the right atrium.     Mitral Valve  The mitral valve leaflets are moderately thickened. Mild mitral valve  prolapse, bileaflet. There is moderate (2+) mitral regurgitation. There is no  mitral valve stenosis.     Tricuspid Valve  The tricuspid valve is not well visualized, but is grossly normal. There is  moderate to mod-severe (2-3+) tricuspid regurgitation. The right ventricular  systolic pressure is approximated at 20.3 mmHg plus the right atrial pressure.  IVC diameter <2.1 cm collapsing >50% with sniff suggests a normal RA pressure  of 3 mmHg.     Aortic Valve  The aortic valve is trileaflet. No aortic regurgitation is present. No aortic  stenosis is present.     Pulmonic Valve  The pulmonic valve is  "not well visualized.     Vessels  The aortic root is not well visualized.     Pericardium  Small circumferential pericardial effusion; primarily over the RV. There are  no echocardiographic indications of cardiac tamponade.       Stress Test:  Date: Results:    ECG Reviewed:  Date: 12/16/2024 Results:  A-fib with V-paced  Cath:  Date: Results:      METS/Exercise Tolerance:     Hematologic:  - neg hematologic  ROS     Musculoskeletal: Comment: Right hip fracture  (+)  arthritis,   fracture,          GI/Hepatic:       Renal/Genitourinary: Comment: Chronic kidney disease, stage 3    (+) renal disease, type: CRI, Pt does not require dialysis,           Endo:  - neg endo ROS     Psychiatric/Substance Use: Comment: Insomnia       Infectious Disease:  - neg infectious disease ROS     Malignancy:  - neg malignancy ROS     Other:            Physical Exam    Airway  airway exam normal      Mallampati: II   TM distance: > 3 FB   Neck ROM: full   Mouth opening: > 3 cm    Respiratory Devices and Support         Dental  no notable dental history         Cardiovascular   cardiovascular exam normal       Rhythm and rate: regular and normal     Pulmonary   pulmonary exam normal        breath sounds clear to auscultation       OUTSIDE LABS:  CBC:   Lab Results   Component Value Date    WBC 6.8 12/16/2024    WBC 10.1 12/10/2024    HGB 12.8 12/16/2024    HGB 13.0 12/10/2024    HCT 36.9 12/16/2024    HCT 37.5 12/10/2024     12/16/2024     12/10/2024     BMP:   Lab Results   Component Value Date     (L) 12/16/2024     (L) 12/10/2024    POTASSIUM 4.1 12/16/2024    POTASSIUM 4.3 12/10/2024    CHLORIDE 90 (L) 12/16/2024    CHLORIDE 92 (L) 12/10/2024    CO2 21 (L) 12/16/2024    CO2 24 12/10/2024    BUN 13.5 12/16/2024    BUN 14.1 12/10/2024    CR 1.31 (H) 12/16/2024    CR 1.05 (H) 12/10/2024     (H) 12/16/2024    GLC 98 12/10/2024     COAGS: No results found for: \"PTT\", \"INR\", \"FIBR\"  POC: No results found " "for: \"BGM\", \"HCG\", \"HCGS\"  HEPATIC:   Lab Results   Component Value Date    ALBUMIN 4.4 10/29/2024    PROTTOTAL 7.7 10/29/2024    ALT 21 10/29/2024    AST 26 10/29/2024    ALKPHOS 72 10/29/2024    BILITOTAL 1.0 10/29/2024     OTHER:   Lab Results   Component Value Date    LACT 0.9 07/31/2022    HO 9.5 12/16/2024    LIPASE 157 12/16/2019    CRP 15.7 (H) 12/16/2019    SED 28 01/23/2023       Anesthesia Plan    ASA Status:  3, emergent    NPO Status:  NPO Appropriate    Anesthesia Type: General.     - Airway: ETT   Induction: Intravenous, Propofol, RSI.   Maintenance: Balanced.        Consents    Anesthesia Plan(s) and associated risks, benefits, and realistic alternatives discussed. Questions answered and patient/representative(s) expressed understanding.     - Discussed:     - Discussed with:  Patient      - Extended Intubation/Ventilatory Support Discussed: No.      - Patient is DNR/DNI Status: No     Use of blood products discussed: No .     Postoperative Care    Pain management: Oral pain medications.   PONV prophylaxis: Ondansetron (or other 5HT-3), Dexamethasone or Solumedrol, Background Propofol Infusion     Comments:    Other Comments: The risks and benefits of anesthesia, and the alternatives where applicable, have been discussed with the patient, and they wish to proceed.    Pt taking 325 mg aspirin daily for history of atrial fibrillation.                ZA Brady CRNA    I have reviewed the pertinent notes and labs in the chart from the past 30 days and (re)examined the patient.  Any updates or changes from those notes are reflected in this note.     # Hyponatremia: Lowest Na = 126 mmol/L in last 2 days, will monitor as appropriate  # Hypochloremia: Lowest Cl = 90 mmol/L in last 2 days, will monitor as appropriate        # Drug Induced Platelet Defect: home medication list includes an antiplatelet medication   # Hypertension: Home medication list includes antihypertensive(s)                # " Pacemaker present

## 2024-12-16 NOTE — CONSULTS
Chief Complaint   Patient presents with    Fall    Hip Pain    Laceration     Reason for consult: right hip fracture status post fall.      HPI: Audrey Ortega is a 89 year old female who presents for right hip pain status post fall at work earlier today at the local [employer]. She started to feel lightheaded and then passed out, hitting her head on something. She tried to get up, felt lightheaded again and passed out a second time, hurting the right hip. Unable to get up, EMS was called and taken to the ED noting a right hip fracture. Admitted and cleared by medicine service for surgery on the hip.    She's been dealing with low BP the way it sounds and probably shouldn't have taken her medications. Also has history of hyponatremia.    Patient has a past medical history significant for chronic kidney disease, she also has a pacemaker that was placed secondary to a low heart rate back in 2011, she also has hypertension   Symptoms: pain  Location of symptoms:right hip  Pain quality: aching, sharp  Frequency of symptoms: frequent  Aggravating factors: movements  Relieving factors: rest, not moving, pain medications.  Previous treatment: rest, pain medications.  History reviewed. No pertinent past medical history.  Past Surgical History:   Procedure Laterality Date    EP PACEMAKER GENERATOR REPLACEMENT- DUAL N/A 12/29/2023    Procedure: Pacemaker Generator Replacement Dual;  Surgeon: Sae Peguero MD;  Location:  [facility]    PHACOEMULSIFICATION WITH STANDARD INTRAOCULAR LENS IMPLANT Right 7/16/2020    Procedure: Cataract surgery with intraocular lens implant;  Surgeon: Fortino Ford MD;  Location:  OR    PHACOEMULSIFICATION WITH STANDARD INTRAOCULAR LENS IMPLANT Left 7/30/2020    Procedure: Cataract Surgery Left Eye;  Surgeon: Max Cooley MD;  Location:  OR     No current outpatient medications on file.     Allergies   Allergen Reactions    Ciprofloxacin Rash    Macrobid  "[Nitrofurantoin] Itching and Rash       REVIEW OF SYSTEMS:   Denies numbness, tingling, parasthesias.   Denies headaches.   Denies fevers, chills, night sweats   Denies chest pain.   Denies shortness of breath.   Denies any skin problems, abrasions, rashes, irritation.     PHYSICAL EXAM:  BP (!) 163/56 (BP Location: Left arm)   Pulse 69   Temp 97.7  F (36.5  C) (Oral)   Resp 16   Ht 1.651 m (5' 5\")   Wt 54.4 kg (119 lb 14.9 oz)   SpO2 100%   BMI 19.96 kg/m    GENERAL APPEARANCE: elderly, alert, no distress. Laceration with suture right eyebrow with swelling. Accompanied by friend and 2 co-workers.  SKIN: no suspicious lesions or rashes   NEURO: Normal strength and tone, mentation intact and speech normal   PSYCH: mentation appears normal and affect normal/bright. Not anxious.   RESPIRATORY: No increased work of breathing.   EXTREMITIES:    Bilateral upper extremity  Skin intact.  Intact sensation to light touch of median, radial, ulnar and axillary nerve distributions.  Intact epl, fpl, fdp, edc, wrist flexion and extension, biceps, triceps and deltoid  Fingers warm and well perfused, brisk capillary refill.  Palpable, 2+ radial pulse.  Right antecubital iv in place.  Bandaid small finger tip.  Nontender to palpation bilateral upper extremity.    Bilateral lower extremity.  Tender to palpation right hip/groin with discomfort with logrolling  Mild swelling  Skin intact.  Left lower extremity nontender to palpation.  Skin intact.  Intact sensation deep peroneal nerve, superficial peroneal nerve, tibial nerve, sural nerve.  Intact ehl, edl, ta, fhl, fdl, gastrocsoleus complex, quadriceps, hamstrings and hip flexors.  Toes warm and well perfused, brisk capillary refill.   Palpable dorsalis pedis and posterior tibial pulses.      X-RAYS:  AP and lateral views right hip 12/16/2024 reviewed, cute fracture of the femoral neck near the subcapital region with anterior displacement distal fragment by approximately one " bone width. Diffuse bony demineralization. No right hip joint space narrowing. Degenerative changes lumbar spine and right SI joint.     ASSESSMENT:  88yo female with acute right hip pain status post syncopal episode and fall, displaced right femoral neck fracture.      PLAN:      * I discussed injury, images with the patient and based on the amount of displacement and/or angulation. Recommend surgical treatment with partial hip replacement, hemiarthroplasty.    * risks and benefits of both surgical (hemiarthroplasty)  and nonsurgical (bedrest) were discussed. In particular, risks of nonop included, but not limited to, nonunion, malunion, joint stiffness, decr range of motion, post-traumatic arthritis and pain and possible functional limitations. Risks of surgery include, but not limited to: bleeding, infection, pain, scar, damage to adjacent structures (e.g. Nerves, blood vessels, bone, cartilage), temporary or permanent nerve damage, recurrence of symptoms, non-union, malunion, implant failure, stiffness, post-traumatic arthritis, need for further surgery, blood clots, pulmonary embolism, risks of anesthesia, death.  * after reviewing the risks and perceived benefits of each, patient would like to proceed with surgical stabilization  * will plan right hip bipolar hemiarthroplasty tonight.  Patient to remain npo, non weight bearing, bedrest  Ivf, iv pain medications as needed.  Postoperative will plan weight bearing as tolerated without restrictions, assuming no intraoperative issues.  Postoperative deep vein thrombosis prophylaxis, likely aspirin x4 weeks  Postoperative pain control with oral medications, iv for breakthrough pain early on, minimizing opioids given patient's age.  Postoperative Physical Therapy.  Anticipate 1-2 days in the hospital, then dispo, with followup in Ortho [facility] clinic at [facility] 2 weeks for wound check, xrays.      Risks and perceived benefits of surgery again discussed with patient.  Patient's questions addressed and answered. Written informed consent obtained and reviewed. Surgical site marked with indelible marker with patient's participation after confirming site with patient.       Adama Begum M.D., M.S.   Dept. of Orthopaedic Surgery   Health system

## 2024-12-16 NOTE — MEDICATION SCRIBE - ADMISSION MEDICATION HISTORY
Medication Scribe Admission Medication History    Admission medication history is complete. The information provided in this note is only as accurate as the sources available at the time of the update.    Information Source(s): Patient and CareEverywhere/SureScripts via in-person    Pertinent Information: Verified no use of pain pump, inhalers or prescription eye drops currently.    Changes made to PTA medication list:  Added: Systane  Deleted: kenalog ointment, trazodone  Changed: None    Allergies reviewed with patient and updates made in EHR: yes    Medication History Completed By: KELLY ESTRELLA 12/16/2024 2:04 PM    PTA Med List   Medication Sig Last Dose/Taking    amoxicillin-clavulanate (AUGMENTIN) 875-125 MG tablet Take 1 tablet by mouth 2 times daily for 7 days. 12/16/2024 at  7:00 AM    Ascorbic Acid (VITAMIN C) 500 MG CAPS Take 500 mg by mouth daily 12/15/2024 Morning    aspirin (ASA) 325 MG EC tablet Take 325 mg by mouth 12/16/2024 at  7:00 AM    calcium 600 MG tablet Take 1 tablet by mouth daily 12/15/2024 Morning    COMPRESSION STOCKINGS 1 each daily 12/16/2024 Morning    lisinopril (ZESTRIL) 10 MG tablet Take 1 tablet (10 mg) by mouth daily. 12/16/2024 at  7:00 AM    magnesium 250 MG tablet Take 1 tablet by mouth daily 12/15/2024 Morning    multivitamin w/minerals (THERA-VIT-M) tablet Take 1 tablet by mouth daily 12/15/2024 Morning    polyethylene glycol-propylene glycol (SYSTANE ULTRA) 0.4-0.3 % SOLN ophthalmic solution Place 1 drop into both eyes 2 times daily. 12/15/2024 Bedtime    Vitamin D, Cholecalciferol, 25 MCG (1000 UT) TABS Take 1,000 Units by mouth daily 12/15/2024 Morning    zolpidem (AMBIEN) 5 MG tablet TAKE 1/2 (ONE-HALF) TABLET BY MOUTH NIGHTLY AS NEEDED (Patient taking differently: Take 2.5 mg by mouth nightly as needed for sleep.) Past Week Bedtime

## 2024-12-16 NOTE — ED PROVIDER NOTES
History     Chief Complaint   Patient presents with    Fall    Hip Pain    Laceration     HPI  Audrey Ortega is a 89 year old female who presents via EMS after a fall at work.  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  Patient states that she was in the closet when she started to feel lightheaded and then passed out.  She was to hit her head on something.  She tried to get up but then when she got up she was still feeling lightheaded and then passed out again and this time hurt her hip.  EMS was called and then brought the patient here.  Patient states that she has had problems with low blood pressure before and she states that she should not have taken her blood pressure pill this morning.  She also has a history of hyponatremia and states if she drinks her Gatorade that usually helps when she gets lightheaded like this.  She currently denies any chest pain or palpitations.  Denies a significant headache.  Denies any visual changes.  Patient states her neck does not hurt.  She has normal range of motion of her neck.  Patient has a past medical history significant for chronic kidney disease, she also has a pacemaker that was placed secondary to a low heart rate back in 2011, she also has hypertension    Allergies:  Allergies   Allergen Reactions    Ciprofloxacin Rash    Macrobid [Nitrofurantoin] Itching and Rash       Problem List:    Patient Active Problem List    Diagnosis Date Noted    Hip fracture, right, closed, initial encounter (H) 12/16/2024     Priority: Medium    Closed displaced fracture of right femoral neck (H) 12/16/2024     Priority: Medium    Syncope, unspecified syncope type 12/16/2024     Priority: Medium    Chronic kidney disease, stage 3 (H) 05/05/2021     Priority: Medium        Past Medical History:    History reviewed. No pertinent past medical history.    Past Surgical History:    Past Surgical History:   Procedure Laterality Date    EP PACEMAKER GENERATOR REPLACEMENT- DUAL N/A 12/29/2023     Procedure: Pacemaker Generator Replacement Dual;  Surgeon: Sae Peguero MD;  Location:  [facility]    PHACOEMULSIFICATION WITH STANDARD INTRAOCULAR LENS IMPLANT Right 7/16/2020    Procedure: Cataract surgery with intraocular lens implant;  Surgeon: Fortino Ford MD;  Location: [facility]    PHACOEMULSIFICATION WITH STANDARD INTRAOCULAR LENS IMPLANT Left 7/30/2020    Procedure: Cataract Surgery Left Eye;  Surgeon: Max Cooley MD;  Location: [facility]       Family History:    History reviewed. No pertinent family history.    Social History:  Marital Status:  Single [1]  Social History     Tobacco Use    Smoking status: Never    Smokeless tobacco: Never   Vaping Use    Vaping status: Never Used   Substance Use Topics    Alcohol use: Not Currently    Drug use: Never        Medications:    amoxicillin-clavulanate (AUGMENTIN) 875-125 MG tablet  Ascorbic Acid (VITAMIN C) 500 MG CAPS  aspirin (ASA) 325 MG EC tablet  calcium 600 MG tablet  COMPRESSION STOCKINGS  lisinopril (ZESTRIL) 10 MG tablet  magnesium 250 MG tablet  multivitamin w/minerals (THERA-VIT-M) tablet  traZODone (DESYREL) 50 MG tablet  triamcinolone (KENALOG) 0.1 % external ointment  Vitamin D, Cholecalciferol, 25 MCG (1000 UT) TABS  zolpidem (AMBIEN) 5 MG tablet          Review of Systems   All other systems reviewed and are negative.      Physical Exam   BP: (!) 144/72  Pulse: 72  Temp: 97.5  F (36.4  C)  Resp: 21  Weight: 58.6 kg (129 lb 1.6 oz)  SpO2: 99 %      Physical Exam  Vitals and nursing note reviewed.   Constitutional:       General: She is not in acute distress.     Appearance: She is well-developed. She is not diaphoretic.   HENT:      Head: Normocephalic and atraumatic.      Nose: Nose normal.      Mouth/Throat:      Pharynx: No oropharyngeal exudate.   Eyes:      Conjunctiva/sclera: Conjunctivae normal.   Cardiovascular:      Rate and Rhythm: Normal rate and regular rhythm.      Heart sounds: Normal heart sounds. No  murmur heard.     No friction rub.   Pulmonary:      Effort: Pulmonary effort is normal. No respiratory distress.      Breath sounds: Normal breath sounds. No stridor. No wheezing or rales.   Abdominal:      General: Bowel sounds are normal. There is no distension.      Palpations: Abdomen is soft. There is no mass.      Tenderness: There is no abdominal tenderness. There is no guarding.   Musculoskeletal:         General: Tenderness (Over right hip area) present. Normal range of motion.      Cervical back: Normal range of motion and neck supple. No rigidity or tenderness.      Comments: Patient has pain with internal/external rotation of the right hip   Skin:     General: Skin is warm and dry.      Capillary Refill: Capillary refill takes less than 2 seconds.      Findings: No erythema.   Neurological:      Mental Status: She is alert and oriented to person, place, and time.   Psychiatric:         Judgment: Judgment normal.         ED Course        [facility]    -Laceration Repair    Date/Time: 12/16/2024 2:36 PM    Performed by: Blair Malin MD  Authorized by: Blair Malin MD    Risks, benefits and alternatives discussed.      ANESTHESIA (see MAR for exact dosages):     Anesthesia method:  Local infiltration    Local anesthetic:  Lidocaine 1% w/o epi  LACERATION DETAILS     Location:  Face    Face location:  R eyebrow    Length (cm):  5    REPAIR TYPE:     Repair type:  Simple      TREATMENT:     Area cleansed with:  Saline    Amount of cleaning:  Standard    SKIN REPAIR     Repair method:  Sutures    Suture size:  5-0    Suture material:  Nylon    Suture technique:  Simple interrupted    Number of sutures:  4    APPROXIMATION     Approximation:  Loose    POST-PROCEDURE DETAILS     Dressing:  Antibiotic ointment            Results for orders placed or performed during the hospital encounter of 12/16/24 (from the past 24 hours)   EKG 12-lead, tracing only    Result Value Ref Range    Systolic Blood Pressure  mmHg    Diastolic Blood Pressure  mmHg    Ventricular Rate 70 BPM    Atrial Rate 70 BPM    CO Interval 212 ms    QRS Duration 132 ms     ms    QTc 503 ms    P Axis 47 degrees    R AXIS 117 degrees    T Axis -47 degrees    Interpretation ECG       AV dual-paced rhythm with prolonged AV conduction with occasional Premature ventricular complexes  Abnormal ECG  No previous ECGs available  Confirmed by SEE ED PROVIDER NOTE FOR, ECG INTERPRETATION ([id number]),  Eddie Souza ([id number]) on 12/16/2024 12:09:09 PM     CBC with platelets differential    Narrative    The following orders were created for panel order CBC with platelets differential.  Procedure                               Abnormality         Status                     ---------                               -----------         ------                     CBC with platelets and d...[[id number]]                      Final result                 Please view results for these tests on the individual orders.   Basic metabolic panel   Result Value Ref Range    Sodium 126 (L) 135 - 145 mmol/L    Potassium 4.1 3.4 - 5.3 mmol/L    Chloride 90 (L) 98 - 107 mmol/L    Carbon Dioxide (CO2) 21 (L) 22 - 29 mmol/L    Anion Gap 15 7 - 15 mmol/L    Urea Nitrogen 13.5 8.0 - 23.0 mg/dL    Creatinine 1.31 (H) 0.51 - 0.95 mg/dL    GFR Estimate 39 (L) >60 mL/min/1.73m2    Calcium 9.5 8.8 - 10.4 mg/dL    Glucose 122 (H) 70 - 99 mg/dL   Troponin T, High Sensitivity   Result Value Ref Range    Troponin T, High Sensitivity 33 (H) <=14 ng/L   CBC with platelets and differential   Result Value Ref Range    WBC Count 6.8 4.0 - 11.0 10e3/uL    RBC Count 4.04 3.80 - 5.20 10e6/uL    Hemoglobin 12.8 11.7 - 15.7 g/dL    Hematocrit 36.9 35.0 - 47.0 %    MCV 91 78 - 100 fL    MCH 31.7 26.5 - 33.0 pg    MCHC 34.7 31.5 - 36.5 g/dL    RDW 11.7 10.0 - 15.0 %    Platelet Count 264 150 - 450 10e3/uL    % Neutrophils 71 %    % Lymphocytes 21 %    %  Monocytes 7 %    % Eosinophils 1 %    % Basophils 0 %    % Immature Granulocytes 1 %    NRBCs per 100 WBC 0 <1 /100    Absolute Neutrophils 4.8 1.6 - 8.3 10e3/uL    Absolute Lymphocytes 1.4 0.8 - 5.3 10e3/uL    Absolute Monocytes 0.5 0.0 - 1.3 10e3/uL    Absolute Eosinophils 0.1 0.0 - 0.7 10e3/uL    Absolute Basophils 0.0 0.0 - 0.2 10e3/uL    Absolute Immature Granulocytes 0.1 <=0.4 10e3/uL    Absolute NRBCs 0.0 10e3/uL   Head CT w/o contrast    Narrative    CT SCAN OF THE HEAD WITHOUT CONTRAST   12/16/2024 12:10 PM     HISTORY: syncope, head injury    TECHNIQUE:  Axial images of the head and coronal reformations without  IV contrast material. Radiation dose for this scan was reduced using  automated exposure control, adjustment of the mA and/or kV according  to patient size, or iterative reconstruction technique.    COMPARISON: 4/20/2023    FINDINGS: There is no evidence of intracranial hemorrhage, mass, acute  infarct or anomaly. The ventricles are normal in size, shape and  configuration. Mild diffuse parenchymal volume loss. Mild patchy  periventricular white matter hypodensities which are nonspecific, but  likely related to chronic microvascular ischemic disease. Intracranial  atherosclerotic calcifications.    The visualized portions of the sinuses and mastoids appear normal. The  bony calvarium and bones of the skull base appear intact.       Impression    IMPRESSION:     1. No evidence of acute intracranial hemorrhage, mass, or herniation.  2. Mild diffuse parenchymal volume loss and white matter changes  likely due to chronic microvascular ischemic disease.      CHRISTY OSEGUERA MD         SYSTEM ID:  [id number]   XR Hip Right 2-3 Views    Narrative    XR HIP RIGHT 2-3 VIEWS   12/16/2024 12:30 PM     HISTORY: fall, right hip pain  COMPARISON: None.       Impression    IMPRESSION: Acute fracture of the femoral neck near the subcapital  region with anterior displacement distal fragment by approximately  one  bone width. Diffuse bony demineralization. No right hip joint space  narrowing. Degenerative changes lumbar spine and right SI joint.    KARL CÁRDENAS MD         SYSTEM ID:  [id number]   EKG 12-lead, tracing only   Result Value Ref Range    Systolic Blood Pressure  mmHg    Diastolic Blood Pressure  mmHg    Ventricular Rate 64 BPM    Atrial Rate  BPM    CO Interval  ms    QRS Duration 94 ms     ms    QTc 435 ms    P Axis  degrees    R AXIS 72 degrees    T Axis 72 degrees    Interpretation ECG       Atrial fibrillation with frequent ventricular-paced complexes  Nonspecific ST abnormality  Abnormal ECG  When compared with ECG of 16-Dec-2024 11:49,  Premature ventricular complexes are no longer Present  Vent. rate has decreased by   6 bpm     EKG 12-lead, tracing only   Result Value Ref Range    Systolic Blood Pressure  mmHg    Diastolic Blood Pressure  mmHg    Ventricular Rate 64 BPM    Atrial Rate  BPM    CO Interval  ms    QRS Duration 92 ms     ms    QTc 439 ms    P Axis  degrees    R AXIS 62 degrees    T Axis 78 degrees    Interpretation ECG       Atrial fibrillation with frequent ventricular-paced complexes  Abnormal ECG  When compared with ECG of 16-Dec-2024 13:19, (unconfirmed)  No significant change was found         Medications   fentaNYL (PF) (SUBLIMAZE) injection 25 mcg (25 mcg Intravenous $Given 12/16/24 1342)   sodium chloride 0.9% BOLUS 250 mL (has no administration in time range)     X-rays come back and does show a fracture of the right hip with some displacement.  This certainly fits clinically.  CT scan of the head was normal.  Labs came back and were mostly chronic, patient's sodium is a little bit lower than patient's baseline but not critical.  Patient's creatinine is also a little bit more elevated than normal at 1.3.  This could indicate that she was a little bit dry and could explain why she might of passed out.  I discussed case with her [facility] surgeon, Dr. Edwards who  will plan on trying to do surgery either later on tonight or first thing tomorrow since she last ate at 10.  Will discuss the case with her hospitalist service to see about getting the patient cleared for surgery.  I spoke to the hospitalist and reviewed the patient's case, they would like to get a pacemaker interrogation since it looks like all 3 of her EKGs look a little different.  I think that what we are seeing is is a combination of her paced beats and some of her intrinsic beats.  Will go ahead and get the pacemaker interrogation done and then we will transfer the patient to the floor.    Assessments & Plan (with Medical Decision Making)  Right hip fracture, syncope     I have reviewed the nursing notes.    I have reviewed the findings, diagnosis, plan and need for follow up with the patient.      New Prescriptions    No medications on file       Final diagnoses:   Hip fracture, right, closed, initial encounter (H)   Syncope, unspecified syncope type       12/16/2024   [facility] EMERGENCY DEPT       Blair Malin MD  12/16/24 1350       Blair Malin MD  12/16/24 0263

## 2024-12-17 ENCOUNTER — APPOINTMENT (OUTPATIENT)
Dept: OCCUPATIONAL THERAPY | Facility: CLINIC | Age: 89
DRG: 522 | End: 2024-12-17
Attending: PHYSICIAN ASSISTANT
Payer: MEDICARE

## 2024-12-17 ENCOUNTER — APPOINTMENT (OUTPATIENT)
Dept: GENERAL RADIOLOGY | Facility: CLINIC | Age: 89
DRG: 522 | End: 2024-12-17
Attending: NURSE PRACTITIONER
Payer: MEDICARE

## 2024-12-17 ENCOUNTER — APPOINTMENT (OUTPATIENT)
Dept: PHYSICAL THERAPY | Facility: CLINIC | Age: 89
DRG: 522 | End: 2024-12-17
Payer: MEDICARE

## 2024-12-17 ENCOUNTER — APPOINTMENT (OUTPATIENT)
Dept: CARDIOLOGY | Facility: CLINIC | Age: 89
DRG: 522 | End: 2024-12-17
Attending: NURSE PRACTITIONER
Payer: MEDICARE

## 2024-12-17 ENCOUNTER — APPOINTMENT (OUTPATIENT)
Dept: GENERAL RADIOLOGY | Facility: CLINIC | Age: 89
DRG: 522 | End: 2024-12-17
Attending: PHYSICIAN ASSISTANT
Payer: MEDICARE

## 2024-12-17 LAB
ANION GAP SERPL CALCULATED.3IONS-SCNC: 13 MMOL/L (ref 7–15)
ANION GAP SERPL CALCULATED.3IONS-SCNC: 15 MMOL/L (ref 7–15)
BUN SERPL-MCNC: 13 MG/DL (ref 8–23)
BUN SERPL-MCNC: 13.9 MG/DL (ref 8–23)
CALCIUM SERPL-MCNC: 8.6 MG/DL (ref 8.8–10.4)
CALCIUM SERPL-MCNC: 8.7 MG/DL (ref 8.8–10.4)
CHLORIDE SERPL-SCNC: 102 MMOL/L (ref 98–107)
CHLORIDE SERPL-SCNC: 97 MMOL/L (ref 98–107)
CREAT SERPL-MCNC: 1.06 MG/DL (ref 0.51–0.95)
CREAT SERPL-MCNC: 1.12 MG/DL (ref 0.51–0.95)
EGFRCR SERPLBLD CKD-EPI 2021: 47 ML/MIN/1.73M2
EGFRCR SERPLBLD CKD-EPI 2021: 50 ML/MIN/1.73M2
ERYTHROCYTE [DISTWIDTH] IN BLOOD BY AUTOMATED COUNT: 11.8 % (ref 10–15)
GLUCOSE SERPL-MCNC: 106 MG/DL (ref 70–99)
GLUCOSE SERPL-MCNC: 158 MG/DL (ref 70–99)
HCO3 SERPL-SCNC: 18 MMOL/L (ref 22–29)
HCO3 SERPL-SCNC: 19 MMOL/L (ref 22–29)
HCT VFR BLD AUTO: 32.1 % (ref 35–47)
HGB BLD-MCNC: 11.2 G/DL (ref 11.7–15.7)
LVEF ECHO: NORMAL
MCH RBC QN AUTO: 31.8 PG (ref 26.5–33)
MCHC RBC AUTO-ENTMCNC: 34.9 G/DL (ref 31.5–36.5)
MCV RBC AUTO: 91 FL (ref 78–100)
PLATELET # BLD AUTO: 246 10E3/UL (ref 150–450)
POTASSIUM SERPL-SCNC: 3.9 MMOL/L (ref 3.4–5.3)
POTASSIUM SERPL-SCNC: 4 MMOL/L (ref 3.4–5.3)
RBC # BLD AUTO: 3.52 10E6/UL (ref 3.8–5.2)
SODIUM SERPL-SCNC: 130 MMOL/L (ref 135–145)
SODIUM SERPL-SCNC: 134 MMOL/L (ref 135–145)
WBC # BLD AUTO: 10.5 10E3/UL (ref 4–11)

## 2024-12-17 PROCEDURE — 80048 BASIC METABOLIC PNL TOTAL CA: CPT | Performed by: PHYSICIAN ASSISTANT

## 2024-12-17 PROCEDURE — 120N000001 HC R&B MED SURG/OB

## 2024-12-17 PROCEDURE — 97162 PT EVAL MOD COMPLEX 30 MIN: CPT | Mod: GP | Performed by: PHYSICAL THERAPIST

## 2024-12-17 PROCEDURE — 36415 COLL VENOUS BLD VENIPUNCTURE: CPT | Performed by: NURSE PRACTITIONER

## 2024-12-17 PROCEDURE — 250N000013 HC RX MED GY IP 250 OP 250 PS 637: Performed by: NURSE PRACTITIONER

## 2024-12-17 PROCEDURE — 85049 AUTOMATED PLATELET COUNT: CPT | Performed by: PHYSICIAN ASSISTANT

## 2024-12-17 PROCEDURE — 999N000063 XR PELVIS AND HIP RIGHT 1 VIEW

## 2024-12-17 PROCEDURE — 99233 SBSQ HOSP IP/OBS HIGH 50: CPT | Performed by: NURSE PRACTITIONER

## 2024-12-17 PROCEDURE — 36415 COLL VENOUS BLD VENIPUNCTURE: CPT | Performed by: PHYSICIAN ASSISTANT

## 2024-12-17 PROCEDURE — 82565 ASSAY OF CREATININE: CPT | Performed by: PHYSICIAN ASSISTANT

## 2024-12-17 PROCEDURE — 74018 RADEX ABDOMEN 1 VIEW: CPT

## 2024-12-17 PROCEDURE — 258N000003 HC RX IP 258 OP 636: Performed by: NURSE PRACTITIONER

## 2024-12-17 PROCEDURE — 250N000013 HC RX MED GY IP 250 OP 250 PS 637

## 2024-12-17 PROCEDURE — 93306 TTE W/DOPPLER COMPLETE: CPT

## 2024-12-17 PROCEDURE — 97530 THERAPEUTIC ACTIVITIES: CPT | Mod: GP | Performed by: PHYSICAL THERAPIST

## 2024-12-17 PROCEDURE — 93306 TTE W/DOPPLER COMPLETE: CPT | Mod: 26 | Performed by: INTERNAL MEDICINE

## 2024-12-17 PROCEDURE — 80048 BASIC METABOLIC PNL TOTAL CA: CPT | Performed by: NURSE PRACTITIONER

## 2024-12-17 PROCEDURE — 85014 HEMATOCRIT: CPT | Performed by: PHYSICIAN ASSISTANT

## 2024-12-17 PROCEDURE — 97165 OT EVAL LOW COMPLEX 30 MIN: CPT | Mod: GO

## 2024-12-17 PROCEDURE — 97110 THERAPEUTIC EXERCISES: CPT | Mod: GP | Performed by: PHYSICAL THERAPIST

## 2024-12-17 PROCEDURE — 250N000011 HC RX IP 250 OP 636: Performed by: PHYSICIAN ASSISTANT

## 2024-12-17 PROCEDURE — 250N000013 HC RX MED GY IP 250 OP 250 PS 637: Performed by: PHYSICIAN ASSISTANT

## 2024-12-17 RX ORDER — SIMETHICONE 80 MG
80 TABLET,CHEWABLE ORAL EVERY 6 HOURS PRN
Status: DISCONTINUED | OUTPATIENT
Start: 2024-12-17 | End: 2024-12-19 | Stop reason: HOSPADM

## 2024-12-17 RX ORDER — SODIUM CHLORIDE 9 MG/ML
INJECTION, SOLUTION INTRAVENOUS CONTINUOUS
Status: ACTIVE | OUTPATIENT
Start: 2024-12-17 | End: 2024-12-17

## 2024-12-17 RX ORDER — FAMOTIDINE 20 MG/1
20 TABLET, FILM COATED ORAL DAILY
Status: DISCONTINUED | OUTPATIENT
Start: 2024-12-17 | End: 2024-12-19 | Stop reason: HOSPADM

## 2024-12-17 RX ORDER — SODIUM CHLORIDE 9 MG/ML
INJECTION, SOLUTION INTRAVENOUS CONTINUOUS
Status: DISCONTINUED | OUTPATIENT
Start: 2024-12-18 | End: 2024-12-19

## 2024-12-17 RX ADMIN — POLYETHYLENE GLYCOL 3350 17 G: 17 POWDER, FOR SOLUTION ORAL at 08:33

## 2024-12-17 RX ADMIN — BISACODYL 10 MG: 10 SUPPOSITORY RECTAL at 15:59

## 2024-12-17 RX ADMIN — ASPIRIN 325 MG: 325 TABLET ORAL at 08:32

## 2024-12-17 RX ADMIN — OXYCODONE HYDROCHLORIDE 2.5 MG: 5 TABLET ORAL at 04:46

## 2024-12-17 RX ADMIN — SENNOSIDES AND DOCUSATE SODIUM 1 TABLET: 8.6; 5 TABLET ORAL at 22:49

## 2024-12-17 RX ADMIN — SODIUM CHLORIDE: 9 INJECTION, SOLUTION INTRAVENOUS at 17:32

## 2024-12-17 RX ADMIN — SODIUM CHLORIDE: 9 INJECTION, SOLUTION INTRAVENOUS at 08:35

## 2024-12-17 RX ADMIN — ONDANSETRON 4 MG: 4 TABLET, ORALLY DISINTEGRATING ORAL at 08:32

## 2024-12-17 RX ADMIN — ACETAMINOPHEN 975 MG: 325 TABLET, FILM COATED ORAL at 06:00

## 2024-12-17 RX ADMIN — CEFAZOLIN 1 G: 1 INJECTION, POWDER, FOR SOLUTION INTRAMUSCULAR; INTRAVENOUS at 04:01

## 2024-12-17 RX ADMIN — SIMETHICONE 80 MG: 80 TABLET, CHEWABLE ORAL at 13:44

## 2024-12-17 RX ADMIN — ACETAMINOPHEN 975 MG: 325 TABLET, FILM COATED ORAL at 15:49

## 2024-12-17 RX ADMIN — FAMOTIDINE 20 MG: 20 TABLET, FILM COATED ORAL at 08:32

## 2024-12-17 RX ADMIN — CEFAZOLIN 1 G: 1 INJECTION, POWDER, FOR SOLUTION INTRAMUSCULAR; INTRAVENOUS at 12:34

## 2024-12-17 RX ADMIN — SENNOSIDES AND DOCUSATE SODIUM 1 TABLET: 8.6; 5 TABLET ORAL at 08:32

## 2024-12-17 RX ADMIN — ACETAMINOPHEN 975 MG: 325 TABLET, FILM COATED ORAL at 22:49

## 2024-12-17 ASSESSMENT — ACTIVITIES OF DAILY LIVING (ADL)
ADLS_ACUITY_SCORE: 23
ADLS_ACUITY_SCORE: 23
ADLS_ACUITY_SCORE: 22
ADLS_ACUITY_SCORE: 23
ADLS_ACUITY_SCORE: 22
ADLS_ACUITY_SCORE: 22
ADLS_ACUITY_SCORE: 23
ADLS_ACUITY_SCORE: 23
DEPENDENT_IADLS:: INDEPENDENT
ADLS_ACUITY_SCORE: 21
IADL_COMMENTS: PATIENT REPORTS BEING IND WITH ALL IADLS AT BASELINE.
ADLS_ACUITY_SCORE: 21
ADLS_ACUITY_SCORE: 22
ADLS_ACUITY_SCORE: 21
ADLS_ACUITY_SCORE: 23
ADLS_ACUITY_SCORE: 21
ADLS_ACUITY_SCORE: 22
ADLS_ACUITY_SCORE: 22
ADLS_ACUITY_SCORE: 23

## 2024-12-17 NOTE — PROGRESS NOTES
"   12/17/24 0900   Appointment Info   Signing Clinician's Name / Credentials (OT) Jaya Wu, OTR/L       Present no   Living Environment   People in Home alone   Current Living Arrangements house   Home Accessibility stairs to enter home   Number of Stairs, Main Entrance 3   Stair Railings, Main Entrance railing on left side (ascending)   Transportation Anticipated family or friend will provide   Living Environment Comments Patient reports living alone in a 1 story home. Patient reports having a tub/shower combo and also reports she does not have any grab bars or use a shower chair/bench.   Self-Care   Usual Activity Tolerance moderate   Current Activity Tolerance fair   Regular Exercise No   Equipment Currently Used at Home none   Fall history within last six months yes   Number of times patient has fallen within last six months 2   Instrumental Activities of Daily Living (IADL)   Previous Responsibilities medication management;finances;driving;work;yardwork;shopping;laundry;housekeeping;meal prep   IADL Comments Patient reports being IND with all IADLs at baseline.   General Information   Onset of Illness/Injury or Date of Surgery 12/16/24   Referring Physician Tyrone Baird PA-C   Patient/Family Therapy Goal Statement (OT) To return home.   Additional Occupational Profile Info/Pertinent History of Current Problem Per chart: \"Patient is a 89 year old female with a medical history of sick sinus syndrome status post pacemaker placement in 2011, history of paroxysmal atrial fibrillation, not on anticoagulation, essential hypertension, CKD, who was brought in after a fall. Patient was in her usual state of health this morning, besides recent diagnosis of diverticulitis after having diarrhea 6 days ago.  While at her place and employment, she went to the restroom and after urinating felt very lightheaded, stood up from the toilet, fell forward.  Does not remember falling.  Patient was able to " "stand up, and unfortunately she fell again on her right side.  After the second fall, she could not get up on her own. Overall, syncopal episode, preceded by loss of consciousness, during micturition, causing a fall.  Patient was found to have an acute fracture of the right femoral neck and was admitted for workup of syncope and for operative management of the fracture.\"   Left Upper Extremity (Weight-bearing Status) full weight-bearing (FWB)   Right Upper Extremity (Weight-bearing Status) full weight-bearing (FWB)   Left Lower Extremity (Weight-bearing Status) full weight-bearing (FWB)   Right Lower Extremity (Weight-bearing Status) weight-bearing as tolerated (WBAT)   Cognitive Status Examination   Orientation Status orientation to person, place and time   Affect/Mental Status (Cognitive) WFL   Follows Commands WFL   Visual Perception   Visual Impairment/Limitations WFL   Sensory   Sensory Quick Adds sensation intact   Pain Assessment   Patient Currently in Pain Yes, see Vital Sign flowsheet   Posture   Posture forward head position;protracted shoulders;kyphosis   Range of Motion Comprehensive   General Range of Motion no range of motion deficits identified   Strength Comprehensive (MMT)   General Manual Muscle Testing (MMT) Assessment no strength deficits identified   Muscle Tone Assessment   Muscle Tone Quick Adds No deficits were identified   Coordination   Upper Extremity Coordination No deficits were identified   Bed Mobility   Comment (Bed Mobility) Bed mobility was not assessed do to patient's medical status currently.   Transfers   Transfer Comments No transfers were completed this date due to patient's medical status.   Balance   Balance Assessment no deficits identified   Activities of Daily Living   BADL Assessment/Intervention lower body dressing;grooming   Comment, BADL Assessment/Training Patient reports being fully IND with ADLs at baseline.   Additional Documentation Comment, BADL " Assessment/Training (Row)   Lower Body Dressing Assessment/Training   Position (Lower Body Dressing) sitting up in bed   Comment, (Lower Body Dressing) Patient able to IND jennifer/doff sock on unaffected leg while seated up in bed.   [place] Level (Lower Body Dressing) independent;lower body dressing skills;doff;don;socks   Grooming Assessment/Training   Position (Grooming) sitting up in bed   [place] Level (Grooming) grooming skills;hair care, combing/brushing;oral care regimen;wash face, hands;set up   Comment, (Grooming) Patient set up assist with grooming tasks such as washing face, brushing teeth, and combing hair while seated up in bed.   Clinical Impression   Criteria for Skilled Therapeutic Interventions Met (OT) Yes, treatment indicated   OT Diagnosis Impaired ADLs and transfers   Influenced by the following impairments post hip surgery and pain   OT Problem List-Impairments impacting ADL problems related to;pain   ADL comments/analysis Patient reports being IND with all ADLs at baseline. Patient currently below baseline with ADLs.   Assessment of Occupational Performance 1-3 Performance Deficits   Planned Therapy Interventions (OT) ADL retraining   Clinical Decision Making Complexity (OT) problem focused assessment/low complexity   Risk & Benefits of therapy have been explained evaluation/treatment results reviewed;care plan/treatment goals reviewed;risks/benefits reviewed;current/potential barriers reviewed;participants included;patient   Clinical Impression Comments Patient from home alone. Patient presents with right femoral neck fracture and multiple recent syncopal episodes due to BP levels. Patient reports being IND with all ADLs and IADLs at baseline. Patient currently below baseline with ADLs. At this point recommending TCU to improve ADLs, medical status transfers, and overall safety. Patient may return home with assist (HHOT) to further work on ADLs, if patient's medical status improves.    OT Total Evaluation Time   OT Eval, Low Complexity Minutes (42331) 30   OT Goals   Therapy Frequency (OT) 3 times/week   OT Predicted Duration/Target Date for Goal Attainment 12/24/24   OT Goals Bed Mobility;Toilet Transfer/Toileting;Transfers   OT: Bed Mobility Modified independent   OT: Transfer Modified independent   OT: Toilet Transfer/Toileting Modified independent   Interventions   Interventions Quick Adds Self-Care/Home Management   OT Discharge Planning   OT Plan 1/3: Tues - ADLs and Transfers   OT Discharge Recommendation (DC Rec) Transitional Care Facility;home with home care occupational therapy   OT Rationale for DC Rec Patient from home alone. Patient presents with right femoral neck fracture and multiple recent syncopal episodes due to BP levels. Patient reports being IND with all ADLs and IADLs at baseline. Patient currently below baseline with ADLs. At this point recommending TCU to improve ADLs, medical status transfers, and overall safety. Patient may return home with assist (HHOT) to further work on ADLs, if patient's medical status improves.   OT Brief overview of current status Patient reports being IND with ADLs at baseline. Patient currently below baseline with ADLs.   Total Session Time   Total Session Time (sum of timed and untimed services) 30     Jaya Wu OTR/L  Occupational Therapist

## 2024-12-17 NOTE — PROGRESS NOTES
"[facility]    Progress note    89 year old female POD#1 s/p right hip hemiarthroplasty, direct anterior approach by Dr. Begum  S: Patient seen at bedside in no apparent distress, alert and pleasant.  I discussed surgery and rehabilitation with the patient.  She denies numbness, tingling or major pain issues.  Printed and gave x-rays to her this morning and explained them.  Discussed Aquacel bandage and discharged to home with her friend.  Discussed navigating 3 steps prior to discharge.  Discussed physical therapy.  Patient did have some low blood pressure this morning when getting up but has ambulated and felt good walking on the leg.  O: CMS intact right LE, DF/PF intact and 5 out of 5 strength.  Able to fire quad       Aquacel dressing on, clean and dry with a good seal and no shadowing       Right hip with moderate swelling and no erythema or ecchymosis        Bilateral calves soft and non tender    Vital signs:  Temp: 98  F (36.7  C) Temp src: Oral BP: 98/51 Pulse: 61   Resp: 16 SpO2: 99 % O2 Device: None (Room air) Oxygen Delivery: 6 LPM Height: 165.1 cm (5' 5\") Weight: 54.4 kg (119 lb 14.9 oz)  Estimated body mass index is 19.96 kg/m  as calculated from the following:    Height as of this encounter: 1.651 m (5' 5\").    Weight as of this encounter: 54.4 kg (119 lb 14.9 oz).      Hemoglobin   Date Value Ref Range Status   12/17/2024 11.2 (L) 11.7 - 15.7 g/dL Final   04/20/2020 13.6 11.7 - 15.7 g/dL Final     No results found for: \"INR\"      A/P:  1. Pain-controlled with Tylenol, oxycodone 2.5 to 5 mg available only 2.5 taken so far.  2. DVT Prophylaxis-325mg Asprin every day starting today.  This is what she usually is on also.  3. Weight Bearing Status-WBAT right LE  4. Hip Precautions-none  5. Physical Therapy-to see patient this morning  6. Occupational Therapy-to see patient if necessary  7. Hospitalist-Dr. Pires following.  Touched base with him yesterday.  Sent secure message to " Beverley Galvan NP this morning.  8. Incision-no signs or symptoms of infection.  Aquacel on and clean.  9. Plan-anticipate discharge to home today if she tolerates steps and passes PT as well as hospitalist okays discharge.  Patient has 3 steps and then 1 level at home and a friend to stay with her.  Discharge orders placed last night by myself.  Follow-up with Tyrone Baird PA-C in 2 weeks.  Discussed plan with charge RN.  Secure message sent to Beverley Galvan NP.  Update message sent to Dr. Begum.    Tyrone Baird PA-C  12/17/2024

## 2024-12-17 NOTE — PLAN OF CARE
Goal Outcome Evaluation:           Overall Patient Progress: improvingOverall Patient Progress: improving    Outcome Evaluation: patient A&O, vss, RA, pain controlled, meds with applesauce, PRN throat lozenge, sacral mepilex applied, purewick in place

## 2024-12-17 NOTE — PLAN OF CARE
Goal Outcome Evaluation:      Plan of Care Reviewed With: patient    Overall Patient Progress: no changeOverall Patient Progress: no change    Outcome Evaluation: A&O. VSS on rm air. Minimal pain overnight. Takes meds whole in applesauce. Sacral mepilex in place. Ambulated at bedside. Voiding adequately and tolerating oral intake.

## 2024-12-17 NOTE — PROGRESS NOTES
12/17/24 1000   Appointment Info   Signing Clinician's Name / Credentials (PT) Zhane Ness PT, DPT, ATC, LAT   Appointment Canceled Reason (PT) Medical status   Appointment Cancel Comments (PT) low BP, orthostatic hypotension   Rehab Comments (PT) PT eval and treat post op hip surgery. Activity order: ambulate with assist, up in chair, up with assist, IS, ice, WBAT, no precautions       Present no   Living Environment   People in Home alone   Current Living Arrangements house   Home Accessibility stairs to enter home   Number of Stairs, Main Entrance 3   Stair Railings, Main Entrance railing on left side (ascending)   Transportation Anticipated family or friend will provide   Living Environment Comments tub shower combo. flat bed. friend can stay with patient for recovery.   Self-Care   Usual Activity Tolerance moderate   Current Activity Tolerance fair   Regular Exercise No   Equipment Currently Used at Home none   Fall history within last six months yes   Number of times patient has fallen within last six months 2   General Information   Onset of Illness/Injury or Date of Surgery 12/16/24   Referring Physician Tyrone Baird PA-C   Patient/Family Therapy Goals Statement (PT) go home with friend when BP is better   Pertinent History of Current Problem (include personal factors and/or comorbidities that impact the POC) Patient is a 89 year old female, day 1 status post right, direct anterior, hemiarthroplasty repair of traumatic fracture by Dr. Begum, 50mL EBL, general anesthesia. Patient with a previous medical history of CKD, hyponatremia, pacemaker, sick sinus syndrome, AFib, HTN, unstable angina.   Existing Precautions/Restrictions fall   Weight-Bearing Status - LUE full weight-bearing   Weight-Bearing Status - RUE full weight-bearing   Weight-Bearing Status - LLE full weight-bearing   Weight-Bearing Status - RLE full weight-bearing   General Observations on room air   Cognition    Affect/Mental Status (Cognition) WFL   Orientation Status (Cognition) oriented x 4   Follows Commands (Cognition) WFL   Pain Assessment   Patient Currently in Pain Yes, see Vital Sign flowsheet   Integumentary/Edema   Integumentary/Edema Comments post op dressing CDI   Posture    Posture Forward head position   Range of Motion (ROM)   Range of Motion ROM is WFL   ROM Comment RLE hip flexion in sitting to 100*, supine heel slide to 50* with pain   Strength (Manual Muscle Testing)   Strength (Manual Muscle Testing) Able to perform L SLR   Strength Comments weak RLE hip due to post op status   Bed Mobility   Bed Mobility supine-sit   Supine-Sit [place] (Bed Mobility) independent   Bed Mobility Limitations decreased ability to use legs for bridging/pushing   Impairments Contributing to Impaired Bed Mobility decreased strength;pain   Transfers   Transfers sit-stand transfer;bed-chair transfer   Maintains Weight-bearing Status (Transfers) able to maintain   Bed-Chair Transfer   Assistive Device (Bed-Chair Transfers) walker, front-wheeled   Bed-Chair [place] (Transfers) contact guard   Sit-Stand Transfer   Sit-Stand [place] (Transfers) contact guard   Assistive Device (Sit-Stand Transfers) walker, front-wheeled   Gait/Stairs (Locomotion)   Comment, (Gait/Stairs) did not progress due to BPs   Balance   Balance Comments IND sitting balance. good standing balance, transitional balance impacting by pain   Sensory Examination   Sensory Perception patient reports no sensory changes   Coordination   Coordination no deficits were identified   Muscle Tone   Muscle Tone no deficits were identified   Clinical Impression   Criteria for Skilled Therapeutic Intervention Yes, treatment indicated   PT Diagnosis (PT) impaired mobility, muscle weakness, joint stiffness   Influenced by the following impairments status post hip fracture repair   Functional limitations due to impairments pain, fatigue, use of walker for  support   Clinical Presentation (PT Evaluation Complexity) evolving   Clinical Presentation Rationale post op acuity, medical status, low bp   Clinical Decision Making (Complexity) moderate complexity   Planned Therapy Interventions (PT) balance training;bed mobility training;gait training;home exercise program;patient/family education;ROM (range of motion);stair training;strengthening;transfer training;progressive activity/exercise;home program guidelines   Risk & Benefits of therapy have been explained evaluation/treatment results reviewed;participants included;participants voiced agreement with care plan;patient   PT Total Evaluation Time   PT Eval, Moderate Complexity Minutes (25781) 12   Physical Therapy Goals   PT Frequency Daily   PT Predicted Duration/Target Date for Goal Attainment 12/21/24   PT Goals Transfers;Stairs;Gait   PT: Transfers Modified independent;Sit to/from stand;Assistive device   PT: Gait Modified independent;Rolling walker;100 feet;Within precautions   PT: Stairs Supervision/stand-by assist;3 stairs;Rail on left;Assistive device   PT Discharge Planning   PT Plan Daily. ambulate, stairs, HEP   PT Discharge Recommendation (DC Rec) home with assist;home with home care physical therapy;Transitional Care Facility   PT Rationale for DC Rec Patient from home alone, friend Zeina willing to stay with her, IND at baseline with no AD. Stairs to enter and main level living within. Patient presents with functional mobility below her baseline, however has friend support at discharge and would benefit from HHPT to address functional deficits in the post op setting.   PT Brief overview of current status IND supine to sit. Sit to/from stand with walker and CGA. Bed to chair with walker CGA. See orthostatic BP assessment   PT Equipment Needed at Discharge walker, rolling   Physical Therapy Time and Intention   Total Session Time (sum of timed and untimed services) 12     Thank you for your referral.  Zhane  Grover PT, DPT, ATC, [facility]  O: [phone]  E: [email or web address]

## 2024-12-17 NOTE — PROGRESS NOTES
S- Transfer to 267 from PACU.    B- right hip fx - surgical    A- Brief systems assessment: vss, RA, pain controlled    R- Transfer to med/surg per physician orders. Continue to monitor pt and update physician as needed.      Code status: DNR / DNI  Skin: surgical incision, right eye bruise and sutures  Fall Risk: Yes- Department fall risk interventions implemented.  Isolation and Signage: None  Medication drips upon transfer: no  Blue Bin checked and medications transfer out with patientN/A

## 2024-12-17 NOTE — PROGRESS NOTES
[facility]    Medicine Progress Note - Hospitalist Service    Date of Admission:  12/16/2024    Assessment & Plan   Audrey Ortega is a 89 year old female with a medical history of sick sinus syndrome status post pacemaker placement in 2011, history of paroxysmal atrial fibrillation, not on anticoagulation, essential hypertension, CKD, who was brought in after a fall. Patient was in her usual state of health this morning, besides recent diagnosis of diverticulitis after having diarrhea 6 days ago.  While at her place and employment, she went to the restroom and after urinating felt very lightheaded, stood up from the toilet, fell forward.  Does not remember falling.  Patient was able to stand up, and unfortunately she fell again on her right side.  After the second fall, she could not get up on her own. Overall, syncopal episode, preceded by loss of consciousness, during micturition, causing a fall.  Patient was found to have an acute fracture of the right femoral neck and was admitted for workup of syncope and for operative management of the fracture.  Of note, 6 days ago on December 10, patient was having abdominal pain and diarrhea, was diagnosed with diverticulitis and started on Augmentin.      Acute fracture of the femoral neck, right  -- POD 1 right hip hemiarthroplasty  -- Management as per surgery  She is up with PT, was initially going to go home with help from a friend, now wishes TCU    Acute blood loss anemia due to surgery, crystalloid dilution.  Physiologically tolerating.    -     Fall  Lightheadedness  Syncopal episode  Patient was in her usual state besides recent diagnosis of diverticulitis after having diarrhea 6 days ago.  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  Patient was able to stand up, and unfortunately she fell again on her right side.  After the second fall, she  could not get up on her own.  She has a history of a similar episode a few years ago.  Patient denied sensation of the room spinning, denies loss of bowel bladder function or biting her tongue afterwards.  In the emergency room a CT head was done without evidence of acute intracranial hemorrhage, mass or herniation.  She did have positive orthostatic blood pressures.  She was given more crystalloid and repeat Orthostatic blood pressures were  not positive this afternoon.  TTE done with normal LVEF, mod MR and mod TR (looks unchanged from previous).  No arrhythmias seen on telemetry  -- With loss of consciousness preceding fall, transient, self terminating  -- Happened during micturition, therefore strong suspicion of reflex mediated syncope due to orthostatic hypotension, possibly due to volume depletion in light of recent diarrhea  -- Cardiac monitoring     Post operative ileus.  Patient complaining of abdominal pain.  KUB done which reveals gas distended loops of colon and small bowel t/o the abdomen most likely reflecting ileus in post operative setting.  -aggressive bowel regimen  -walks at least TID     Hyponatremia, acute on chronic-will give isotonic fluids as she does exhibit ileus today  Hypochloremia, acute on chronic-resolving on NS infusion  Usually sodium is between 127-131, 134 after IV NS  Usually chloride is between 92 and 97  -- Likely due to volume depletion from gastrointestinal losses from recent diarrhea  -- 0.9% NS 2 100 hour  -- Repeat electrolytes tomorrow morning     Recent diagnosis of diverticulitis  Recent diarrhea  -- Patient is finishing up a course of Augmentin 1 tablet 2 times daily for 7 days that was started on December 10, 2024  -- Patient completed 6 days of treatment prior to being admitted     Sick sinus syndrome status post pacemaker placement  History of atrial fibrillation, not on anticoagulation     Essential hypertension  -- Holding home lisinopril 10 mg, restart  postoperatively due to orthostasis     CKD, stage III  --creatinine stable at 1.06  -- Continue to monitor  -- Repeat labs tomorrow     Troponinemia, likely secondary to CKD     Insomnia  -- Restart zolpidem postoperatively when appropriate    Right upper forehead laceration.  Stiches c/d/i          Diet: Advance Diet as Tolerated: Regular Diet Adult  Discharge Instruction - Regular Diet Adult    DVT Prophylaxis: asa  Ulrich Catheter: Not present  Lines: None     Cardiac Monitoring: None  Code Status: No CPR- Do NOT Intubate      Clinically Significant Risk Factors         # Hyponatremia: Lowest Na = 126 mmol/L in last 2 days, will monitor as appropriate  # Hypochloremia: Lowest Cl = 90 mmol/L in last 2 days, will monitor as appropriate          # Hypertension: Noted on problem list                # Financial/Environmental Concerns: none   # Pacemaker present       Social Drivers of Health    Stress: Stress Concern Present (10/29/2024)    [employer] - Occupational Stress Questionnaire     Feeling of Stress : To some extent   Social Connections: Unknown (10/29/2024)    Social Connection and Isolation Panel [NHANES]     Frequency of Social Gatherings with Friends and Family: Twice a week          Disposition Plan     Medically Ready for Discharge: Anticipated Tomorrow           The patient's care was discussed with the  entire care team .    Beverley Galvan CNP  Hospitalist Service  [facility]  Securely message with [employer] (more info)  Text page via [employer] Paging/Directory   ______________________________________________________________________    Interval History   Reviewed events since surgery done last evening    Physical Exam   Vital Signs: Temp: 98  F (36.7  C) Temp src: Oral BP: (!) 145/59 Pulse: 77   Resp: 20 SpO2: 98 % O2 Device: None (Room air) Oxygen Delivery: 6 LPM  Weight: 119 lbs 14.88 oz    Gen:  Lying in bed no acute distress  HEENT:   normocephalic, atraumatic, oropharynx clear  Resp:  CTA bibasilarly  Skin:  right hip incision covered  Card:  S1,S2, RRR no murmur, rub or gallop  Abd:  Soft nondistended, non tender,  normoactive bowel sounds   Neuro:  Neuro exam non focal      Medical Decision Making       45 MINUTES SPENT BY ME on the date of service doing chart review, history, exam, documentation & further activities per the note.        Data     I have personally reviewed the following data over the past 24 hrs:    10.5  \   11.2 (L)   / 246     134 (L) 102 13.9 /  158 (H)   3.9 19 (L) 1.06 (H) \       Imaging results reviewed over the past 24 hrs:   Recent Results (from the past 24 hours)   XR Surgery MARIE L/T 5 Min Fluoro w Stills    Narrative    This exam was marked as non-reportable because it will not be read by a   radiologist or a [place] non-radiologist provider.         XR Pelvis w Hip Right 1 View    Narrative    XR PELVIS AND HIP RIGHT 1 VIEW   2024 7:47 AM     HISTORY: Postop x-rays AP pelvis and lateral of the right hip can be  done portable on the floor.  COMPARISON: 2024       Impression    IMPRESSION: Postoperative changes of right hip hemiarthroplasty.  Postoperative soft tissue gas about the hip. Degenerative arthritis  pubic symphysis. Demineralization.    ORLANDO COVARRUBIAS MD         SYSTEM ID:  [id number]   Echocardiogram Complete   Result Value    LVEF  60-65%    Narrative    [id number]  [id number]  [id number]  [id number]^LYNDSEY^ELENA^CHERRI     [facility]  Echocardiography Laboratory  [address] [place] [id number]     Name: SHAHRAM MILLS  MRN: [id number]  : 1935  Study Date: 2024 11:37 AM  Age: 89 yrs  Gender: Female  Patient Location: [facility]  Reason For Study: Syncope  History: Afib, Pacemaker, CKD, HTN  Ordering Physician: ELENA SOLORIO  Referring Physician: Krish Bates  Performed By: Yumiko Rhoades     BSA: 1.6 m2  Height: 65 in  Weight: 119 lb  HR: 74  BP: 82/43  mmHg  ______________________________________________________________________________  Procedure  Echocardiogram with two-dimensional, color and spectral Doppler.  ______________________________________________________________________________  Interpretation Summary     Irregular rhythm with wide QRS and frequent PVCs.     Normal LV size with systolic function. Estimated ejection fraction 60 to 65%.  Dyssynchronous pattern of LV contraction from conduction abnormality or  pacemaker activation.  RV is borderline dilated but normal systolic function. There is a pacemaker  lead noted in the right heart.  Severe biatrial enlargement. Multiple leads noted in the right atrium.  There is mild to moderate mitral regurgitation. No mitral stenosis.  Tricuspid valve is not well-seen but there appears to be likely 3+ tricuspid  regurgitation related to tethering of the leaflets from the pacemaker lead.  RVSP is elevated at 46 mmHg plus right atrial pressure consistent with mild to  moderate pulmonary hypertension.  There is a small pericardial effusion as previously reported without  echocardiographic features of tamponade.  ______________________________________________________________________________  Left Ventricle  The left ventricle is normal in size. The visual ejection fraction is 60-65%.  Diastolic Doppler findings (E/E' ratio and/or other parameters) suggest left  ventricular filling pressures are increased. Septal motion is consistent with  conduction abnormality. Septal wall motion abnormality may reflect pacemaker  activation.     Right Ventricle  Borderline right ventricular enlargement. The right ventricular systolic  function is normal. There is a pacemaker lead in the right ventricle.     Atria  There is severe biatrial enlargement. Pacer wire in right atrium.     Mitral Valve  The mitral valve is normal in structure and function. There is mild to  moderate (1-2+) mitral regurgitation. There is no mitral valve  stenosis.     Tricuspid Valve  The tricuspid valve is not well visualized. There is moderately severe (3+)  tricuspid regurgitation. The right ventricular systolic pressure is elevated  at 46.1 mmHg. Right ventricular systolic pressure is elevated, consistent with  mild pulmonary hypertension.     Aortic Valve  The aortic valve is trileaflet with aortic valve sclerosis. No hemodynamically  significant valvular aortic stenosis.     Pulmonic Valve  The pulmonic valve is not well visualized.     Vessels  The aortic root is normal size. The inferior vena cava was normal in size with  preserved respiratory variability.     Pericardium  Small pericardial effusion. There are no echocardiographic indications of  cardiac tamponade.     ______________________________________________________________________________  MMode/2D Measurements & Calculations  IVSd: 0.99 cm  LVIDd: 3.9 cm  LVIDs: 2.5 cm  LVPWd: 0.88 cm  FS: 35.4 %  LV mass(C)d: 109.6 grams  LV mass(C)dI: 69.1 grams/m2     Ao root diam: 2.8 cm  LA dimension: 5.2 cm  asc Aorta Diam: 2.5 cm  LA/Ao: 1.9  Ao root diam index Ht(cm/m): 1.7  Ao root diam index BSA (cm/m2): 1.7  Asc Ao diam index BSA (cm/m2): 1.6  Asc Ao diam index Ht(cm/m): 1.5  LA Volume (BP): 83.4 ml  LA Volume Index (BP): 52.5 ml/m2  RV Base: 3.4 cm     RWT: 0.46  TAPSE: 1.5 cm     Doppler Measurements & Calculations  MV E max artur: 109.0 cm/sec  MV A max artur: 52.5 cm/sec  MV E/A: 2.1  MV dec slope: 479.7 cm/sec2  MV dec time: 0.23 sec  TR max artur: 339.3 cm/sec  TR max P.1 mmHg  E/E' av.8  Lateral E/e': 12.7  Medial E/e': 16.9  RV S Artur: 7.0 cm/sec     ______________________________________________________________________________  Report approved by: Elba Monroe MD on 2024 12:31 PM         X-ray Abdomen flat port    Narrative    ABDOMEN ONE VIEW PORTABLE  2024 2:35 PM     HISTORY: abdomina distention    COMPARISON: Pelvic radiograph 2024       Impression    IMPRESSION: Gas  distended loops of colon and small bowel throughout  the abdomen most likely reflecting ileus in the postoperative setting.    Thin medial right infradiaphragmatic lucency is indeterminate,  possibly summation artifact, although consider upright abdominal  radiograph to exclude pneumoperitoneum.    Right hip arthroplasty and right thigh subcutaneous gas.    Cardiomegaly. Cardiac leads.    CARMENZA COLIN MD         SYSTEM ID:  [id number]

## 2024-12-17 NOTE — ANESTHESIA PROCEDURE NOTES
Airway       Patient location during procedure: OR       Procedure Start/Stop Times: 12/16/2024 6:40 PM  Staff -        CRNA: Chad Gomez APRN CRNA       Performed By: CRNA  Consent for Airway        Urgency: elective  Indications and Patient Condition       Indications for airway management: gwyn-procedural       Induction type:intravenous       Mask difficulty assessment: 1 - vent by mask    Final Airway Details       Final airway type: endotracheal airway       Successful airway: ETT - single  Endotracheal Airway Details        ETT size (mm): 7.0       Cuffed: yes       Successful intubation technique: direct laryngoscopy       DL Blade Type: Umaña 2       Grade View of Cords: 1       Adjucts: stylet       Position: Right       Measured from: lips       Secured at (cm): 21       Bite block used: Oral Airway    Post intubation assessment        Placement verified by: capnometry, equal breath sounds and chest rise        Number of attempts at approach: 1       Number of other approaches attempted: 0       Secured with: plastic tape       Ease of procedure: easy       Dentition: Intact and Unchanged    Medication(s) Administered   Medication Administration Time: 12/16/2024 6:40 PM

## 2024-12-17 NOTE — ANESTHESIA CARE TRANSFER NOTE
Patient: Audrey Ortega    Procedure: Procedure(s):  HEMIARTHROPLASTY, right HIP, BIPOLAR       Diagnosis: Closed displaced fracture of right femoral neck (H) [S72.001A]  Diagnosis Additional Information: No value filed.    Anesthesia Type:   General     Note:    Oropharynx: oropharynx clear of all foreign objects and spontaneously breathing  Level of Consciousness: drowsy  Oxygen Supplementation: face mask    Independent Airway: airway patency satisfactory and stable  Dentition: dentition unchanged  Vital Signs Stable: post-procedure vital signs reviewed and stable  Report to RN Given: handoff report given  Patient transferred to: PACU    Handoff Report: Identifed the Patient, Identified the Reponsible Provider, Reviewed the pertinent medical history, Discussed the surgical course, Reviewed Intra-OP anesthesia mangement and issues during anesthesia, Set expectations for post-procedure period and Allowed opportunity for questions and acknowledgement of understanding      Vitals:  Vitals Value Taken Time   BP     Temp     Pulse     Resp     SpO2         Electronically Signed By: ZA Melendez CRNA  December 16, 2024  8:47 PM

## 2024-12-17 NOTE — PLAN OF CARE
Goal Outcome Evaluation:      Plan of Care Reviewed With: patient    Overall Patient Progress: no changeOverall Patient Progress: no change     A&O x4. Pt c/o dizziness in the morning, positive for ortho Bp ( see flowsheet). Started IV of NS infusing at 125 and improved pt's Bp. C/o abdominal pain, abd xray showed gas distended lops, PRN bisacodyl given. No BM but passed gas. No uo noted since 9 in the morning, bladder scanned for 86. Pt unable to tell if she have been peeing today.

## 2024-12-17 NOTE — CONSULTS
Care Management Initial Consult    General Information  Assessment completed with: Patient,    Type of CM/SW Visit: Initial Assessment    Primary Care Provider verified and updated as needed: Yes   Readmission within the last 30 days:        Reason for Consult: discharge planning  Advance Care Planning: Advance Care Planning Reviewed: no concerns identified        Communication Assessment  Patient's communication style: spoken language (English or Bilingual)    Hearing Difficulty or Deaf: no   Wear Glasses or Blind: no    Cognitive  Cognitive/Neuro/Behavioral: WDL  Level of Consciousness: other (see comments) (drowsy)  Arousal Level: opens eyes spontaneously                Living Environment:   People in home: alone     Current living Arrangements: house      Able to return to prior arrangements: no  Living Arrangement Comments: needs TCU    Family/Social Support:  Care provided by: self  Provides care for: no one  Marital Status: Single  Support system: Children, Friend          Description of Support System: Involved, Supportive    Support Assessment: Adequate family and caregiver support    Current Resources:   Patient receiving home care services: No      Community Resources: None  Equipment currently used at home: none  Supplies currently used at home: None    Employment/Financial:  Employment Status: retired        Financial Concerns: none   Referral to Financial Worker: No       Does the patient's insurance plan have a 3 day qualifying hospital stay waiver?  No    Lifestyle & Psychosocial Needs:  Social Drivers of Health     Food Insecurity: Low Risk  (12/16/2024)    Food Insecurity     Within the past 12 months, did you worry that your food would run out before you got money to buy more?: No     Within the past 12 months, did the food you bought just not last and you didn t have money to get more?: No   Depression: Not at risk (4/24/2024)    PHQ-2     PHQ-2 Score: 0   Housing Stability: Low Risk   (12/16/2024)    Housing Stability     Do you have housing? : Yes     Are you worried about losing your housing?: No   Tobacco Use: Low Risk  (12/16/2024)    Patient History     Smoking Tobacco Use: Never     Smokeless Tobacco Use: Never     Passive Exposure: Not on file   Financial Resource Strain: Low Risk  (12/16/2024)    Financial Resource Strain     Within the past 12 months, have you or your family members you live with been unable to get utilities (heat, electricity) when it was really needed?: No   Alcohol Use: Not on file   Transportation Needs: Low Risk  (12/16/2024)    Transportation Needs     Within the past 12 months, has lack of transportation kept you from medical appointments, getting your medicines, non-medical meetings or appointments, work, or from getting things that you need?: No   Physical Activity: Not on file   Interpersonal Safety: Low Risk  (12/16/2024)    Interpersonal Safety     Do you feel physically and emotionally safe where you currently live?: Yes     Within the past 12 months, have you been hit, slapped, kicked or otherwise physically hurt by someone?: No     Within the past 12 months, have you been humiliated or emotionally abused in other ways by your partner or ex-partner?: No   Stress: Stress Concern Present (10/29/2024)    [employer] - Occupational Stress Questionnaire     Feeling of Stress : To some extent   Social Connections: Unknown (10/29/2024)    Social Connection and Isolation Panel [NHANES]     Frequency of Communication with Friends and Family: Not on file     Frequency of Social Gatherings with Friends and Family: Twice a week     Attends Zoroastrianism Services: Not on file     Active Member of Clubs or Organizations: Not on file     Attends Club or Organization Meetings: Not on file     Marital Status: Not on file   Health Literacy: Not on file       Functional Status:  Prior to admission patient needed assistance:   Dependent ADLs::  Independent, Ambulation-no assistive device  Dependent IADLs:: Independent       Mental Health Status:  Mental Health Status: No Current Concerns       Chemical Dependency Status:  Chemical Dependency Status: No Current Concerns             Values/Beliefs:  Spiritual, Cultural Beliefs, Yazidi Practices, Values that affect care: no               Discussed  Partnership in Safe Discharge Planning  document with patient/family: No    Additional Information:  Consult received for discharge planning. Initial assessment completed with patient, at bedside.     Patient lives alone in her own home. She has a son/dgtr-n-law that live in [place]. She states she has a friend that was planning to stay with her after surgery, but has since change her mind. Patient does not think she can manage at home alone. She is fearful of passing out again as she states that is what brought her into the hospital this time.  She has three stairs to enter the home which she has not attempted yet. Plans to work with P/T tomorrow.    Discussed TCU options with patient. Patient prefers 1) [facility] (Main Phone: [phone] Admissions Phone: [phone] Fax: [phone])  2) [facility] (Admissions: [phone] Main Phone: [phone] Fax: [phone])   Referrals sent.    Patient is independent at baseline. She is independent with ambulation. She does not use an assistive device. She does not receive any in-home services. Independent with driving, cooking, cleaning, medications, etc.    If patient progresses and can return home alone, she has been accepted with [facility] (Phone: [phone]) for RN, PT/OT.    Reny Burgess [facility] [phone]/ [facility] [phone]  Care Management

## 2024-12-17 NOTE — OP NOTE
DATE OF SERVICE:  12/16/2024      PREOPERATIVE DIAGNOSIS:  displaced femoral neck fracture  - right Hip    POSTOPERATIVE DIAGNOSIS:  displaced femoral neck fracture - right Hip    OPERATION PERFORMED:  Bipolar hemiarthroplasty, Press Fit, right Hip, Direct anterior Approach.    ATTENDING SURGEON(S):  Adama Begum M.D., M.S.    ASSISTANT(S): Galindo Baird PA-C   The PA's assistance was medically necessary given the technical complexity of the case; with assistance with patient positioning, retraction and hip joint exposure, limb manipulation with initial dislocation of the native hip, femoral osteotomy, assistance with implant placement, trial and final arthroplasty hip implant reduction, and wound closure.     ANESTHESIA:  General ; in the supine position on the [place] surgical table.    ESTIMATED BLOOD LOSS:  50mL .    FLUIDS:  800 mL LR    UO: no heck.    ANTIBIOTICS:  cefazolin, 2gm IVPB prior to incision and repeat 1gm iv at closure.    IMPLANTS / GRAFTS:    Size 6 [place] Insignia cementless collared femoral stem, standard offset  +0, 28mm V40 femoral head  45mm Bipolar head      DRAINS:  None.    COMPLICATIONS:  None.     SPECIMENS: none      FINDINGS: displaced femoral neck fracture, mild comminution.    INDICATIONS FOR PROCEDURE: Audrey Ortega is a pleasant 89 year old female who sustained a femoral neck fracture s/p mechanical fall from standing height. 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 The second time couldn't get up due to right hip pain. Taken to the ED via EMS, noted to have a displaced right hip femoral neck fracture. Ortho consulted. The risk/benefit analysis of the above right hip Bipolar hemiarthroplasty was explained. Alternate bearing technology and the additional risks of a press fit hip were explained, including fracture. Risks discussed to include, but not be limited to: wear, loosening, infection, bleeding, pain, scar, thromboembolic disease,  neurovascular damage with temporary or permanent nerve damage, limb length inequality, implant failure, implant dislocation, gwyn-prosthetic fracture, need for further surgery, risks of anesthesia and death.  Despite these risks, the patient elected to proceed and, with the patient's permission, was taken to the operating room.       DESCRIPTION OF PROCEDURE:  The patient was identified in the preoperative holding area.  After confirming with the patient the correct procedure and procedure site, the right hip was marked with an indelible marker by myself, the attending surgeon.  After again reviewing the risks and perceived benefits of surgery, questions were addressed, he elected to proceed and written informed consent was obtained and reviewed.     The patient was then taken to the operating room and placed supine on the operating surgical [place] table.  After adequate anesthesia, a Ulrich catheter was placed.  The lower extremities were placed in the boots and suspended.  The arms were well positioned and padded to be comfortable.  The right lower extremity was then prepped and draped in the usual sterile fashion.     A timeout was then performed confirming the correct patient, procedure, procedure site, availability of instruments and implants, the administration of prophylactic antibiotics as well as a review of the patient's allergies by all surgical staff.    At that time that an anterior based incision was made approximately two fingerbreadths lateral (3cm) and two fingerbreadths distal to the ASIS in an oblique fashion extending posterior-distally towards the anterior aspect of the femur approximately 10-12 cm in length, sharply through skin.  We then used electrocautery through the subcutaneous tissues, performing hemostasis.  Perforating vessels were cauterized. The fascia over the tensor was then incised longitudinally.  I then proceeded to dissect the tensor muscle off the  undersurface of the fascia  "anteriorly and medially, exposing the underlying tissues and capsule as well as the rectus femoris medially.  A retractor was placed laterally over the neck as well as inferiorly over the femoral neck and anteriorly over the acetabulum rim deep to the rectus.  We then proceeded to use a Aristides retractor laterally and a vascular bundle of the circumflex vessels was identified.  Using the Aquamantys, these vessels were then cauterized.  Good exposure of the anterior aspect  of the femoral neck capsule was obtained.  I then proceeded to make a capsulotomy an inverted \"T\" fashion, starting at the superolateral aspect of the acetabulum and femoral head, along the superior aspect of the femoral neck down to the intertrochanteric ridge and then distally in an oblique fashion along the intertrochanteric ridge towards the lesser trochanter.  This anterior capsule was then tagged. I also tagged the superolateral capsule at the saddle of the trochanter and gently released this as well.  This exposed the underlying femoral head and neck and the neck was fractured. Hemotoma was evacuated. Further  capsular releases were performed inferiorly along the medial calcar to get to the base of the lesser trochanter as well as laterally into the trochanteric saddle.     The leg was rotated in external rotation to give better exposure of the femoral head and  the femoral head was then removed with a corkscrew in line with the fibers of the tensor muscle with care taken to protect the tensor.  the fractured neck was cut back with the saw to a desired length. The wound was then irrigated.  I then proceeded to place a retractor anteriorly over the anterior/inferior acetabulum, inferiorly over the transverse acetabular ligament as well as posteriorly within the capsule, giving good exposure to the acetabulum.  I then proceeded to size the native femoral head at the back table, which meaured 45. I trialed a size 45 head. It appeared that was a " good size.  The wound was irrigated.     I then proceeded to the femur.  A hook retractor was placed along the lateral aspect of the trochanter just distal to the vastus tubercle. Using my hand, I pulled the femur laterally taking care that it was not caught under the acetabulum. We then proceeded to lower the limb down to the floor and slightly adduct, externally rotating the foot to 120 degrees (femur about 90 degrees), giving good femoral exposure.  A retractor was placed medially along the calcar.  I then proceeded to release the lateral capsule along the inner aspect of the greater trochanter, taking care of  the underlying rotators; these tendons were exposed and kept intact.  This gave better exposure of the femur. A shepards hook was placed over the tip of the greater trochanter.  I then proceeded to connect the femoral hook to the lift and using my hand to lift the femur, proceeded to elevate the lift until there was firm pressure against my hand.  This gave us adequate exposure of the femur for broaching.  A cookie cutter was placed laterally to remove some of the remnant of the lateral neck as well as a  rongeur.  A canal finding awl was placed down the femur.  I then proceeded to broach, first starting with the chili pepper broach followed by sequential broaching up to good fit.  This was calcar planed.  The wound was irrigated.  I then proceeded to do a trial reduction,  Starting off with a +0 femoral head, releasing the femoral hook and removing the retractors and bringing the leg back up to a neutral position,  the hip was easily reduced.  Using C-arm, we assess length using the overlay technique with printed images using the contralateral hip for comparison.  Once the final length was determined trialing varius head sizes using the overlay technique, we proceed to dislocate the trial and proceed with final femoral stem placement. The final femoral stem was then placed.  This provided good fixation on  the femoral component.  I then proceeded to trial once again the previous trialed femoral head and this was reduced, with final images showing good length. It appeared to be a good fit and stable.  This trial was then dislocated and the  final bipolar  femoral head was placed.  The wound was copiously irrigated.  The hip was then reduced.  It was taken through a range of motion including flexion, extension, internal and external rotation and there was good stability without dislocation concerns.  The wound was then copiously irrigated starting with a liter of dilute Betadine followed by three liters of antibiotic normal saline.  A second dose of Ancef was administered.  The  capsule was repaired side-to-side with 0 Ethibond.  One gram of vancomycin powder was placed deep in the wound.  The fascia over the tensor was closed side-to-side with #0 Vicryl followed by a 0 Stratafix.  The subcutaneous and dermal tissues were then approximated with 0 Vicryl and 3-0 Monocryl.  3-0 Monocryl subcuticular suture was used to maintain good skin eversion and apposition with Dermabond to seal the skin edges.  A sterile waterproof  dressing was applied over the incision.  The patient was then awakened and extubated and taken to the recovery room without difficulty in stable condition.  There were no apparent complications.       A postoperative pelvis x-ray will be obtained in recovery.      The postoperative plan will be to weight bear as tolerated.  No hip precautions.  Twenty-three hours of IV antibiotics.  Anticoagulation will proceed for four weeks postoperative and will include daily 325 mg aspirin.     There were no apparent complications.

## 2024-12-18 ENCOUNTER — APPOINTMENT (OUTPATIENT)
Dept: PHYSICAL THERAPY | Facility: CLINIC | Age: 89
DRG: 522 | End: 2024-12-18
Payer: MEDICARE

## 2024-12-18 ENCOUNTER — APPOINTMENT (OUTPATIENT)
Dept: OCCUPATIONAL THERAPY | Facility: CLINIC | Age: 89
DRG: 522 | End: 2024-12-18
Payer: MEDICARE

## 2024-12-18 LAB
ALBUMIN SERPL BCG-MCNC: 3.2 G/DL (ref 3.5–5.2)
ALP SERPL-CCNC: 49 U/L (ref 40–150)
ALT SERPL W P-5'-P-CCNC: 8 U/L (ref 0–50)
ANION GAP SERPL CALCULATED.3IONS-SCNC: 12 MMOL/L (ref 7–15)
ANION GAP SERPL CALCULATED.3IONS-SCNC: 13 MMOL/L (ref 7–15)
AST SERPL W P-5'-P-CCNC: 29 U/L (ref 0–45)
BILIRUB SERPL-MCNC: 0.9 MG/DL
BUN SERPL-MCNC: 14.5 MG/DL (ref 8–23)
BUN SERPL-MCNC: 16.6 MG/DL (ref 8–23)
CALCIUM SERPL-MCNC: 8.5 MG/DL (ref 8.8–10.4)
CALCIUM SERPL-MCNC: 8.8 MG/DL (ref 8.8–10.4)
CHLORIDE SERPL-SCNC: 103 MMOL/L (ref 98–107)
CHLORIDE SERPL-SCNC: 104 MMOL/L (ref 98–107)
CREAT SERPL-MCNC: 0.89 MG/DL (ref 0.51–0.95)
CREAT SERPL-MCNC: 1.1 MG/DL (ref 0.51–0.95)
EGFRCR SERPLBLD CKD-EPI 2021: 48 ML/MIN/1.73M2
EGFRCR SERPLBLD CKD-EPI 2021: 62 ML/MIN/1.73M2
ERYTHROCYTE [DISTWIDTH] IN BLOOD BY AUTOMATED COUNT: 12 % (ref 10–15)
GLUCOSE SERPL-MCNC: 107 MG/DL (ref 70–99)
GLUCOSE SERPL-MCNC: 109 MG/DL (ref 70–99)
HCO3 SERPL-SCNC: 19 MMOL/L (ref 22–29)
HCO3 SERPL-SCNC: 19 MMOL/L (ref 22–29)
HCT VFR BLD AUTO: 28.9 % (ref 35–47)
HGB BLD-MCNC: 9.7 G/DL (ref 11.7–15.7)
MCH RBC QN AUTO: 31.4 PG (ref 26.5–33)
MCHC RBC AUTO-ENTMCNC: 33.6 G/DL (ref 31.5–36.5)
MCV RBC AUTO: 94 FL (ref 78–100)
PLATELET # BLD AUTO: 195 10E3/UL (ref 150–450)
POTASSIUM SERPL-SCNC: 3.7 MMOL/L (ref 3.4–5.3)
POTASSIUM SERPL-SCNC: 3.9 MMOL/L (ref 3.4–5.3)
PROT SERPL-MCNC: 5.9 G/DL (ref 6.4–8.3)
RBC # BLD AUTO: 3.09 10E6/UL (ref 3.8–5.2)
SODIUM SERPL-SCNC: 135 MMOL/L (ref 135–145)
SODIUM SERPL-SCNC: 135 MMOL/L (ref 135–145)
WBC # BLD AUTO: 10.6 10E3/UL (ref 4–11)

## 2024-12-18 PROCEDURE — 97530 THERAPEUTIC ACTIVITIES: CPT | Mod: GP | Performed by: PHYSICAL THERAPIST

## 2024-12-18 PROCEDURE — 250N000013 HC RX MED GY IP 250 OP 250 PS 637: Performed by: PHYSICIAN ASSISTANT

## 2024-12-18 PROCEDURE — 250N000013 HC RX MED GY IP 250 OP 250 PS 637

## 2024-12-18 PROCEDURE — 82247 BILIRUBIN TOTAL: CPT | Performed by: NURSE PRACTITIONER

## 2024-12-18 PROCEDURE — 85014 HEMATOCRIT: CPT | Performed by: NURSE PRACTITIONER

## 2024-12-18 PROCEDURE — 36415 COLL VENOUS BLD VENIPUNCTURE: CPT | Performed by: NURSE PRACTITIONER

## 2024-12-18 PROCEDURE — 85048 AUTOMATED LEUKOCYTE COUNT: CPT | Performed by: NURSE PRACTITIONER

## 2024-12-18 PROCEDURE — 99233 SBSQ HOSP IP/OBS HIGH 50: CPT | Performed by: NURSE PRACTITIONER

## 2024-12-18 PROCEDURE — 97535 SELF CARE MNGMENT TRAINING: CPT | Mod: GO

## 2024-12-18 PROCEDURE — 258N000003 HC RX IP 258 OP 636: Performed by: FAMILY MEDICINE

## 2024-12-18 PROCEDURE — 97110 THERAPEUTIC EXERCISES: CPT | Mod: GP | Performed by: PHYSICAL THERAPIST

## 2024-12-18 PROCEDURE — 120N000001 HC R&B MED SURG/OB

## 2024-12-18 PROCEDURE — 80053 COMPREHEN METABOLIC PANEL: CPT | Performed by: NURSE PRACTITIONER

## 2024-12-18 RX ORDER — AMOXICILLIN 250 MG
1-2 CAPSULE ORAL 2 TIMES DAILY
Qty: 30 TABLET | Refills: 0 | Status: SHIPPED | OUTPATIENT
Start: 2024-12-18 | End: 2024-12-27

## 2024-12-18 RX ORDER — OXYCODONE HYDROCHLORIDE 5 MG/1
5 TABLET ORAL EVERY 4 HOURS PRN
Qty: 26 TABLET | Refills: 0 | Status: SHIPPED | OUTPATIENT
Start: 2024-12-18

## 2024-12-18 RX ORDER — ACETAMINOPHEN 325 MG/1
650 TABLET ORAL EVERY 4 HOURS PRN
Qty: 100 TABLET | Refills: 0 | Status: SHIPPED | OUTPATIENT
Start: 2024-12-18

## 2024-12-18 RX ADMIN — ACETAMINOPHEN 975 MG: 325 TABLET, FILM COATED ORAL at 06:59

## 2024-12-18 RX ADMIN — ACETAMINOPHEN 975 MG: 325 TABLET, FILM COATED ORAL at 22:45

## 2024-12-18 RX ADMIN — SODIUM CHLORIDE: 9 INJECTION, SOLUTION INTRAVENOUS at 09:56

## 2024-12-18 RX ADMIN — ASPIRIN 325 MG: 325 TABLET ORAL at 08:43

## 2024-12-18 RX ADMIN — ACETAMINOPHEN 975 MG: 325 TABLET, FILM COATED ORAL at 16:34

## 2024-12-18 RX ADMIN — FAMOTIDINE 20 MG: 20 TABLET, FILM COATED ORAL at 08:43

## 2024-12-18 ASSESSMENT — ACTIVITIES OF DAILY LIVING (ADL)
ADLS_ACUITY_SCORE: 45
ADLS_ACUITY_SCORE: 23
ADLS_ACUITY_SCORE: 36
ADLS_ACUITY_SCORE: 36
ADLS_ACUITY_SCORE: 23
ADLS_ACUITY_SCORE: 36
ADLS_ACUITY_SCORE: 36
ADLS_ACUITY_SCORE: 39
ADLS_ACUITY_SCORE: 36
ADLS_ACUITY_SCORE: 45
ADLS_ACUITY_SCORE: 28
ADLS_ACUITY_SCORE: 23
ADLS_ACUITY_SCORE: 36
ADLS_ACUITY_SCORE: 27
ADLS_ACUITY_SCORE: 36
ADLS_ACUITY_SCORE: 23

## 2024-12-18 NOTE — PROGRESS NOTES
[facility]    Medicine Progress Note - Hospitalist Service    Date of Admission:  12/16/2024    Assessment & Plan   Audrey Ortega is a 89 year old female with a medical history of sick sinus syndrome status post pacemaker placement in 2011, history of paroxysmal atrial fibrillation, not on anticoagulation, essential hypertension, CKD, who was brought in after a fall. Patient was in her usual state of health this morning, besides recent diagnosis of diverticulitis after having diarrhea 6 days ago.  While at her place and employment, she went to the restroom and after urinating felt very lightheaded, stood up from the toilet, fell forward.  Does not remember falling.  Patient was able to stand up, and unfortunately she fell again on her right side.  After the second fall, she could not get up on her own. Overall, syncopal episode, preceded by loss of consciousness, during micturition, causing a fall.  Patient was found to have an acute fracture of the right femoral neck and was admitted for workup of syncope and for operative management of the fracture.  Of note, 6 days ago on December 10, patient was having abdominal pain and diarrhea, was diagnosed with diverticulitis and started on Augmentin.      Acute fracture of the femoral neck, right  -- POD 2 right hip hemiarthroplasty, Hgb 9.7  -- Management as per surgery  She is up with PT, will be discharging to TCU tomorrow    Acute blood loss anemia due to surgery, crystalloid dilution.  Physiologically tolerating.      Fall  Lightheadedness  Syncopal episode  Patient was in her usual state besides recent diagnosis of diverticulitis after having diarrhea 6 days ago.  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  Patient was able to stand up, and unfortunately she fell again on her right side.  After the second fall, she could not get up on her own.  She  has a history of a similar episode a few years ago.  Patient denied sensation of the room spinning, denies loss of bowel bladder function or biting her tongue afterwards.  In the emergency room a CT head was done without evidence of acute intracranial hemorrhage, mass or herniation.  She did have positive orthostatic blood pressures.  She was given more crystalloid and repeat Orthostatic blood pressures were negative.  TTE done with normal LVEF, mod MR and mod TR (looks unchanged from previous).  No arrhythmias seen on telemetry.    -- With loss of consciousness preceding fall, transient, self terminating  -- Happened during micturition, therefore strong suspicion of reflex mediated syncope due to orthostatic hypotension, possibly due to volume depletion in light of recent diarrhea  -- Cardiac monitoring   -hold lisinopril and will likely not resume at discharge, could re-introduce outpatient if she is consistently on the hypertensive side.    Oliguria.  RN states she has not urinated since early this am.  She is currently on crystalloid at 100/hour.    -BUS and straight cath if needed  -continue crystalloid for now     Post operative ileus.  Patient complaining of abdominal pain.  KUB done which reveals gas distended loops of colon and small bowel t/o the abdomen most likely reflecting ileus in post operative setting.  She did have a couple loose BMs this am (12/18)  -aggressive bowel regimen  -walks at least TID     Hyponatremia, acute on chronic-will give isotonic fluids as she does exhibit ileus today.  Sodium normal today  Hypochloremia, acute on chronic-resolving on NS infusion  Usually sodium is between 127-131, 134 after IV NS  Usually chloride is between 92 and 97  -- Likely due to volume depletion from gastrointestinal losses from recent diarrhea  -- 0.9% NS 2 100 hour  -- Repeat electrolytes tomorrow morning     Recent diagnosis of diverticulitis  Recent diarrhea  -- Patient is finishing up a course of  Augmentin 1 tablet 2 times daily for 7 days that was started on December 10, 2024  -- Patient completed 6 days of treatment prior to being admitted     Sick sinus syndrome status post pacemaker placement  History of atrial fibrillation, not on anticoagulation     Essential hypertension  -- Holding home lisinopril 10 mg, will likely discontinue     CKD, stage III  --creatinine stable at 1.06-1.12  -- Continue to monitor  -- Repeat labs tomorrow     Troponinemia, likely secondary to CKD     Insomnia  -- Restart zolpidem postoperatively when appropriate     Right upper forehead laceration.  Stiches c/d/I  Will have stitches removed outpatient in 7 to 10 days.          Diet: Advance Diet as Tolerated: Regular Diet Adult  Diet    DVT Prophylaxis: asa  Ulrich Catheter: Not present  Lines: None     Cardiac Monitoring: ACTIVE order. Indication: Tachyarrhythmias, acute (48 hours)  Code Status: No CPR- Do NOT Intubate      Clinically Significant Risk Factors         # Hyponatremia: Lowest Na = 130 mmol/L in last 2 days, will monitor as appropriate  # Hypochloremia: Lowest Cl = 97 mmol/L in last 2 days, will monitor as appropriate  # Hypocalcemia: Lowest Ca = 8.5 mg/dL in last 2 days, will monitor and replace as appropriate     # Hypoalbuminemia: Lowest albumin = 3.2 g/dL at 12/18/2024  5:35 AM, will monitor as appropriate     # Hypertension: Noted on problem list                # Financial/Environmental Concerns: none   # Pacemaker present       Social Drivers of Health    Stress: Stress Concern Present (10/29/2024)    [place] [employer] - Occupational Stress Questionnaire     Feeling of Stress : To some extent   Social Connections: Unknown (10/29/2024)    Social Connection and Isolation Panel [NHANES]     Frequency of Social Gatherings with Friends and Family: Twice a week          Disposition Plan     Medically Ready for Discharge: Anticipated in 2-4 Days       The patient's care was discussed with the   entire care team .    Beverley Galvna CNP  Hospitalist Service  [facility]  Securely message with [employer] (more info)  Text page via [facility] Paging/Directory   ______________________________________________________________________    Interval History   No acute events overnight    Physical Exam   Vital Signs: Temp: 97.7  F (36.5  C) Temp src: Oral BP: 134/52 Pulse: 68   Resp: 18 SpO2: 97 % O2 Device: None (Room air)    Weight: 119 lbs 14.88 oz    Gen:  Lying in bed no acute distress  HEENT:  normocephalic, atraumatic, oropharynx clear  Resp:  CTA  Skin: incision covered, +cms, laceration right upper eyebrow line, suture intact  Card:  S1,S2, RRR no murmur, rub or gallop  Abd:  Soft nondistended, non tender,  normoactive bowel sounds   Neuro:  Neuro exam non focal      Medical Decision Making       45 MINUTES SPENT BY ME on the date of service doing chart review, history, exam, documentation & further activities per the note.        Data     I have personally reviewed the following data over the past 24 hrs:    10.6  \   9.7 (L)   / 195     135 104 14.5 /  109 (H)   3.9 19 (L) 1.10 (H) \     ALT: 8 AST: 29 AP: 49 TBILI: 0.9   ALB: 3.2 (L) TOT PROTEIN: 5.9 (L) LIPASE: N/A       Imaging results reviewed over the past 24 hrs:   No results found for this or any previous visit (from the past 24 hours).

## 2024-12-18 NOTE — PLAN OF CARE
Goal Outcome Evaluation:      Plan of Care Reviewed With: patient          Outcome Evaluation: Becky TCLONA

## 2024-12-18 NOTE — PLAN OF CARE
Goal Outcome Evaluation:      Plan of Care Reviewed With: patient          Outcome Evaluation: Patient alert and oriented. Was noted to have no voids since 9 a.m. when night shift began, so asked for order to continue IV fluids. Bladder scanned in evening, encouraged sips. Patient had urine void and bowel movement overnight, has IVF running still. BP soft 94/44 this a.m. so may have continued need. Patient reports her friend is not able to help transport her at discharge, now, and wanted to let someone know. Tele- 1st degree AV block, bundle branch block, intermittent PVC's, intermittently v paced. No PRN's given. Takes pills with apple sauce. Up with A2 walker and gait belt. No external catheter overnight to promote ambulation, ambulated to bathroom 1 x overnight for void.

## 2024-12-18 NOTE — ANESTHESIA POSTPROCEDURE EVALUATION
Patient: Audrey Ortega    Procedure: Procedure(s):  HEMIARTHROPLASTY, right HIP, BIPOLAR       Anesthesia Type:  General    Note:  Disposition: Inpatient   Postop Pain Control: Uneventful            Sign Out: Well controlled pain   PONV: No   Neuro/Psych: Uneventful            Sign Out: Acceptable/Baseline neuro status   Airway/Respiratory: Uneventful            Sign Out: Acceptable/Baseline resp. status   CV/Hemodynamics: Uneventful            Sign Out: Acceptable CV status   Other NRE: NONE   DID A NON-ROUTINE EVENT OCCUR? No    Event details/Postop Comments:  Pt still having significant pain on the right hip and is on the left side. She would like to take pain medications, but is feeling constipated. She did get an enema in hopes of that relieving some of the discomfort, but the incisional pain is still moderate to severe.        Last vitals:  Vitals Value Taken Time   /50 12/16/24 2145   Temp 97.6  F (36.4  C) 12/16/24 2145   Pulse 60 12/16/24 2145   Resp 13 12/16/24 2145   SpO2 96 % 12/16/24 2145       Electronically Signed By: ZA Smith CRNA  December 17, 2024  9:26 PM

## 2024-12-18 NOTE — PROVIDER NOTIFICATION
Dr. Monahan notified of patient minimal urine output- requesting IVF rate continue, and no order for telemetry but on monitor and day doctor has in note. Order obtained for telemetry, and IVF to continue at 75 ml/hour.

## 2024-12-18 NOTE — PROGRESS NOTES
Ortho Note:    1.  Unfortunately patient's friend is now not able to stay with her thus patient will discharge to transitional care.  2.  Social work solidified [facility] tomorrow.  3.  Discussed with hospitalist Beverley Galvan NP and more than likely patient will discharge tomorrow.  4.  Changed all discharge orders to transitional care discharge orders and medications to any pharmacy in [place] for [facility].  5.  Anticipate discharge tomorrow and follow-up with myself in 2 weeks.    Tyrone Baird PA-C

## 2024-12-18 NOTE — PROGRESS NOTES
"[facility]    Progress note    89 year old female POD#2 s/p right hip hemiarthroplasty, direct anterior approach by Dr. Begum   S: Patient seen at bedside in no apparent distress, alert and pleasant.  I discussed surgery and rehabilitation with the patient.  She denies numbness, tingling or major pain issues.  Patient states that her lightheadedness has gotten better.  She unfortunately does not have Zeina her friend to help her and cannot think of any other friends that can help her at home.  Discussed possible transitional care then.  O: CMS intact right LE, DF/PF intact and 5 out of 5 strength.  Able to do heel slide.  Unable to do straight leg raise yet.       Aquacel dressing on, clean and dry with a good seal and no shadowing       Right hip with minimal swelling and no erythema or ecchymosis        Bilateral calves soft and non tender    Vital signs:  Temp: 98  F (36.7  C) Temp src: Oral BP: 125/47 Pulse: 68   Resp: 18 SpO2: 97 % O2 Device: None (Room air) Oxygen Delivery: 6 LPM Height: 165.1 cm (5' 5\") Weight: 54.4 kg (119 lb 14.9 oz)  Estimated body mass index is 19.96 kg/m  as calculated from the following:    Height as of this encounter: 1.651 m (5' 5\").    Weight as of this encounter: 54.4 kg (119 lb 14.9 oz).      Hemoglobin   Date Value Ref Range Status   12/18/2024 9.7 (L) 11.7 - 15.7 g/dL Final   04/20/2020 13.6 11.7 - 15.7 g/dL Final     No results found for: \"INR\"      A/P:  1. Pain-controlled with Tylenol, oxycodone 2.5 to 5 mg available only 2.5 taken so far.  2. DVT Prophylaxis-325mg Asprin every day starting today.  This is what she usually is on also.  3. Weight Bearing Status-WBAT right LE  4. Hip Precautions-none  5. Physical Therapy-recommending home with assist and home care physical therapy.  Patient could also do transitional care but with her friend Zeina helping she could do home with home care.  6. Occupational Therapy-recommending home with home care versus " transitional care  7. Hospitalist-Beverley Galvan NP following.  Discussed patient with that this morning.  Anticipate she may be able to discharge today.  8. Incision-no signs or symptoms of infection.  Aquacel on and clean.  9. Case Management-prepared for either transitional care possible [facility] for [place] versus excepted at [facility] for RN PT and OT.  9. Plan-anticipate discharge to home today if she is cleared by the hospitalist.  Plan might have to be for transitional care since her friend Zeina cannot be with her at home now.  Discharge orders will have to be changed to transitional care which I will do later today.  Follow-up with Tyrone Baird PA-C in 2 weeks.  Discussed plan with charge RN and Beverley Galvan NP.   Update message sent to Dr. Begum text Adama eBgum.    Tyrone Baird PA-C  12/18/2024

## 2024-12-18 NOTE — PLAN OF CARE
Goal Outcome Evaluation:      Plan of Care Reviewed With: patient    Overall Patient Progress: no changeOverall Patient Progress: no change     VSS on RA. Ambulated halls x2 SBA GBW. Loose BM, held stool softeners. Pt not able to tell if she peed or not, Bladder scanned for 138. Pt showered today. Encouraged fluid intake. CMS intact. Plan to discharge to [facility] tomorrow.

## 2024-12-18 NOTE — PROGRESS NOTES
Care Management Follow Up    Length of Stay (days): 2    Expected Discharge Date: 12/18/2024     Concerns to be Addressed: discharge planning       Patient plan of care discussed at interdisciplinary rounds: Yes    Anticipated Discharge Disposition: Transitional Care        Anticipated Discharge Services: None  Anticipated Discharge DME: None    Patient/family educated on Medicare website which has current facility and service quality ratings: yes  Education Provided on the Discharge Plan: Yes  Patient/Family in Agreement with the Plan: yes    Referrals Placed by CM/SW: Internal Clinic Care Coordination, Post Acute Facilities, Transportation  Private pay costs discussed: transportation costs    Discussed  Partnership in Safe Discharge Planning  document with patient/family: No     Handoff Completed: No, handoff not indicated or clinically appropriate    Additional Information:  Patient accepted at [facility] (Main Phone: [phone] Admissions Phone: [phone] Fax: [phone]) when medically ready for discharge.    Spoke with Manny at [facility] regarding possible [place] [facility] transport for tomorrow. Awaiting call back with availability.    Update 1530 - [place] bus to transport patient tomorrow at 0930    Updated patient. Patient is aware and in agreement with plan.     RAVEN Murrell  [facility] [phone]/ [facility] [phone]  Care Management

## 2024-12-19 ENCOUNTER — DOCUMENTATION ONLY (OUTPATIENT)
Dept: GERIATRICS | Facility: CLINIC | Age: 89
End: 2024-12-19
Payer: COMMERCIAL

## 2024-12-19 ENCOUNTER — TELEPHONE (OUTPATIENT)
Dept: ORTHOPEDICS | Facility: OTHER | Age: 89
End: 2024-12-19
Payer: COMMERCIAL

## 2024-12-19 VITALS
WEIGHT: 119.93 LBS | SYSTOLIC BLOOD PRESSURE: 105 MMHG | HEIGHT: 65 IN | HEART RATE: 65 BPM | DIASTOLIC BLOOD PRESSURE: 51 MMHG | BODY MASS INDEX: 19.98 KG/M2 | TEMPERATURE: 97.6 F | OXYGEN SATURATION: 97 % | RESPIRATION RATE: 18 BRPM

## 2024-12-19 LAB
ANION GAP SERPL CALCULATED.3IONS-SCNC: 13 MMOL/L (ref 7–15)
BUN SERPL-MCNC: 15.4 MG/DL (ref 8–23)
CALCIUM SERPL-MCNC: 8.4 MG/DL (ref 8.8–10.4)
CHLORIDE SERPL-SCNC: 103 MMOL/L (ref 98–107)
CREAT SERPL-MCNC: 0.98 MG/DL (ref 0.51–0.95)
EGFRCR SERPLBLD CKD-EPI 2021: 55 ML/MIN/1.73M2
ERYTHROCYTE [DISTWIDTH] IN BLOOD BY AUTOMATED COUNT: 12.1 % (ref 10–15)
GLUCOSE SERPL-MCNC: 99 MG/DL (ref 70–99)
HCO3 SERPL-SCNC: 17 MMOL/L (ref 22–29)
HCT VFR BLD AUTO: 30.4 % (ref 35–47)
HGB BLD-MCNC: 10.3 G/DL (ref 11.7–15.7)
MCH RBC QN AUTO: 31.6 PG (ref 26.5–33)
MCHC RBC AUTO-ENTMCNC: 33.9 G/DL (ref 31.5–36.5)
MCV RBC AUTO: 93 FL (ref 78–100)
NT-PROBNP SERPL-MCNC: 6420 PG/ML (ref 0–1800)
PLATELET # BLD AUTO: 188 10E3/UL (ref 150–450)
POTASSIUM SERPL-SCNC: 4 MMOL/L (ref 3.4–5.3)
RBC # BLD AUTO: 3.26 10E6/UL (ref 3.8–5.2)
SODIUM SERPL-SCNC: 133 MMOL/L (ref 135–145)
WBC # BLD AUTO: 12.1 10E3/UL (ref 4–11)

## 2024-12-19 PROCEDURE — 250N000013 HC RX MED GY IP 250 OP 250 PS 637: Performed by: PHYSICIAN ASSISTANT

## 2024-12-19 PROCEDURE — 250N000013 HC RX MED GY IP 250 OP 250 PS 637

## 2024-12-19 PROCEDURE — 99239 HOSP IP/OBS DSCHRG MGMT >30: CPT | Performed by: NURSE PRACTITIONER

## 2024-12-19 PROCEDURE — 83880 ASSAY OF NATRIURETIC PEPTIDE: CPT | Performed by: NURSE PRACTITIONER

## 2024-12-19 PROCEDURE — 80048 BASIC METABOLIC PNL TOTAL CA: CPT | Performed by: NURSE PRACTITIONER

## 2024-12-19 PROCEDURE — 36415 COLL VENOUS BLD VENIPUNCTURE: CPT | Performed by: NURSE PRACTITIONER

## 2024-12-19 PROCEDURE — 82374 ASSAY BLOOD CARBON DIOXIDE: CPT | Performed by: NURSE PRACTITIONER

## 2024-12-19 PROCEDURE — 258N000003 HC RX IP 258 OP 636: Performed by: FAMILY MEDICINE

## 2024-12-19 PROCEDURE — 85027 COMPLETE CBC AUTOMATED: CPT | Performed by: NURSE PRACTITIONER

## 2024-12-19 PROCEDURE — 250N000013 HC RX MED GY IP 250 OP 250 PS 637: Performed by: NURSE PRACTITIONER

## 2024-12-19 RX ORDER — ZOLPIDEM TARTRATE 5 MG/1
2.5 TABLET ORAL
Qty: 15 TABLET | Refills: 0 | Status: SHIPPED | OUTPATIENT
Start: 2024-12-19

## 2024-12-19 RX ORDER — FUROSEMIDE 20 MG/1
20 TABLET ORAL DAILY
Status: DISCONTINUED | OUTPATIENT
Start: 2024-12-19 | End: 2024-12-19 | Stop reason: HOSPADM

## 2024-12-19 RX ADMIN — ASPIRIN 325 MG: 325 TABLET ORAL at 08:52

## 2024-12-19 RX ADMIN — SODIUM CHLORIDE: 9 INJECTION, SOLUTION INTRAVENOUS at 03:41

## 2024-12-19 RX ADMIN — ACETAMINOPHEN 975 MG: 325 TABLET, FILM COATED ORAL at 06:07

## 2024-12-19 RX ADMIN — FAMOTIDINE 20 MG: 20 TABLET, FILM COATED ORAL at 08:52

## 2024-12-19 RX ADMIN — FUROSEMIDE 20 MG: 20 TABLET ORAL at 09:19

## 2024-12-19 ASSESSMENT — ACTIVITIES OF DAILY LIVING (ADL)
ADLS_ACUITY_SCORE: 45

## 2024-12-19 NOTE — PROGRESS NOTES
"[facility]    Progress note    89 year old female POD#3 s/p right hip hemiarthroplasty, direct anterior approach by Dr. Begum   S: Patient seen at bedside. Sleeping but easily awoke. States pain is doing well this AM. Reports bowel movements have slowed down. Tolerating oral intake. Voiding. Working with therapy. Has been ambulatory.  No new concerns. Plans for discharge to [facility] today.   O: Alert and orient. In NAD.  CMS intact right LE, DF/PF intact and 5 out of 5 strength.       Aquacel dressing on, clean and dry with a good seal and no shadowing       Right hip with minimal swelling and no erythema or ecchymosis        Calf soft non-tender.     Vital signs:  Temp: 98.5  F (36.9  C) Temp src: Oral BP: 118/53 Pulse: 68   Resp: 18 SpO2: 99 % O2 Device: None (Room air) Oxygen Delivery: 6 LPM Height: 165.1 cm (5' 5\") Weight: 54.4 kg (119 lb 14.9 oz)  Estimated body mass index is 19.96 kg/m  as calculated from the following:    Height as of this encounter: 1.651 m (5' 5\").    Weight as of this encounter: 54.4 kg (119 lb 14.9 oz).      Hemoglobin   Date Value Ref Range Status   12/19/2024 10.3 (L) 11.7 - 15.7 g/dL Final   04/20/2020 13.6 11.7 - 15.7 g/dL Final     No results found for: \"INR\"      A/P:  1. Pain-controlled with Tylenol, oxycodone 2.5 to 5 mg available only 2.5 taken so far.  2. DVT Prophylaxis-325mg Asprin every day starting today.  This is what she usually is on also.  3. Weight Bearing Status-WBAT right LE  4. Hip Precautions-none  5. Hospitalist-Primary.  Cleared for discharge from orthopedic perspective. Follow-up in 2 weeks with Galindo Baird PA-C.   6. Incision-Leave aquacell on. CDI.   7. Plan-discharge today to [facility]      Newton Ayers APRN, CNP  Orthopedic Surgery    "

## 2024-12-19 NOTE — TELEPHONE ENCOUNTER
Appt scheduled.    Future Appointments 12/19/2024 - 6/17/2025        Date Visit Type Length Department Provider     1/6/2025 10:40 AM RETURN HIP 20 min [facility] Tyrone Baird PA-C    Location Instructions:     From [address]: Exit at [email or web address] on south side of [place]. Turn right on [email or web address]. Turn left at stoplight on [address].&nbsp;  [facility] will be in view two blocks ahead. Please follow the signage to utilize the  [facility]  entrance and parking lot when entering campus.

## 2024-12-19 NOTE — PROGRESS NOTES
S-(situation): Patient discharged to [facility] via transport with     B-(background): The patient has met criteria to discharge to rehab    A-(assessment): The patient is A&Ox4, VSS, denies pain, doing well with ambulation. Aquacel in place, CDI. Gave 20mg Lasix prior to discharge, BNP at 6,400 this am.   Last bowel movement: 12/19 loose stools overnight.     R-(recommendations):Report called to LUISA Lowe at [facility]. Listed belongings gathered and sent with patient.     Discharge Nursing Criteria:     Care Plan and Patient education resolved: Yes    Vaccines  Influenza status verified at discharge:  No    MISC  Home medications returned to patient: NA  Medication Bin checked and emptied on discharge Yes  All paperwork sent with patient/Copy of AVS given to patient or family Yes.  Transport.

## 2024-12-19 NOTE — DISCHARGE SUMMARY
[facility]  Hospitalist Discharge Summary      Date of Admission:  12/16/2024  Date of Discharge:  12/19/2024  9:41 AM  Discharging Provider: Beverley Galvan CNP  Discharge Service: Hospitalist Service             Follow-ups Needed After Discharge   Follow-up Appointments       Follow Up Care      Follow-up with Tyrone Baird PA-C in 2 weeks for wound check and x-ray AP pelvis as well as AP and lateral of the right hip.        Follow Up and recommended labs and tests      Follow up with [facility] physician.  The following labs/tests are recommended: bnp,                Discharge Disposition   Discharged to TCU  Condition at discharge: Stable    Hospital Course   Audrey Orteag is a 89 year old female with a medical history of sick sinus syndrome status post pacemaker placement in 2011, history of paroxysmal atrial fibrillation, not on anticoagulation, essential hypertension, CKD, who was brought in after a fall. Patient was in her usual state of health this morning, besides recent diagnosis of diverticulitis after having diarrhea 6 days ago.  While at her place and employment, she went to the restroom and after urinating felt very lightheaded, stood up from the toilet, fell forward.  Does not remember falling.  Patient was able to stand up, and unfortunately she fell again on her right side.  After the second fall, she could not get up on her own. Overall, syncopal episode, preceded by loss of consciousness, during micturition, causing a fall.  Patient was found to have an acute fracture of the right femoral neck and was admitted for workup of syncope and for operative management of the fracture.  Of note, 6 days ago on December 10, patient was having abdominal pain and diarrhea, was diagnosed with diverticulitis and started on Augmentin.      Acute fracture of the femoral neck, right  -- POD 3 right hip hemiarthroplasty, Hgb 10.3  -- Management per surgery  She has been up with PT,  discharging to TCU today     Acute blood loss anemia due to surgery, crystalloid dilution.  Physiologically tolerating.      Fall  Lightheadedness  Syncopal episode  Patient was in her usual state besides recent diagnosis of diverticulitis after having diarrhea 6 days ago.  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  Patient was able to stand up, and unfortunately she fell again on her right side.  After the second fall, she could not get up on her own.  She has a history of a similar episode a few years ago.  Patient denied sensation of the room spinning, denies loss of bowel bladder function or biting her tongue afterwards.  In the emergency room a CT head was done without evidence of acute intracranial hemorrhage, mass or herniation.  She did have positive orthostatic blood pressures.  She was given more crystalloid and repeat Orthostatic blood pressures were negative.  TTE done with normal LVEF, mod MR and mod TR (looks unchanged from previous).  No arrhythmias seen on telemetry.    -- With loss of consciousness preceding fall, was transient, self terminating  -- Happened during micturition, therefore strong suspicion of reflex mediated syncope due to orthostatic hypotension, possibly due to volume depletion in light of recent diarrhea  -Discontinued lisinopril at discharge due to positive orthostatic blood pressure and some soft blood pressures.  Could reintroduce if needed at a later date.     Oliguria.  RN stated she has had decreased urine output.  Bladder scanned for under 300 ml.    She did received crystalloid over several days to to positive orthostatic blood pressures.  Checked BNP this am which was 6420.  Gave her a one time dose of oral furosemide 20 mg prior to discharge to TCU.  This may need to be re-evaluated by the [facility] physician at discharge.     Post operative ileus-resrolved.  Patient complained of  abdominal pain.  KUB done which reveals gas distended loops of colon and small bowel t/o the abdomen most likely reflecting ileus in post operative setting.  She did have a couple loose BMs and the pain has since resolved.  -aggressive bowel regimen  -walks at least TID     Hyponatremia, acute on chronic.  Sodium 133 on day of discharge.  Recheck at outpatient follow up.  Hypochloremia, acute on chronic-resolved after NS infusion.  Usually sodium is between 127-131,   Usually chloride is between 92 and 97     Recent diagnosis of diverticulitis  Recent diarrhea  -- Patient is finishing up a course of Augmentin 1 tablet 2 times daily for 7 days that was started on December 10, 2024  -- Patient completed 6 days of treatment prior to being admitted     Sick sinus syndrome status post pacemaker placement  History of atrial fibrillation, not on anticoagulation     Essential hypertension  -- discontinue home lisinopril 10 mg, may need to resume at a later date     CKD, stage III  --creatinine stable at 0.98-1.12     Troponinemia, likely secondary to CKD     Insomnia  -- continue zolpidem      Right upper forehead laceration.  Stiches c/d/I  Will have stitches removed at TCU in 7 to 10 days    Consultations This Hospital Stay   ORTHOPEDIC SURGERY IP CONSULT  HOSPITALIST IP CONSULT  PHYSICAL THERAPY ADULT IP CONSULT  CARE MANAGEMENT / SOCIAL WORK IP CONSULT  OCCUPATIONAL THERAPY ADULT IP CONSULT  PHYSICAL THERAPY ADULT IP CONSULT  OCCUPATIONAL THERAPY ADULT IP CONSULT    Code Status   Prior    Time Spent on this Encounter   Beverley DAY CNP, personally saw the patient today and spent greater than 30 minutes discharging this patient.       Beverley Galvan CNP  [facility] MEDICAL SURGICAL  [address] [place] [address]  Phone: [phone]  ______________________________________________________________________    Physical Exam   Vital Signs: Temp: 97.6  F (36.4  C) Temp src: Oral BP: 105/51 Pulse:  "65   Resp: 18 SpO2: 97 % O2 Device: None (Room air)    Weight: 119 lbs 14.88 oz  Gen:  Lying in bed no acute distress  HEENT:  normocephalic, atraumatic, oropharynx clear  Resp:  CTA  Skin: right hip incision covered  Card:  S1,S2, RRR no murmur, rub or gallop  Abd:  Soft per RN exam, non tender,  normoactive bowel sounds   Neuro:  Neuro exam non focal         Primary Care Physician   Krish Bates    Discharge Orders      Primary Care - Care Coordination Referral      General info for SNF    Length of Stay Estimate: Short Term Care: Estimated # of Days <30 Condition at Discharge: Stable Level of care:skilled  Rehabilitation Potential: Good Admission H&P remains valid and up-to-date: Yes Recent Chemotherapy: N/A Use Nursing Home Standing Orders: Yes     Mantoux Instructions    Give two-step Mantoux (PPD) Per Facility Policy: Yes     Incentive Spirometry    Incentive Spirometry 10 times per hour, 4 times per day.     Shower with wound/dressing NOT covered    You do not need to cover your dressing or incision in the shower, you may allow water and soap to run over top of the surgical dressing or incision. You may shower 0 days after surgery.  You are strictly prohibited from soaking or submerging the surgical wound underwater.     Reason for your hospital stay    Right hip fracture status post right hip hemiarthroplasty with direct anterior approach by Dr. Begum     When to call - Contact Surgeon Team    You may experience symptoms that require follow-up before your scheduled appointment. Refer to the \"Stoplight Tool\" for instructions on when to contact your Surgeon Team if you are concerned about pain control, blood clots, constipation, or if you are unable to urinate.     When to call - Reach out to Urgent Care    If you are not able to reach your Surgeon Team and you need immediate care, go to the Orthopedic Walk-in Clinic or Urgent Care at your Surgeon's office.  Do NOT go to the Emergency Room unless you have " shortness of breath, chest pain, or other signs of a medical emergency.     When to call - Reasons to Call 911    Call 911 immediately if you experience sudden-onset chest pain, arm weakness/numbness, slurred speech, or shortness of breath     Symptoms - Fever Management    A low grade fever can be expected after surgery.  Use acetaminophen (TYLENOL) as needed for fever management.  Contact your Surgeon Team if you have a fever greater than 101.5 F, chills, and/or night sweats.     Symptoms - Constipation management    Constipation (hard, dry bowel movements) is expected after surgery due to the combination of being less active, the anesthetic, and the opioid pain medication.  You can do the following to help reduce constipation:  ~  FLUIDS:  Drink clear liquids (water or Gatorade), or juice (apple/prune).  ~  DIET:  Eat a fiber rich diet.    ~  ACTIVITY:  Get up and move around several times a day.  Increase your activity as you are able.  MEDICATIONS:  Reduce the risk of constipation by starting medications before you are constipated.  You can take Miralax   (1 packet as directed) and/or a stool softener (Senokot 1-2 tablets 1-2 times a day).  If you already have constipation and these medications are not working, you can get magnesium citrate and use as directed.  If you continue to have constipation you can try an over the counter suppository or enema.  Call your Surgeon Team if it has been greater than 3 days since your last bowel movement.     Symptoms - Reduced Urine Output    Changes in the amount of fluids you drank before and after surgery may result in problems urinating.  It is important to stay well-hydrated after surgery and drink plenty of water. If it has been greater than 8 hours since you have urinated despite drinking plenty of water, call your Surgeon Team.     Activity - Exercises to prevent blood clots    Unless otherwise directed by your Surgeon team, perform the following exercises at least  "three times per day for the first four weeks after surgery to prevent blood clots in your legs: 1) Point and flex your feet (Ankle Pumps), 2) Move your ankle around in big circles, 3) Wiggle your toes, 4) Walk, even for short distances, several times a day, will help decrease the risk of blood clots.     Comfort and Pain Management - Pain after Surgery    Pain after surgery is normal and expected.  You will have some amount of pain for several weeks after surgery.  Your pain will improve with time.  There are several things you can do to help reduce your pain including: rest, ice, elevation, and using pain medications as needed. Contact your Surgeon Team if you have pain that persists or worsens after surgery despite rest, ice, elevation, and taking your medication(s) as prescribed. Contact your Surgeon Team if you have new numbness, tingling, or weakness in your operative extremity.     Comfort and Pain Management - Swelling after Surgery    Swelling and/or bruising of the surgical extremity is common and may persist for several months after surgery. In addition to frequent icing and elevation, gentle compressive support with an ACE wrap or tubigrip may help with swelling. Apply compression regularly, removing at least twice daily to perform skin checks. Contact your Surgeon Team if your swelling increases and is NOT associated with an increase in your activity level, or if your swelling increases and is associated with redness and pain.     Comfort and Pain Management - LOWER Extremity Elevation    Swelling is expected for several months after surgery. This type of swelling is usually associated with gravity and activity, and can be improved with elevation.   The best way to do this is to get your \"toes above your nose\" by laying down and placing several pillows lengthwise under your calf and heel. When elevating your leg keep your knee completely straight. Perform this elevation as often as possible especially " for the first two weeks after surgery.     Comfort and Pain Management - Cold therapy    Ice can be used to control swelling and discomfort after surgery. Place a thin towel over your operative site and apply the ice pack overtop. Leave ice pack in place for 20 minutes, then remove for 20 minutes. Repeat this 20 minutes on/20 minutes off routine as often as tolerated.     Medication Instructions - Acetaminophen (TYLENOL) Instructions    You were discharged with acetaminophen (TYLENOL) for pain management after surgery. Acetaminophen most effectively manages pain symptoms when it is taken on a schedule without missing doses (every four, six, or eight hours). Your Provider will prescribe a safe daily dose between 3000 - 4000 mg.  Do NOT exceed this daily dose. Most patients use acetaminophen for pain control for the first four weeks after surgery.  You can wean from this medication as your pain decreases.     Follow Up Care    Follow-up with Tyrone Baird PA-C in 2 weeks for wound check and x-ray AP pelvis as well as AP and lateral of the right hip.     Resume anticoagulation as prior to admission    Resume 325 mg of aspirin once a day on post-op day.  Patient usually takes this dose and now will continue on it     Opioid Instructions (Greater than or equal to 65 years)    You were discharged with an opioid medication (hydromorphone, oxycodone, hydrocodone, or tramadol). This medication should only be taken for breakthrough pain that is not controlled with acetaminophen (TYLENOL). If you rate your pain less than 3 you do not need this medication. Pain rating 0-3: You do not need this medication Pain rating 4-6: Take 1/2 tablet every 4-6 hours as needed Pain rating 7-10: Take 1 tablet every 4-6 hours as needed Do not exceed 4 tablets per day     Medication Instructions - Opioids - Tapering Instructions    In the first three days following surgery, your symptoms may warrant use of the narcotic pain medication every four  to six hours as prescribed. This is normal. As your pain symptoms improve, focus your efforts on decreasing (tapering) use of narcotic medications. The most successful tapering strategy is to first, decrease the number of tablets you take every 4-6 hours to the minimum prescribed. Then, increase the amount of time between doses. For example: First, taper to   or 1 tablet every 4-6 hours. Then, taper to   or 1 tablet every 6-8 hours. Then, taper to   or 1 tablet every 8-10 hours. Then, taper to   or 1 tablet every 10-12 hours. Then, taper to   or 1 tablet at bedtime. The bedtime dose can help with comfort during sleep and is typically the last dose to be discontinued after surgery.     Follow Up and recommended labs and tests    Follow up with assisted physician.  The following labs/tests are recommended: bnp,     Wound care (specify)    Remove sutures (above right eye) on December 26     Physical Therapy Adult Consult    Evaluate and treat as clinically indicated.    Reason: Status Post Hip Surgery     Occupational Therapy Adult Consult    Evaluate and treat as clinically indicated.    Reason: Status Post Hip Surgery     Diet    Follow this diet upon discharge: Orders Placed This Encounter      Advance Diet as Tolerated: Regular Diet Adult      Discharge Instruction - Regular Diet Adult         Significant Results and Procedures   Most Recent 3 CBC's:  Recent Labs   Lab Test 12/19/24  0606 12/18/24  0535 12/17/24  0526   WBC 12.1* 10.6 10.5   HGB 10.3* 9.7* 11.2*   MCV 93 94 91    195 246     Most Recent 3 BMP's:  Recent Labs   Lab Test 12/19/24  0606 12/18/24  1643 12/18/24  0535   * 135 135   POTASSIUM 4.0 3.7 3.9   CHLORIDE 103 103 104   CO2 17* 19* 19*   BUN 15.4 16.6 14.5   CR 0.98* 0.89 1.10*   ANIONGAP 13 13 12   HO 8.4* 8.8 8.5*   GLC 99 107* 109*   ,   Results for orders placed or performed during the hospital encounter of 12/16/24   Head CT w/o contrast    Narrative    CT SCAN OF THE HEAD  WITHOUT CONTRAST   12/16/2024 12:10 PM     HISTORY: syncope, head injury    TECHNIQUE:  Axial images of the head and coronal reformations without  IV contrast material. Radiation dose for this scan was reduced using  automated exposure control, adjustment of the mA and/or kV according  to patient size, or iterative reconstruction technique.    COMPARISON: 4/20/2023    FINDINGS: There is no evidence of intracranial hemorrhage, mass, acute  infarct or anomaly. The ventricles are normal in size, shape and  configuration. Mild diffuse parenchymal volume loss. Mild patchy  periventricular white matter hypodensities which are nonspecific, but  likely related to chronic microvascular ischemic disease. Intracranial  atherosclerotic calcifications.    The visualized portions of the sinuses and mastoids appear normal. The  bony calvarium and bones of the skull base appear intact.       Impression    IMPRESSION:     1. No evidence of acute intracranial hemorrhage, mass, or herniation.  2. Mild diffuse parenchymal volume loss and white matter changes  likely due to chronic microvascular ischemic disease.      CHRISTY OSEGUERA MD         SYSTEM ID:  [id number]   XR Hip Right 2-3 Views    Narrative    XR HIP RIGHT 2-3 VIEWS   12/16/2024 12:30 PM     HISTORY: fall, right hip pain  COMPARISON: None.       Impression    IMPRESSION: Acute fracture of the femoral neck near the subcapital  region with anterior displacement distal fragment by approximately one  bone width. Diffuse bony demineralization. No right hip joint space  narrowing. Degenerative changes lumbar spine and right SI joint.    KARL CÁRDENAS MD         SYSTEM ID:  [id number]   XR Surgery MARIE L/T 5 Min Fluoro w Stills    Narrative    This exam was marked as non-reportable because it will not be read by a   radiologist or a [place] non-radiologist provider.         XR Pelvis w Hip Right 1 View    Narrative    XR PELVIS AND HIP RIGHT 1 VIEW   12/17/2024 7:47 AM      HISTORY: Postop x-rays AP pelvis and lateral of the right hip can be  done portable on the floor.  COMPARISON: 2024       Impression    IMPRESSION: Postoperative changes of right hip hemiarthroplasty.  Postoperative soft tissue gas about the hip. Degenerative arthritis  pubic symphysis. Demineralization.    ORLANDO COVARRUBIAS MD         SYSTEM ID:  [id number]   X-ray Abdomen flat port    Narrative    ABDOMEN ONE VIEW PORTABLE  2024 2:35 PM     HISTORY: abdomina distention    COMPARISON: Pelvic radiograph 2024       Impression    IMPRESSION: Gas distended loops of colon and small bowel throughout  the abdomen most likely reflecting ileus in the postoperative setting.    Thin medial right infradiaphragmatic lucency is indeterminate,  possibly summation artifact, although consider upright abdominal  radiograph to exclude pneumoperitoneum.    Right hip arthroplasty and right thigh subcutaneous gas.    Cardiomegaly. Cardiac leads.    CARMENZA COLIN MD         SYSTEM ID:  [id number]   Echocardiogram Complete     Value    LVEF  60-65%    Narrative    [id number]  [id number]  [id number]  [id number]^LYNDSEY^ELENA^CHERRI     [facility]  Echocardiography Laboratory  [address] [place] [address]     Name: SHAHRAM MILLS  MRN: [id number]  : 1935  Study Date: 2024 11:37 AM  Age: 89 yrs  Gender: Female  Patient Location: [facility]  Reason For Study: Syncope  History: Afib, Pacemaker, CKD, HTN  Ordering Physician: ELENA SOLORIO  Referring Physician: Krish Bates  Performed By: Yumiko Rhoades     BSA: 1.6 m2  Height: 65 in  Weight: 119 lb  HR: 74  BP: 82/43 mmHg  ______________________________________________________________________________  Procedure  Echocardiogram with two-dimensional, color and spectral Doppler.  ______________________________________________________________________________  Interpretation Summary     Irregular rhythm with wide QRS and frequent PVCs.      Normal LV size with systolic function. Estimated ejection fraction 60 to 65%.  Dyssynchronous pattern of LV contraction from conduction abnormality or  pacemaker activation.  RV is borderline dilated but normal systolic function. There is a pacemaker  lead noted in the right heart.  Severe biatrial enlargement. Multiple leads noted in the right atrium.  There is mild to moderate mitral regurgitation. No mitral stenosis.  Tricuspid valve is not well-seen but there appears to be likely 3+ tricuspid  regurgitation related to tethering of the leaflets from the pacemaker lead.  RVSP is elevated at 46 mmHg plus right atrial pressure consistent with mild to  moderate pulmonary hypertension.  There is a small pericardial effusion as previously reported without  echocardiographic features of tamponade.  ______________________________________________________________________________  Left Ventricle  The left ventricle is normal in size. The visual ejection fraction is 60-65%.  Diastolic Doppler findings (E/E' ratio and/or other parameters) suggest left  ventricular filling pressures are increased. Septal motion is consistent with  conduction abnormality. Septal wall motion abnormality may reflect pacemaker  activation.     Right Ventricle  Borderline right ventricular enlargement. The right ventricular systolic  function is normal. There is a pacemaker lead in the right ventricle.     Atria  There is severe biatrial enlargement. Pacer wire in right atrium.     Mitral Valve  The mitral valve is normal in structure and function. There is mild to  moderate (1-2+) mitral regurgitation. There is no mitral valve stenosis.     Tricuspid Valve  The tricuspid valve is not well visualized. There is moderately severe (3+)  tricuspid regurgitation. The right ventricular systolic pressure is elevated  at 46.1 mmHg. Right ventricular systolic pressure is elevated, consistent with  mild pulmonary hypertension.     Aortic Valve  The aortic  valve is trileaflet with aortic valve sclerosis. No hemodynamically  significant valvular aortic stenosis.     Pulmonic Valve  The pulmonic valve is not well visualized.     Vessels  The aortic root is normal size. The inferior vena cava was normal in size with  preserved respiratory variability.     Pericardium  Small pericardial effusion. There are no echocardiographic indications of  cardiac tamponade.     ______________________________________________________________________________  MMode/2D Measurements & Calculations  IVSd: 0.99 cm  LVIDd: 3.9 cm  LVIDs: 2.5 cm  LVPWd: 0.88 cm  FS: 35.4 %  LV mass(C)d: 109.6 grams  LV mass(C)dI: 69.1 grams/m2     Ao root diam: 2.8 cm  LA dimension: 5.2 cm  asc Aorta Diam: 2.5 cm  LA/Ao: 1.9  Ao root diam index Ht(cm/m): 1.7  Ao root diam index BSA (cm/m2): 1.7  Asc Ao diam index BSA (cm/m2): 1.6  Asc Ao diam index Ht(cm/m): 1.5  LA Volume (BP): 83.4 ml  LA Volume Index (BP): 52.5 ml/m2  RV Base: 3.4 cm     RWT: 0.46  TAPSE: 1.5 cm     Doppler Measurements & Calculations  MV E max artur: 109.0 cm/sec  MV A max artur: 52.5 cm/sec  MV E/A: 2.1  MV dec slope: 479.7 cm/sec2  MV dec time: 0.23 sec  TR max artur: 339.3 cm/sec  TR max P.1 mmHg  E/E' av.8  Lateral E/e': 12.7  Medial E/e': 16.9  RV S Artur: 7.0 cm/sec     ______________________________________________________________________________  Report approved by: Elba Monroe MD on 2024 12:31 PM             Discharge Medications   Discharge Medication List as of 2024  9:20 AM        CONTINUE these medications which have CHANGED    Details   acetaminophen (TYLENOL) 325 MG tablet Take 2 tablets (650 mg) by mouth every 4 hours as needed for other (mild pain)., Disp-100 tablet, R-0, E-Prescribe      oxyCODONE (ROXICODONE) 5 MG tablet Take 1 tablet (5 mg) by mouth every 4 hours as needed for moderate to severe pain., Disp-26 tablet, R-0, E-Prescribe      senna-docusate (SENOKOT-S/PERICOLACE) 8.6-50 MG tablet Take 1-2  tablets by mouth 2 times daily. Take while on oral narcotics to prevent or treat constipation., Disp-30 tablet, R-0, E-Prescribe           CONTINUE these medications which have NOT CHANGED    Details   Ascorbic Acid (VITAMIN C) 500 MG CAPS Take 500 mg by mouth daily, Historical      aspirin (ASA) 325 MG EC tablet Take 325 mg by mouth daily., Historical      calcium 600 MG tablet Take 1 tablet by mouth daily, Historical      COMPRESSION STOCKINGS 1 each daily, Disp-1 each, R-1, Local [id number] mmHg, Knee high, on in the morning and off at night.      magnesium 250 MG tablet Take 1 tablet by mouth daily, Historical      multivitamin w/minerals (THERA-VIT-M) tablet Take 1 tablet by mouth daily, Historical      polyethylene glycol-propylene glycol (SYSTANE ULTRA) 0.4-0.3 % SOLN ophthalmic solution Place 1 drop into both eyes 2 times daily., Historical      Vitamin D, Cholecalciferol, 25 MCG (1000 UT) TABS Take 1,000 Units by mouth daily, Historical      zolpidem (AMBIEN) 5 MG tablet TAKE 1/2 (ONE-HALF) TABLET BY MOUTH NIGHTLY AS NEEDED, Disp-15 tablet, R-0, E-Prescribe           STOP taking these medications       amoxicillin-clavulanate (AUGMENTIN) 875-125 MG tablet Comments:   Reason for Stopping:         lisinopril (ZESTRIL) 10 MG tablet Comments:   Reason for Stopping:             Allergies   Allergies   Allergen Reactions    Ciprofloxacin Rash    Macrobid [Nitrofurantoin] Itching and Rash

## 2024-12-19 NOTE — PLAN OF CARE
Physical Therapy Discharge Summary    Reason for therapy discharge:    Discharged to transitional care facility.    Progress towards therapy goal(s). See goals on Care Plan in [facility] electronic health record for goal details.  Goals partially met.  Barriers to achieving goals:   limited tolerance for therapy and discharge from facility.    Therapy recommendation(s):    Continued therapy is recommended.  Rationale/Recommendations:   .Patient would benefit from continued skilled therapeutic intervention in order to progress them towards a higher level of function in accordance with their surgeon's protocol.        Thank you for your referral.  Zhane Ness, PT, DPT, ATC, [facility]  O: [phone]  E: [email or web address]

## 2024-12-19 NOTE — PROGRESS NOTES
Care Management Discharge Note    Discharge Date: 12/19/2024     Discharge Disposition: Transitional Care    Discharge Services: None    Discharge DME: None    Discharge Transportation: agency, family or friend will provide    Private pay costs discussed: Not applicable    Does the patient's insurance plan have a 3 day qualifying hospital stay waiver?  No    PAS Confirmation Code: [id number]    Patient/family educated on Medicare website which has current facility and service quality ratings: yes    Education Provided on the Discharge Plan: Yes  Persons Notified of Discharge Plans: Patient  Patient/Family in Agreement with the Plan: yes    Handoff Referral Completed: Yes, [facility] PCP: Internal handoff referral completed    Additional Information:  Patient medically ready for discharge today. Plan is for patient to discharge to [facility] (Main Phone: [phone] Admissions Phone: [phone] Fax: [phone]) TCU.    [facility] bus transport at 0930.    RAVEN Murrell  [facility] [phone]/ [facility] [phone]  Care Management

## 2024-12-19 NOTE — TELEPHONE ENCOUNTER
Other: Gi with [facility] calling to schedule patient 2 week post op with Tyrone MORALES. Please call her back to assist with scheduling.     Could we send this information to you in [employer] or would you prefer to receive a phone call?:   Patient would prefer a phone call   Okay to leave a detailed message?: Yes at Other phone number:  *[phone]

## 2024-12-19 NOTE — PLAN OF CARE
Goal Outcome Evaluation:           Overall Patient Progress: improvingOverall Patient Progress: improving     Vitals stable on RA. A&Ox4. Pt reported R hip tenderness is improving, CMS present. Continues with loose stools but decreasing. Voided 300 ml. IV fluids running at 75/hr. Going to TCU today.

## 2024-12-19 NOTE — PLAN OF CARE
Occupational Therapy Discharge Summary    Reason for therapy discharge:    Discharged to transitional care facility.    Progress towards therapy goal(s). See goals on Care Plan in [facility] electronic health record for goal details.  Goals partially met.  Barriers to achieving goals:   limited tolerance for therapy and discharge from facility.    Therapy recommendation(s):    Continued therapy is recommended.  Rationale/Recommendations:  to further progress ADL engagement and progress close to baseline level of functioning.    Thank you for the referral.   SELMA Dobson/L   [facility]  Email: [email or web address]  Phone: [phone]

## 2024-12-20 ENCOUNTER — LAB REQUISITION (OUTPATIENT)
Dept: LAB | Facility: CLINIC | Age: 89
End: 2024-12-20
Payer: MEDICARE

## 2024-12-20 DIAGNOSIS — E87.1 HYPO-OSMOLALITY AND HYPONATREMIA: ICD-10-CM

## 2024-12-20 PROBLEM — Z95.0 PRESENCE OF CARDIAC PACEMAKER: Status: ACTIVE | Noted: 2024-12-19

## 2024-12-20 PROBLEM — S01.111D: Status: ACTIVE | Noted: 2024-12-19

## 2024-12-20 PROBLEM — R60.0 LOCALIZED EDEMA: Status: ACTIVE | Noted: 2024-12-19

## 2024-12-20 PROBLEM — K59.00 CONSTIPATION, UNSPECIFIED: Status: ACTIVE | Noted: 2024-12-19

## 2024-12-20 PROBLEM — S72.001D FRACTURE OF UNSPECIFIED PART OF NECK OF RIGHT FEMUR, SUBSEQUENT ENCOUNTER FOR CLOSED FRACTURE WITH ROUTINE HEALING: Status: ACTIVE | Noted: 2024-12-19

## 2024-12-20 PROBLEM — R52 PAIN, UNSPECIFIED: Status: ACTIVE | Noted: 2024-12-19

## 2024-12-20 PROBLEM — G47.00 INSOMNIA, UNSPECIFIED: Status: ACTIVE | Noted: 2024-12-19

## 2024-12-20 PROBLEM — I10 ESSENTIAL (PRIMARY) HYPERTENSION: Status: ACTIVE | Noted: 2024-12-19

## 2024-12-20 PROBLEM — R55 SYNCOPE AND COLLAPSE: Status: ACTIVE | Noted: 2024-12-19

## 2024-12-20 PROBLEM — N18.30 CHRONIC KIDNEY DISEASE, STAGE 3 UNSPECIFIED (H): Status: ACTIVE | Noted: 2024-12-19

## 2024-12-21 ENCOUNTER — TELEPHONE (OUTPATIENT)
Dept: GERIATRICS | Facility: CLINIC | Age: 89
End: 2024-12-21
Payer: COMMERCIAL

## 2024-12-22 NOTE — PROGRESS NOTES
Nurse calling to report patient with elevated blood pressures, now 187/93. She was recently hospitalized for a syncopal episode with fall and right hip fracture. Her lisinopril was discontinued at discharge from the hospital due to soft blood pressures and positive orthostatic blood pressure. She is now feeling better, eating more and drinking more fluid and her blood pressures have been rising since her admission. This evening 187/93 as stated. Patient is wondering about her lisinopril.  Orders: Give lisinopril 10 mg p.o. x 1 now, then start lisinopril 10 mg every morning every 24 hours. Watch blood pressure twice daily and repeat orthostatic blood pressures in the next several days.  Maria Victoria Norman, CNP

## 2024-12-23 ENCOUNTER — DOCUMENTATION ONLY (OUTPATIENT)
Dept: OTHER | Facility: CLINIC | Age: 89
End: 2024-12-23
Payer: COMMERCIAL

## 2024-12-23 ENCOUNTER — TRANSITIONAL CARE UNIT VISIT (OUTPATIENT)
Dept: GERIATRICS | Facility: CLINIC | Age: 89
End: 2024-12-23
Payer: COMMERCIAL

## 2024-12-23 VITALS
RESPIRATION RATE: 16 BRPM | OXYGEN SATURATION: 98 % | WEIGHT: 131.8 LBS | DIASTOLIC BLOOD PRESSURE: 70 MMHG | TEMPERATURE: 97.2 F | HEART RATE: 67 BPM | SYSTOLIC BLOOD PRESSURE: 160 MMHG | BODY MASS INDEX: 21.93 KG/M2

## 2024-12-23 LAB
ANION GAP SERPL CALCULATED.3IONS-SCNC: 12 MMOL/L (ref 7–15)
BASOPHILS # BLD AUTO: 0 10E3/UL (ref 0–0.2)
BASOPHILS NFR BLD AUTO: 0 %
BUN SERPL-MCNC: 12 MG/DL (ref 8–23)
CALCIUM SERPL-MCNC: 8.9 MG/DL (ref 8.8–10.4)
CHLORIDE SERPL-SCNC: 100 MMOL/L (ref 98–107)
CREAT SERPL-MCNC: 0.86 MG/DL (ref 0.51–0.95)
EGFRCR SERPLBLD CKD-EPI 2021: 65 ML/MIN/1.73M2
EOSINOPHIL # BLD AUTO: 0.3 10E3/UL (ref 0–0.7)
EOSINOPHIL NFR BLD AUTO: 4 %
ERYTHROCYTE [DISTWIDTH] IN BLOOD BY AUTOMATED COUNT: 12.3 % (ref 10–15)
GLUCOSE SERPL-MCNC: 114 MG/DL (ref 70–99)
HCO3 SERPL-SCNC: 22 MMOL/L (ref 22–29)
HCT VFR BLD AUTO: 29.7 % (ref 35–47)
HGB BLD-MCNC: 9.8 G/DL (ref 11.7–15.7)
IMM GRANULOCYTES # BLD: 0.1 10E3/UL
IMM GRANULOCYTES NFR BLD: 1 %
LYMPHOCYTES # BLD AUTO: 1.6 10E3/UL (ref 0.8–5.3)
LYMPHOCYTES NFR BLD AUTO: 24 %
MCH RBC QN AUTO: 31.5 PG (ref 26.5–33)
MCHC RBC AUTO-ENTMCNC: 33 G/DL (ref 31.5–36.5)
MCV RBC AUTO: 96 FL (ref 78–100)
MONOCYTES # BLD AUTO: 0.8 10E3/UL (ref 0–1.3)
MONOCYTES NFR BLD AUTO: 13 %
NEUTROPHILS # BLD AUTO: 3.9 10E3/UL (ref 1.6–8.3)
NEUTROPHILS NFR BLD AUTO: 58 %
NRBC # BLD AUTO: 0 10E3/UL
NRBC BLD AUTO-RTO: 0 /100
NT-PROBNP SERPL-MCNC: 3590 PG/ML (ref 0–1800)
PLATELET # BLD AUTO: 271 10E3/UL (ref 150–450)
POTASSIUM SERPL-SCNC: 3.9 MMOL/L (ref 3.4–5.3)
RBC # BLD AUTO: 3.11 10E6/UL (ref 3.8–5.2)
SODIUM SERPL-SCNC: 134 MMOL/L (ref 135–145)
WBC # BLD AUTO: 6.7 10E3/UL (ref 4–11)

## 2024-12-23 NOTE — PROGRESS NOTES
[facility] GERIATRICS    Chief Complaint   Patient presents with    RECHECK     HPI:  Audrey Ortega is a 89 year old  (1935), who is being seen today for an episodic care visit at: [facility] ([facility]) [[id number]]. Today's concern is: ***    Allergies, and PMH/PSH reviewed in EPIC today.  REVIEW OF SYSTEMS:  {aeovee89:436982}    Objective:   BP (!) 160/70   Pulse 67   Temp 97.2  F (36.2  C)   Resp 16   Wt 59.8 kg (131 lb 12.8 oz)   SpO2 98%   BMI 21.93 kg/m    {Nursing home physical exam :841978}    {fgslab:084456}    Assessment/Plan:  {FGS DX2:259034}    MED REC REQUIRED{TIP  Click the link below to document or use med rec list, use list to pull in response :234678}  Post Medication Reconciliation Status: {MED REC LIST:627590}      Orders:  {fgsorders:197586}  ***    Electronically signed by: Chad King ***

## 2024-12-23 NOTE — LETTER
12/23/2024      Audrey Ortega  [address]  [place] [address]        No notes on file      Sincerely,        ZA Moody CNP    Electronically signed

## 2025-01-03 ENCOUNTER — LAB REQUISITION (OUTPATIENT)
Dept: LAB | Facility: CLINIC | Age: 89
End: 2025-01-03

## 2025-01-03 DIAGNOSIS — I10 ESSENTIAL (PRIMARY) HYPERTENSION: ICD-10-CM

## 2025-01-06 ENCOUNTER — ANCILLARY PROCEDURE (OUTPATIENT)
Dept: GENERAL RADIOLOGY | Facility: CLINIC | Age: 89
End: 2025-01-06
Attending: PHYSICIAN ASSISTANT
Payer: COMMERCIAL

## 2025-01-06 ENCOUNTER — OFFICE VISIT (OUTPATIENT)
Dept: ORTHOPEDICS | Facility: CLINIC | Age: 89
End: 2025-01-06
Payer: COMMERCIAL

## 2025-01-06 VITALS
TEMPERATURE: 97.1 F | WEIGHT: 127 LBS | SYSTOLIC BLOOD PRESSURE: 180 MMHG | HEIGHT: 65 IN | DIASTOLIC BLOOD PRESSURE: 88 MMHG | BODY MASS INDEX: 21.16 KG/M2 | HEART RATE: 86 BPM

## 2025-01-06 DIAGNOSIS — Z09 POSTOPERATIVE FOLLOW-UP: ICD-10-CM

## 2025-01-06 DIAGNOSIS — Z96.641 HISTORY OF RIGHT HIP HEMIARTHROPLASTY: Primary | ICD-10-CM

## 2025-01-06 LAB
ANION GAP SERPL CALCULATED.3IONS-SCNC: 14 MMOL/L (ref 7–15)
BUN SERPL-MCNC: 16.6 MG/DL (ref 8–23)
CALCIUM SERPL-MCNC: 9.1 MG/DL (ref 8.8–10.4)
CHLORIDE SERPL-SCNC: 100 MMOL/L (ref 98–107)
CREAT SERPL-MCNC: 0.99 MG/DL (ref 0.51–0.95)
EGFRCR SERPLBLD CKD-EPI 2021: 55 ML/MIN/1.73M2
ERYTHROCYTE [DISTWIDTH] IN BLOOD BY AUTOMATED COUNT: 13.4 % (ref 10–15)
GLUCOSE SERPL-MCNC: 91 MG/DL (ref 70–99)
HCO3 SERPL-SCNC: 22 MMOL/L (ref 22–29)
HCT VFR BLD AUTO: 33.5 % (ref 35–47)
HGB BLD-MCNC: 10.8 G/DL (ref 11.7–15.7)
MCH RBC QN AUTO: 31.3 PG (ref 26.5–33)
MCHC RBC AUTO-ENTMCNC: 32.2 G/DL (ref 31.5–36.5)
MCV RBC AUTO: 97 FL (ref 78–100)
PLATELET # BLD AUTO: 380 10E3/UL (ref 150–450)
POTASSIUM SERPL-SCNC: 4.4 MMOL/L (ref 3.4–5.3)
RBC # BLD AUTO: 3.45 10E6/UL (ref 3.8–5.2)
SODIUM SERPL-SCNC: 136 MMOL/L (ref 135–145)
WBC # BLD AUTO: 5.7 10E3/UL (ref 4–11)

## 2025-01-06 PROCEDURE — 80048 BASIC METABOLIC PNL TOTAL CA: CPT

## 2025-01-06 PROCEDURE — 36415 COLL VENOUS BLD VENIPUNCTURE: CPT

## 2025-01-06 PROCEDURE — 73502 X-RAY EXAM HIP UNI 2-3 VIEWS: CPT | Mod: TC | Performed by: RADIOLOGY

## 2025-01-06 PROCEDURE — 99024 POSTOP FOLLOW-UP VISIT: CPT | Performed by: PHYSICIAN ASSISTANT

## 2025-01-06 PROCEDURE — P9604 ONE-WAY ALLOW PRORATED TRIP: HCPCS

## 2025-01-06 PROCEDURE — 85027 COMPLETE CBC AUTOMATED: CPT

## 2025-01-06 ASSESSMENT — PAIN SCALES - GENERAL: PAINLEVEL_OUTOF10: NO PAIN (0)

## 2025-01-06 NOTE — PATIENT INSTRUCTIONS
Encounter Diagnoses   Name Primary?    Postoperative follow-up     History of right hip hemiarthroplasty Yes     Rest, ice and elevate above heart level as needed for pain control  Exam:  1.  Incision: Healing well with no erythema or drainage.    2.  X-ray: X-rays showing good placement and no failure.      Recommendations medication/treatment orders:  1. Incision/Dressing: Dressing discontinued.  No need for dressing at this point.  In 1 week can start massaging incision with vitamin E lotion to help desensitize and break down scar tissue.  2. Anticoagulation: Continue taking aspirin 325 once a day like she usually takes.  3. Pain Medication: Continue Tylenol as needed.  Sounds like she does not need any narcotic medications at this point which is good.  4. Weight Bearing: Weightbearing as tolerated right lower extremity.  5. Activity/Therapy: No hip restrictions.  Continue formal physical therapy and when the patient is discharged should have in-home physical therapy.  Today I ordered outpatient physical therapy to start when in-home physical therapy is done.  6.  Discharge: It sounds like the patient may discharge in 2 days or so, we are okay with that from this end.    Diagnosis: same as above    Follow-up:  Follow-up in 3 weeks with myself With x-ray.    [facility] and [employer] may offer reliable information regarding your diagnosis and treatment plan.    THANK YOU for coming in today. If you receive a survey via [employer] or mail please let us know if there was anything you especially appreciated today or if there is any way we can improve our clinic. We appreciate your input.    GENERAL INFORMATION:  Bone and Joint Service Line for any issues or concerns: [phone]      I am not in the office Thursdays. Therefore non- urgent calls and medical messages received on Thursday will be addressed when we are back in the office on Wednesday. Urgent matters will be reviewed and addressed by one of our partners in  the office as needed.    If lab work was done today as part of your evaluation you will generally be contacted via [employer], mail, or phone with the results within 1-5 days. If there is an alarming result we will contact you by phone. Lab results come back at varying times, I generally wait until all labs are resulted before making comments on results. Please note labs are automatically released to [employer] (if you have signed up for it) once available-at times you may see these prior to my having a chance to review them as well.    If you need refills please contact your pharmacist. They will send a refill request to me to review. Please allow 3 business days for us to process all refill requests. All narcotic refills should be handled in the clinic at the time of your visit.

## 2025-01-06 NOTE — LETTER
"1/6/2025      Audrey Ortega  [address]  [place] [address]      Dear Colleague,    Thank you for referring your patient, Audrey Ortega, to the [facility]. Please see a copy of my visit note below.    Orthopedic Clinic Post-Operative Note    CHIEF COMPLAINT:   Chief Complaint   Patient presents with     Surgical Followup     displaced femoral neck fracture  - right Hip       HISTORY OF PRESENT ILLNESS  Location: Right hip  Rating of Pain: 0 out of 10   Pain is better with: No pain  Pain improving overall: No pain  Associated Features: Denies numbness or tingling shooting burning or electric pain.  Denies any problems with the incision.  Patient concerns: None, patient is doing very well and happy she went to transitional care and feels they helped her quite a bit.  Additional History: She had a right hip fracture and Dr. Begum on 12/16/2024 did a hemiarthroplasty on the patient's right hip with a direct anterior approach.  The plan was for the patient to discharge with her friend to be with her 24/7 by the name of Zeina but unfortunately prior to discharge Zeina was not able to do this.  Then we had to wait for placement.  She did discharge on 12/19/2024 to [facility].  Therapy has helped her quite a bit there.  She has been able to ambulate with just her cane in her room otherwise she uses a walker.    Patient's past medical, surgical, social and family histories reviewed.     REVIEW OF SYSTEMS  Review of systems negative other than positive findings in HPI.    Physical Exam:  Vitals: BP (!) 186/80 (BP Location: Left arm, Cuff Size: Adult Regular)   Pulse 86   Temp 97.1  F (36.2  C) (Temporal)   Ht 1.651 m (5' 5\")   Wt 57.6 kg (127 lb)   BMI 21.13 kg/m    BMI= Body mass index is 21.13 kg/m .  Constitutional: healthy, alert and no acute distress   Psychiatric: mentation appears normal and affect normal/bright  NEURO: no focal deficits, CMS intact right lower extremity   RESP: Normal " with easy respirations and no use of accessory muscles to breathe, no audible wheezing or retractions  CV: Calf soft and nontender to palpation, leg warm   SKIN: Incision healing well with no erythema swelling or drainage and incision does not open with gentle pressure.  The rest of the hip with no erythema, rashes, excoriation, or breakdown. No evidence of infection.   MUSCULOSKELETAL:  INSPECTION of right hip: No gross deformities, erythema, edema, ecchymosis, atrophy or fasciculations.   PALPATION: no tenderness over the lateral hip and greater trochanteric region, thigh or lower leg.   ROM: Passive: Flexion to 120 degrees, internal rotation to 25 degrees, external rotation to 70 degrees.  All range of motion without catching locking or pain.     STRENGTH: 5 out of 5 hip flexion, quad and hamstring without pain.   SPECIAL TEST: none  GAIT: Patient able to stand under her own power without any problems.  Did not assess her gait today.  Patient ambulating with walker.    Lymph: no palpable lymph nodes    Diagnostic Modalities:                                           XR Hip Right 2-3 Views    Narrative    XR HIP RIGHT 2-3 VIEWS   12/16/2024 12:30 PM     HISTORY: fall, right hip pain  COMPARISON: None.       Impression    IMPRESSION: Acute fracture of the femoral neck near the subcapital  region with anterior displacement distal fragment by approximately one  bone width. Diffuse bony demineralization. No right hip joint space  narrowing. Degenerative changes lumbar spine and right SI joint.    KARL CÁRDENAS MD         SYSTEM ID:  [id number]   XR Surgery MARIE L/T 5 Min Fluoro w Stills    Narrative    This exam was marked as non-reportable because it will not be read by a   radiologist or a [place] non-radiologist provider.         XR Pelvis w Hip Right 1 View    Narrative    XR PELVIS AND HIP RIGHT 1 VIEW   12/17/2024 7:47 AM     HISTORY: Postop x-rays AP pelvis and lateral of the right hip can be  done portable  on the floor.  COMPARISON: 12/16/2024       Impression    IMPRESSION: Postoperative changes of right hip hemiarthroplasty.  Postoperative soft tissue gas about the hip. Degenerative arthritis  pubic symphysis. Demineralization.    ORLANDO COVARRUBIAS MD         SYSTEM ID:  [id number]                                            I agree with the above readings.    X-rays done today showing no fracture no dislocation no tumor and no signs of loosening of the prosthesis.  X-rays are compared to the previous x-rays done on 12/17/2024 showing no changes.    Independent visualization of the images was performed.      Impression: 1.  22 days status post right hip hemiarthroplasty with direct anterior approach by Dr. Begum    FOCUSED PLAN:   Patient is doing very well and is not taking any pain medications but does take Tylenol as needed.  She states she has been able to ambulate with just her cane in her room but usually is using a walker at this point.  She is very happy she went to transitional care as they have helped her a lot with rehab.  Patient feels that she will be discharged in the next 2 days to home.  I ordered outpatient physical therapy to start when the in-home physical therapy is done.  Incision healing well and does not need a bandage anymore and can start massaging with vitamin E lotion in 1 week.  Everything written down for [facility] transitional care and sent back with them.  AVS printed for her.  Follow-up with myself in 3 weeks with x-ray.    Re-x-ray on return: Yes AP pelvis as well as AP and lateral of right hip.      This note was dictated with [employer].    Tyrone Baird PA-C                 Patient's blood pressure was elevated at time of visit today. She states that she usually has elevated blood pressures when she comes to the clinic and just had physical therapy this morning. She also states that she has been taking her Lisinopril as prescribed and readings have been normal at home. She  denies any signs or systems of stroke at this time. She will continue to monitor her blood pressure readings at home and will update her primary care provider if they remain elevated.     Kim Fitzpatrick RN   [facility]      Again, thank you for allowing me to participate in the care of your patient.        Sincerely,        Tyrone Baird PA-C    Electronically signed

## 2025-01-06 NOTE — PROGRESS NOTES
"Orthopedic Clinic Post-Operative Note    CHIEF COMPLAINT:   Chief Complaint   Patient presents with    Surgical Followup     displaced femoral neck fracture  - right Hip       HISTORY OF PRESENT ILLNESS  Location: Right hip  Rating of Pain: 0 out of 10   Pain is better with: No pain  Pain improving overall: No pain  Associated Features: Denies numbness or tingling shooting burning or electric pain.  Denies any problems with the incision.  Patient concerns: None, patient is doing very well and happy she went to transitional care and feels they helped her quite a bit.  Additional History: She had a right hip fracture and Dr. Begum on 12/16/2024 did a hemiarthroplasty on the patient's right hip with a direct anterior approach.  The plan was for the patient to discharge with her friend to be with her 24/7 by the name of Zeina but unfortunately prior to discharge Zeina was not able to do this.  Then we had to wait for placement.  She did discharge on 12/19/2024 to [facility].  Therapy has helped her quite a bit there.  She has been able to ambulate with just her cane in her room otherwise she uses a walker.    Patient's past medical, surgical, social and family histories reviewed.     REVIEW OF SYSTEMS  Review of systems negative other than positive findings in HPI.    Physical Exam:  Vitals: BP (!) 186/80 (BP Location: Left arm, Cuff Size: Adult Regular)   Pulse 86   Temp 97.1  F (36.2  C) (Temporal)   Ht 1.651 m (5' 5\")   Wt 57.6 kg (127 lb)   BMI 21.13 kg/m    BMI= Body mass index is 21.13 kg/m .  Constitutional: healthy, alert and no acute distress   Psychiatric: mentation appears normal and affect normal/bright  NEURO: no focal deficits, CMS intact right lower extremity   RESP: Normal with easy respirations and no use of accessory muscles to breathe, no audible wheezing or retractions  CV: Calf soft and nontender to palpation, leg warm   SKIN: Incision healing well with no erythema swelling or drainage and " incision does not open with gentle pressure.  The rest of the hip with no erythema, rashes, excoriation, or breakdown. No evidence of infection.   MUSCULOSKELETAL:  INSPECTION of right hip: No gross deformities, erythema, edema, ecchymosis, atrophy or fasciculations.   PALPATION: no tenderness over the lateral hip and greater trochanteric region, thigh or lower leg.   ROM: Passive: Flexion to 120 degrees, internal rotation to 25 degrees, external rotation to 70 degrees.  All range of motion without catching locking or pain.     STRENGTH: 5 out of 5 hip flexion, quad and hamstring without pain.   SPECIAL TEST: none  GAIT: Patient able to stand under her own power without any problems.  Did not assess her gait today.  Patient ambulating with walker.    Lymph: no palpable lymph nodes    Diagnostic Modalities:                                           XR Hip Right 2-3 Views    Narrative    XR HIP RIGHT 2-3 VIEWS   12/16/2024 12:30 PM     HISTORY: fall, right hip pain  COMPARISON: None.       Impression    IMPRESSION: Acute fracture of the femoral neck near the subcapital  region with anterior displacement distal fragment by approximately one  bone width. Diffuse bony demineralization. No right hip joint space  narrowing. Degenerative changes lumbar spine and right SI joint.    KARL CÁRDENAS MD         SYSTEM ID:  [id number]   XR Surgery MARIE L/T 5 Min Fluoro w Stills    Narrative    This exam was marked as non-reportable because it will not be read by a   radiologist or a [place] non-radiologist provider.         XR Pelvis w Hip Right 1 View    Narrative    XR PELVIS AND HIP RIGHT 1 VIEW   12/17/2024 7:47 AM     HISTORY: Postop x-rays AP pelvis and lateral of the right hip can be  done portable on the floor.  COMPARISON: 12/16/2024       Impression    IMPRESSION: Postoperative changes of right hip hemiarthroplasty.  Postoperative soft tissue gas about the hip. Degenerative arthritis  pubic symphysis.  Demineralization.    ORLANDO COVARRUBIAS MD         SYSTEM ID:  [id number]                                            I agree with the above readings.    X-rays done today showing no fracture no dislocation no tumor and no signs of loosening of the prosthesis.  X-rays are compared to the previous x-rays done on 12/17/2024 showing no changes.    Independent visualization of the images was performed.      Impression: 1.  22 days status post right hip hemiarthroplasty with direct anterior approach by Dr. Begum    FOCUSED PLAN:   Patient is doing very well and is not taking any pain medications but does take Tylenol as needed.  She states she has been able to ambulate with just her cane in her room but usually is using a walker at this point.  She is very happy she went to transitional care as they have helped her a lot with rehab.  Patient feels that she will be discharged in the next 2 days to home.  I ordered outpatient physical therapy to start when the in-home physical therapy is done.  Incision healing well and does not need a bandage anymore and can start massaging with vitamin E lotion in 1 week.  Everything written down for [facility] transitional care and sent back with them.  AVS printed for her.  Follow-up with myself in 3 weeks with x-ray.    Re-x-ray on return: Yes AP pelvis as well as AP and lateral of right hip.      This note was dictated with [facility] [employer].    Tyrone Baird PA-C

## 2025-01-06 NOTE — PROGRESS NOTES
Patient's blood pressure was elevated at time of visit today. She states that she usually has elevated blood pressures when she comes to the clinic and just had physical therapy this morning. She also states that she has been taking her Lisinopril as prescribed and readings have been normal at home. She denies any signs or systems of stroke at this time. She will continue to monitor her blood pressure readings at home and will update her primary care provider if they remain elevated.     Kim Fitzpatrick RN   [facility]

## 2025-01-10 ENCOUNTER — TELEPHONE (OUTPATIENT)
Dept: INTERNAL MEDICINE | Facility: CLINIC | Age: 89
End: 2025-01-10
Payer: COMMERCIAL

## 2025-01-10 NOTE — TELEPHONE ENCOUNTER
I approve of requested home care orders.    Ok to resume amlodipine and lisinopril.       Krish Bates MD

## 2025-01-10 NOTE — TELEPHONE ENCOUNTER
Home Care is calling regarding an established patient with [facility].       Requesting orders from: Krish Bates  Provider is following patient: Yes  Is this a 60-day recertification request?  No    Orders Requested    Skilled Nursing  Request for initial certification (first set of orders)   Frequency:  1x/wk for 3 wks  And then every other week for 4 weeks  2 PRN visits    Skilled nursing, post-right hip fracture with open reduction surgery, and hypertension    Hypertension Notes:  Blood pressure today: 162/68, asymptomatic  TCU had taken off of BP meds, put her back on Lisinopril, but did not resume Amlodipine. PCP to advise if needing to resume that as well?     Had also been taking Zolpidem 5mg at bedtime for sleep; was discontinued at [facility], okay to resume now?       Information was gathered and will be sent to provider for review.  RN will contact Home Care with information after provider review.  Confirmed ok to leave a detailed message with call back.  Contact information confirmed and updated as needed.    Disha Feliciano RN

## 2025-01-13 ENCOUNTER — TELEPHONE (OUTPATIENT)
Dept: INTERNAL MEDICINE | Facility: CLINIC | Age: 89
End: 2025-01-13
Payer: COMMERCIAL

## 2025-01-13 DIAGNOSIS — R30.0 DYSURIA: Primary | ICD-10-CM

## 2025-01-13 NOTE — TELEPHONE ENCOUNTER
Izzy from [facility] is calling regarding an established patient with [facility].       Requesting orders from: Krish Bates  Provider is following patient: Yes  Is this a 60-day recertification request?  No    Orders Requested  UA / UC  Izzy reports when talking with patient today, she reported low back pain, urine frequency and urgency. Izzy is requesting verbal orders for urine testing at this time.    Information was gathered and will be sent to provider for review.  RN will contact Home Care with information after provider review.  Confirmed ok to leave a detailed message with call back.  Contact information confirmed and updated as needed.    Kristine Cordova RN

## 2025-01-13 NOTE — TELEPHONE ENCOUNTER
Detailed approval left on confidential home care voicemail    Peyton Lopez RN on 1/13/2025 at 10:39 AM

## 2025-01-13 NOTE — TELEPHONE ENCOUNTER
Called and spoke with Izzy, with [facility].    Reviewed providers message below. Izzy verbalizes understanding.     Ana Montgomery RN on 1/13/2025 at 9:08 AM

## 2025-01-13 NOTE — TELEPHONE ENCOUNTER
MTM referral from: Transitions of Care (recent hospital discharge, TCU discharge, or ED visit)    MTM referral outreach attempt #1 on January 13, 2025 at 10:41 AM      Outcome: Patient is not interested at this time because she feels she has not questions about her medications.     Use vbc  for the carrier/Plan on the flowsheet      Ceci Sandoval CMA  MTM

## 2025-01-14 ENCOUNTER — LAB (OUTPATIENT)
Dept: LAB | Facility: CLINIC | Age: 89
End: 2025-01-14
Payer: COMMERCIAL

## 2025-01-14 DIAGNOSIS — R30.0 DYSURIA: ICD-10-CM

## 2025-01-14 LAB
ALBUMIN UR-MCNC: NEGATIVE MG/DL
APPEARANCE UR: CLEAR
BILIRUB UR QL STRIP: NEGATIVE
COLOR UR AUTO: NORMAL
GLUCOSE UR STRIP-MCNC: NEGATIVE MG/DL
HGB UR QL STRIP: NEGATIVE
KETONES UR STRIP-MCNC: NEGATIVE MG/DL
LEUKOCYTE ESTERASE UR QL STRIP: NEGATIVE
NITRATE UR QL: NEGATIVE
PH UR STRIP: 6.5 [PH] (ref 5–7)
SP GR UR STRIP: 1.01 (ref 1–1.03)
UROBILINOGEN UR STRIP-MCNC: NORMAL MG/DL

## 2025-01-14 PROCEDURE — 81003 URINALYSIS AUTO W/O SCOPE: CPT

## 2025-01-15 DIAGNOSIS — Z53.9 DIAGNOSIS NOT YET DEFINED: Primary | ICD-10-CM

## 2025-01-15 PROCEDURE — G0180 MD CERTIFICATION HHA PATIENT: HCPCS | Performed by: INTERNAL MEDICINE

## 2025-01-23 ENCOUNTER — NURSE TRIAGE (OUTPATIENT)
Dept: INTERNAL MEDICINE | Facility: CLINIC | Age: 89
End: 2025-01-23

## 2025-01-23 ENCOUNTER — OFFICE VISIT (OUTPATIENT)
Dept: INTERNAL MEDICINE | Facility: CLINIC | Age: 89
End: 2025-01-23
Payer: COMMERCIAL

## 2025-01-23 VITALS
OXYGEN SATURATION: 100 % | HEIGHT: 65 IN | BODY MASS INDEX: 19.99 KG/M2 | WEIGHT: 120 LBS | SYSTOLIC BLOOD PRESSURE: 138 MMHG | RESPIRATION RATE: 20 BRPM | HEART RATE: 74 BPM | DIASTOLIC BLOOD PRESSURE: 85 MMHG | TEMPERATURE: 97.7 F

## 2025-01-23 DIAGNOSIS — N30.00 ACUTE CYSTITIS WITHOUT HEMATURIA: Primary | ICD-10-CM

## 2025-01-23 RX ORDER — AMOXICILLIN 500 MG/1
500 CAPSULE ORAL 3 TIMES DAILY
Qty: 21 CAPSULE | Refills: 0 | Status: SHIPPED | OUTPATIENT
Start: 2025-01-23

## 2025-01-23 ASSESSMENT — PAIN SCALES - GENERAL: PAINLEVEL_OUTOF10: NO PAIN (0)

## 2025-01-23 NOTE — PROGRESS NOTES
Lyric Ventura is a 89 year old, presenting for the following health issues:  UTI (States unable to empty bladder with a dull pain while urination, last night had back pain)      1/23/2025    10:18 AM   Additional Questions   Roomed by Angelique     History of Present Illness       Reason for visit:  Possibly UTI    She eats 2-3 servings of fruits and vegetables daily.She consumes 1 sweetened beverage(s) daily.She exercises with enough effort to increase her heart rate 9 or less minutes per day.  She exercises with enough effort to increase her heart rate 3 or less days per week.   She is taking medications regularly.              EMR reviewed including:             Complaint, History of Chief Complaint, Corresponding Review of Systems, and Complaint Specific Physical Examination.    #1   Frequency, urgency and dysuria.  Had a urinary analysis performed last week and it was negative.  Starting last night and this morning, symptoms have worsened significantly.  Has suprapubic tenderness.  Has dysuria with urination.  Has some associated low back pain.  Denies flank pain or fever at this time.  Notes that urine is now becoming dark-colored and odorous.          Exam:   URINARY: Hua's punch is negative.  Moderate suprapubic tenderness is noted with palpation.   GI: Abdomen is soft, without rebound, guarding or tenderness. Bowel sounds are appropriate. No renal bruits are heard.   LUNGS: clear bilaterally, airflow is brisk, no intercostal retraction or stridor is noted. No coughing is noted during visit.   HEART:  regular without rubs, clicks, gallops, or murmurs. PMI is nondisplaced. Upstrokes are brisk. S1,S2 are heard.   Constitutional: The patient appears to be in no acute distress. The patient appears to be adequately hydrated. No acute respiratory or hemodynamic distress is noted at this time.        Patient has been interviewed, applicable history and applied review of systems have been performed.    Vital  "Signs:   /85   Pulse 74   Temp 97.7  F (36.5  C) (Temporal)   Resp 20   Ht 1.651 m (5' 5\")   Wt 54.4 kg (120 lb)   SpO2 100%   BMI 19.97 kg/m        Decision Making    Problem and Complexity     1. Acute cystitis without hematuria (Primary)  Patient notes that she has had allergic reactions to previous antibiotics and only wants amoxicillin.  Will obtain urine sample for culture and sensitivity.  Based on her specific symptoms, will initiate antibiotic therapy immediately.  Recommend dilution with increased fluid intake.  Tylenol for discomfort.  - amoxicillin (AMOXIL) 500 MG capsule; Take 1 capsule (500 mg) by mouth 3 times daily.  Dispense: 21 capsule; Refill: 0  - UA Macroscopic with reflex to Microscopic and Culture - Lab Collect; Future  - UA Macroscopic with reflex to Microscopic and Culture - Lab Collect                                FOLLOW UP   I have asked the patient to make an appointment for followup with Dr. Bates Monday as scheduled for her posthospital follow-up.    Regarding routine vaccinations:  I have reviewed the patient's vaccination schedule and discussed the benefits of prophylactic vaccination in detail.  I recommend the patient contact their pharmacist for vaccinations.  Discussed that most insurance companies now favor reimbursement to the pharmacies and it will financially behoove the patient to have vaccinations performed at their pharmacy.        I have carefully explained the diagnosis and treatment options to the patient.  The patient has displayed an understanding of the above, and all subsequent questions were answered.      DO DAVID Newsome    Portions of this note were produced using [employer]  Although every attempt at real-time proof reading has been made, occasional grammar/syntax errors may have been missed.    "

## 2025-01-23 NOTE — TELEPHONE ENCOUNTER
Nurse Triage SBAR    Is this a 2nd Level Triage? NO    Situation: Patient calling requesting orders for UA due to symptoms.    Background: Recent hip fracture. Thought she had UTI last week but UA was clear.     Assessment: Patient woke up in the middle of the night to urinate. Reports pain with urination, difficulty starting urine stream. Low back pain present.   Denies hematuria or fever.    Protocol Recommended Disposition:   Go To Office Now    Recommendation: Appointment scheduled to be seen this morning.   Appointments in Next Year      Jan 23, 2025 10:40 AM  (Arrive by 10:20 AM)  Provider Visit with Javier Sampson DO  [facility] ([facility] ) [phone]     Shannan VARGASN, RN     Reason for Disposition   Side (flank) or lower back pain present    Additional Information   Negative: Shock suspected (e.g., cold/pale/clammy skin, too weak to stand, low BP, rapid pulse)   Negative: Sounds like a life-threatening emergency to the triager   Negative: Followed a genital area injury (e.g., vagina, vulva)   Negative: Taking antibiotic for urinary tract infection (UTI)   Negative: Pregnant   Negative: Unable to urinate (or only a few drops) and bladder feels very full   Negative: Vomiting   Negative: Patient sounds very sick or weak to the triager   Negative: Severe pain with urination   Negative: Fever > 100.4 F (38.0 C)    Protocols used: Urination Pain - Female-A-OH

## 2025-01-24 ENCOUNTER — TELEPHONE (OUTPATIENT)
Dept: INTERNAL MEDICINE | Facility: CLINIC | Age: 89
End: 2025-01-24

## 2025-01-24 NOTE — TELEPHONE ENCOUNTER
Patient has been notified and states she has been taking Amoxicillin and has not been feeling well on it. She is going to see how she feels. Offered to transferred to RN line - no nurses are in at site. Patient states she is going to call back and also has a upcoming appt Monday with Dr.Larson Cara Sanabria CNA

## 2025-01-24 NOTE — TELEPHONE ENCOUNTER
----- Message from Javier Sampson sent at 1/24/2025  2:37 PM CST -----  Dear Audrey, your recent test results are attached.    Urinary analysis shows no outstanding irregularities.      You will be contacted with any outstanding results as they are available.  Feel free to contact me via the office or My Chart if you have any questions regarding the above.

## 2025-01-27 ENCOUNTER — OFFICE VISIT (OUTPATIENT)
Dept: INTERNAL MEDICINE | Facility: CLINIC | Age: 89
End: 2025-01-27
Payer: COMMERCIAL

## 2025-01-27 VITALS
WEIGHT: 125.5 LBS | RESPIRATION RATE: 20 BRPM | TEMPERATURE: 96.9 F | OXYGEN SATURATION: 99 % | DIASTOLIC BLOOD PRESSURE: 82 MMHG | BODY MASS INDEX: 20.91 KG/M2 | HEART RATE: 78 BPM | SYSTOLIC BLOOD PRESSURE: 135 MMHG | HEIGHT: 65 IN

## 2025-01-27 DIAGNOSIS — R53.83 FATIGUE, UNSPECIFIED TYPE: ICD-10-CM

## 2025-01-27 DIAGNOSIS — Z87.81 S/P RIGHT HIP FRACTURE: Primary | ICD-10-CM

## 2025-01-27 DIAGNOSIS — Z78.0 ASYMPTOMATIC POSTMENOPAUSAL STATUS: ICD-10-CM

## 2025-01-27 DIAGNOSIS — R42 LIGHTHEADEDNESS: ICD-10-CM

## 2025-01-27 DIAGNOSIS — I10 BENIGN ESSENTIAL HYPERTENSION: ICD-10-CM

## 2025-01-27 LAB
ERYTHROCYTE [DISTWIDTH] IN BLOOD BY AUTOMATED COUNT: 13.6 % (ref 10–15)
HCT VFR BLD AUTO: 38.3 % (ref 35–47)
HGB BLD-MCNC: 13 G/DL (ref 11.7–15.7)
MCH RBC QN AUTO: 31.5 PG (ref 26.5–33)
MCHC RBC AUTO-ENTMCNC: 33.9 G/DL (ref 31.5–36.5)
MCV RBC AUTO: 93 FL (ref 78–100)
PLATELET # BLD AUTO: 265 10E3/UL (ref 150–450)
RBC # BLD AUTO: 4.13 10E6/UL (ref 3.8–5.2)
TSH SERPL DL<=0.005 MIU/L-ACNC: 3.1 UIU/ML (ref 0.3–4.2)
WBC # BLD AUTO: 10.2 10E3/UL (ref 4–11)

## 2025-01-27 PROCEDURE — 36415 COLL VENOUS BLD VENIPUNCTURE: CPT | Performed by: INTERNAL MEDICINE

## 2025-01-27 PROCEDURE — 85027 COMPLETE CBC AUTOMATED: CPT | Performed by: INTERNAL MEDICINE

## 2025-01-27 PROCEDURE — 99214 OFFICE O/P EST MOD 30 MIN: CPT | Performed by: INTERNAL MEDICINE

## 2025-01-27 PROCEDURE — 84443 ASSAY THYROID STIM HORMONE: CPT | Performed by: INTERNAL MEDICINE

## 2025-01-27 PROCEDURE — G2211 COMPLEX E/M VISIT ADD ON: HCPCS | Performed by: INTERNAL MEDICINE

## 2025-01-27 RX ORDER — AMLODIPINE BESYLATE 5 MG/1
5 TABLET ORAL DAILY
COMMUNITY
Start: 2025-01-27

## 2025-01-27 ASSESSMENT — PAIN SCALES - GENERAL: PAINLEVEL_OUTOF10: NO PAIN (0)

## 2025-01-29 DIAGNOSIS — F51.01 PRIMARY INSOMNIA: ICD-10-CM

## 2025-01-29 RX ORDER — ZOLPIDEM TARTRATE 5 MG/1
TABLET ORAL
Qty: 15 TABLET | Refills: 0 | Status: SHIPPED | OUTPATIENT
Start: 2025-01-29

## 2025-02-18 ENCOUNTER — TELEPHONE (OUTPATIENT)
Dept: CARDIOLOGY | Facility: CLINIC | Age: 89
End: 2025-02-18
Payer: COMMERCIAL

## 2025-02-18 NOTE — TELEPHONE ENCOUNTER
"Patient is a former Dr. Jama and Brooklyn Walker NP (both providers no longer work within [facility]) patient. Patient is scheduled for future visit with Blanche Florence NP on 3/26/25 at 1:20pm here in [place] but has not met provider yet.     Patient said she can't sleep, she is very lightheaded, weak, groggy from not sleeping, in the mornings when she wakes up. When she is walking around feels like she is going to faint.   Neck pounds on both sides and then goes up to both sides of her head, thinks this is why she can't sleep.   When she take half tab (2.5mg) of Ambien gets about 4 hours of sleep, unable to fall asleep during the day. Patient has slept tried sleepy time tea and melatonin but it hasn't helped.     Patient is still having diarrhea, was taking metamucil and then her stool was \"fluffly\" so stopped then the diarrhea came back. She took imodium this morning. Started drinking lemon water yesterday and today. Patient said she hasn't eaten because she was told not eat for 24 hours when diarrhea came back. Instructed patient it is okay to eat, stick with B.R.A.T diet and to stay hydrated with some Gatorade or electrolyte drink.     Blood pressure readings - checks everyday  2/18/25 morning reading 174/88, pulse 89   179/86, 88   177/88, 90     Patient takes lisinopril 5mg BID and amlodipine 5mg daily.      Carotid US bilateral 2/6/25 - Ordered by PCP Dr. Bates  Last Echocardiogram 12/17/24 - done while inpatient for fall    Will ask Dr. Bates for further recommendations, he last met with patient on 1/27/25 for these same issues. If cardiology should need to be involved will route to on call cardiologist.         Patient's last cardiology visit with Dr. Jama on 4/24/24:  ASSESSMENT:     Paroxysmal atrial fibrillation  Continues to decline anticoagulation, confirmed again in clinic today.  She has also declined Watchman  Most recent device notes frequent mode switches; controlled ventricular " rates  Has not required rate control; metoprolol resulted in hypotensoin     Hypertension                   Mildly elevated in clinic; reports BP at goal at home on lisinopril     Sick sinus syndrome  Status post permanent pacemaker in 2011  Follows with device clinic; normal device functoin     4.  Small to moderate pericardial effusion:  Incidental finding, stable     5.   Chest pain:  Resolved.  Coronary CTA without obstructive CAD     6.  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  No symptoms attributable to valve disease, euvolemic        RECOMMENDATIONS:  -Audrey is doing very well from a cardiac standpoint and remains very active for her age.  She will check her BP at home over the next week and contact my office with the readings  -Plan for follow up in one year with Brooklyn Jama MD [facility]  April 24, 2024

## 2025-02-18 NOTE — TELEPHONE ENCOUNTER
[facility] Call Center    Phone Message    May a detailed message be left on voicemail: yes    Reason for Call: Patient called requesting to speak with Blanche in regards to her health concerns. Please call back to address.    Thank you!  [facility]

## 2025-02-18 NOTE — TELEPHONE ENCOUNTER
Called and updated patient on Dr. Bates's recommendations. Scheduled her for follow up on 2/28 at 9:20AM.    Patient will call back with any worsening symptoms or further concerns.    Cassandra Justin RN on 2/18/2025 at 2:29 PM

## 2025-02-18 NOTE — TELEPHONE ENCOUNTER
She can try doubling her lisinopril to 10 mg bid for bp control.   Also try the metamucil again to help stools.     Follow up in clinic

## 2025-02-26 ENCOUNTER — TELEPHONE (OUTPATIENT)
Dept: INTERNAL MEDICINE | Facility: CLINIC | Age: 89
End: 2025-02-26
Payer: COMMERCIAL

## 2025-02-26 NOTE — TELEPHONE ENCOUNTER
LUISA Jaime with [facility] called with an update for Dr. Bates.    She states they were discharging her today because patient is getting around well, back to driving. But she is still having issues with her blood pressure. She states patient does have an upcoming appointment on Friday with Dr. Bates and plans to discuss options then.    She states patient stopped her amlodipine 10 days ago due to swelling in her legs. She states patient had 1+ pitting edema BLE that is now almost resolved, just has 1+ pitting edema on top of left foot now.     She states patient reported to her that she is only taking lisinopril in the morning because she feels dizzy and lightheaded if she takes it at night. She states patient reported she feels much better with only morning dosing, but her BP is high. LUISA Jaime states her BP today was 162/88. She states patient has no symptoms.     She states other updates include patient is still having diarrhea and not sleeping well, but these are ongoing issues. She states patient is taking metamucil as directed, this helps a little bit but she still not having formed stool, stool is soft.     Routing to nurse [place] to reach out to patient to offer triage for high blood pressure before routing update to Dr. Bates.     SAM GarrisonN, RN

## 2025-02-26 NOTE — TELEPHONE ENCOUNTER
"Called and spoke with patient. Patient declined RN triage at this time. Patient states she feels \"fine\" she has just been having some higher BP readings. \"Typically around 164\" SBP. Patient denies HA, vision changes, dizziness, CP, SOB. Patient states she has been keeping a log of BP readings and plans to bring that with her to her apt with PCP on Friday. Reviewed arrival time. Writer did review red flag symptoms with patient. Did advise to call back with questions or concerns. Did advise ER for red flag symptoms. Patient verbalizes understanding and denies further questions at time of call.     Ana Montgomery RN on 2/26/2025 at 12:45 PM    "

## 2025-02-27 NOTE — TELEPHONE ENCOUNTER
Per screen shot pasted below- provider opened and doned this message 2/26/25. Closing encounter.     Maria Victoria Luevano, BSN, RN

## 2025-03-04 ENCOUNTER — NURSE TRIAGE (OUTPATIENT)
Dept: INTERNAL MEDICINE | Facility: CLINIC | Age: 89
End: 2025-03-04
Payer: COMMERCIAL

## 2025-03-04 NOTE — TELEPHONE ENCOUNTER
Writer spoke with patient and relayed providers update. Verbalized understanding. No further questions.     Cyril Beauchamp RN on 3/4/2025 at 2:08 PM

## 2025-03-04 NOTE — PROGRESS NOTES
"Late entry: 3/3/2025    [facility] remote PPM check from 12/16/2024, pt hospitalized for syncope and right hip fracture    [employer] Kristy (D) PPM  Presenting rhythm: A tach with  and VS  Battery: 12.2 yrs estimated longevity   Lead measurements stale   Mode: AAIR-->DDDR   AP: 51%  : 53%  180 atrial episodes since last remote on 11/18/2024. Available EGMs show a-tach/aflutter. Hospital discharge note says pt not on AC, does not superficially say why not: \"History of atrial fibrillation, not on anticoagulation\"    Pt has OV with Blanche XIAO 3/26/2026. She is also due for a device check, order is in, not yet scheduled. Message sent to scheduling in [place] to set this up.   "

## 2025-03-04 NOTE — TELEPHONE ENCOUNTER
"Nurse Triage SBAR    Is this a 2nd Level Triage? YES, LICENSED PRACTITIONER REVIEW IS REQUIRED    Situation: Patient reports BP has been elevated the past few days since starting the new medication, Losartan    Background: Hx of HTN    Assessment: Patient reports her BP has been elevated. Most recent readings: 164/77, 186/92, 202/90 (after her morning exercise) and currently 178/86. She said yesterday she was feeling light headed and had to leave work early. Today she is not having any symptoms.    Protocol Recommended Disposition:   See in Office Today    Recommendation: Patient is asking if she can change back to Lisinopril as she feels that was a better fit for her. She said she will \"deal with the cough side effect.\"  Do you want to see her in clinic? Last visit was 2/28/25    Routed to provider    Does the patient meet one of the following criteria for ADS visit consideration? No    Cassandra Justin RN on 3/4/2025 at 12:53 PM        Reason for Disposition   Systolic BP >= 180 OR Diastolic >= 110    Additional Information   Negative: Sounds like a life-threatening emergency to the triager   Negative: Symptom is main concern (e.g., headache, chest pain)   Negative: Low blood pressure is main concern   Negative: Systolic BP >= 160 OR Diastolic >= 100, and any cardiac (e.g., breathing difficulty, chest pain) or neurologic symptoms (e.g., new-onset blurred or double vision)   Negative: Pregnant 20 or more weeks (or postpartum < 6 weeks) with new hand or face swelling   Negative: Pregnant 20 or more weeks (or postpartum < 6 weeks) and Systolic BP >= 160 OR Diastolic >= 110   Negative: Patient sounds very sick or weak to the triager   Negative: Systolic BP >= 200 OR Diastolic >= 120 and having NO cardiac or neurologic symptoms   Negative: Pregnant 20 or more weeks (or postpartum < 6 weeks) with Systolic BP >= 140 OR Diastolic >= 90   Negative: Systolic BP >= 180 OR Diastolic >= 110, and missed most recent dose of blood " pressure medication    Protocols used: Blood Pressure - High-A-OH

## 2025-03-04 NOTE — TELEPHONE ENCOUNTER
I would recommend she double up the losartan from 50 mg to 100 mg for the next 3 days and see if her blood pressure is better.  If not she can go back to the lisinopril.

## 2025-03-06 ENCOUNTER — ANCILLARY PROCEDURE (OUTPATIENT)
Dept: CARDIOLOGY | Facility: CLINIC | Age: 89
End: 2025-03-06
Attending: INTERNAL MEDICINE
Payer: COMMERCIAL

## 2025-03-06 DIAGNOSIS — I49.5 SICK SINUS SYNDROME (H): ICD-10-CM

## 2025-03-06 DIAGNOSIS — Z95.0 CARDIAC PACEMAKER IN SITU: ICD-10-CM

## 2025-03-06 LAB
MDC_IDC_EPISODE_DTM: NORMAL
MDC_IDC_EPISODE_DURATION: 1438 S
MDC_IDC_EPISODE_DURATION: 180 S
MDC_IDC_EPISODE_DURATION: 181 S
MDC_IDC_EPISODE_DURATION: 182 S
MDC_IDC_EPISODE_DURATION: 183 S
MDC_IDC_EPISODE_DURATION: 183 S
MDC_IDC_EPISODE_DURATION: 184 S
MDC_IDC_EPISODE_DURATION: 1996 S
MDC_IDC_EPISODE_DURATION: 2304 S
MDC_IDC_EPISODE_DURATION: 242 S
MDC_IDC_EPISODE_DURATION: 27 S
MDC_IDC_EPISODE_DURATION: 288 S
MDC_IDC_EPISODE_DURATION: 301 S
MDC_IDC_EPISODE_DURATION: 3273 S
MDC_IDC_EPISODE_DURATION: 344 S
MDC_IDC_EPISODE_DURATION: 3936 S
MDC_IDC_EPISODE_DURATION: 44 S
MDC_IDC_EPISODE_DURATION: 463 S
MDC_IDC_EPISODE_DURATION: 468 S
MDC_IDC_EPISODE_DURATION: 470 S
MDC_IDC_EPISODE_DURATION: 4787 S
MDC_IDC_EPISODE_DURATION: 565 S
MDC_IDC_EPISODE_DURATION: 661 S
MDC_IDC_EPISODE_DURATION: 7731 S
MDC_IDC_EPISODE_DURATION: 800 S
MDC_IDC_EPISODE_DURATION: NORMAL S
MDC_IDC_EPISODE_ID: 1433
MDC_IDC_EPISODE_ID: 1434
MDC_IDC_EPISODE_ID: 1435
MDC_IDC_EPISODE_ID: 1436
MDC_IDC_EPISODE_ID: 1437
MDC_IDC_EPISODE_ID: 1438
MDC_IDC_EPISODE_ID: 1439
MDC_IDC_EPISODE_ID: 1440
MDC_IDC_EPISODE_ID: 1441
MDC_IDC_EPISODE_ID: 1442
MDC_IDC_EPISODE_ID: 1443
MDC_IDC_EPISODE_ID: 1444
MDC_IDC_EPISODE_ID: 1445
MDC_IDC_EPISODE_ID: 1446
MDC_IDC_EPISODE_ID: 1447
MDC_IDC_EPISODE_ID: 1448
MDC_IDC_EPISODE_ID: 1449
MDC_IDC_EPISODE_ID: 1450
MDC_IDC_EPISODE_ID: 1451
MDC_IDC_EPISODE_ID: 1452
MDC_IDC_EPISODE_ID: 1453
MDC_IDC_EPISODE_ID: 1454
MDC_IDC_EPISODE_ID: 1455
MDC_IDC_EPISODE_ID: 1456
MDC_IDC_EPISODE_ID: 1457
MDC_IDC_EPISODE_ID: 1458
MDC_IDC_EPISODE_ID: 1459
MDC_IDC_EPISODE_ID: 1460
MDC_IDC_EPISODE_ID: 1461
MDC_IDC_EPISODE_ID: 1462
MDC_IDC_EPISODE_ID: 1463
MDC_IDC_EPISODE_ID: 1464
MDC_IDC_EPISODE_ID: 1465
MDC_IDC_EPISODE_ID: 1466
MDC_IDC_EPISODE_ID: 1467
MDC_IDC_EPISODE_ID: 1468
MDC_IDC_EPISODE_ID: 1469
MDC_IDC_EPISODE_ID: 1470
MDC_IDC_EPISODE_ID: 1471
MDC_IDC_EPISODE_ID: 1472
MDC_IDC_EPISODE_ID: 1473
MDC_IDC_EPISODE_ID: 1474
MDC_IDC_EPISODE_ID: 1475
MDC_IDC_EPISODE_ID: 1476
MDC_IDC_EPISODE_ID: 1477
MDC_IDC_EPISODE_ID: 1478
MDC_IDC_EPISODE_ID: 1479
MDC_IDC_EPISODE_ID: 1480
MDC_IDC_EPISODE_ID: 1481
MDC_IDC_EPISODE_ID: 1482
MDC_IDC_EPISODE_ID: 1483
MDC_IDC_EPISODE_TYPE: NORMAL
MDC_IDC_EPISODE_TYPE_INDUCED: NO
MDC_IDC_LEAD_CONNECTION_STATUS: NORMAL
MDC_IDC_LEAD_CONNECTION_STATUS: NORMAL
MDC_IDC_LEAD_IMPLANT_DT: NORMAL
MDC_IDC_LEAD_IMPLANT_DT: NORMAL
MDC_IDC_LEAD_LOCATION: NORMAL
MDC_IDC_LEAD_LOCATION: NORMAL
MDC_IDC_LEAD_LOCATION_DETAIL_1: NORMAL
MDC_IDC_LEAD_LOCATION_DETAIL_1: NORMAL
MDC_IDC_LEAD_MFG: NORMAL
MDC_IDC_LEAD_MFG: NORMAL
MDC_IDC_LEAD_MODEL: NORMAL
MDC_IDC_LEAD_MODEL: NORMAL
MDC_IDC_LEAD_POLARITY_TYPE: NORMAL
MDC_IDC_LEAD_POLARITY_TYPE: NORMAL
MDC_IDC_LEAD_SERIAL: NORMAL
MDC_IDC_LEAD_SERIAL: NORMAL
MDC_IDC_MSMT_BATTERY_DTM: NORMAL
MDC_IDC_MSMT_BATTERY_REMAINING_LONGEVITY: 145 MO
MDC_IDC_MSMT_BATTERY_RRT_TRIGGER: 2.62
MDC_IDC_MSMT_BATTERY_STATUS: NORMAL
MDC_IDC_MSMT_BATTERY_VOLTAGE: 3.05 V
MDC_IDC_MSMT_LEADCHNL_RA_IMPEDANCE_VALUE: 285 OHM
MDC_IDC_MSMT_LEADCHNL_RA_IMPEDANCE_VALUE: 399 OHM
MDC_IDC_MSMT_LEADCHNL_RA_PACING_THRESHOLD_AMPLITUDE: 0.5 V
MDC_IDC_MSMT_LEADCHNL_RA_PACING_THRESHOLD_PULSEWIDTH: 0.4 MS
MDC_IDC_MSMT_LEADCHNL_RA_SENSING_INTR_AMPL: 0.38 MV
MDC_IDC_MSMT_LEADCHNL_RA_SENSING_INTR_AMPL: 0.5 MV
MDC_IDC_MSMT_LEADCHNL_RV_IMPEDANCE_VALUE: 361 OHM
MDC_IDC_MSMT_LEADCHNL_RV_IMPEDANCE_VALUE: 456 OHM
MDC_IDC_MSMT_LEADCHNL_RV_PACING_THRESHOLD_AMPLITUDE: 0.5 V
MDC_IDC_MSMT_LEADCHNL_RV_PACING_THRESHOLD_PULSEWIDTH: 0.4 MS
MDC_IDC_MSMT_LEADCHNL_RV_SENSING_INTR_AMPL: 3.62 MV
MDC_IDC_MSMT_LEADCHNL_RV_SENSING_INTR_AMPL: 3.75 MV
MDC_IDC_PG_IMPLANT_DTM: NORMAL
MDC_IDC_PG_MFG: NORMAL
MDC_IDC_PG_MODEL: NORMAL
MDC_IDC_PG_SERIAL: NORMAL
MDC_IDC_PG_TYPE: NORMAL
MDC_IDC_SESS_CLINIC_NAME: NORMAL
MDC_IDC_SESS_DTM: NORMAL
MDC_IDC_SESS_TYPE: NORMAL
MDC_IDC_SET_BRADY_AT_MODE_SWITCH_RATE: 171 {BEATS}/MIN
MDC_IDC_SET_BRADY_HYSTRATE: NORMAL
MDC_IDC_SET_BRADY_LOWRATE: 60 {BEATS}/MIN
MDC_IDC_SET_BRADY_MAX_SENSOR_RATE: 130 {BEATS}/MIN
MDC_IDC_SET_BRADY_MAX_TRACKING_RATE: 130 {BEATS}/MIN
MDC_IDC_SET_BRADY_MODE: NORMAL
MDC_IDC_SET_BRADY_PAV_DELAY_LOW: 180 MS
MDC_IDC_SET_BRADY_SAV_DELAY_LOW: 150 MS
MDC_IDC_SET_LEADCHNL_RA_PACING_AMPLITUDE: 1.5 V
MDC_IDC_SET_LEADCHNL_RA_PACING_ANODE_ELECTRODE_1: NORMAL
MDC_IDC_SET_LEADCHNL_RA_PACING_ANODE_LOCATION_1: NORMAL
MDC_IDC_SET_LEADCHNL_RA_PACING_CAPTURE_MODE: NORMAL
MDC_IDC_SET_LEADCHNL_RA_PACING_CATHODE_ELECTRODE_1: NORMAL
MDC_IDC_SET_LEADCHNL_RA_PACING_CATHODE_LOCATION_1: NORMAL
MDC_IDC_SET_LEADCHNL_RA_PACING_POLARITY: NORMAL
MDC_IDC_SET_LEADCHNL_RA_PACING_PULSEWIDTH: 0.4 MS
MDC_IDC_SET_LEADCHNL_RA_SENSING_ANODE_ELECTRODE_1: NORMAL
MDC_IDC_SET_LEADCHNL_RA_SENSING_ANODE_LOCATION_1: NORMAL
MDC_IDC_SET_LEADCHNL_RA_SENSING_CATHODE_ELECTRODE_1: NORMAL
MDC_IDC_SET_LEADCHNL_RA_SENSING_CATHODE_LOCATION_1: NORMAL
MDC_IDC_SET_LEADCHNL_RA_SENSING_POLARITY: NORMAL
MDC_IDC_SET_LEADCHNL_RA_SENSING_SENSITIVITY: 0.3 MV
MDC_IDC_SET_LEADCHNL_RV_PACING_AMPLITUDE: 2 V
MDC_IDC_SET_LEADCHNL_RV_PACING_ANODE_ELECTRODE_1: NORMAL
MDC_IDC_SET_LEADCHNL_RV_PACING_ANODE_LOCATION_1: NORMAL
MDC_IDC_SET_LEADCHNL_RV_PACING_CAPTURE_MODE: NORMAL
MDC_IDC_SET_LEADCHNL_RV_PACING_CATHODE_ELECTRODE_1: NORMAL
MDC_IDC_SET_LEADCHNL_RV_PACING_CATHODE_LOCATION_1: NORMAL
MDC_IDC_SET_LEADCHNL_RV_PACING_POLARITY: NORMAL
MDC_IDC_SET_LEADCHNL_RV_PACING_PULSEWIDTH: 0.4 MS
MDC_IDC_SET_LEADCHNL_RV_SENSING_ANODE_ELECTRODE_1: NORMAL
MDC_IDC_SET_LEADCHNL_RV_SENSING_ANODE_LOCATION_1: NORMAL
MDC_IDC_SET_LEADCHNL_RV_SENSING_CATHODE_ELECTRODE_1: NORMAL
MDC_IDC_SET_LEADCHNL_RV_SENSING_CATHODE_LOCATION_1: NORMAL
MDC_IDC_SET_LEADCHNL_RV_SENSING_POLARITY: NORMAL
MDC_IDC_SET_LEADCHNL_RV_SENSING_SENSITIVITY: 0.9 MV
MDC_IDC_SET_ZONE_DETECTION_INTERVAL: 350 MS
MDC_IDC_SET_ZONE_DETECTION_INTERVAL: 400 MS
MDC_IDC_SET_ZONE_STATUS: NORMAL
MDC_IDC_SET_ZONE_STATUS: NORMAL
MDC_IDC_SET_ZONE_TYPE: NORMAL
MDC_IDC_SET_ZONE_VENDOR_TYPE: NORMAL
MDC_IDC_STAT_AT_BURDEN_PERCENT: 88.4 %
MDC_IDC_STAT_AT_DTM_END: NORMAL
MDC_IDC_STAT_AT_DTM_START: NORMAL
MDC_IDC_STAT_BRADY_AP_VP_PERCENT: 7.71 %
MDC_IDC_STAT_BRADY_AP_VS_PERCENT: 11.33 %
MDC_IDC_STAT_BRADY_AS_VP_PERCENT: 17.2 %
MDC_IDC_STAT_BRADY_AS_VS_PERCENT: 63.48 %
MDC_IDC_STAT_BRADY_DTM_END: NORMAL
MDC_IDC_STAT_BRADY_DTM_START: NORMAL
MDC_IDC_STAT_BRADY_RA_PERCENT_PACED: 6.84 %
MDC_IDC_STAT_BRADY_RV_PERCENT_PACED: 74.78 %
MDC_IDC_STAT_EPISODE_RECENT_COUNT: 0
MDC_IDC_STAT_EPISODE_RECENT_COUNT: 156
MDC_IDC_STAT_EPISODE_RECENT_COUNT_DTM_END: NORMAL
MDC_IDC_STAT_EPISODE_RECENT_COUNT_DTM_START: NORMAL
MDC_IDC_STAT_EPISODE_TOTAL_COUNT: 0
MDC_IDC_STAT_EPISODE_TOTAL_COUNT: 1483
MDC_IDC_STAT_EPISODE_TOTAL_COUNT_DTM_END: NORMAL
MDC_IDC_STAT_EPISODE_TOTAL_COUNT_DTM_START: NORMAL
MDC_IDC_STAT_EPISODE_TYPE: NORMAL
MDC_IDC_STAT_TACHYTHERAPY_RECENT_DTM_END: NORMAL
MDC_IDC_STAT_TACHYTHERAPY_RECENT_DTM_START: NORMAL
MDC_IDC_STAT_TACHYTHERAPY_TOTAL_DTM_END: NORMAL
MDC_IDC_STAT_TACHYTHERAPY_TOTAL_DTM_START: NORMAL

## 2025-03-06 PROCEDURE — 93280 PM DEVICE PROGR EVAL DUAL: CPT | Performed by: INTERNAL MEDICINE

## 2025-03-07 ENCOUNTER — HOSPITAL ENCOUNTER (EMERGENCY)
Facility: CLINIC | Age: 89
Discharge: HOME | End: 2025-03-07
Attending: EMERGENCY MEDICINE | Admitting: EMERGENCY MEDICINE
Payer: MEDICARE

## 2025-03-07 VITALS
HEART RATE: 60 BPM | RESPIRATION RATE: 10 BRPM | OXYGEN SATURATION: 99 % | HEIGHT: 65 IN | DIASTOLIC BLOOD PRESSURE: 84 MMHG | WEIGHT: 121 LBS | BODY MASS INDEX: 20.16 KG/M2 | TEMPERATURE: 96.6 F | SYSTOLIC BLOOD PRESSURE: 161 MMHG

## 2025-03-07 DIAGNOSIS — R22.0 LIP SWELLING: ICD-10-CM

## 2025-03-07 DIAGNOSIS — T78.3XXA ANGIOTENSIN CONVERTING ENZYME INHIBITOR-AGGRAVATED ANGIOEDEMA, INITIAL ENCOUNTER: ICD-10-CM

## 2025-03-07 DIAGNOSIS — I10 HYPERTENSION GOAL BP (BLOOD PRESSURE) < 140/90: ICD-10-CM

## 2025-03-07 DIAGNOSIS — T46.4X5A ANGIOTENSIN CONVERTING ENZYME INHIBITOR-AGGRAVATED ANGIOEDEMA, INITIAL ENCOUNTER: ICD-10-CM

## 2025-03-07 LAB
ALBUMIN SERPL BCG-MCNC: 4.2 G/DL (ref 3.5–5.2)
ALP SERPL-CCNC: 87 U/L (ref 40–150)
ALT SERPL W P-5'-P-CCNC: 23 U/L (ref 0–50)
ANION GAP SERPL CALCULATED.3IONS-SCNC: 13 MMOL/L (ref 7–15)
AST SERPL W P-5'-P-CCNC: 25 U/L (ref 0–45)
ATRIAL RATE - MUSE: NORMAL BPM
BASOPHILS # BLD AUTO: 0 10E3/UL (ref 0–0.2)
BASOPHILS NFR BLD AUTO: 0 %
BILIRUB SERPL-MCNC: 1 MG/DL
BUN SERPL-MCNC: 15.8 MG/DL (ref 8–23)
CALCIUM SERPL-MCNC: 9.7 MG/DL (ref 8.8–10.4)
CHLORIDE SERPL-SCNC: 97 MMOL/L (ref 98–107)
CREAT SERPL-MCNC: 0.88 MG/DL (ref 0.51–0.95)
DIASTOLIC BLOOD PRESSURE - MUSE: NORMAL MMHG
EGFRCR SERPLBLD CKD-EPI 2021: 63 ML/MIN/1.73M2
EOSINOPHIL # BLD AUTO: 0.3 10E3/UL (ref 0–0.7)
EOSINOPHIL NFR BLD AUTO: 4 %
ERYTHROCYTE [DISTWIDTH] IN BLOOD BY AUTOMATED COUNT: 13.2 % (ref 10–15)
GLUCOSE SERPL-MCNC: 66 MG/DL (ref 70–99)
HCO3 SERPL-SCNC: 23 MMOL/L (ref 22–29)
HCT VFR BLD AUTO: 39 % (ref 35–47)
HGB BLD-MCNC: 13.2 G/DL (ref 11.7–15.7)
IMM GRANULOCYTES # BLD: 0 10E3/UL
IMM GRANULOCYTES NFR BLD: 0 %
INTERPRETATION ECG - MUSE: NORMAL
LYMPHOCYTES # BLD AUTO: 1.6 10E3/UL (ref 0.8–5.3)
LYMPHOCYTES NFR BLD AUTO: 23 %
MAGNESIUM SERPL-MCNC: 1.9 MG/DL (ref 1.7–2.3)
MCH RBC QN AUTO: 30.6 PG (ref 26.5–33)
MCHC RBC AUTO-ENTMCNC: 33.8 G/DL (ref 31.5–36.5)
MCV RBC AUTO: 91 FL (ref 78–100)
MONOCYTES # BLD AUTO: 0.7 10E3/UL (ref 0–1.3)
MONOCYTES NFR BLD AUTO: 10 %
NEUTROPHILS # BLD AUTO: 4.3 10E3/UL (ref 1.6–8.3)
NEUTROPHILS NFR BLD AUTO: 62 %
NRBC # BLD AUTO: 0 10E3/UL
NRBC BLD AUTO-RTO: 0 /100
P AXIS - MUSE: NORMAL DEGREES
PHOSPHATE SERPL-MCNC: 4.3 MG/DL (ref 2.5–4.5)
PLATELET # BLD AUTO: 222 10E3/UL (ref 150–450)
POTASSIUM SERPL-SCNC: 4 MMOL/L (ref 3.4–5.3)
PR INTERVAL - MUSE: NORMAL MS
PROT SERPL-MCNC: 7.1 G/DL (ref 6.4–8.3)
QRS DURATION - MUSE: 94 MS
QT - MUSE: 432 MS
QTC - MUSE: 445 MS
R AXIS - MUSE: 76 DEGREES
RBC # BLD AUTO: 4.31 10E6/UL (ref 3.8–5.2)
SODIUM SERPL-SCNC: 133 MMOL/L (ref 135–145)
SYSTOLIC BLOOD PRESSURE - MUSE: NORMAL MMHG
T AXIS - MUSE: 79 DEGREES
TROPONIN T SERPL HS-MCNC: 22 NG/L
TROPONIN T SERPL HS-MCNC: 24 NG/L
TSH SERPL DL<=0.005 MIU/L-ACNC: 3 UIU/ML (ref 0.3–4.2)
VENTRICULAR RATE- MUSE: 64 BPM
WBC # BLD AUTO: 6.9 10E3/UL (ref 4–11)

## 2025-03-07 PROCEDURE — 93005 ELECTROCARDIOGRAM TRACING: CPT | Performed by: EMERGENCY MEDICINE

## 2025-03-07 PROCEDURE — 85025 COMPLETE CBC W/AUTO DIFF WBC: CPT | Performed by: EMERGENCY MEDICINE

## 2025-03-07 PROCEDURE — 36415 COLL VENOUS BLD VENIPUNCTURE: CPT | Performed by: EMERGENCY MEDICINE

## 2025-03-07 PROCEDURE — 84295 ASSAY OF SERUM SODIUM: CPT | Performed by: EMERGENCY MEDICINE

## 2025-03-07 PROCEDURE — 99284 EMERGENCY DEPT VISIT MOD MDM: CPT | Mod: 25 | Performed by: EMERGENCY MEDICINE

## 2025-03-07 PROCEDURE — 84484 ASSAY OF TROPONIN QUANT: CPT | Performed by: EMERGENCY MEDICINE

## 2025-03-07 PROCEDURE — 80053 COMPREHEN METABOLIC PANEL: CPT | Performed by: EMERGENCY MEDICINE

## 2025-03-07 PROCEDURE — 84443 ASSAY THYROID STIM HORMONE: CPT | Performed by: EMERGENCY MEDICINE

## 2025-03-07 PROCEDURE — 93010 ELECTROCARDIOGRAM REPORT: CPT | Performed by: EMERGENCY MEDICINE

## 2025-03-07 PROCEDURE — 99284 EMERGENCY DEPT VISIT MOD MDM: CPT | Performed by: EMERGENCY MEDICINE

## 2025-03-07 PROCEDURE — 84100 ASSAY OF PHOSPHORUS: CPT | Performed by: EMERGENCY MEDICINE

## 2025-03-07 PROCEDURE — 83735 ASSAY OF MAGNESIUM: CPT | Performed by: EMERGENCY MEDICINE

## 2025-03-07 RX ORDER — TRIAMTERENE AND HYDROCHLOROTHIAZIDE 37.5; 25 MG/1; MG/1
1 CAPSULE ORAL EVERY MORNING
Qty: 30 CAPSULE | Refills: 0 | Status: SHIPPED | OUTPATIENT
Start: 2025-03-07

## 2025-03-07 ASSESSMENT — ACTIVITIES OF DAILY LIVING (ADL)
ADLS_ACUITY_SCORE: 61

## 2025-03-07 ASSESSMENT — COLUMBIA-SUICIDE SEVERITY RATING SCALE - C-SSRS
1. IN THE PAST MONTH, HAVE YOU WISHED YOU WERE DEAD OR WISHED YOU COULD GO TO SLEEP AND NOT WAKE UP?: NO
2. HAVE YOU ACTUALLY HAD ANY THOUGHTS OF KILLING YOURSELF IN THE PAST MONTH?: NO
6. HAVE YOU EVER DONE ANYTHING, STARTED TO DO ANYTHING, OR PREPARED TO DO ANYTHING TO END YOUR LIFE?: NO

## 2025-03-07 NOTE — DISCHARGE INSTRUCTIONS
Stop lisinopril and stop losartan.    Start the Dyazide as prescribed.    Follow-up with Dr. Bates within the next 2 weeks.    Continue your blood pressure monitoring and journaling.    Return at anytime for concerns.    I hope that you have a great weekend!!

## 2025-03-07 NOTE — ED TRIAGE NOTES
Patient reports elevated BP for the past week, says she had BP med adjustments Friday and it is not helping.

## 2025-03-08 NOTE — ED PROVIDER NOTES
History     Chief Complaint   Patient presents with    Hypertension     HPI  History per patient, medical records    This is an 89-year-old female, history of hypertension, CKD, atrial fibrillation, sick sinus syndrome with pacemaker, presenting with hypertension.  Patient states that she had hip surgery and went to TCU afterwards.  Prior to hospitalization, patient was on lisinopril and amlodipine.  While hospitalized she had orthostatic hypotension and her blood pressure medications were stopped.  In the TCU her lisinopril was restarted but her amlodipine was not restarted until after discharge from TCU.  Apparently on follow-up with her primary care provider 2/28/2025 patient noted swelling from the amlodipine so she has not been taking it.  She was only taking 10 mg of lisinopril each morning because taking 20 mg made her lightheaded.  Also, apparently lisinopril was giving her a slight cough.  For this reason she was started on losartan 50 mg/day.  Apparently, despite taking the losartan her blood pressures have been increasing.  She has been having symptoms of lightheadedness, blurry vision and some dull pain up the back of her neck when her blood pressures are high.  She called nurse triage on 3/4/2025 and it was recommended that she increase her losartan to 100 mg..  Apparently patient felt that this only made things worse so she instead restarted her lisinopril on Tuesday, 3 days ago.  Her blood pressure has remained high and in addition she started having some upper lip swelling.  She took a Benadryl.  No tongue swelling or difficulty swallowing.  No shortness of breath.  She has not been on any over-the-counter cough or cold medications.  She states she has had diarrhea for about a month whenever she eats.  No abdominal pain.  She also notes some weight loss.    Allergies:  Allergies   Allergen Reactions    Lisinopril Angioedema    Ciprofloxacin Rash    Macrobid [Nitrofurantoin] Itching and Rash        Problem List:    Patient Active Problem List    Diagnosis Date Noted    Pain, unspecified 12/19/2024     Priority: Medium    Localized edema 12/19/2024     Priority: Medium    Laceration without foreign body of right eyelid and periocular area, subsequent encounter 12/19/2024     Priority: Medium    Insomnia, unspecified 12/19/2024     Priority: Medium    Hypo-osmolality and hyponatremia 12/19/2024     Priority: Medium    Constipation, unspecified 12/19/2024     Priority: Medium    Chronic kidney disease, stage 3 unspecified (H) 12/19/2024     Priority: Medium    Fracture of unspecified part of neck of right femur, subsequent encounter for closed fracture with routine healing 12/19/2024     Priority: Medium    Essential (primary) hypertension 12/19/2024     Priority: Medium    Syncope and collapse 12/19/2024     Priority: Medium    Presence of cardiac pacemaker 12/19/2024     Priority: Medium    Hip fracture, right, closed, initial encounter (H) 12/16/2024     Priority: Medium    Closed displaced fracture of right femoral neck (H) 12/16/2024     Priority: Medium    Vasovagal syncope 12/16/2024     Priority: Medium    Sick sinus syndrome (H) 12/16/2024     Priority: Medium     Status post dual chamber pacemaker placement for this in 2011      Dehydration 12/16/2024     Priority: Medium    Headache 04/21/2023     Priority: Medium    Hyponatremia 04/21/2023     Priority: Medium    Loose stools 04/21/2023     Priority: Medium    Unstable angina (H) 04/21/2023     Priority: Medium    Chronic kidney disease, stage 3 (H) 05/05/2021     Priority: Medium    Atrial fibrillation (H) 10/13/2017     Priority: Medium     Cardiology consult 1/2019  1. Paroxysmal atrial fibrillation.    A.     Low AF burden, asymptomatic.  The 33-hour episode on December 18th may have reflected atrial flutter of even atrial tachycardia.  Asymptomatic.  Continue to follow for any increase atrial arrhythmia burden.    B.     Strong conviction  against anticoagulation.  Continue to use aspirin.  We will continue surveillance.   C.     If we see evidence of AF of significant frequency and duration I would like to discuss with her a trial of anti-arrhythmic therapy such as flecainide given her firm reservations against anti-coagulation therapy.      HTN (hypertension) 10/13/2017     Priority: Medium    Pacemaker 10/13/2017     Priority: Medium     Cardiology consult 1/2019  Her pacemaker interrogation from December 21st is overall very favorable.  She did have an episode of A. fib of 33 hours duration on December 18th.  The EGM suggests either an atrial tachycardia or an atrial flutter as there is significant regularity to the atrial activity.  She had no perception of this but acknowledges increased situational stress around the holidays.          Past Medical History:    No past medical history on file.    Past Surgical History:    Past Surgical History:   Procedure Laterality Date    EP PACEMAKER GENERATOR REPLACEMENT- DUAL N/A 12/29/2023    Procedure: Pacemaker Generator Replacement Dual;  Surgeon: Sae Peguero MD;  Location: [facility] CARDIAC CATH LAB    OPEN REDUCTION INTERNAL FIXATION HIP BIPOLAR Right 12/16/2024    Procedure: HEMIARTHROPLASTY, right HIP, BIPOLAR;  Surgeon: Adama Begum MD;  Location: [facility] OR    PHACOEMULSIFICATION WITH STANDARD INTRAOCULAR LENS IMPLANT Right 7/16/2020    Procedure: Cataract surgery with intraocular lens implant;  Surgeon: Fortino Ford MD;  Location: [facility] OR    PHACOEMULSIFICATION WITH STANDARD INTRAOCULAR LENS IMPLANT Left 7/30/2020    Procedure: Cataract Surgery Left Eye;  Surgeon: Max Cooley MD;  Location: [facility] OR       Family History:    No family history on file.    Social History:  Marital Status:  Single [1]  Social History     Tobacco Use    Smoking status: Never    Smokeless tobacco: Never   Vaping Use    Vaping status: Never Used   Substance Use Topics    Alcohol use: Not Currently     "Drug use: Never        Medications:    triamterene-HCTZ (DYAZIDE) 37.5-25 MG capsule  acetaminophen (TYLENOL) 325 MG tablet  Ascorbic Acid (VITAMIN C) 500 MG CAPS  aspirin (ASA) 325 MG EC tablet  calcium 600 MG tablet  COMPRESSION STOCKINGS  mirtazapine (REMERON) 7.5 MG tablet  multivitamin w/minerals (THERA-VIT-M) tablet  polyethylene glycol-propylene glycol (SYSTANE ULTRA) 0.4-0.3 % SOLN ophthalmic solution  Vitamin D, Cholecalciferol, 25 MCG (1000 UT) TABS          Review of Systems  All other ROS reviewed and are negative or non-contributory except as stated in HPI.     Physical Exam   BP: (!) 184/85  Pulse: 70  Temp: (!) 96.6  F (35.9  C)  Resp: 20  Height: 165.1 cm (5' 5\")  Weight: 54.9 kg (121 lb)  SpO2: 100 %      Physical Exam  Vitals and nursing note reviewed.   Constitutional:       Appearance: Normal appearance. She is normal weight.      Comments: Pleasant, healthy-appearing older female sitting in the bed   HENT:      Head: Normocephalic.      Nose: Nose normal.      Mouth/Throat:      Mouth: Mucous membranes are moist.      Pharynx: Oropharynx is clear.      Comments: Upper lip swollen  Eyes:      Extraocular Movements: Extraocular movements intact.      Conjunctiva/sclera: Conjunctivae normal.   Cardiovascular:      Pulses: Normal pulses.      Comments: Pacemaker in place.  Seemingly regular rate/rhythm  Pulmonary:      Effort: Pulmonary effort is normal.      Breath sounds: Normal breath sounds.   Abdominal:      Palpations: Abdomen is soft.      Tenderness: There is no abdominal tenderness.   Musculoskeletal:         General: No tenderness. Normal range of motion.      Cervical back: Normal range of motion and neck supple.      Right lower leg: No edema.      Left lower leg: No edema.   Skin:     General: Skin is warm and dry.      Findings: No rash.   Neurological:      General: No focal deficit present.      Mental Status: She is alert. Mental status is at baseline.   Psychiatric:         Mood " and Affect: Mood normal.         Behavior: Behavior normal.         ED Course (with Medical Decision Making)    Pt seen and examined by me.  RN and EPIC notes reviewed.       Elderly female with pacemaker, atrial fibrillation, sick sinus syndrome presenting with elevated blood pressures and some symptoms including dull pain in the back of her neck, blurry vision.  She was on losartan, but felt that this made her blood pressure worse.  She restarted her lisinopril but now is noted to have upper lip swelling and I am concerned for mild angioedema.    EKG was done via triage.  Patient has atrial fibrillation and pacemaker noted with a rate of 64.  Read by myself at 953    I checked basic labs.  Normal CBC  Normal TSH  Normal electrolytes  Slightly low glucose.  She was given a little something to eat  Troponin 24, 2-hour troponin 22    I spoke with patient's primary care provider.  I am adding lisinopril to her allergy list.  Apparently she does not want to take amlodipine because of peripheral edema.  Plan will be to start Dyazide with close follow-up with primary care provider.  Continue to monitor blood pressures.  If she has any significant worsening, changes or concerns return at any time.        Procedures    Results for orders placed or performed during the hospital encounter of 03/07/25 (from the past 24 hours)   EKG 12-lead, tracing only   Result Value Ref Range    Systolic Blood Pressure  mmHg    Diastolic Blood Pressure  mmHg    Ventricular Rate 64 BPM    Atrial Rate  BPM    MD Interval  ms    QRS Duration 94 ms     ms    QTc 445 ms    P Axis  degrees    R AXIS 76 degrees    T Axis 79 degrees    Interpretation ECG       Atrial fibrillation with frequent ventricular-paced complexes  Abnormal ECG  When compared with ECG of 16-Dec-2024 13:27,  No significant change was found  Confirmed by SEE ED PROVIDER NOTE FOR, ECG INTERPRETATION ([id number]),  Eddie Souza ([id number]) on 3/7/2025 10:03:57 AM     CBC with  platelets differential    Narrative    The following orders were created for panel order CBC with platelets differential.  Procedure                               Abnormality         Status                     ---------                               -----------         ------                     CBC with platelets and ...[[id number]]                      Final result                 Please view results for these tests on the individual orders.   Comprehensive metabolic panel   Result Value Ref Range    Sodium 133 (L) 135 - 145 mmol/L    Potassium 4.0 3.4 - 5.3 mmol/L    Carbon Dioxide (CO2) 23 22 - 29 mmol/L    Anion Gap 13 7 - 15 mmol/L    Urea Nitrogen 15.8 8.0 - 23.0 mg/dL    Creatinine 0.88 0.51 - 0.95 mg/dL    GFR Estimate 63 >60 mL/min/1.73m2    Calcium 9.7 8.8 - 10.4 mg/dL    Chloride 97 (L) 98 - 107 mmol/L    Glucose 66 (L) 70 - 99 mg/dL    Alkaline Phosphatase 87 40 - 150 U/L    AST 25 0 - 45 U/L    ALT 23 0 - 50 U/L    Protein Total 7.1 6.4 - 8.3 g/dL    Albumin 4.2 3.5 - 5.2 g/dL    Bilirubin Total 1.0 <=1.2 mg/dL   Troponin T, High Sensitivity   Result Value Ref Range    Troponin T, High Sensitivity 24 (H) <=14 ng/L   Magnesium   Result Value Ref Range    Magnesium 1.9 1.7 - 2.3 mg/dL   TSH with free T4 reflex   Result Value Ref Range    TSH 3.00 0.30 - 4.20 uIU/mL   Phosphorus   Result Value Ref Range    Phosphorus 4.3 2.5 - 4.5 mg/dL   CBC with platelets and differential   Result Value Ref Range    WBC Count 6.9 4.0 - 11.0 10e3/uL    RBC Count 4.31 3.80 - 5.20 10e6/uL    Hemoglobin 13.2 11.7 - 15.7 g/dL    Hematocrit 39.0 35.0 - 47.0 %    MCV 91 78 - 100 fL    MCH 30.6 26.5 - 33.0 pg    MCHC 33.8 31.5 - 36.5 g/dL    RDW 13.2 10.0 - 15.0 %    Platelet Count 222 150 - 450 10e3/uL    % Neutrophils 62 %    % Lymphocytes 23 %    % Monocytes 10 %    % Eosinophils 4 %    % Basophils 0 %    % Immature Granulocytes 0 %    NRBCs per 100 WBC 0 <1 /100    Absolute Neutrophils 4.3 1.6 - 8.3 10e3/uL    Absolute  Lymphocytes 1.6 0.8 - 5.3 10e3/uL    Absolute Monocytes 0.7 0.0 - 1.3 10e3/uL    Absolute Eosinophils 0.3 0.0 - 0.7 10e3/uL    Absolute Basophils 0.0 0.0 - 0.2 10e3/uL    Absolute Immature Granulocytes 0.0 <=0.4 10e3/uL    Absolute NRBCs 0.0 10e3/uL   Troponin T, High Sensitivity   Result Value Ref Range    Troponin T, High Sensitivity 22 (H) <=14 ng/L       Medications - No data to display    Assessments & Plan     I have reviewed the findings, diagnosis, plan and need for follow up with the patient.      Discharge Medication List as of 3/7/2025 11:58 AM        START taking these medications    Details   triamterene-HCTZ (DYAZIDE) 37.5-25 MG capsule Take 1 capsule by mouth every morning., Disp-30 capsule, R-0, E-Prescribe             Final diagnoses:   Angiotensin converting enzyme inhibitor-aggravated angioedema, initial encounter   Lip swelling   Hypertension goal BP (blood pressure) < 140/90     Disposition: Patient discharged home in stable condition.  Plan as above.  Return for concerns.     Note: Chart documentation done in part with Dragon Voice Recognition software. Although reviewed after completion, some word and grammatical errors may remain.   3/7/2025   [facility] EMERGENCY DEPT       Karen Unger MD  03/07/25 8688

## 2025-03-12 ENCOUNTER — TELEPHONE (OUTPATIENT)
Dept: INTERNAL MEDICINE | Facility: CLINIC | Age: 89
End: 2025-03-12
Payer: COMMERCIAL

## 2025-03-12 DIAGNOSIS — R19.7 DIARRHEA, UNSPECIFIED TYPE: Primary | ICD-10-CM

## 2025-03-12 NOTE — TELEPHONE ENCOUNTER
Order/Referral Request    Who is requesting: Patient    Orders being requested: Referral for gastro    Reason service is needed/diagnosis: N/A    When are orders needed by: ASAP    Has this been discussed with Provider: N/A    Does patient have a preference on a Group/Provider/Facility? N/A    Does patient have an appointment scheduled?: No    Where to send orders: Place orders within Epic    Okay to leave a detailed message?: Yes at Cell number on file:    Telephone Information:   Mobile [phone]

## 2025-03-18 ENCOUNTER — HOSPITAL ENCOUNTER (INPATIENT)
Facility: CLINIC | Age: 89
DRG: 644 | End: 2025-03-18
Attending: EMERGENCY MEDICINE
Payer: MEDICARE

## 2025-03-18 ENCOUNTER — APPOINTMENT (OUTPATIENT)
Dept: CT IMAGING | Facility: CLINIC | Age: 89
DRG: 644 | End: 2025-03-18
Attending: EMERGENCY MEDICINE
Payer: MEDICARE

## 2025-03-18 DIAGNOSIS — R19.7 DIARRHEA, UNSPECIFIED TYPE: ICD-10-CM

## 2025-03-18 DIAGNOSIS — I10 PRIMARY HYPERTENSION: Primary | ICD-10-CM

## 2025-03-18 DIAGNOSIS — N17.9 ACUTE KIDNEY INJURY: ICD-10-CM

## 2025-03-18 DIAGNOSIS — E86.0 DEHYDRATION: ICD-10-CM

## 2025-03-18 DIAGNOSIS — E87.1 HYPONATREMIA: ICD-10-CM

## 2025-03-18 PROBLEM — G47.00 INSOMNIA, UNSPECIFIED: Status: ACTIVE | Noted: 2024-12-19

## 2025-03-18 PROBLEM — M62.81 GENERALIZED MUSCLE WEAKNESS: Status: ACTIVE | Noted: 2025-03-18

## 2025-03-18 PROBLEM — I48.0 PAROXYSMAL ATRIAL FIBRILLATION (H): Status: ACTIVE | Noted: 2017-10-13

## 2025-03-18 PROBLEM — Z95.0 PACEMAKER: Status: ACTIVE | Noted: 2017-10-13
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
## 2025-03-18 PROBLEM — N18.30 CHRONIC KIDNEY DISEASE, STAGE 3 (H): Status: ACTIVE | Noted: 2021-05-05

## 2025-03-18 PROBLEM — R11.2 NAUSEA AND VOMITING: Status: ACTIVE | Noted: 2025-03-18

## 2025-03-18 PROBLEM — I49.5 SICK SINUS SYNDROME (H): Status: ACTIVE | Noted: 2024-12-16
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
## 2025-03-18 LAB
ADV 40+41 DNA STL QL NAA+NON-PROBE: NEGATIVE
ALBUMIN SERPL BCG-MCNC: 4.4 G/DL (ref 3.5–5.2)
ALBUMIN UR-MCNC: NEGATIVE MG/DL
ALP SERPL-CCNC: 99 U/L (ref 40–150)
ALT SERPL W P-5'-P-CCNC: 19 U/L (ref 0–50)
ANION GAP SERPL CALCULATED.3IONS-SCNC: 13 MMOL/L (ref 7–15)
APPEARANCE UR: CLEAR
AST SERPL W P-5'-P-CCNC: 30 U/L (ref 0–45)
ASTRO TYP 1-8 RNA STL QL NAA+NON-PROBE: NEGATIVE
BASOPHILS # BLD AUTO: 0 10E3/UL (ref 0–0.2)
BASOPHILS NFR BLD AUTO: 1 %
BILIRUB SERPL-MCNC: 1.1 MG/DL
BILIRUB UR QL STRIP: NEGATIVE
BUN SERPL-MCNC: 27 MG/DL (ref 8–23)
C CAYETANENSIS DNA STL QL NAA+NON-PROBE: NEGATIVE
C DIFF TOX B STL QL: NEGATIVE
CALCIUM SERPL-MCNC: 9.9 MG/DL (ref 8.8–10.4)
CAMPYLOBACTER DNA SPEC NAA+PROBE: NEGATIVE
CHLORIDE SERPL-SCNC: 86 MMOL/L (ref 98–107)
COLOR UR AUTO: ABNORMAL
CREAT SERPL-MCNC: 1.14 MG/DL (ref 0.51–0.95)
CRYPTOSP DNA STL QL NAA+NON-PROBE: NEGATIVE
E COLI O157 DNA STL QL NAA+NON-PROBE: NORMAL
E HISTOLYT DNA STL QL NAA+NON-PROBE: NEGATIVE
EAEC ASTA GENE ISLT QL NAA+PROBE: NEGATIVE
EC STX1+STX2 GENES STL QL NAA+NON-PROBE: NEGATIVE
EGFRCR SERPLBLD CKD-EPI 2021: 46 ML/MIN/1.73M2
EOSINOPHIL # BLD AUTO: 0.2 10E3/UL (ref 0–0.7)
EOSINOPHIL NFR BLD AUTO: 2 %
EPEC EAE GENE STL QL NAA+NON-PROBE: NEGATIVE
ERYTHROCYTE [DISTWIDTH] IN BLOOD BY AUTOMATED COUNT: 13 % (ref 10–15)
ETEC LTA+ST1A+ST1B TOX ST NAA+NON-PROBE: NEGATIVE
G LAMBLIA DNA STL QL NAA+NON-PROBE: NEGATIVE
GLUCOSE SERPL-MCNC: 116 MG/DL (ref 70–99)
GLUCOSE UR STRIP-MCNC: NEGATIVE MG/DL
HCO3 SERPL-SCNC: 23 MMOL/L (ref 22–29)
HCT VFR BLD AUTO: 46 % (ref 35–47)
HGB BLD-MCNC: 15.8 G/DL (ref 11.7–15.7)
HGB UR QL STRIP: NEGATIVE
HYALINE CASTS: 1 /LPF
IMM GRANULOCYTES # BLD: 0 10E3/UL
IMM GRANULOCYTES NFR BLD: 0 %
KETONES UR STRIP-MCNC: NEGATIVE MG/DL
LEUKOCYTE ESTERASE UR QL STRIP: NEGATIVE
LYMPHOCYTES # BLD AUTO: 2 10E3/UL (ref 0.8–5.3)
LYMPHOCYTES NFR BLD AUTO: 26 %
MAGNESIUM SERPL-MCNC: 1.9 MG/DL (ref 1.7–2.3)
MCH RBC QN AUTO: 30 PG (ref 26.5–33)
MCHC RBC AUTO-ENTMCNC: 34.3 G/DL (ref 31.5–36.5)
MCV RBC AUTO: 88 FL (ref 78–100)
MONOCYTES # BLD AUTO: 0.8 10E3/UL (ref 0–1.3)
MONOCYTES NFR BLD AUTO: 10 %
NEUTROPHILS # BLD AUTO: 4.8 10E3/UL (ref 1.6–8.3)
NEUTROPHILS NFR BLD AUTO: 61 %
NITRATE UR QL: NEGATIVE
NOROVIRUS GI+II RNA STL QL NAA+NON-PROBE: NEGATIVE
NRBC # BLD AUTO: 0 10E3/UL
NRBC BLD AUTO-RTO: 0 /100
P SHIGELLOIDES DNA STL QL NAA+NON-PROBE: NEGATIVE
PH UR STRIP: 6.5 [PH] (ref 5–7)
PHOSPHATE SERPL-MCNC: 3.8 MG/DL (ref 2.5–4.5)
PLATELET # BLD AUTO: 269 10E3/UL (ref 150–450)
POTASSIUM SERPL-SCNC: 4.8 MMOL/L (ref 3.4–5.3)
PROT SERPL-MCNC: 7.9 G/DL (ref 6.4–8.3)
RBC # BLD AUTO: 5.26 10E6/UL (ref 3.8–5.2)
RBC URINE: <1 /HPF
RVA RNA STL QL NAA+NON-PROBE: NEGATIVE
SALMONELLA SP RPOD STL QL NAA+PROBE: NEGATIVE
SAPO I+II+IV+V RNA STL QL NAA+NON-PROBE: NEGATIVE
SHIGELLA SP+EIEC IPAH ST NAA+NON-PROBE: NEGATIVE
SODIUM SERPL-SCNC: 121 MMOL/L (ref 135–145)
SODIUM SERPL-SCNC: 122 MMOL/L (ref 135–145)
SODIUM SERPL-SCNC: 127 MMOL/L (ref 135–145)
SODIUM UR-SCNC: 59 MMOL/L
SP GR UR STRIP: 1.02 (ref 1–1.03)
SQUAMOUS EPITHELIAL: 2 /HPF
T4 FREE SERPL-MCNC: 1.13 NG/DL (ref 0.9–1.7)
TSH SERPL DL<=0.005 MIU/L-ACNC: 6.87 UIU/ML (ref 0.3–4.2)
UROBILINOGEN UR STRIP-MCNC: NORMAL MG/DL
V CHOLERAE DNA SPEC QL NAA+PROBE: NEGATIVE
VIBRIO DNA SPEC NAA+PROBE: NEGATIVE
WBC # BLD AUTO: 7.7 10E3/UL (ref 4–11)
WBC URINE: 1 /HPF
Y ENTEROCOL DNA STL QL NAA+PROBE: NEGATIVE

## 2025-03-18 PROCEDURE — 84295 ASSAY OF SERUM SODIUM: CPT | Performed by: FAMILY MEDICINE

## 2025-03-18 PROCEDURE — 87209 SMEAR COMPLEX STAIN: CPT | Performed by: FAMILY MEDICINE

## 2025-03-18 PROCEDURE — 74177 CT ABD & PELVIS W/CONTRAST: CPT

## 2025-03-18 PROCEDURE — 258N000003 HC RX IP 258 OP 636: Performed by: EMERGENCY MEDICINE

## 2025-03-18 PROCEDURE — 250N000011 HC RX IP 250 OP 636: Performed by: EMERGENCY MEDICINE

## 2025-03-18 PROCEDURE — 82310 ASSAY OF CALCIUM: CPT | Performed by: EMERGENCY MEDICINE

## 2025-03-18 PROCEDURE — 36415 COLL VENOUS BLD VENIPUNCTURE: CPT | Performed by: FAMILY MEDICINE

## 2025-03-18 PROCEDURE — 99223 1ST HOSP IP/OBS HIGH 75: CPT | Performed by: FAMILY MEDICINE

## 2025-03-18 PROCEDURE — 99285 EMERGENCY DEPT VISIT HI MDM: CPT | Mod: 25 | Performed by: EMERGENCY MEDICINE

## 2025-03-18 PROCEDURE — 87493 C DIFF AMPLIFIED PROBE: CPT | Performed by: FAMILY MEDICINE

## 2025-03-18 PROCEDURE — 82374 ASSAY BLOOD CARBON DIOXIDE: CPT | Performed by: EMERGENCY MEDICINE

## 2025-03-18 PROCEDURE — 83930 ASSAY OF BLOOD OSMOLALITY: CPT | Performed by: EMERGENCY MEDICINE

## 2025-03-18 PROCEDURE — 120N000001 HC R&B MED SURG/OB

## 2025-03-18 PROCEDURE — 250N000009 HC RX 250: Performed by: EMERGENCY MEDICINE

## 2025-03-18 PROCEDURE — 81003 URINALYSIS AUTO W/O SCOPE: CPT | Performed by: EMERGENCY MEDICINE

## 2025-03-18 PROCEDURE — 85041 AUTOMATED RBC COUNT: CPT | Performed by: EMERGENCY MEDICINE

## 2025-03-18 PROCEDURE — 99285 EMERGENCY DEPT VISIT HI MDM: CPT | Performed by: EMERGENCY MEDICINE

## 2025-03-18 PROCEDURE — 84443 ASSAY THYROID STIM HORMONE: CPT | Performed by: EMERGENCY MEDICINE

## 2025-03-18 PROCEDURE — 250N000013 HC RX MED GY IP 250 OP 250 PS 637: Performed by: FAMILY MEDICINE

## 2025-03-18 PROCEDURE — 83735 ASSAY OF MAGNESIUM: CPT | Performed by: FAMILY MEDICINE

## 2025-03-18 PROCEDURE — 87507 IADNA-DNA/RNA PROBE TQ 12-25: CPT | Performed by: FAMILY MEDICINE

## 2025-03-18 PROCEDURE — 96374 THER/PROPH/DIAG INJ IV PUSH: CPT | Mod: 59 | Performed by: EMERGENCY MEDICINE

## 2025-03-18 PROCEDURE — 96361 HYDRATE IV INFUSION ADD-ON: CPT | Performed by: EMERGENCY MEDICINE

## 2025-03-18 PROCEDURE — 84100 ASSAY OF PHOSPHORUS: CPT | Performed by: FAMILY MEDICINE

## 2025-03-18 PROCEDURE — 83935 ASSAY OF URINE OSMOLALITY: CPT | Performed by: EMERGENCY MEDICINE

## 2025-03-18 PROCEDURE — 84295 ASSAY OF SERUM SODIUM: CPT | Performed by: EMERGENCY MEDICINE

## 2025-03-18 PROCEDURE — 36415 COLL VENOUS BLD VENIPUNCTURE: CPT | Performed by: EMERGENCY MEDICINE

## 2025-03-18 PROCEDURE — 84439 ASSAY OF FREE THYROXINE: CPT | Performed by: EMERGENCY MEDICINE

## 2025-03-18 PROCEDURE — 84300 ASSAY OF URINE SODIUM: CPT | Performed by: EMERGENCY MEDICINE

## 2025-03-18 PROCEDURE — 85004 AUTOMATED DIFF WBC COUNT: CPT | Performed by: EMERGENCY MEDICINE

## 2025-03-18 RX ORDER — HYDRALAZINE HYDROCHLORIDE 10 MG/1
10 TABLET, FILM COATED ORAL EVERY 4 HOURS PRN
Status: DISCONTINUED | OUTPATIENT
Start: 2025-03-18 | End: 2025-03-23

## 2025-03-18 RX ORDER — IOPAMIDOL 755 MG/ML
500 INJECTION, SOLUTION INTRAVASCULAR ONCE
Status: COMPLETED | OUTPATIENT
Start: 2025-03-18 | End: 2025-03-18

## 2025-03-18 RX ORDER — GLIPIZIDE 10 MG/1
1 TABLET ORAL 2 TIMES DAILY
Status: DISCONTINUED | OUTPATIENT
Start: 2025-03-18 | End: 2025-03-23 | Stop reason: HOSPADM

## 2025-03-18 RX ORDER — CALCIUM CARBONATE 500 MG/1
1000 TABLET, CHEWABLE ORAL 4 TIMES DAILY PRN
Status: DISCONTINUED | OUTPATIENT
Start: 2025-03-18 | End: 2025-03-23 | Stop reason: HOSPADM

## 2025-03-18 RX ORDER — ONDANSETRON 2 MG/ML
4 INJECTION INTRAMUSCULAR; INTRAVENOUS EVERY 6 HOURS PRN
Status: DISCONTINUED | OUTPATIENT
Start: 2025-03-18 | End: 2025-03-23 | Stop reason: HOSPADM

## 2025-03-18 RX ORDER — ONDANSETRON 4 MG/1
4 TABLET, ORALLY DISINTEGRATING ORAL EVERY 6 HOURS PRN
Status: DISCONTINUED | OUTPATIENT
Start: 2025-03-18 | End: 2025-03-23 | Stop reason: HOSPADM

## 2025-03-18 RX ORDER — SODIUM CHLORIDE 9 MG/ML
INJECTION, SOLUTION INTRAVENOUS CONTINUOUS
Status: DISCONTINUED | OUTPATIENT
Start: 2025-03-18 | End: 2025-03-19

## 2025-03-18 RX ORDER — HYDRALAZINE HYDROCHLORIDE 20 MG/ML
10 INJECTION INTRAMUSCULAR; INTRAVENOUS EVERY 4 HOURS PRN
Status: DISCONTINUED | OUTPATIENT
Start: 2025-03-18 | End: 2025-03-23

## 2025-03-18 RX ORDER — ASPIRIN 325 MG
325 TABLET, DELAYED RELEASE (ENTERIC COATED) ORAL DAILY
Status: DISCONTINUED | OUTPATIENT
Start: 2025-03-18 | End: 2025-03-23 | Stop reason: HOSPADM

## 2025-03-18 RX ORDER — ONDANSETRON 2 MG/ML
4 INJECTION INTRAMUSCULAR; INTRAVENOUS EVERY 30 MIN PRN
Status: DISCONTINUED | OUTPATIENT
Start: 2025-03-18 | End: 2025-03-18

## 2025-03-18 RX ORDER — SODIUM CHLORIDE 9 MG/ML
INJECTION, SOLUTION INTRAVENOUS CONTINUOUS
Status: DISCONTINUED | OUTPATIENT
Start: 2025-03-18 | End: 2025-03-18

## 2025-03-18 RX ORDER — PROCHLORPERAZINE MALEATE 5 MG/1
5 TABLET ORAL EVERY 6 HOURS PRN
Status: DISCONTINUED | OUTPATIENT
Start: 2025-03-18 | End: 2025-03-23 | Stop reason: HOSPADM

## 2025-03-18 RX ORDER — LIDOCAINE 40 MG/G
CREAM TOPICAL
Status: DISCONTINUED | OUTPATIENT
Start: 2025-03-18 | End: 2025-03-23 | Stop reason: HOSPADM

## 2025-03-18 RX ADMIN — ONDANSETRON 4 MG: 2 INJECTION INTRAMUSCULAR; INTRAVENOUS at 10:33

## 2025-03-18 RX ADMIN — DEXTRAN 70, GLYCERIN, HYPROMELLOSE 1 DROP: 1; 2; 3 SOLUTION/ DROPS OPHTHALMIC at 19:52

## 2025-03-18 RX ADMIN — IOPAMIDOL 58 ML: 755 INJECTION, SOLUTION INTRAVENOUS at 11:39

## 2025-03-18 RX ADMIN — ASPIRIN 325 MG: 325 TABLET ORAL at 17:04

## 2025-03-18 RX ADMIN — SODIUM CHLORIDE: 0.9 INJECTION, SOLUTION INTRAVENOUS at 13:00

## 2025-03-18 RX ADMIN — SODIUM CHLORIDE 1000 ML: 0.9 INJECTION, SOLUTION INTRAVENOUS at 10:33

## 2025-03-18 RX ADMIN — SODIUM CHLORIDE 60 ML: 9 INJECTION, SOLUTION INTRAVENOUS at 11:40

## 2025-03-18 RX ADMIN — Medication 1 MG: at 19:52

## 2025-03-18 ASSESSMENT — ACTIVITIES OF DAILY LIVING (ADL)
ADLS_ACUITY_SCORE: 39
DIFFICULTY_EATING/SWALLOWING: NO
ADLS_ACUITY_SCORE: 39
ADLS_ACUITY_SCORE: 39
FALL_HISTORY_WITHIN_LAST_SIX_MONTHS: YES
HEARING_DIFFICULTY_OR_DEAF: NO
ADLS_ACUITY_SCORE: 39
DOING_ERRANDS_INDEPENDENTLY_DIFFICULTY: NO
ADLS_ACUITY_SCORE: 39
CONCENTRATING,_REMEMBERING_OR_MAKING_DECISIONS_DIFFICULTY: NO
ADLS_ACUITY_SCORE: 61
DRESSING/BATHING_DIFFICULTY: NO
VISION_MANAGEMENT: GLASSES FOR READING
EQUIPMENT_CURRENTLY_USED_AT_HOME: CANE, STRAIGHT
ADLS_ACUITY_SCORE: 39
WALKING_OR_CLIMBING_STAIRS_DIFFICULTY: NO
ADLS_ACUITY_SCORE: 61
CHANGE_IN_FUNCTIONAL_STATUS_SINCE_ONSET_OF_CURRENT_ILLNESS/INJURY: NO
DIFFICULTY_COMMUNICATING: NO
ADLS_ACUITY_SCORE: 61
ADLS_ACUITY_SCORE: 61
NUMBER_OF_TIMES_PATIENT_HAS_FALLEN_WITHIN_LAST_SIX_MONTHS: 1
TOILETING_ISSUES: NO
WEAR_GLASSES_OR_BLIND: YES
ADLS_ACUITY_SCORE: 61
ADLS_ACUITY_SCORE: 61
ADLS_ACUITY_SCORE: 39
ADLS_ACUITY_SCORE: 39
ADLS_ACUITY_SCORE: 61

## 2025-03-18 ASSESSMENT — COLUMBIA-SUICIDE SEVERITY RATING SCALE - C-SSRS
2. HAVE YOU ACTUALLY HAD ANY THOUGHTS OF KILLING YOURSELF IN THE PAST MONTH?: NO
1. IN THE PAST MONTH, HAVE YOU WISHED YOU WERE DEAD OR WISHED YOU COULD GO TO SLEEP AND NOT WAKE UP?: NO
6. HAVE YOU EVER DONE ANYTHING, STARTED TO DO ANYTHING, OR PREPARED TO DO ANYTHING TO END YOUR LIFE?: NO

## 2025-03-18 NOTE — PROGRESS NOTES
S-(situation): Patient arrives to room 251 via cart from ED    B-(background): hyponatremia    A-(assessment): vss, denies pain, RA, IVF 30ml/hr    R-(recommendations): Orders reviewed with patient. Will monitor patient per MD orders.     Inpatient nursing criteria listed below were met:    Health care directives status obtained and documented: Yes  VTE ordered/documented: Yes  Skin issues/needs documented:Yes  Isolation addressed and Signage used: Yes  Fall Prevention: Care plan updated Yes Education given and documented Yes  Care Plan initiated and Co-Morbidities added: Yes  Education Assessment documented:Yes  Admission Education Documented: Yes  If present CAUTI/CLABI Education done: Yes  New medication patient education completed and documented (Possible Side Effects of Common Medications handout): Yes  Allergies Reviewed: Yes  Admission Medication Reconciliation completed: Yes  Home medications if not able to send immediately home with family stored here: NA  Reminder note placed in discharge instructions regarding home meds: NA  Individualized care needs/preferences addressed and charted: Yes  Provider Notified that patient has arrived to the unit: Yes

## 2025-03-18 NOTE — MEDICATION SCRIBE - ADMISSION MEDICATION HISTORY
Medication Scribe Admission Medication History    Admission medication history is complete. The information provided in this note is only as accurate as the sources available at the time of the update.    Information Source(s): Patient and CareEverywhere/SureScripts via in-person    Pertinent Information: Verified no use of pain pump, inhalers or prescription eye drops currently.      Changes made to PTA medication list:  Added: None  Deleted: tylenol, mirtazapine  Changed: None    Allergies reviewed with patient and updates made in EHR: yes    Medication History Completed By: KELLY ESTRELLA 3/18/2025 11:35 AM    PTA Med List   Medication Sig Last Dose/Taking    Ascorbic Acid (VITAMIN C) 500 MG CAPS Take 500 mg by mouth daily 3/17/2025 Morning    aspirin (ASA) 325 MG EC tablet Take 325 mg by mouth daily. 3/17/2025 Morning    calcium 600 MG tablet Take 1 tablet by mouth daily 3/17/2025 Morning    COMPRESSION STOCKINGS 1 each daily 3/17/2025 Morning    multivitamin w/minerals (THERA-VIT-M) tablet Take 1 tablet by mouth daily 3/17/2025 Morning    polyethylene glycol-propylene glycol (SYSTANE ULTRA) 0.4-0.3 % SOLN ophthalmic solution Place 1 drop into both eyes 2 times daily. 3/17/2025    triamterene-HCTZ (DYAZIDE) 37.5-25 MG capsule Take 1 capsule by mouth every morning. 3/17/2025 Morning    Vitamin D, Cholecalciferol, 25 MCG (1000 UT) TABS Take 1,000 Units by mouth daily 3/17/2025 Morning

## 2025-03-18 NOTE — CONSULTS
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     Upon patient review, her medication of hydrochlorothiazide-triamterene combo can be attributed to causing this. She was also recently started on this medication (3/7) and her electrolytes were normal prior to initialization. This would lead me to believe that this is the medication causing her hyponatremia.    Almas Sparks, KorinD

## 2025-03-18 NOTE — ED TRIAGE NOTES
Patient reports vomiting that started this morning. She's had diarrhea for several months. She c/o generalized weakness . EMS reports finding patient prone on the floor. She states she layed herself down and did not fall.      Triage Assessment (Adult)       Row Name 03/18/25 0859          Triage Assessment    Airway WDL WDL        Respiratory WDL    Respiratory WDL WDL        Skin Circulation/Temperature WDL    Skin Circulation/Temperature WDL WDL

## 2025-03-18 NOTE — ED PROVIDER NOTES
History     Chief Complaint   Patient presents with    Nausea, Vomiting, & Diarrhea     HPI  Audrey Ortega is a 89 year old female with a history of sick sinus syndrome, pacemaker, atrial fibrillation not anticoagulated, hypertension who presents via EMS to the emergency department secondary to nausea, vomiting, diarrhea, generalized weakness.  She was so weak that she had laid down on the floor.  When EMS arrived she was laying prone on the floor.  She did not have a fall or injury.    She has had diarrhea for several months.  She has been seen here multiple times in the past for diarrhea.  In December CT scan showed that she had diverticulitis.  She was given a prescription for Augmentin.  Prior to that she was seen for diarrhea and had negative stool cultures.    Last echo:  Interpretation Summary     Irregular rhythm with wide QRS and frequent PVCs.     Normal LV size with systolic function. Estimated ejection fraction 60 to 65%.  Dyssynchronous pattern of LV contraction from conduction abnormality or  pacemaker activation.  RV is borderline dilated but normal systolic function. There is a pacemaker  lead noted in the right heart.  Severe biatrial enlargement. Multiple leads noted in the right atrium.  There is mild to moderate mitral regurgitation. No mitral stenosis.  Tricuspid valve is not well-seen but there appears to be likely 3+ tricuspid  regurgitation related to tethering of the leaflets from the pacemaker lead.  RVSP is elevated at 46 mmHg plus right atrial pressure consistent with mild to  moderate pulmonary hypertension.  There is a small pericardial effusion as previously reported without  echocardiographic features of tamponade.  Allergies:  Allergies   Allergen Reactions    Lisinopril Angioedema    Ciprofloxacin Rash    Macrobid [Nitrofurantoin] Itching and Rash    Tylenol [Acetaminophen] Headache       Problem List:    Patient Active Problem List    Diagnosis Date Noted    Diarrhea, acute on  chronic 03/18/2025     Priority: Medium    Generalized muscle weakness 03/18/2025     Priority: Medium    Nausea and vomiting 03/18/2025     Priority: Medium    Pain, unspecified 12/19/2024     Priority: Medium    Localized edema 12/19/2024     Priority: Medium    Laceration without foreign body of right eyelid and periocular area, subsequent encounter 12/19/2024     Priority: Medium    Insomnia, unspecified 12/19/2024     Priority: Medium    Hypo-osmolality and hyponatremia 12/19/2024     Priority: Medium    Constipation, unspecified 12/19/2024     Priority: Medium    Chronic kidney disease, stage 3 unspecified (H) 12/19/2024     Priority: Medium    Fracture of unspecified part of neck of right femur, subsequent encounter for closed fracture with routine healing 12/19/2024     Priority: Medium    Essential (primary) hypertension 12/19/2024     Priority: Medium    Syncope and collapse 12/19/2024     Priority: Medium    Presence of cardiac pacemaker 12/19/2024     Priority: Medium    Hip fracture, right, closed, initial encounter (H) 12/16/2024     Priority: Medium    Closed displaced fracture of right femoral neck (H) 12/16/2024     Priority: Medium    Vasovagal syncope 12/16/2024     Priority: Medium    Sick sinus syndrome (H) 12/16/2024     Priority: Medium     Status post dual chamber pacemaker placement for this in 2011      Dehydration 12/16/2024     Priority: Medium    Headache 04/21/2023     Priority: Medium    Hyponatremia 04/21/2023     Priority: Medium    Loose stools 04/21/2023     Priority: Medium    Unstable angina (H) 04/21/2023     Priority: Medium    Chronic kidney disease, stage 3 (H) 05/05/2021     Priority: Medium    Paroxysmal atrial fibrillation (H) 10/13/2017     Priority: Medium     Cardiology consult 1/2019  1. Paroxysmal atrial fibrillation.    A.     Low AF burden, asymptomatic.  The 33-hour episode on December 18th may have reflected atrial flutter of even atrial tachycardia.   Asymptomatic.  Continue to follow for any increase atrial arrhythmia burden.    B.     Strong conviction against anticoagulation.  Continue to use aspirin.  We will continue surveillance.   C.     If we see evidence of AF of significant frequency and duration I would like to discuss with her a trial of anti-arrhythmic therapy such as flecainide given her firm reservations against anti-coagulation therapy.      HTN (hypertension) 10/13/2017     Priority: Medium    Pacemaker 10/13/2017     Priority: Medium     Cardiology consult 1/2019  Her pacemaker interrogation from December 21st is overall very favorable.  She did have an episode of A. fib of 33 hours duration on December 18th.  The EGM suggests either an atrial tachycardia or an atrial flutter as there is significant regularity to the atrial activity.  She had no perception of this but acknowledges increased situational stress around the holidays.          Past Medical History:    Past Medical History:   Diagnosis Date    Nausea and vomiting 3/18/2025       Past Surgical History:    Past Surgical History:   Procedure Laterality Date    EP PACEMAKER GENERATOR REPLACEMENT- DUAL N/A 12/29/2023    Procedure: Pacemaker Generator Replacement Dual;  Surgeon: Sae Peguero MD;  Location: [facility] CARDIAC CATH LAB    OPEN REDUCTION INTERNAL FIXATION HIP BIPOLAR Right 12/16/2024    Procedure: HEMIARTHROPLASTY, right HIP, BIPOLAR;  Surgeon: Adama Begum MD;  Location: [facility] OR    PHACOEMULSIFICATION WITH STANDARD INTRAOCULAR LENS IMPLANT Right 7/16/2020    Procedure: Cataract surgery with intraocular lens implant;  Surgeon: Fortino Ford MD;  Location: [facility] OR    PHACOEMULSIFICATION WITH STANDARD INTRAOCULAR LENS IMPLANT Left 7/30/2020    Procedure: Cataract Surgery Left Eye;  Surgeon: Max Cooley MD;  Location: [facility] OR       Family History:    No family history on file.    Social History:  Marital Status:  Single [1]  Social History     Tobacco Use     "Smoking status: Never    Smokeless tobacco: Never   Vaping Use    Vaping status: Never Used   Substance Use Topics    Alcohol use: Not Currently    Drug use: Never        Medications:    No current outpatient medications on file.        Review of Systems   All other systems reviewed and are negative.      Physical Exam   BP: 130/72  Pulse: 76  Temp: (!) 96.5  F (35.8  C)  Resp: 16  Height: 165.1 cm (5' 5\")  Weight: 53.5 kg (118 lb)  SpO2: 99 %      Physical Exam  Vitals and nursing note reviewed.   Constitutional:       General: She is not in acute distress.     Appearance: Normal appearance. She is well-developed.   HENT:      Head: Normocephalic and atraumatic.      Right Ear: External ear normal.      Left Ear: External ear normal.      Nose: Nose normal. No congestion or rhinorrhea.      Mouth/Throat:      Pharynx: No oropharyngeal exudate or posterior oropharyngeal erythema.   Eyes:      General: No scleral icterus.     Extraocular Movements: Extraocular movements intact.      Conjunctiva/sclera: Conjunctivae normal.      Pupils: Pupils are equal, round, and reactive to light.   Cardiovascular:      Rate and Rhythm: Normal rate and regular rhythm.   Pulmonary:      Effort: Pulmonary effort is normal. No respiratory distress.      Breath sounds: No stridor. No wheezing or rhonchi.   Abdominal:      General: Abdomen is flat.      Tenderness: There is abdominal tenderness.      Comments: Llq ttp    Musculoskeletal:         General: Normal range of motion.      Cervical back: Normal range of motion and neck supple.      Right lower leg: No edema.      Left lower leg: No edema.   Skin:     General: Skin is warm and dry.      Findings: No rash.   Neurological:      General: No focal deficit present.      Mental Status: She is alert and oriented to person, place, and time.   Psychiatric:         Mood and Affect: Mood normal.         Behavior: Behavior normal.         ED Course        Procedures             Results for " orders placed or performed during the hospital encounter of 03/18/25 (from the past 24 hours)   CBC with platelets differential    Narrative    The following orders were created for panel order CBC with platelets differential.  Procedure                               Abnormality         Status                     ---------                               -----------         ------                     CBC with platelets and ...[[id number]]  Abnormal            Final result                 Please view results for these tests on the individual orders.   Comprehensive metabolic panel   Result Value Ref Range    Sodium 122 (L) 135 - 145 mmol/L    Potassium 4.8 3.4 - 5.3 mmol/L    Carbon Dioxide (CO2) 23 22 - 29 mmol/L    Anion Gap 13 7 - 15 mmol/L    Urea Nitrogen 27.0 (H) 8.0 - 23.0 mg/dL    Creatinine 1.14 (H) 0.51 - 0.95 mg/dL    GFR Estimate 46 (L) >60 mL/min/1.73m2    Calcium 9.9 8.8 - 10.4 mg/dL    Chloride 86 (L) 98 - 107 mmol/L    Glucose 116 (H) 70 - 99 mg/dL    Alkaline Phosphatase 99 40 - 150 U/L    AST 30 0 - 45 U/L    ALT 19 0 - 50 U/L    Protein Total 7.9 6.4 - 8.3 g/dL    Albumin 4.4 3.5 - 5.2 g/dL    Bilirubin Total 1.1 <=1.2 mg/dL   CBC with platelets and differential   Result Value Ref Range    WBC Count 7.7 4.0 - 11.0 10e3/uL    RBC Count 5.26 (H) 3.80 - 5.20 10e6/uL    Hemoglobin 15.8 (H) 11.7 - 15.7 g/dL    Hematocrit 46.0 35.0 - 47.0 %    MCV 88 78 - 100 fL    MCH 30.0 26.5 - 33.0 pg    MCHC 34.3 31.5 - 36.5 g/dL    RDW 13.0 10.0 - 15.0 %    Platelet Count 269 150 - 450 10e3/uL    % Neutrophils 61 %    % Lymphocytes 26 %    % Monocytes 10 %    % Eosinophils 2 %    % Basophils 1 %    % Immature Granulocytes 0 %    NRBCs per 100 WBC 0 <1 /100    Absolute Neutrophils 4.8 1.6 - 8.3 10e3/uL    Absolute Lymphocytes 2.0 0.8 - 5.3 10e3/uL    Absolute Monocytes 0.8 0.0 - 1.3 10e3/uL    Absolute Eosinophils 0.2 0.0 - 0.7 10e3/uL    Absolute Basophils 0.0 0.0 - 0.2 10e3/uL    Absolute Immature Granulocytes 0.0  <=0.4 10e3/uL    Absolute NRBCs 0.0 10e3/uL   TSH with free T4 reflex   Result Value Ref Range    TSH 6.87 (H) 0.30 - 4.20 uIU/mL   T4 free   Result Value Ref Range    Free T4 1.13 0.90 - 1.70 ng/dL   CT Abdomen Pelvis w Contrast    Narrative    EXAM: CT ABDOMEN PELVIS W CONTRAST  LOC[facility]: [facility]  DATE: 3/18/2025    INDICATION: Abdominal pain, diarrhea  COMPARISON: 12/10/2024  TECHNIQUE: CT scan of the abdomen and pelvis was performed following injection of IV contrast. Multiplanar reformats were obtained. Dose reduction techniques were used.  CONTRAST: Isovue 370, 58mL    FINDINGS: Partial obscuration of the bladder due to metallic streak artifact from a right hip arthroplasty.    LOWER CHEST: Cardiomegaly with pacer leads. Increased, small partially imaged pericardial effusion.    HEPATOBILIARY: Normal.    PANCREAS: Normal.    SPLEEN: Normal.    ADRENAL GLANDS: Normal.    KIDNEYS/BLADDER: No significant mass, stone, or hydronephrosis.    BOWEL: No obstruction or inflammatory change. Normal appendix. Mild colonic fecal burden. Colonic diverticulosis with resolution of sigmoid diverticulitis.    LYMPH NODES: No lymphadenopathy.    VASCULATURE: Moderate abdominal aorta atherosclerosis without aneurysmal dilation.    PELVIC ORGANS: No suspicious pelvic mass. Removed vaginal pessary. Trace pelvic ascites is nonspecific and possibly physiologic.    MUSCULOSKELETAL: Bony demineralization without aggressive osseous lesion. Unchanged grade 1 anterolisthesis of L3 on L4. Right hip arthroplasty. Tiny fat-containing right inguinal hernia.        Impression    IMPRESSION:   1.  No acute abdominopelvic abnormality.  2.  Cardiomegaly with increased, small pericardial effusion.       UA with Microscopic reflex to Culture    Specimen: Urine, Clean Catch   Result Value Ref Range    Color Urine Light Yellow Colorless, Straw, Light Yellow, Yellow    Appearance Urine Clear Clear    Glucose Urine  Negative Negative mg/dL    Bilirubin Urine Negative Negative    Ketones Urine Negative Negative mg/dL    Specific Gravity Urine 1.016 1.003 - 1.035    Blood Urine Negative Negative    pH Urine 6.5 5.0 - 7.0    Protein Albumin Urine Negative Negative mg/dL    Urobilinogen Urine Normal Normal, 2.0 mg/dL    Nitrite Urine Negative Negative    Leukocyte Esterase Urine Negative Negative    RBC Urine <1 <=2 /HPF    WBC Urine 1 <=5 /HPF    Squamous Epithelials Urine 2 (H) <=1 /HPF    Hyaline Casts Urine 1 <=2 /LPF    Narrative    Urine Culture not indicated   Sodium random urine   Result Value Ref Range    Sodium Urine mmol/L 59 mmol/L   Sodium   Result Value Ref Range    Sodium 121 (L) 135 - 145 mmol/L   Magnesium   Result Value Ref Range    Magnesium 1.9 1.7 - 2.3 mg/dL   Phosphorus   Result Value Ref Range    Phosphorus 3.8 2.5 - 4.5 mg/dL   Sodium   Result Value Ref Range    Sodium 122 (L) 135 - 145 mmol/L       Medications   sodium chloride 0.9 % infusion ( Intravenous Handoff 3/18/25 1419)   aspirin (ASA) EC tablet 325 mg (has no administration in time range)   artificial tears (GENTEAL) 0.1-0.2-0.3 % ophthalmic solution 1 drop (has no administration in time range)   lidocaine 1 % 0.1-1 mL (has no administration in time range)   lidocaine (LMX4) cream (has no administration in time range)   sodium chloride (PF) 0.9% PF flush 3 mL (3 mLs Intracatheter $Given 3/18/25 1448)   sodium chloride (PF) 0.9% PF flush 3 mL (has no administration in time range)   calcium carbonate (TUMS) chewable tablet 1,000 mg (has no administration in time range)   hydrALAZINE (APRESOLINE) tablet 10 mg (has no administration in time range)     Or   hydrALAZINE (APRESOLINE) injection 10 mg (has no administration in time range)   melatonin tablet 1 mg (has no administration in time range)   ondansetron (ZOFRAN ODT) ODT tab 4 mg (has no administration in time range)     Or   ondansetron (ZOFRAN) injection 4 mg (has no administration in time  range)   prochlorperazine (COMPAZINE) injection 5 mg (has no administration in time range)     Or   prochlorperazine (COMPAZINE) tablet 5 mg (has no administration in time range)   sodium chloride 0.9% BOLUS 1,000 mL (0 mLs Intravenous Stopped 3/18/25 1130)   iopamidol (ISOVUE-370) solution 500 mL (58 mLs Intravenous $Given 3/18/25 1139)   sodium chloride 0.9 % bag 100mL for CT scan flush use (60 mLs Intravenous $Given 3/18/25 1140)       Assessments & Plan (with Medical Decision Making)  89-year-old female who presented to the emergency department secondary to generalized weakness.  She was laying prone on the floor when paramedics arrived.  She has complaints of nausea vomiting and diarrhea.  Diarrhea is not acute.  She has been seen here in the emergency department a few times for diarrhea.  1 point she was diagnosed with diverticulitis.  She previously had negative stool cultures.  She only had vomiting after paramedics arrived when she was helped up and they brought her to the toilet.  She got nauseated and vomited in the bathroom but prior to that has not had nausea or vomiting.  An IV was established, IV fluids given, labs drawn.  Patient did have some abdominal discomfort and previous history of diverticulitis with diarrhea and therefore we proceeded with CT scan.  There is no diverticulitis today and no acute findings.  Vomiting was controlled with 1 dose of Zofran she had no longer nausea.  She was given a 500 mL bolus of normal saline.  Labs show acute kidney injury with elevation of BUN and creatinine, mild.  Patient shows hyponatremia.  Given generalized weakness and the fact that she lives alone and could not get up from the floor and is found to be hyponatremic I recommend admission to hospital.  Patient agreed with this.  I discussed the case with Dr. Del Valle who is on-call who asked me to start.  Function on 30 mL/h of normal saline, repeat stool cultures and C. difficile, urine and serum  osmolality, spot urine sodium.  Those orders were added.  Patient was admitted to the floor.     I have reviewed the nursing notes.    I have reviewed the findings, diagnosis, plan and need for follow up with the patient.        Current Discharge Medication List          Final diagnoses:   Hyponatremia   Dehydration   Diarrhea, unspecified type   Acute kidney injury       3/18/2025   [facility] EMERGENCY DEPT       Lincoln Shane MD  03/18/25 [id number]

## 2025-03-18 NOTE — H&P
[facility]    History and Physical - Hospitalist Service       Date of Admission:  3/18/2025    Assessment & Plan      Audrey Ortega is a 89 year old female with PHx of HTN, sick sinus symptoms s/p pacemaker, CKD stage 3, paroxysmal atrial fibrillation, chronic insomnia and intermittent chronic diarrhea admitted on 3/18/2025 for generalized weakness, worsening diarrhea and significant hyponatremia.  Patient will be admitted to inpatient status with goal of increasing sodium 4-8 mEq every 24 hours.  Although worsening diarrhea could be secondary to hyponatremia it has been present for the past month with new onset abdominal pain and sodium was at patient's baseline 133 at ED visit 10 days ago.  Given severity of hyponatremia and need for slow correction patient will be admitted to inpatient status.      Principal Problem:    Hyponatremia - acute on chronic    Assessment: Patient's baseline sodium appears to be 133-136 in recent months, has been in the upper 120s during acute illness last year but then improved back to previous baseline. Sodium was 133 on ED visit on 3/7/25 and at that time patient was started on triamterene-hydrochlorothiazide 37.5-25mg for HTN.  In the past week patient has been having worsening weakness and diarrhea today started having nausea/vomiting. No confusion, no dizziness    Plan:   -will continue NS at 30 mL/hr and recheck sodium every 4 hours with titration of NS as needed to avoid overcorrection  -stop hydrochlorothiazide.  Discussed with patient that half life of this medication is variable but can be as long as 15 hours and may take several days to clear from her system.  -monitor for results of serum osmolality, urine osmolality and urine sodium  -monitor for symptoms improvement as labs improve.     Active Problems:    Diarrhea, acute on chronic    Assessment: Patient has had diarrhea intermittently since 2023 and was seen by GI specialist at that time  with concern for  chronic constipation with overflow diarrhea and was started on Fiber, Miralax with instructions to return if symptoms persist.  Patient reports it did improve for a little while but seems to come and go.  Did have worsening again at the end of last year with pain associated with defecation and was found to have diverticulitis and was treated with Augmentin after which she returned to her baseline diarrhea.  In the past week she has noticed her frequency has increased from her normal 4-6 stools a day to more like 6-10 stools but no pain until the past few days and this morning had nausea and vomiting x1.  No fevers that patient is aware of.      Plan:   -stool testing including enteric stools, c diff and Ova/parasites sent in ED and will monitor for results  -enteric precautions until stool results return  -hyponatremia can be associated with diarrhea and will monitor for improvement of symptoms as sodium improves.    -strict I/O to track stool during this stay       Nausea and vomiting    Assessment: developing nausea in the past few days and today vomited x1 and still feeling nauseous.  Likely secondary to hyponatremia but could be secondary to other etiology, such as GI illness    Plan:   -will continue with treatment of hyponatremia as above  -continue with work up of GI symptoms as above   -continue with prn antiemetics as above  -allow sips of water and clears as tolerated      Dehydration    Assessment: patient with mildly elevated creatinine from baseline, elevated BUN/creatinine ratio above 20 and patient appears clinically dry with frequent diarrhea and now onset of nausea/vomiting    Plan:   -will continue with cautious IV fluids and increase as sodium will allow.  -allow sips of clears as patient can tolerate      Generalized muscle weakness    Assessment: developing in the past week and likely secondary to hyponatremia and acute on chronic diarrhea     Plan:   -will proceed with  treatment of conditions as above and monitor for resolution of weakness  -if weakness continues despite improvement in symptoms and labs, will get physical therapy evaluation       Chronic kidney disease, stage 3 (H)    Assessment: patient's baseline creatinine appears to range from 0.88-1.1 at baseline.  Currently is 1.14 which is upper edge of normal for patient with BUN elevated at 27 and patient dehydrated - is at risk for CLOVIS going forward without intervention       Plan:   -will continue with IV fluids and monitor renal function closely       Paroxysmal atrial fibrillation (H)    Assessment: previous history, currently in NSR.  Patient is not on beta blocker at baseline because it caused hypotension in the past and has chosen not to be on full anticogulation and is on  mg instead.     Plan:   -monitor for recurrence during this stay   -given HTN could consider beta blocker again going forward if orthostatic hypotension is not present  -continue with  mg daily as per home regimen.       Essential (primary) hypertension    Assessment: Patient has been struggling to find a medication regimen she can tolerate.  She had previous hypotension on beta blocker, significant leg swelling from amlodipine, angioedema from lisinopril.  She attempted losartan but states it did not seem to improve her blood pressure at all, even on 100 mg dosing.  She did notice that her blood pressures were starting to improve from the 160-180s systolic down to 140-160s systolic on the triamterene-hydrochlorothiazide started last week.      Plan:   -stop triamterene-hydrochlorothiazide due to concern the hydrochlorothiazide is causing hyponatremia worsening as above  -monitor blood pressures during this stay  -patient does have history of orthostatic blood pressures in the past and will have nursing staff perform orthostatic blood pressures on arrival to the floor to evaluate for ongoing presence as this may influence options  for medications as above   -prn hydralazine for systolic blood pressures over 180      Sick sinus syndrome s/p pacemaker    Assessment: patient currently in paced rhythm in the 60s    Plan:   -monitor for ongoing stability   -follow up in outpatient setting with cardiology       Insomnia, unspecified    Assessment: patient has ongoing issues with insomnia and has tried several agents including Ambien in the past but without significant results and she doesn't want to try anything more at this time.  Patient reports she normally sleep about 4 hours a night.      Plan:   -will provide melatonin prn if patient desires to try.              Diet:  NPO but allow medications, ice chips, and sips of water and clears   DVT Prophylaxis: Enoxaparin (Lovenox) SQ  Ulrich Catheter: Not present  Lines: None     Cardiac Monitoring: None  Code Status:  DNR/DNI    Clinically Significant Risk Factors Present on Admission         # Hyponatremia: Lowest Na = 121 mmol/L in last 2 days, will monitor as appropriate  # Hypochloremia: Lowest Cl = 86 mmol/L in last 2 days, will monitor as appropriate        # Drug Induced Platelet Defect: home medication list includes an antiplatelet medication   # Hypertension: Noted on problem list               # Financial/Environmental Concerns:     # Pacemaker present       Disposition Plan     Medically Ready for Discharge: Anticipated in 2-4 Days           Jena Del Valle MD  Hospitalist Service  [facility]  Securely message with [employer] (more info)  Text page via [employer] Paging/Directory     ______________________________________________________________________    Chief Complaint   Weakness, worsening diarrhea, new nausea/vomiting     History is obtained from the patient    History of Present Illness   Audrey Ortega is a 89 year old female with PHx of HTN, sick sinus symptoms s/p pacemaker, CKD stage 3, paroxysmal atrial fibrillation, chronic insomnia and  intermittent chronic diarrhea who presents with worsening weakness in the past week associated with worsening diarrhea from her chronic baseline and yesterday she began having severe abdominal cramping just before a stool which is not her normal.  Today had onset of nausea and vomiting x1.  She felt so weak she sat down on the floor because she didn't want to fall down and break something like she did last year.  She then scooted herself across the floor to call a neighbor who came and saw her and then called 911.  In the ED patient was 96.5 temp with  but blood pressure stable.  Saturating well on RA.  CT scan showed no acute GI findings.  Sodium has returned at 122 and patient was given a 1L bolus prior to this being known and IV fluids now decreased to 30 ml/hr.  Patient is being admitted as above       Past Medical History    Past Medical History:   Diagnosis Date    Nausea and vomiting 3/18/2025       Past Surgical History   Past Surgical History:   Procedure Laterality Date    EP PACEMAKER GENERATOR REPLACEMENT- DUAL N/A 12/29/2023    Procedure: Pacemaker Generator Replacement Dual;  Surgeon: Sae Peguero MD;  Location:  [facility]    OPEN REDUCTION INTERNAL FIXATION HIP BIPOLAR Right 12/16/2024    Procedure: HEMIARTHROPLASTY, right HIP, BIPOLAR;  Surgeon: Adama Begum MD;  Location: [facility] OR    PHACOEMULSIFICATION WITH STANDARD INTRAOCULAR LENS IMPLANT Right 7/16/2020    Procedure: Cataract surgery with intraocular lens implant;  Surgeon: Fortino Ford MD;  Location: [facility] OR    PHACOEMULSIFICATION WITH STANDARD INTRAOCULAR LENS IMPLANT Left 7/30/2020    Procedure: Cataract Surgery Left Eye;  Surgeon: Max Cooley MD;  Location: [facility] OR       Prior to Admission Medications   Prior to Admission Medications   Prescriptions Last Dose Informant Patient Reported? Taking?   Ascorbic Acid (VITAMIN C) 500 MG CAPS 3/17/2025 Morning  Yes Yes   Sig: Take 500 mg by mouth daily    COMPRESSION STOCKINGS 3/17/2025 Morning  No Yes   Si each daily   Vitamin D, Cholecalciferol, 25 MCG (1000 UT) TABS 3/17/2025 Morning  Yes Yes   Sig: Take 1,000 Units by mouth daily   aspirin (ASA) 325 MG EC tablet 3/17/2025 Morning  Yes Yes   Sig: Take 325 mg by mouth daily.   calcium 600 MG tablet 3/17/2025 Morning  Yes Yes   Sig: Take 1 tablet by mouth daily   multivitamin w/minerals (THERA-VIT-M) tablet 3/17/2025 Morning  Yes Yes   Sig: Take 1 tablet by mouth daily   polyethylene glycol-propylene glycol (SYSTANE ULTRA) 0.4-0.3 % SOLN ophthalmic solution 3/17/2025  Yes Yes   Sig: Place 1 drop into both eyes 2 times daily.   triamterene-HCTZ (DYAZIDE) 37.5-25 MG capsule 3/17/2025 Morning  No Yes   Sig: Take 1 capsule by mouth every morning.      Facility-Administered Medications: None      Physical Exam   Vital Signs: Temp: (!) 96.5  F (35.8  C) Temp src: Temporal BP: (!) 159/81 Pulse: 64   Resp: 14 SpO2: 98 % O2 Device: None (Room air)    Weight: 118 lbs 0 oz    Constitutional: awake, alert, cooperative, no apparent distress, and appears stated age  Respiratory: No increased work of breathing, good air exchange, clear to auscultation bilaterally, no crackles or wheezing  Cardiovascular: RRR with HR in the low 60s and paced on monitor.    GI: bowel sounds present and hyperactive, abdomen soft but mild tenderness on palpation diffusely, no guarding or rebound tenderness   Musculoskeletal: no lower extremity pitting edema present  Neurologic: Awake, alert, oriented to name, place and situation     Medical Decision Making       80 MINUTES SPENT BY ME on the date of service doing chart review, history, exam, documentation & further activities per the note.      Data     I have personally reviewed the following data over the past 24 hrs:    7.7  \   15.8 (H)   / 269     122 (L) 86 (L) 27.0 (H) /  116 (H)   4.8 23 1.14 (H) \     ALT: 19 AST: 30 AP: 99 TBILI: 1.1   ALB: 4.4 TOT PROTEIN: 7.9 LIPASE: N/A     TSH:  6.87 (H) T4: 1.13 A1C: N/A       Imaging results reviewed over the past 24 hrs:   Recent Results (from the past 24 hours)   CT Abdomen Pelvis w Contrast    Narrative    EXAM: CT ABDOMEN PELVIS W CONTRAST  LOCATION: [facility]  DATE: 3/18/2025    INDICATION: Abdominal pain, diarrhea  COMPARISON: 12/10/2024  TECHNIQUE: CT scan of the abdomen and pelvis was performed following injection of IV contrast. Multiplanar reformats were obtained. Dose reduction techniques were used.  CONTRAST: Isovue 370, 58mL    FINDINGS: Partial obscuration of the bladder due to metallic streak artifact from a right hip arthroplasty.    LOWER CHEST: Cardiomegaly with pacer leads. Increased, small partially imaged pericardial effusion.    HEPATOBILIARY: Normal.    PANCREAS: Normal.    SPLEEN: Normal.    ADRENAL GLANDS: Normal.    KIDNEYS/BLADDER: No significant mass, stone, or hydronephrosis.    BOWEL: No obstruction or inflammatory change. Normal appendix. Mild colonic fecal burden. Colonic diverticulosis with resolution of sigmoid diverticulitis.    LYMPH NODES: No lymphadenopathy.    VASCULATURE: Moderate abdominal aorta atherosclerosis without aneurysmal dilation.    PELVIC ORGANS: No suspicious pelvic mass. Removed vaginal pessary. Trace pelvic ascites is nonspecific and possibly physiologic.    MUSCULOSKELETAL: Bony demineralization without aggressive osseous lesion. Unchanged grade 1 anterolisthesis of L3 on L4. Right hip arthroplasty. Tiny fat-containing right inguinal hernia.        Impression    IMPRESSION:   1.  No acute abdominopelvic abnormality.  2.  Cardiomegaly with increased, small pericardial effusion.

## 2025-03-19 LAB
ANION GAP SERPL CALCULATED.3IONS-SCNC: 10 MMOL/L (ref 7–15)
BUN SERPL-MCNC: 19.6 MG/DL (ref 8–23)
CALCIUM SERPL-MCNC: 9.3 MG/DL (ref 8.8–10.4)
CHLORIDE SERPL-SCNC: 98 MMOL/L (ref 98–107)
CREAT SERPL-MCNC: 0.88 MG/DL (ref 0.51–0.95)
EGFRCR SERPLBLD CKD-EPI 2021: 62 ML/MIN/1.73M2
ERYTHROCYTE [DISTWIDTH] IN BLOOD BY AUTOMATED COUNT: 13.2 % (ref 10–15)
GLUCOSE SERPL-MCNC: 80 MG/DL (ref 70–99)
HCO3 SERPL-SCNC: 19 MMOL/L (ref 22–29)
HCT VFR BLD AUTO: 44 % (ref 35–47)
HGB BLD-MCNC: 15 G/DL (ref 11.7–15.7)
MCH RBC QN AUTO: 30.2 PG (ref 26.5–33)
MCHC RBC AUTO-ENTMCNC: 34.1 G/DL (ref 31.5–36.5)
MCV RBC AUTO: 89 FL (ref 78–100)
O+P STL MICRO: NEGATIVE
OSMOLALITY SERPL: 271 MMOL/KG (ref 280–301)
OSMOLALITY UR: 330 MMOL/KG (ref 100–1200)
PLATELET # BLD AUTO: 240 10E3/UL (ref 150–450)
POTASSIUM SERPL-SCNC: 5.3 MMOL/L (ref 3.4–5.3)
RBC # BLD AUTO: 4.96 10E6/UL (ref 3.8–5.2)
SODIUM SERPL-SCNC: 127 MMOL/L (ref 135–145)
SODIUM SERPL-SCNC: 128 MMOL/L (ref 135–145)
SODIUM SERPL-SCNC: 129 MMOL/L (ref 135–145)
SODIUM SERPL-SCNC: 131 MMOL/L (ref 135–145)
SPECIMEN TYPE: NORMAL
TRI STN SPEC: NORMAL
TRI STN SPEC: NORMAL
WBC # BLD AUTO: 9 10E3/UL (ref 4–11)

## 2025-03-19 PROCEDURE — 120N000001 HC R&B MED SURG/OB

## 2025-03-19 PROCEDURE — 99232 SBSQ HOSP IP/OBS MODERATE 35: CPT | Performed by: INTERNAL MEDICINE

## 2025-03-19 PROCEDURE — 85014 HEMATOCRIT: CPT | Performed by: FAMILY MEDICINE

## 2025-03-19 PROCEDURE — 36415 COLL VENOUS BLD VENIPUNCTURE: CPT | Performed by: FAMILY MEDICINE

## 2025-03-19 PROCEDURE — 250N000013 HC RX MED GY IP 250 OP 250 PS 637: Performed by: FAMILY MEDICINE

## 2025-03-19 PROCEDURE — 250N000013 HC RX MED GY IP 250 OP 250 PS 637: Performed by: INTERNAL MEDICINE

## 2025-03-19 PROCEDURE — 80048 BASIC METABOLIC PNL TOTAL CA: CPT | Performed by: FAMILY MEDICINE

## 2025-03-19 PROCEDURE — 258N000003 HC RX IP 258 OP 636: Performed by: INTERNAL MEDICINE

## 2025-03-19 PROCEDURE — 84295 ASSAY OF SERUM SODIUM: CPT | Performed by: FAMILY MEDICINE

## 2025-03-19 RX ORDER — AMLODIPINE BESYLATE 5 MG/1
5 TABLET ORAL DAILY
Status: DISCONTINUED | OUTPATIENT
Start: 2025-03-19 | End: 2025-03-19

## 2025-03-19 RX ORDER — METOPROLOL TARTRATE 25 MG/1
25 TABLET, FILM COATED ORAL 2 TIMES DAILY
Status: DISCONTINUED | OUTPATIENT
Start: 2025-03-19 | End: 2025-03-21

## 2025-03-19 RX ADMIN — DEXTRAN 70, GLYCERIN, HYPROMELLOSE 1 DROP: 1; 2; 3 SOLUTION/ DROPS OPHTHALMIC at 20:06

## 2025-03-19 RX ADMIN — METOPROLOL TARTRATE 25 MG: 25 TABLET, FILM COATED ORAL at 20:06

## 2025-03-19 RX ADMIN — ASPIRIN 325 MG: 325 TABLET ORAL at 07:45

## 2025-03-19 RX ADMIN — SODIUM CHLORIDE: 0.9 INJECTION, SOLUTION INTRAVENOUS at 07:25

## 2025-03-19 RX ADMIN — METOPROLOL TARTRATE 25 MG: 25 TABLET, FILM COATED ORAL at 11:07

## 2025-03-19 RX ADMIN — DEXTRAN 70, GLYCERIN, HYPROMELLOSE 1 DROP: 1; 2; 3 SOLUTION/ DROPS OPHTHALMIC at 07:45

## 2025-03-19 ASSESSMENT — ACTIVITIES OF DAILY LIVING (ADL)
ADLS_ACUITY_SCORE: 39
ADLS_ACUITY_SCORE: 38
ADLS_ACUITY_SCORE: 39
ADLS_ACUITY_SCORE: 38
ADLS_ACUITY_SCORE: 39

## 2025-03-19 NOTE — PROGRESS NOTES
Noticed Na up to 127 which is fine but don't want to rise much further. Decreased Na back to 50 ml/hr

## 2025-03-19 NOTE — PLAN OF CARE
Goal Outcome Evaluation:           Overall Patient Progress: improvingOverall Patient Progress: improving    Outcome Evaluation: Sodium improving to 128. PAtient verbalized difficulty of sleeping, PRN melatonin given. VSS and afebrile. On room air. A and Ox4. IV fluid NS at 50ml/hr on flow. Negative for loose stool

## 2025-03-19 NOTE — PLAN OF CARE
Goal Outcome Evaluation:      Plan of Care Reviewed With: patient    Overall Patient Progress: improvingOverall Patient Progress: improving    Outcome Evaluation: patient A&O, vss except elevated BP metoprolol, RA, denies nausea, no BM this shift, showered, NA improving, expecting to d/c tomorrow if NA remains about 130

## 2025-03-19 NOTE — PROGRESS NOTES
[facility]  Hospitalist Progress Note  Krish Stevens MD  03/19/2025    Assessment & Plan   Audrey Ortega is a 89 year old female with PHx of HTN, sick sinus symptoms s/p pacemaker, CKD stage 3, paroxysmal atrial fibrillation, chronic insomnia and intermittent chronic diarrhea admitted on 3/18/2025 for generalized weakness, worsening diarrhea and significant hyponatremia.  Patient will be admitted to inpatient status with goal of increasing sodium 4-8 mEq every 24 hours.  Although worsening diarrhea could be secondary to hyponatremia it has been present for the past month with new onset abdominal pain and sodium was at patient's baseline 133 at ED visit 10 days ago.  Given severity of hyponatremia and need for slow correction patient will be admitted to inpatient status.       Principal Problem:  Hyponatremia - acute on chronic  Assessment: Patient's baseline sodium appears to be 133-136 in recent months, has been in the upper 120s during acute illness last year but then improved back to previous baseline. Sodium was 133 on ED visit on 3/7/25 and at that time patient was started on triamterene-hydrochlorothiazide 37.5-25mg for HTN.  In the past week patient has been having worsening weakness and diarrhea today started having nausea/vomiting. No confusion, no dizziness  -will initially treated with NS with improvement of Na from 121 >> 129-131  -stop IVF  -stop hydrochlorothiazide moving forward and attempt a different BP medication.    -U Na is 59, Uosm 330, Serum osm 271, TSH 6.87 but FT4 1.13  -her labs improved and she is able to ambulate stand by     Active Problems:  Diarrhea, acute on chronic   Assessment: Patient has had diarrhea intermittently since 2023 and was seen by GI specialist at that time with concern for  chronic constipation with overflow diarrhea and was started on Fiber, Miralax with instructions to return if symptoms persist.  Patient reports it did improve for  a little while but seems to come and go.  Did have worsening again at the end of last year with pain associated with defecation and was found to have diverticulitis and was treated with Augmentin after which she returned to her baseline diarrhea.  In the past week she has noticed her frequency has increased from her normal 4-6 stools a day to more like 6-10 stools but no pain until the past few days and this morning had nausea and vomiting x1.  No fevers that patient is aware of.      Plan:   -stool testing negative for C diff, Ova/parasites and enteric panel     -continue regular diet     Nausea and vomiting    Assessment: developing nausea in the past few days and today vomited x1 and still feeling nauseous.  Likely secondary to hyponatremia but could be secondary to other etiology, such as GI illness    Plan:   -will continue with treatment of hyponatremia as above  -continue with work up of GI symptoms as above   -continue with prn antiemetics as above  -tolerating diet     Dehydration    Assessment: patient with mildly elevated creatinine from baseline, elevated BUN/creatinine ratio above 20 and patient appears clinically dry with frequent diarrhea and now onset of nausea/vomiting    Plan:   -SL IVF, tolerating diet, 900 ml urine output     Generalized muscle weakness    Assessment: developing in the past week and likely secondary to hyponatremia and acute on chronic diarrhea     Plan:   - PT evaluation  - she walked with RN, anticipate home     Chronic kidney disease, stage 3 (H)    Assessment: patient's baseline creatinine appears to range from 0.88-1.1 at baseline.  Currently is 1.14 which is upper edge of normal for patient with BUN elevated at 27 and patient dehydrated - is at risk for CLOVIS going forward without intervention       Plan:   -will continue with IV fluids and monitor renal function closely      Paroxysmal atrial fibrillation (H)    Assessment: previous history, currently in NSR.  Patient is not  on beta blocker at baseline because it caused hypotension in the past and has chosen not to be on full anticogulation and is on  mg instead.     Plan:   -monitor for recurrence during this stay   -start Metoprolol 25 mg BID, patient has a pacemaker so we are not limited by bradycardia, she didn't like side effects of Norvasc (LE edema) and she has angioedema with ACE.  -continue with  mg daily as per home regimen.        Essential (primary) hypertension    Assessment: Patient has been struggling to find a medication regimen she can tolerate.  She had previous hypotension on beta blocker, significant leg swelling from amlodipine, angioedema from lisinopril.  She attempted losartan but states it did not seem to improve her blood pressure at all, even on 100 mg dosing.  She did notice that her blood pressures were starting to improve from the 160-180s systolic down to 140-160s systolic on the triamterene-hydrochlorothiazide started last week.      Plan:   -stop triamterene-hydrochlorothiazide due to concern the hydrochlorothiazide is causing hyponatremia worsening as above  -monitor blood pressures during this stay  -patient does have history of orthostatic blood pressures in the past and will have nursing staff perform orthostatic blood pressures on arrival to the floor to evaluate for ongoing presence as this may influence options for medications as above   -prn hydralazine for systolic blood pressures over 180       Sick sinus syndrome s/p pacemaker    Assessment: patient currently in paced rhythm in the 60s    Plan:   -monitor for ongoing stability   -follow up in outpatient setting with cardiology        Insomnia, unspecified    Assessment: patient has ongoing issues with insomnia and has tried several agents including Ambien in the past but without significant results and she doesn't want to try anything more at this time.  Patient reports she normally sleep about 4 hours a night.      Plan:   -will  "provide melatonin prn if patient desires to try.          Diet:  Regular diet  DVT Prophylaxis: PCD  Ulrich Catheter: Not present  Lines: None     Cardiac Monitoring: None  Code Status:  DNR/DNI        Clinically Significant Risk Factors Present on Admission         # Hyponatremia: Lowest Na = 121 mmol/L in last 2 days, will monitor as appropriate  # Hypochloremia: Lowest Cl = 86 mmol/L in last 2 days, will monitor as appropriate        # Drug Induced Platelet Defect: home medication list includes an antiplatelet medication   # Hypertension: Noted on problem list                # Financial/Environmental Concerns:     # Pacemaker present     Disposition Plan  -- home     Medically Ready for Discharge: on 3/20    Disposition:     Interval History   -- chart reviewed  -- Na corrected near to her baseline  -- no nausea or headache    -Data reviewed today: I reviewed all new labs and imaging over the last 24 hours. I personally reviewed no images or EKG's today.    Physical Exam    , Blood pressure (!) 154/67, pulse 61, temperature 97.6  F (36.4  C), temperature source Oral, resp. rate 18, height 1.651 m (5' 5\"), weight 51.8 kg (114 lb 3.2 oz), SpO2 98%, not currently breastfeeding.  Vitals:    03/18/25 0900 03/18/25 1441 03/19/25 0500   Weight: 53.5 kg (118 lb) 52.1 kg (114 lb 14.4 oz) 51.8 kg (114 lb 3.2 oz)     Vital Signs with Ranges  Temp:  [97.6  F (36.4  C)-97.9  F (36.6  C)] 97.6  F (36.4  C)  Pulse:  [58-61] 61  Resp:  [18] 18  BP: (153-176)/(67-76) 154/67  SpO2:  [97 %-100 %] 98 %  I/O's Last 24 hours  I/O last 3 completed shifts:  In: 1586 [P.O.:600; I.V.:986]  Out: 1300 [Urine:1300]    Constitutional: Awake, alert, cooperative, no apparent distress  Respiratory: Clear to auscultation bilaterally, no crackles or wheezing  Cardiovascular: Regular rate and rhythm, normal S1 and S2   GI: Normal bowel sounds, soft, non-distended, non-tender  Skin/Integumen: No rashes, no cyanosis, no edema  Other:      Medications "   All medications were reviewed.  Current Facility-Administered Medications   Medication Dose Route Frequency Provider Last Rate Last Admin     Current Facility-Administered Medications   Medication Dose Route Frequency Provider Last Rate Last Admin    artificial tears (GENTEAL) 0.1-0.2-0.3 % ophthalmic solution 1 drop  1 drop Both Eyes BID Jena Del Valle MD   1 drop at 03/19/25 0745    aspirin (ASA) EC tablet 325 mg  325 mg Oral Daily Jena Del Valle MD   325 mg at 03/19/25 0745    metoprolol tartrate (LOPRESSOR) tablet 25 mg  25 mg Oral BID Krish Stevens MD   25 mg at 03/19/25 1107    sodium chloride (PF) 0.9% PF flush 3 mL  3 mL Intracatheter Q8H Jena Del Valle MD   3 mL at 03/19/25 1522        Data   Recent Labs   Lab 03/19/25  1409 03/19/25  0959 03/19/25  0540 03/19/25  0228 03/18/25  1306 03/18/25  0950   WBC  --   --   --  9.0  --  7.7   HGB  --   --   --  15.0  --  15.8*   MCV  --   --   --  89  --  88   PLT  --   --   --  240  --  269   * 131* 128* 127*   < > 122*   POTASSIUM  --   --   --  5.3  --  4.8   CHLORIDE  --   --   --  98  --  86*   CO2  --   --   --  19*  --  23   BUN  --   --   --  19.6  --  27.0*   CR  --   --   --  0.88  --  1.14*   ANIONGAP  --   --   --  10  --  13   HO  --   --   --  9.3  --  9.9   GLC  --   --   --  80  --  116*   ALBUMIN  --   --   --   --   --  4.4   PROTTOTAL  --   --   --   --   --  7.9   BILITOTAL  --   --   --   --   --  1.1   ALKPHOS  --   --   --   --   --  99   ALT  --   --   --   --   --  19   AST  --   --   --   --   --  30    < > = values in this interval not displayed.       No results found for this or any previous visit (from the past 24 hours).    Krish Stevens MD  Text Page  (7am to 6pm)

## 2025-03-20 VITALS
HEART RATE: 61 BPM | BODY MASS INDEX: 19.24 KG/M2 | RESPIRATION RATE: 18 BRPM | SYSTOLIC BLOOD PRESSURE: 139 MMHG | WEIGHT: 115.5 LBS | HEIGHT: 65 IN | DIASTOLIC BLOOD PRESSURE: 63 MMHG | TEMPERATURE: 98.1 F | OXYGEN SATURATION: 97 %

## 2025-03-20 LAB
ANION GAP SERPL CALCULATED.3IONS-SCNC: 4 MMOL/L (ref 7–15)
BUN SERPL-MCNC: 23.1 MG/DL (ref 8–23)
CALCIUM SERPL-MCNC: 9.1 MG/DL (ref 8.8–10.4)
CHLORIDE SERPL-SCNC: 94 MMOL/L (ref 98–107)
CREAT SERPL-MCNC: 1.12 MG/DL (ref 0.51–0.95)
EGFRCR SERPLBLD CKD-EPI 2021: 47 ML/MIN/1.73M2
GLUCOSE SERPL-MCNC: 88 MG/DL (ref 70–99)
HCO3 SERPL-SCNC: 24 MMOL/L (ref 22–29)
HOLD SPECIMEN: NORMAL
POTASSIUM SERPL-SCNC: 4.7 MMOL/L (ref 3.4–5.3)
SODIUM SERPL-SCNC: 122 MMOL/L (ref 135–145)
SODIUM SERPL-SCNC: 123 MMOL/L (ref 135–145)
SODIUM SERPL-SCNC: 128 MMOL/L (ref 135–145)
SODIUM SERPL-SCNC: 129 MMOL/L (ref 135–145)
SODIUM SERPL-SCNC: 131 MMOL/L (ref 135–145)

## 2025-03-20 PROCEDURE — 80048 BASIC METABOLIC PNL TOTAL CA: CPT | Performed by: INTERNAL MEDICINE

## 2025-03-20 PROCEDURE — 258N000003 HC RX IP 258 OP 636: Performed by: NURSE PRACTITIONER

## 2025-03-20 PROCEDURE — 250N000013 HC RX MED GY IP 250 OP 250 PS 637: Performed by: INTERNAL MEDICINE

## 2025-03-20 PROCEDURE — 36415 COLL VENOUS BLD VENIPUNCTURE: CPT | Performed by: INTERNAL MEDICINE

## 2025-03-20 PROCEDURE — 99232 SBSQ HOSP IP/OBS MODERATE 35: CPT | Performed by: NURSE PRACTITIONER

## 2025-03-20 PROCEDURE — 84295 ASSAY OF SERUM SODIUM: CPT | Performed by: NURSE PRACTITIONER

## 2025-03-20 PROCEDURE — 36415 COLL VENOUS BLD VENIPUNCTURE: CPT | Performed by: NURSE PRACTITIONER

## 2025-03-20 PROCEDURE — 120N000001 HC R&B MED SURG/OB

## 2025-03-20 PROCEDURE — 250N000013 HC RX MED GY IP 250 OP 250 PS 637: Performed by: FAMILY MEDICINE

## 2025-03-20 PROCEDURE — 250N000013 HC RX MED GY IP 250 OP 250 PS 637: Performed by: NURSE PRACTITIONER

## 2025-03-20 RX ORDER — POLYETHYLENE GLYCOL 3350 17 G/17G
17 POWDER, FOR SOLUTION ORAL DAILY
Status: DISCONTINUED | OUTPATIENT
Start: 2025-03-20 | End: 2025-03-21

## 2025-03-20 RX ORDER — SODIUM CHLORIDE 9 MG/ML
INJECTION, SOLUTION INTRAVENOUS CONTINUOUS
Status: DISCONTINUED | OUTPATIENT
Start: 2025-03-20 | End: 2025-03-20

## 2025-03-20 RX ORDER — SODIUM CHLORIDE 1 G/1
1 TABLET ORAL 2 TIMES DAILY WITH MEALS
Status: DISCONTINUED | OUTPATIENT
Start: 2025-03-20 | End: 2025-03-21

## 2025-03-20 RX ADMIN — SODIUM CHLORIDE TAB 1 GM 1 G: 1 TAB at 12:17

## 2025-03-20 RX ADMIN — DEXTRAN 70, GLYCERIN, HYPROMELLOSE 1 DROP: 1; 2; 3 SOLUTION/ DROPS OPHTHALMIC at 07:36

## 2025-03-20 RX ADMIN — DEXTRAN 70, GLYCERIN, HYPROMELLOSE 1 DROP: 1; 2; 3 SOLUTION/ DROPS OPHTHALMIC at 20:58

## 2025-03-20 RX ADMIN — SODIUM CHLORIDE: 0.9 INJECTION, SOLUTION INTRAVENOUS at 12:21

## 2025-03-20 RX ADMIN — METOPROLOL TARTRATE 25 MG: 25 TABLET, FILM COATED ORAL at 20:58

## 2025-03-20 RX ADMIN — METOPROLOL TARTRATE 25 MG: 25 TABLET, FILM COATED ORAL at 07:35

## 2025-03-20 RX ADMIN — ASPIRIN 325 MG: 325 TABLET ORAL at 07:35

## 2025-03-20 RX ADMIN — POLYETHYLENE GLYCOL 3350 17 G: 17 POWDER, FOR SOLUTION ORAL at 12:21

## 2025-03-20 ASSESSMENT — ACTIVITIES OF DAILY LIVING (ADL)
ADLS_ACUITY_SCORE: 38
ADLS_ACUITY_SCORE: 42
ADLS_ACUITY_SCORE: 38
ADLS_ACUITY_SCORE: 42
ADLS_ACUITY_SCORE: 40
ADLS_ACUITY_SCORE: 42
ADLS_ACUITY_SCORE: 38
ADLS_ACUITY_SCORE: 40
ADLS_ACUITY_SCORE: 42
ADLS_ACUITY_SCORE: 40
ADLS_ACUITY_SCORE: 38
ADLS_ACUITY_SCORE: 42
ADLS_ACUITY_SCORE: 38
ADLS_ACUITY_SCORE: 38
ADLS_ACUITY_SCORE: 42
ADLS_ACUITY_SCORE: 40
ADLS_ACUITY_SCORE: 38
ADLS_ACUITY_SCORE: 40

## 2025-03-20 NOTE — PROGRESS NOTES
[facility]    Medicine Progress Note - Hospitalist Service    Date of Admission:  3/18/2025    Assessment & Plan   Audrey Ortega is a 89 year old female with PHx of HTN, sick sinus symptoms s/p pacemaker, CKD stage 3, paroxysmal atrial fibrillation, chronic insomnia and intermittent chronic diarrhea admitted on 3/18/2025 for generalized weakness, worsening diarrhea and significant hyponatremia.  Patient was admitted to inpatient status with goal of increasing sodium 4-8 mEq every 24 hours.  Although worsening diarrhea could be secondary to hyponatremia it has been present for the past month with new onset abdominal pain and sodium was at patient's baseline 133 at ED visit 10 days ago.       Principal Problem:  Hyponatremia - acute on chronic  Patient's baseline sodium appears to be 133-136 in recent months, has been in the upper 120s during acute illness last year but then improved back to previous baseline. Sodium was 133 on ED visit on 3/7/25 and at that time patient was started on triamterene-hydrochlorothiazide 37.5-25mg for HTN.  In the past week patient has been having worsening weakness and diarrhea today started having nausea/vomiting. No confusion, no dizziness  will initially treated with NS with improvement of Na from 121 >> 129-131, then back down to 122 this am.  She does appear euvolemic today.  Labs consistent with SIADH.    -resume ns at 50/hour overnight  -add salt tabs 1 g bid  -trend sodium q 4 hours for now  -1000 ml fluid restriction  -discontinued  hydrochlorothiazide on admission moving forward and attempt a different BP medication.    -U Na is 59, Uosm 330, Serum osm 271, TSH 6.87 but FT4 1.13     Active Problems:  Diarrhea, acute on chronic   Assessment: Patient has had diarrhea intermittently since 2023 and was seen by GI specialist at that time with concern for  chronic constipation with overflow diarrhea and was started on Fiber, Miralax with instructions to  return if symptoms persist.  Patient reports it did improve for a little while but seems to come and go.  Reportedly did have worsening again at the end of last year with pain associated with defecation and was found to have diverticulitis and was treated with Augmentin after which she returned to her baseline diarrhea.  In the past week she has noticed her frequency has increased from her normal 4-6 stools a day to more like 6-10 stools but no pain until the past few days and this morning had nausea and vomiting x1.  No fevers that patient is aware of.      Plan:   C diff, Ova/parasites and enteric panel negative.  No BM in two days  -continue regular diet     Nausea and vomiting   developing nausea in the past few days vomited x1 on admission.  Likely secondary to hyponatremia but could be secondary to other etiology, such as GI illness.  No nausea today    Plan:   -continue with treatment of hyponatremia as above  -continue prn antiemetics  -tolerating diet     Dehydration    Assessment: patient with mildly elevated creatinine from baseline, elevated BUN/creatinine ratio above 20 and patient appears clinically dry with frequent diarrhea and now onset of nausea/vomiting.  3/10 she does appear euvolemic today.  Cr elevated 1.12.  She was saline locked on 3/19    Plan:   -IV NS at 50cc hour     Generalized muscle weakness   developing in the past week and likely secondary to hyponatremia and acute on chronic diarrhea     Plan:   - she walked with RN, anticipate home     Chronic kidney disease, stage 3 (H)    Assessment: patient's baseline creatinine appears to range from 0.88-1.1 at baseline.  Cr 1.14 on admission which is upper edge of normal for patient.  Cr 0.88 after crystalloid and again cr 1.12 today    Plan:   -will resume IV fluids at 50cc/hour  -check renal fxn in am     Paroxysmal atrial fibrillation (H)  : previous history, currently in NSR.  Patient is not on beta blocker at baseline because it caused  hypotension in the past and has chosen not to be on full anticogulation and is on  mg instead.     Plan:   -monitor for recurrence   -continue Metoprolol 25 mg BID, patient has a pacemaker so we are not limited by bradycardia, she didn't like side effects of Norvasc (LE edema) and she has angioedema with ACE.  -continue with  mg daily as per home regimen.        Essential (primary) hypertension   Patient has been struggling to find a medication regimen she can tolerate.  She had previous hypotension on beta blocker, significant leg swelling from amlodipine, angioedema from lisinopril.  She attempted losartan but states it did not seem to improve her blood pressure at all, even on 100 mg dosing.  She did notice that her blood pressures were starting to improve from the 160-180s systolic down to 140-160s systolic on the triamterene-hydrochlorothiazide started last week.  Blood pressure 120's to 154 systolic    Plan:   -discontinued  triamterene-hydrochlorothiazide due to concern the hydrochlorothiazide was causing hyponatremia worsening as above  -monitor blood pressures during this stay  -patient does have history of orthostatic blood pressures in the past and will have nursing staff perform orthostatic blood pressures on arrival to the floor to evaluate for ongoing presence as this may influence options for medications as above   -prn hydralazine for systolic blood pressures over 180       Sick sinus syndrome s/p pacemaker   patient  paced rhythm in the 60s    Plan:   -monitor for ongoing stability   -follow up in outpatient setting with cardiology        Insomnia, unspecified  patient has ongoing issues with insomnia and has tried several agents including Ambien in the past but without significant results and she doesn't want to try anything more at this time.  Patient reports she normally sleep about 4 hours a night.      Plan:   -will provide melatonin prn if patient desires to try.           Diet:  Regular Diet Adult  Fluid restriction 1000 ML FLUID    DVT Prophylaxis: Pneumatic Compression Devices  Ulrich Catheter: Not present  Lines: None     Cardiac Monitoring: None  Code Status: No CPR- Do NOT Intubate      Clinically Significant Risk Factors         # Hyponatremia: Lowest Na = 121 mmol/L in last 2 days, will monitor as appropriate  # Hypochloremia: Lowest Cl = 94 mmol/L in last 2 days, will monitor as appropriate          # Hypertension: Noted on problem list                # Financial/Environmental Concerns:     # Pacemaker present       Social Drivers of Health    Stress: Stress Concern Present (10/29/2024)    Citizen of [place] [employer] Occupational [employer] - Occupational Stress Questionnaire     Feeling of Stress : To some extent   Social Connections: Unknown (10/29/2024)    Social Connection and Isolation Panel [NHANES]     Frequency of Social Gatherings with Friends and Family: Twice a week          Disposition Plan     Medically Ready for Discharge: Anticipated Tomorrow           The patient's care was discussed with the  entire care team .    Beverley Galvan CNP  Hospitalist Service  formerly [facility]  Securely message with [employer] (more info)  Text page via [employer] Paging/Directory   ______________________________________________________________________    Interval History   No acute events overnight    Physical Exam   Vital Signs: Temp: 97.9  F (36.6  C) Temp src: Oral BP: (!) 146/62 Pulse: 61   Resp: 16 SpO2: 99 % O2 Device: None (Room air)    Weight: 115 lbs 8 oz    Gen:  Lying in bed no acute distress  HEENT:  normocephalic, atraumatic, oropharynx clear  Resp:  CTA  Card:  S1,S2, RRR no murmur, rub or gallop  Abd:  Soft, nondistended, non tender,  normoactive bowel sounds   Neuro:  Neuro exam non focal      Medical Decision Making       40 MINUTES SPENT BY ME on the date of service doing chart review, history, exam, documentation & further activities per the note.        Data     I  have personally reviewed the following data over the past 24 hrs:    N/A  \   N/A   / N/A     123 (L) 94 (L) 23.1 (H) /  88   4.7 24 1.12 (H) \       Imaging results reviewed over the past 24 hrs:   No results found for this or any previous visit (from the past 24 hours).

## 2025-03-20 NOTE — PLAN OF CARE
Goal Outcome Evaluation:      Plan of Care Reviewed With: patient    Overall Patient Progress: improvingOverall Patient Progress: improving    A&Ox4, VSS on room air. NA recheck this AM was 122. Ambulating SBA, tolerating oral intake and meds. Cooperative and pleasant. Orthos neg.     Temp: 97.7  F (36.5  C) Temp src: Oral BP: 138/76 Pulse: 60   Resp: 18 SpO2: 96 % O2 Device: None (Room air)

## 2025-03-20 NOTE — PROGRESS NOTES
Informed by nursing staff that patient's sodium has increased from 123 up to 131 with changes made this morning.  Will saline lock IV fluids, increased the fluid restriction to 1500 mL and hold this afternoon's salt tablet dose and recheck sodium level again at 1800 with consideration of D5W if the sodium is not trending downward.      Electronically Signed:  Jena Del Valle MD

## 2025-03-20 NOTE — PLAN OF CARE
Goal Outcome Evaluation:      Plan of Care Reviewed With: patient    Overall Patient Progress: improvingOverall Patient Progress: improving    Outcome Evaluation: patient A&O, vss, RA,  up to 131, IVF stopped and Na tablet held, passing gas, No BM this shift, SBA

## 2025-03-21 LAB
ANION GAP SERPL CALCULATED.3IONS-SCNC: 10 MMOL/L (ref 7–15)
BUN SERPL-MCNC: 27.7 MG/DL (ref 8–23)
CALCIUM SERPL-MCNC: 8.9 MG/DL (ref 8.8–10.4)
CHLORIDE SERPL-SCNC: 98 MMOL/L (ref 98–107)
CREAT SERPL-MCNC: 0.93 MG/DL (ref 0.51–0.95)
EGFRCR SERPLBLD CKD-EPI 2021: 58 ML/MIN/1.73M2
GLUCOSE SERPL-MCNC: 81 MG/DL (ref 70–99)
HCO3 SERPL-SCNC: 18 MMOL/L (ref 22–29)
HOLD SPECIMEN: NORMAL
POTASSIUM SERPL-SCNC: 5.3 MMOL/L (ref 3.4–5.3)
SODIUM SERPL-SCNC: 126 MMOL/L (ref 135–145)
SODIUM SERPL-SCNC: 128 MMOL/L (ref 135–145)
SODIUM SERPL-SCNC: 129 MMOL/L (ref 135–145)
SODIUM SERPL-SCNC: 130 MMOL/L (ref 135–145)

## 2025-03-21 PROCEDURE — 250N000013 HC RX MED GY IP 250 OP 250 PS 637: Performed by: FAMILY MEDICINE

## 2025-03-21 PROCEDURE — 250N000013 HC RX MED GY IP 250 OP 250 PS 637: Performed by: NURSE PRACTITIONER

## 2025-03-21 PROCEDURE — 120N000001 HC R&B MED SURG/OB

## 2025-03-21 PROCEDURE — 36415 COLL VENOUS BLD VENIPUNCTURE: CPT | Performed by: NURSE PRACTITIONER

## 2025-03-21 PROCEDURE — 80048 BASIC METABOLIC PNL TOTAL CA: CPT | Performed by: NURSE PRACTITIONER

## 2025-03-21 PROCEDURE — 84295 ASSAY OF SERUM SODIUM: CPT | Performed by: NURSE PRACTITIONER

## 2025-03-21 PROCEDURE — 250N000013 HC RX MED GY IP 250 OP 250 PS 637: Performed by: INTERNAL MEDICINE

## 2025-03-21 PROCEDURE — 99232 SBSQ HOSP IP/OBS MODERATE 35: CPT | Performed by: NURSE PRACTITIONER

## 2025-03-21 RX ORDER — SODIUM CHLORIDE 1 G/1
1 TABLET ORAL
Status: DISCONTINUED | OUTPATIENT
Start: 2025-03-21 | End: 2025-03-23 | Stop reason: HOSPADM

## 2025-03-21 RX ORDER — METOPROLOL TARTRATE 50 MG
50 TABLET ORAL 2 TIMES DAILY
Status: DISCONTINUED | OUTPATIENT
Start: 2025-03-21 | End: 2025-03-23 | Stop reason: HOSPADM

## 2025-03-21 RX ADMIN — SODIUM CHLORIDE TAB 1 GM 1 G: 1 TAB at 17:26

## 2025-03-21 RX ADMIN — DEXTRAN 70, GLYCERIN, HYPROMELLOSE 1 DROP: 1; 2; 3 SOLUTION/ DROPS OPHTHALMIC at 08:29

## 2025-03-21 RX ADMIN — CALCIUM POLYCARBOPHIL 625 MG: 625 TABLET, FILM COATED ORAL at 13:21

## 2025-03-21 RX ADMIN — METOPROLOL TARTRATE 50 MG: 50 TABLET, FILM COATED ORAL at 20:03

## 2025-03-21 RX ADMIN — METOPROLOL TARTRATE 25 MG: 25 TABLET, FILM COATED ORAL at 08:23

## 2025-03-21 RX ADMIN — SODIUM CHLORIDE TAB 1 GM 1 G: 1 TAB at 08:23

## 2025-03-21 RX ADMIN — POLYETHYLENE GLYCOL 3350 17 G: 17 POWDER, FOR SOLUTION ORAL at 08:23

## 2025-03-21 RX ADMIN — DEXTRAN 70, GLYCERIN, HYPROMELLOSE 1 DROP: 1; 2; 3 SOLUTION/ DROPS OPHTHALMIC at 20:03

## 2025-03-21 RX ADMIN — ASPIRIN 325 MG: 325 TABLET ORAL at 08:23

## 2025-03-21 ASSESSMENT — ACTIVITIES OF DAILY LIVING (ADL)
ADLS_ACUITY_SCORE: 42
ADLS_ACUITY_SCORE: 38
ADLS_ACUITY_SCORE: 38
ADLS_ACUITY_SCORE: 40
ADLS_ACUITY_SCORE: 38
ADLS_ACUITY_SCORE: 40
ADLS_ACUITY_SCORE: 42
ADLS_ACUITY_SCORE: 38
ADLS_ACUITY_SCORE: 38
ADLS_ACUITY_SCORE: 42
ADLS_ACUITY_SCORE: 38
ADLS_ACUITY_SCORE: 42
ADLS_ACUITY_SCORE: 40
ADLS_ACUITY_SCORE: 38
ADLS_ACUITY_SCORE: 42
ADLS_ACUITY_SCORE: 38
ADLS_ACUITY_SCORE: 42

## 2025-03-21 NOTE — PLAN OF CARE
Goal Outcome Evaluation:      Plan of Care Reviewed With: patient    Overall Patient Progress: improvingOverall Patient Progress: improving    Outcome Evaluation: A&Ox4, VSS on room air.  NA rechecked around 3:30, Sodium 126. Resting comfortably between cares, cooperative and pleasant. Sodium this morning was 128. 1500 Fluid restriction followed.    Temp: 97.7  F (36.5  C) Temp src: Oral BP: (!) 148/82 Pulse: 62   Resp: 18 SpO2: 96 % O2 Device: None (Room air)

## 2025-03-21 NOTE — PROGRESS NOTES
[facility]    Medicine Progress Note - Hospitalist Service    Date of Admission:  3/18/2025    Assessment & Plan   Audrey Ortega is a 89 year old female with PHx of HTN, sick sinus symptoms s/p pacemaker, CKD stage 3, paroxysmal atrial fibrillation, chronic insomnia and intermittent chronic diarrhea admitted on 3/18/2025 for generalized weakness, worsening diarrhea and significant hyponatremia.  Patient was admitted to inpatient status with goal of increasing sodium 4-8 mEq every 24 hours.  Although worsening diarrhea could be secondary to hyponatremia it has been present for the past month with new onset abdominal pain and sodium was at patient's baseline 133 at ED visit 10 days ago.       Principal Problem:  Hyponatremia - acute on chronic  Patient's baseline sodium appears to be 133-136 in recent months, has been in the upper 120s during acute illness last year but then improved back to previous baseline. Sodium was 133 on ED visit on 3/7/25 and at that time patient was started on triamterene-hydrochlorothiazide 37.5-25mg for HTN.  In the past week patient has been having worsening weakness and diarrhea in addition to nausea/vomiting. No confusion, no dizziness  will initially treated with NS with improvement of Na from 121 >> 129-131, then back down to 122 on 3/20.  She appears euvolemic today.  Labs consistent with SIADH.  Initiated NS at 50 ml/hour, salt tabs 2 g bid and fluid restriction however patient correcting too rapidly and NS has been held.  Next several checks coming up appropriately.    Plan to continue checks (change to q 6 hours overnight and possible home tomorrow)  -add salt tabs 1 g TID  -trend sodium q 6 hours  -1500 ml fluid restriction  -discontinued  hydrochlorothiazide (likely the cause of acute hyponatremia) on admission moving forward and attempt a different BP medication however she has many intolerances to blood pressure medications).    -U Na is 59, Uosm  330, Serum osm 271, TSH 6.87 but FT4 1.13     Active Problems:  Diarrhea, acute on chronic   Assessment: Patient has had diarrhea intermittently since 2023 and was seen by GI specialist at that time with concern for  chronic constipation with overflow diarrhea and was started on Fiber, Miralax with instructions to return if symptoms persist.  Patient reports it did improve for a little while but seems to come and go.  Reportedly did have worsening again at the end of last year with pain associated with defecation and was found to have diverticulitis and was treated with Augmentin after which she returned to her baseline diarrhea.  In the past week she has noticed her frequency has increased from her normal 4-6 stools a day to more like 6-10 stools but no pain until the past few days and this morning had nausea and vomiting x1.  No fevers that patient is aware of.  She now has had no BM for 3 days.      Plan:   C diff, Ova/parasites and enteric panel negative.  No BM in three days  -continue regular diet  -add back fibercon     Nausea and vomiting   developing nausea in the past few days vomited x1 on admission.  Likely secondary to hyponatremia but could be secondary to other etiology, such as GI illness.  No nausea for past three days    Plan:   -continue with treatment of hyponatremia as above  -continue prn antiemetics  -tolerating diet     Dehydration   patient with mildly elevated creatinine from baseline, elevated BUN/creatinine ratio above 20 and patient appeared clinically dry with frequent diarrhea and now onset of nausea/vomiting.  3/20  euvolemic today.  Cr 0.97.  She was saline locked on 3/19    Plan:   -continue saline lock     Generalized muscle weakness   developing in the past week and likely secondary to hyponatremia and acute on chronic diarrhea     Plan:   - she walked with RN, anticipate home at discharge      Chronic kidney disease, stage 3 (H)    A patient's baseline creatinine appears to range  from 0.88-1.1 at baseline.  Cr 1.14 on admission which is upper edge of normal for patient.  Cr stable at 0.93 on 3/21    Plan:   -check renal fxn in am     Paroxysmal atrial fibrillation (H)  : previous history, currently in NSR.  Patient is not on beta blocker at baseline because it caused hypotension in the past and has chosen not to be on full anticogulation and is on  mg instead.     Plan:   -monitor for recurrence   -increase Metoprolol 25 mg BID to 50 mg BID, patient has a pacemaker so we are not limited by bradycardia, she didn't like side effects of Norvasc (LE edema) and she has angioedema with ACE.  -continue with  mg daily as per home regimen.        Essential (primary) hypertension   Patient has been struggling to find a medication regimen she can tolerate.  She had previous hypotension on beta blocker, significant leg swelling from amlodipine, angioedema from lisinopril.  She attempted losartan but states it did not seem to improve her blood pressure at all, even on 100 mg dosing.  She did notice that her blood pressures were starting to improve from the 160-180s systolic down to 140-160s systolic on the triamterene-hydrochlorothiazide started last week.  Blood pressure 120's to 170 systolic here    Plan:   -discontinued  triamterene-hydrochlorothiazide due to concern the hydrochlorothiazide was causing hyponatremia worsening as above  -monitor blood pressures during this stay  -orthostatics negative after several checks  -increase metoprolol tartrate from 25 bid to 50 mg bid and she will likely require continued titration outpatient  -prn hydralazine for systolic blood pressures over 180       Sick sinus syndrome s/p pacemaker   patient  paced rhythm in the 60s    Plan:   -monitor for ongoing stability   -follow up in outpatient setting with cardiology        Insomnia, unspecified  patient has ongoing issues with insomnia and has tried several agents including Ambien in the past but  without significant results and she doesn't want to try anything more at this time.  Patient reports she normally sleep about 4 hours a night.      Plan:   -will provide melatonin prn if patient desires to try.           Diet: Regular Diet Adult  Fluid restriction 1500 ML FLUID    DVT Prophylaxis: Pneumatic Compression Devices  Ulrich Catheter: Not present  Lines: None     Cardiac Monitoring: None  Code Status: No CPR- Do NOT Intubate      Clinically Significant Risk Factors         # Hyponatremia: Lowest Na = 122 mmol/L in last 2 days, will monitor as appropriate  # Hypochloremia: Lowest Cl = 94 mmol/L in last 2 days, will monitor as appropriate          # Hypertension: Noted on problem list                # Financial/Environmental Concerns:     # Pacemaker present       Social Drivers of Health    Stress: Stress Concern Present (10/29/2024)    [place] of Occupational [employer] - Occupational Stress Questionnaire     Feeling of Stress : To some extent   Social Connections: Unknown (10/29/2024)    Social Connection and Isolation Panel [NHANES]     Frequency of Social Gatherings with Friends and Family: Twice a week          Disposition Plan     Medically Ready for Discharge: Anticipated Tomorrow           The patient's care was discussed with the  entire care team .    Beverley Galvan CNP  Hospitalist Service  [facility]  Securely message with [facility] (more info)  Text page via [employer] Paging/Directory   ______________________________________________________________________    Interval History   NO acute events overnight    Physical Exam   Vital Signs: Temp: 97.7  F (36.5  C) Temp src: Oral BP: (!) 167/66 Pulse: 60   Resp: 18 SpO2: 98 % O2 Device: None (Room air)    Weight: 117 lbs 9.6 oz    Gen:  Lying in bed no acute distress  HEENT:  normocephalic, atraumatic, oropharynx clear  Resp:  CTA  Card:  S1,S2, RRR no murmur, rub or gallop  Abd:  Soft nondistended, non tender,  normoactive  bowel sounds   Neuro:  Neuro exam non focal      Medical Decision Making       40 MINUTES SPENT BY ME on the date of service doing chart review, history, exam, documentation & further activities per the note.        Data     I have personally reviewed the following data over the past 24 hrs:    N/A  \   N/A   / N/A     129 (L) 98 27.7 (H) /  81   5.3 18 (L) 0.93 \       Imaging results reviewed over the past 24 hrs:   No results found for this or any previous visit (from the past 24 hours).

## 2025-03-21 NOTE — PLAN OF CARE
Goal Outcome Evaluation:      Plan of Care Reviewed With: patient    Overall Patient Progress: improving    Outcome Evaluation: A&Ox4, VSS on RA, up independently in room and to bathroom. Strict I&O. Na being supplemented and monitored.

## 2025-03-22 LAB
ANION GAP SERPL CALCULATED.3IONS-SCNC: 8 MMOL/L (ref 7–15)
BUN SERPL-MCNC: 24.7 MG/DL (ref 8–23)
CALCIUM SERPL-MCNC: 9.5 MG/DL (ref 8.8–10.4)
CHLORIDE SERPL-SCNC: 96 MMOL/L (ref 98–107)
CREAT SERPL-MCNC: 0.97 MG/DL (ref 0.51–0.95)
EGFRCR SERPLBLD CKD-EPI 2021: 56 ML/MIN/1.73M2
GLUCOSE SERPL-MCNC: 95 MG/DL (ref 70–99)
HCO3 SERPL-SCNC: 25 MMOL/L (ref 22–29)
HOLD SPECIMEN: NORMAL
POTASSIUM SERPL-SCNC: 5 MMOL/L (ref 3.4–5.3)
SODIUM SERPL-SCNC: 129 MMOL/L (ref 135–145)
SODIUM SERPL-SCNC: 130 MMOL/L (ref 135–145)
SODIUM SERPL-SCNC: 132 MMOL/L (ref 135–145)
SODIUM SERPL-SCNC: 134 MMOL/L (ref 135–145)

## 2025-03-22 PROCEDURE — 80048 BASIC METABOLIC PNL TOTAL CA: CPT | Performed by: NURSE PRACTITIONER

## 2025-03-22 PROCEDURE — 120N000001 HC R&B MED SURG/OB

## 2025-03-22 PROCEDURE — 36415 COLL VENOUS BLD VENIPUNCTURE: CPT | Performed by: NURSE PRACTITIONER

## 2025-03-22 PROCEDURE — 250N000013 HC RX MED GY IP 250 OP 250 PS 637: Performed by: NURSE PRACTITIONER

## 2025-03-22 PROCEDURE — 250N000013 HC RX MED GY IP 250 OP 250 PS 637: Performed by: FAMILY MEDICINE

## 2025-03-22 PROCEDURE — 84295 ASSAY OF SERUM SODIUM: CPT | Performed by: NURSE PRACTITIONER

## 2025-03-22 PROCEDURE — 99232 SBSQ HOSP IP/OBS MODERATE 35: CPT | Performed by: STUDENT IN AN ORGANIZED HEALTH CARE EDUCATION/TRAINING PROGRAM

## 2025-03-22 RX ADMIN — ASPIRIN 325 MG: 325 TABLET ORAL at 08:08

## 2025-03-22 RX ADMIN — METOPROLOL TARTRATE 50 MG: 50 TABLET, FILM COATED ORAL at 08:08

## 2025-03-22 RX ADMIN — SODIUM CHLORIDE TAB 1 GM 1 G: 1 TAB at 08:08

## 2025-03-22 RX ADMIN — METOPROLOL TARTRATE 50 MG: 50 TABLET, FILM COATED ORAL at 20:34

## 2025-03-22 RX ADMIN — DEXTRAN 70, GLYCERIN, HYPROMELLOSE 1 DROP: 1; 2; 3 SOLUTION/ DROPS OPHTHALMIC at 09:42

## 2025-03-22 RX ADMIN — SODIUM CHLORIDE TAB 1 GM 1 G: 1 TAB at 17:00

## 2025-03-22 RX ADMIN — CALCIUM POLYCARBOPHIL 625 MG: 625 TABLET, FILM COATED ORAL at 08:08

## 2025-03-22 RX ADMIN — SODIUM CHLORIDE TAB 1 GM 1 G: 1 TAB at 12:06

## 2025-03-22 RX ADMIN — DEXTRAN 70, GLYCERIN, HYPROMELLOSE 1 DROP: 1; 2; 3 SOLUTION/ DROPS OPHTHALMIC at 20:34

## 2025-03-22 ASSESSMENT — ACTIVITIES OF DAILY LIVING (ADL)
ADLS_ACUITY_SCORE: 38
ADLS_ACUITY_SCORE: 38
ADLS_ACUITY_SCORE: 39
ADLS_ACUITY_SCORE: 38
ADLS_ACUITY_SCORE: 39
ADLS_ACUITY_SCORE: 38
ADLS_ACUITY_SCORE: 38
ADLS_ACUITY_SCORE: 39
ADLS_ACUITY_SCORE: 39
ADLS_ACUITY_SCORE: 38
ADLS_ACUITY_SCORE: 39
ADLS_ACUITY_SCORE: 38
ADLS_ACUITY_SCORE: 38
ADLS_ACUITY_SCORE: 39

## 2025-03-22 NOTE — PROGRESS NOTES
"4 hour shift note: Hypertensive, Sbp in 160s but pt asymptomatic. Tolerating 1500ml FR. Na at 6pm- 134. Pt reported no episode of diarrhea this shift. SL    BP (!) 161/65 (BP Location: Left arm)   Pulse 60   Temp 97.9  F (36.6  C) (Oral)   Resp 19   Ht 1.651 m (5' 5\")   Wt 52.8 kg (116 lb 8 oz)   SpO2 92%   BMI 19.39 kg/m     "

## 2025-03-22 NOTE — PROGRESS NOTES
[facility]    Medicine Progress Note - Hospitalist Service    Date of Admission:  3/18/2025    Assessment & Plan   Audrey Ortega is a 89 year old female with PHx of HTN, sick sinus symptoms s/p pacemaker, CKD stage 3, paroxysmal atrial fibrillation, chronic insomnia and intermittent chronic diarrhea admitted on 3/18/2025 for generalized weakness, worsening diarrhea and significant hyponatremia.  Patient was admitted to inpatient status with goal of increasing sodium 4-8 mEq every 24 hours.  Although worsening diarrhea could be secondary to hyponatremia it has been present for the past month with new onset abdominal pain and sodium was at patient's baseline 133 at ED visit 10 days ago.       Principal Problem:  Hyponatremia - acute on chronic  Patient's baseline sodium appears to be 133-136 in recent months, has been in the upper 120s during acute illness last year but then improved back to previous baseline. Sodium was 133 on ED visit on 3/7/25 and at that time patient was started on triamterene-hydrochlorothiazide 37.5-25mg for HTN.  In the past week patient has been having worsening weakness and diarrhea in addition to nausea/vomiting. No confusion, no dizziness. Was initially treated with NS with improvement of Na from 121 >> 129-131, then back down to 122 on 3/20.  Labs consistent with SIADH.  Initiated NS at 50 ml/hour, salt tabs 2 g bid and fluid restriction however patient correcting too rapidly and NS has been held.  Next several checks coming up appropriately.  Sodium tabs increased to 1g TID.   - sodium checks q6hs  - continue salt tabs 1 g TID  -1500 ml fluid restriction  -discontinued  hydrochlorothiazide (likely the cause of acute hyponatremia) on admission moving forward and attempt a different BP medication however she has many intolerances to blood pressure medications).    -U Na is 59, Uosm 330, Serum osm 271, TSH 6.87 but FT4 1.13     Active Problems:  Diarrhea, acute  on chronic   Assessment: Patient has had diarrhea intermittently since 2023 and was seen by GI specialist at that time with concern for  chronic constipation with overflow diarrhea and was started on Fiber, Miralax with instructions to return if symptoms persist.  Patient reports it did improve for a little while but seems to come and go.  Reportedly did have worsening again at the end of last year with pain associated with defecation and was found to have diverticulitis and was treated with Augmentin after which she returned to her baseline diarrhea.  In the past week she has noticed her frequency has increased from her normal 4-6 stools a day to more like 6-10 stools but no pain until the past few days and this morning had nausea and vomiting x1.  No fevers that patient is aware of.  She now has had no BM for 3 days.      Plan:   C diff, Ova/parasites and enteric panel negative.    -continue regular diet  -add back fibercon     Nausea and vomiting   developing nausea in the past few days vomited x1 on admission.  Likely secondary to hyponatremia but could be secondary to other etiology, such as GI illness.  No nausea for past three days    Plan:   -continue with treatment of hyponatremia as above  -continue prn antiemetics  -tolerating diet     Dehydration   patient with mildly elevated creatinine from baseline, elevated BUN/creatinine ratio above 20 and patient appeared clinically dry with frequent diarrhea and now onset of nausea/vomiting.  3/20  euvolemic today.  Cr 0.97.  She was saline locked on 3/19    Plan:   -continue saline lock     Generalized muscle weakness   developing in the past week and likely secondary to hyponatremia and acute on chronic diarrhea     Plan:   - she walked with RN, anticipate home at discharge      Chronic kidney disease, stage 3 (H)    A patient's baseline creatinine appears to range from 0.88-1.1 at baseline.  Cr 1.14 on admission which is upper edge of normal for patient.  Cr  stable at 0.93 on 3/21    Plan:   -check renal fxn in am     Paroxysmal atrial fibrillation (H)  : previous history, currently in NSR.  Patient is not on beta blocker at baseline because it caused hypotension in the past and has chosen not to be on full anticogulation and is on  mg instead.     Plan:   -monitor for recurrence   -increase Metoprolol 25 mg BID to 50 mg BID, patient has a pacemaker so we are not limited by bradycardia, she didn't like side effects of Norvasc (LE edema) and she has angioedema with ACE.  -continue with  mg daily as per home regimen.        Essential (primary) hypertension   Patient has been struggling to find a medication regimen she can tolerate.  She had previous hypotension on beta blocker, significant leg swelling from amlodipine, angioedema from lisinopril.  She attempted losartan but states it did not seem to improve her blood pressure at all, even on 100 mg dosing.  She did notice that her blood pressures were starting to improve from the 160-180s systolic down to 140-160s systolic on the triamterene-hydrochlorothiazide started last week.  Blood pressure 120's to 170 systolic here    Plan:   -discontinued  triamterene-hydrochlorothiazide due to concern the hydrochlorothiazide was causing hyponatremia worsening as above  -monitor blood pressures during this stay  -orthostatics negative after several checks  -increase metoprolol tartrate from 25 bid to 50 mg bid and she will likely require continued titration outpatient  -prn hydralazine for systolic blood pressures over 180       Sick sinus syndrome s/p pacemaker   patient  paced rhythm in the 60s    Plan:   -monitor for ongoing stability   -follow up in outpatient setting with cardiology        Insomnia, unspecified  patient has ongoing issues with insomnia and has tried several agents including Ambien in the past but without significant results and she doesn't want to try anything more at this time.  Patient reports  she normally sleep about 4 hours a night.      Plan:   -will provide melatonin prn if patient desires to try.           Diet: Regular Diet Adult  Fluid restriction 1500 ML FLUID    DVT Prophylaxis: Pneumatic Compression Devices  Ulrich Catheter: Not present  Lines: None     Cardiac Monitoring: None  Code Status: No CPR- Do NOT Intubate      Clinically Significant Risk Factors         # Hyponatremia: Lowest Na = 126 mmol/L in last 2 days, will monitor as appropriate  # Hypochloremia: Lowest Cl = 96 mmol/L in last 2 days, will monitor as appropriate          # Hypertension: Noted on problem list                # Financial/Environmental Concerns:     # Pacemaker present       Social Drivers of Health    Stress: Stress Concern Present (10/29/2024)    [place] [employer] - Occupational Stress Questionnaire     Feeling of Stress : To some extent   Social Connections: Unknown (10/29/2024)    Social Connection and Isolation Panel [NHANES]     Frequency of Social Gatherings with Friends and Family: Twice a week          Disposition Plan     Medically Ready for Discharge: Anticipated in 5+ Days, pending placement to memory care             Jane Porter MD  Hospitalist Service  [facility]  Securely message with Commissioner (more info)  Text page via [facility] Paging/Directory   ______________________________________________________________________    Interval History     Feeling better   Did have a BM this am that was loose, prior to that no BM for 3 days so was on prn bowel regiment     Physical Exam   Vital Signs: Temp: 97.8  F (36.6  C) Temp src: Oral BP: (!) 146/55 Pulse: 59   Resp: 18 SpO2: 99 % O2 Device: None (Room air)    Weight: 116 lbs 8 oz    Constitutional: awake, alert, cooperative, no apparent distress, and appears stated age  Respiratory: No increased work of breathing, good air exchange, clear to auscultation bilaterally, no crackles or wheezing  Cardiovascular:  Normal apical impulse, regular rate and rhythm, normal S1 and S2, no S3 or S4, and no murmur noted  GI: normal bowel sounds, soft, non-distended, non-tender    Medical Decision Making       35 MINUTES SPENT BY ME on the date of service doing chart review, history, exam, documentation & further activities per the note.      Data   ------------------------- PAST 24 HR DATA REVIEWED -----------------------------------------------    I have personally reviewed the following data over the past 24 hrs:    N/A  \   N/A   / N/A     129 (L) 96 (L) 24.7 (H) /  95   5.0 25 0.97 (H) \       Imaging results reviewed over the past 24 hrs:   No results found for this or any previous visit (from the past 24 hours).

## 2025-03-22 NOTE — PLAN OF CARE
Goal Outcome Evaluation:      Plan of Care Reviewed With: patient    Overall Patient Progress: improving    Outcome Evaluation: Pt A/Ox4, VSS on RA, Systolic BP continues to run in the 140-160's, BM this morning, noon Na 132 next draw @1800, tolerating fluid resriction. Wt. down 1# today.

## 2025-03-22 NOTE — PLAN OF CARE
Goal Outcome Evaluation:           Overall Patient Progress: no changeOverall Patient Progress: no change    Outcome Evaluation: Pt A&Ox4. Blood pressures 140's-160's systolic, other VSS. No BM, abd soft, non-tender. Denies nausea.  Na 130 and 129. Compliant with fluid restriction. Weight down 1#.

## 2025-03-23 VITALS
OXYGEN SATURATION: 100 % | DIASTOLIC BLOOD PRESSURE: 62 MMHG | HEART RATE: 64 BPM | RESPIRATION RATE: 17 BRPM | TEMPERATURE: 97.6 F | WEIGHT: 118.1 LBS | HEIGHT: 65 IN | SYSTOLIC BLOOD PRESSURE: 136 MMHG | BODY MASS INDEX: 19.68 KG/M2

## 2025-03-23 LAB
ANION GAP SERPL CALCULATED.3IONS-SCNC: 7 MMOL/L (ref 7–15)
BUN SERPL-MCNC: 28.5 MG/DL (ref 8–23)
CALCIUM SERPL-MCNC: 9.2 MG/DL (ref 8.8–10.4)
CHLORIDE SERPL-SCNC: 102 MMOL/L (ref 98–107)
CREAT SERPL-MCNC: 0.92 MG/DL (ref 0.51–0.95)
EGFRCR SERPLBLD CKD-EPI 2021: 59 ML/MIN/1.73M2
GLUCOSE SERPL-MCNC: 92 MG/DL (ref 70–99)
HCO3 SERPL-SCNC: 23 MMOL/L (ref 22–29)
HOLD SPECIMEN: NORMAL
POTASSIUM SERPL-SCNC: 4.9 MMOL/L (ref 3.4–5.3)
SODIUM SERPL-SCNC: 132 MMOL/L (ref 135–145)
SODIUM SERPL-SCNC: 133 MMOL/L (ref 135–145)
SODIUM SERPL-SCNC: 135 MMOL/L (ref 135–145)

## 2025-03-23 PROCEDURE — 250N000013 HC RX MED GY IP 250 OP 250 PS 637: Performed by: STUDENT IN AN ORGANIZED HEALTH CARE EDUCATION/TRAINING PROGRAM

## 2025-03-23 PROCEDURE — 80048 BASIC METABOLIC PNL TOTAL CA: CPT | Performed by: NURSE PRACTITIONER

## 2025-03-23 PROCEDURE — 250N000013 HC RX MED GY IP 250 OP 250 PS 637: Performed by: FAMILY MEDICINE

## 2025-03-23 PROCEDURE — 36415 COLL VENOUS BLD VENIPUNCTURE: CPT | Performed by: NURSE PRACTITIONER

## 2025-03-23 PROCEDURE — 84295 ASSAY OF SERUM SODIUM: CPT | Performed by: NURSE PRACTITIONER

## 2025-03-23 PROCEDURE — 250N000013 HC RX MED GY IP 250 OP 250 PS 637: Performed by: NURSE PRACTITIONER

## 2025-03-23 PROCEDURE — 84295 ASSAY OF SERUM SODIUM: CPT | Performed by: STUDENT IN AN ORGANIZED HEALTH CARE EDUCATION/TRAINING PROGRAM

## 2025-03-23 PROCEDURE — 36415 COLL VENOUS BLD VENIPUNCTURE: CPT | Performed by: STUDENT IN AN ORGANIZED HEALTH CARE EDUCATION/TRAINING PROGRAM

## 2025-03-23 RX ORDER — SODIUM CHLORIDE 1 G/1
1 TABLET ORAL 2 TIMES DAILY WITH MEALS
Qty: 60 TABLET | Refills: 0 | Status: SHIPPED | OUTPATIENT
Start: 2025-03-23 | End: 2025-04-22

## 2025-03-23 RX ORDER — HYDRALAZINE HYDROCHLORIDE 10 MG/1
10 TABLET, FILM COATED ORAL 2 TIMES DAILY
Qty: 60 TABLET | Refills: 0 | Status: SHIPPED | OUTPATIENT
Start: 2025-03-23 | End: 2025-04-22

## 2025-03-23 RX ORDER — METOPROLOL TARTRATE 50 MG
50 TABLET ORAL 2 TIMES DAILY
Qty: 60 TABLET | Refills: 0 | Status: SHIPPED | OUTPATIENT
Start: 2025-03-23 | End: 2025-04-22

## 2025-03-23 RX ORDER — HYDRALAZINE HYDROCHLORIDE 10 MG/1
10 TABLET, FILM COATED ORAL 4 TIMES DAILY
Status: DISCONTINUED | OUTPATIENT
Start: 2025-03-23 | End: 2025-03-23

## 2025-03-23 RX ORDER — HYDRALAZINE HYDROCHLORIDE 10 MG/1
10 TABLET, FILM COATED ORAL 3 TIMES DAILY
Qty: 90 TABLET | Refills: 0 | Status: CANCELLED | OUTPATIENT
Start: 2025-03-23 | End: 2025-04-22

## 2025-03-23 RX ADMIN — SODIUM CHLORIDE TAB 1 GM 1 G: 1 TAB at 08:16

## 2025-03-23 RX ADMIN — SODIUM CHLORIDE TAB 1 GM 1 G: 1 TAB at 12:31

## 2025-03-23 RX ADMIN — ASPIRIN 325 MG: 325 TABLET ORAL at 08:16

## 2025-03-23 RX ADMIN — CALCIUM POLYCARBOPHIL 625 MG: 625 TABLET, FILM COATED ORAL at 08:16

## 2025-03-23 RX ADMIN — METOPROLOL TARTRATE 50 MG: 50 TABLET, FILM COATED ORAL at 08:16

## 2025-03-23 RX ADMIN — DEXTRAN 70, GLYCERIN, HYPROMELLOSE 1 DROP: 1; 2; 3 SOLUTION/ DROPS OPHTHALMIC at 08:16

## 2025-03-23 RX ADMIN — HYDRALAZINE HYDROCHLORIDE 10 MG: 10 TABLET, FILM COATED ORAL at 09:53

## 2025-03-23 ASSESSMENT — ACTIVITIES OF DAILY LIVING (ADL)
ADLS_ACUITY_SCORE: 39
ADLS_ACUITY_SCORE: 40
ADLS_ACUITY_SCORE: 39
ADLS_ACUITY_SCORE: 40
ADLS_ACUITY_SCORE: 39
ADLS_ACUITY_SCORE: 39

## 2025-03-23 NOTE — DISCHARGE SUMMARY
"[facility]  Hospitalist Discharge Summary      Date of Admission:  3/18/2025  Date of Discharge:  3/23/2025  Discharging Provider: Jane Porter MD  Discharge Service: Hospitalist Service    Discharge Diagnoses   ***    Clinically Significant Risk Factors          Follow-ups Needed After Discharge   Follow-up Appointments       Hospital Follow-up with Existing Primary Care Provider (PCP)      Please go to your already scheduled appointment on 3/28/2025 with your PCP  Please bring a record of your blood pressures at home on the new blood pressure medication    Schedule Primary Care visit within: 14 Days   Recommended labs and Imaging (to be ordered by Primary Care Provider): BMP (need to follow up on sodium)           {Additional follow-up instructions/to-do's for PCP    :***}    Unresulted Labs Ordered in the Past 30 Days of this Admission       Date and Time Order Name Status Description    3/23/2025  6:00 AM Sodium In process         These results will be followed up by ***    Discharge Disposition   {Discharge to:0592974::\"Discharged to home\"}  Condition at discharge: {CONDITION:493567::\"Stable\"}    Hospital Course   {OPTIONAL -- use to select A&P section   :855639}    Consultations This Hospital Stay   PHARMACY IP CONSULT    Code Status   No CPR- Do NOT Intubate    Time Spent on this Encounter   {How much time did you spend on discharge?:50792367}       Jane Porter MD  [facility] MEDICAL SURGICAL  [address]   [place] [address]  Phone: [phone]  ______________________________________________________________________    Physical Exam   Vital Signs: Temp: 97.6  F (36.4  C) Temp src: Oral BP: 136/62 Pulse: 64   Resp: 17 SpO2: 100 % O2 Device: None (Room air)    Weight: 118 lbs 1.6 oz  {Recommend personal SmartPhrase or Notewriter for exam (OPTIONAL)   :889758}       Primary Care Physician   Krish Bates    Discharge Orders      Reason " for your hospital stay    Hyponatremia (low sodium), likely due to starting hydrochlorothiazide recently, discontinued hydrochlorothiazide (likely the cause of acute hyponatremia). Was started on metoprolol and hydralazine. Recommend monitoring BP at home and follow up with PCP.     Activity    Your activity upon discharge: activity as tolerated     Diet    Follow this diet upon discharge: Current Diet:Orders Placed This Encounter      Fluid restriction 1500 ML FLUID      Regular Diet Adult     Hospital Follow-up with Existing Primary Care Provider (PCP)    Please go to your already scheduled appointment on 3/28/2025 with your PCP  Please bring a record of your blood pressures at home on the new blood pressure medication       Significant Results and Procedures   {Data for Discharge Summary:775156}    Discharge Medications   Current Discharge Medication List        START taking these medications    Details   hydrALAZINE (APRESOLINE) 10 MG tablet Take 1 tablet (10 mg) by mouth 2 times daily. HOLD/DO NOT TAKE if systolic blood pressure is less than 130  Qty: 60 tablet, Refills: 0    Associated Diagnoses: Primary hypertension      metoprolol tartrate (LOPRESSOR) 50 MG tablet Take 1 tablet (50 mg) by mouth 2 times daily.  Qty: 60 tablet, Refills: 0    Associated Diagnoses: Primary hypertension      sodium chloride 1 GM tablet Take 1 tablet (1 g) by mouth 2 times daily (with meals). Re-evaluate need for salt tabs out-patient with PCP.  Qty: 60 tablet, Refills: 0    Associated Diagnoses: Hyponatremia           CONTINUE these medications which have NOT CHANGED    Details   Ascorbic Acid (VITAMIN C) 500 MG CAPS Take 500 mg by mouth daily      aspirin (ASA) 325 MG EC tablet Take 325 mg by mouth daily.      calcium 600 MG tablet Take 1 tablet by mouth daily      COMPRESSION STOCKINGS 1 each daily  Qty: 1 each, Refills: 1    Comments: 20-30 mmHg, Knee high, on in the morning and off at night.  Associated Diagnoses: Lower  extremity edema      multivitamin w/minerals (THERA-VIT-M) tablet Take 1 tablet by mouth daily      polyethylene glycol-propylene glycol (SYSTANE ULTRA) 0.4-0.3 % SOLN ophthalmic solution Place 1 drop into both eyes 2 times daily.      Vitamin D, Cholecalciferol, 25 MCG (1000 UT) TABS Take 1,000 Units by mouth daily           STOP taking these medications       triamterene-HCTZ (DYAZIDE) 37.5-25 MG capsule Comments:   Reason for Stopping:             Allergies   Allergies   Allergen Reactions    Lisinopril Angioedema    Ciprofloxacin Rash    Macrobid [Nitrofurantoin] Itching and Rash    Tylenol [Acetaminophen] Headache

## 2025-03-23 NOTE — PROGRESS NOTES
S-(situation): Patient discharged to home via private car with neighbor.    B-(background): HTN and hyponatremia    A-(assessment): A/Ox4, VSS on RA, up independently in room, ambulating halls with SBA.     R-(recommendations): Discharge instructions reviewed with Audrey. Belongings gathered and returned to patient.         Care Plan and Patient education resolved: Yes    New Medications- pt has been educated about purpose and side effects: Yes    Vaccines  Influenza status verified at discharge:  Yes    MISC  Prescriptions if needed, hard copies sent with patient  NA  Home medications returned to patient: NA  Medication Bin checked and emptied on discharge Yes  Patient reports post-discharge pain management plan is effective: Yes

## 2025-03-23 NOTE — PROGRESS NOTES
4 hour shift note: Pt A&Ox4. VSS. Reports feeling better. Compliant with fluid restriction. Up independently.

## 2025-03-23 NOTE — PLAN OF CARE
Goal Outcome Evaluation:      Plan of Care Reviewed With: patient    Overall Patient Progress: improvingOverall Patient Progress: improving    Outcome Evaluation: A&O. VSS on rm air. Denies pain overnight. Ambulating SBA. Tolerating oral intake. Adequate UOP. Fluid restriction maintained. Q6h Na checks. IV saline locked.

## 2025-03-23 NOTE — PLAN OF CARE
Goal Outcome Evaluation:      Plan of Care Reviewed With: patient    Overall Patient Progress: improving    Outcome Evaluation: A/Ox4, VSS on RA, LS clear and equal bilaterally, denies pain, up independently in room, tolerating regular diet and maintaining fluid restriction. Na 132 this AM. POC - discharge today.

## 2025-03-24 ENCOUNTER — PATIENT OUTREACH (OUTPATIENT)
Dept: CARE COORDINATION | Facility: CLINIC | Age: 89
End: 2025-03-24

## 2025-03-24 ENCOUNTER — OFFICE VISIT (OUTPATIENT)
Dept: GASTROENTEROLOGY | Facility: CLINIC | Age: 89
End: 2025-03-24
Attending: INTERNAL MEDICINE
Payer: COMMERCIAL

## 2025-03-24 VITALS
BODY MASS INDEX: 20.49 KG/M2 | TEMPERATURE: 96 F | WEIGHT: 123 LBS | SYSTOLIC BLOOD PRESSURE: 150 MMHG | DIASTOLIC BLOOD PRESSURE: 90 MMHG | HEIGHT: 65 IN

## 2025-03-24 DIAGNOSIS — R19.7 DIARRHEA, UNSPECIFIED TYPE: ICD-10-CM

## 2025-03-24 PROCEDURE — 3080F DIAST BP >= 90 MM HG: CPT | Performed by: INTERNAL MEDICINE

## 2025-03-24 PROCEDURE — 1126F AMNT PAIN NOTED NONE PRSNT: CPT | Performed by: INTERNAL MEDICINE

## 2025-03-24 PROCEDURE — 3077F SYST BP >= 140 MM HG: CPT | Performed by: INTERNAL MEDICINE

## 2025-03-24 PROCEDURE — 99212 OFFICE O/P EST SF 10 MIN: CPT | Performed by: INTERNAL MEDICINE

## 2025-03-24 RX ORDER — CHOLESTYRAMINE 4 G/9G
1 POWDER, FOR SUSPENSION ORAL AT BEDTIME
Qty: 60 PACKET | Refills: 5 | Status: SHIPPED | OUTPATIENT
Start: 2025-03-24

## 2025-03-24 ASSESSMENT — PAIN SCALES - GENERAL: PAINLEVEL_OUTOF10: NO PAIN (0)

## 2025-03-24 NOTE — PROGRESS NOTES
~Cardiology Clinic Visit~    Primary Cardiologist: beata Zapata MD (former Dr. Jama patient)  Reason for visit: annual follow up    History of Present Illness    Ms. Audrey Ortega, a pleasant 89 year old patient who has a past medical history significant for paroxysmal atrial fibrillation (declined anticoagulation), sick sinus syndrome status post permanent pacemaker in 2011 and hypertension who presents to clinic today for follow up.  She was last seen by Brooklyn Rollins CNP in December 2023.       In brief review, she has previously declined anticoagulation (unable to afford DOACs and warfarin caused bilateral shoulder pain). Previous device checks have been showing prolonged episodes of tachycardia thought to be atrial flutter or atrial tachycardia.     In 2023, Audrey was hospitalized at [facility] for chest pain.  She underwent a coronary CTA which did not show obstructive disease.  An echocardiogram demonstrated  Normal LV function with moderate to severe MR, and severe TR.  There was a small to moderate pericardial effusion. Follow up echocardiogram demonstrated normal LV function with moderate MR, moderate-severe TR and small circumferential pericardial effusion.       She underwent pacemaker generator change without complications in December 2023.    ON 3/18/25 she presented to the ER with N/V/D.  She reported a several month history of diarrhea - a CT scan prior to that visit in Dec. 2024 showed diverticulitis.  She had hyponatremia, was on hydrochlorothiazide for BP management at that point.    Diagnostics:    Device check 3/6/25: Atrial Arrhythmia: 156 episodes logged. EGMs show AFL with controlled V rates, longest lasting > 99h. AF burden at 88% Pt declines OAC and is on ASA.  Ventricular Arrhythmia: 0      Interval 03/26/25    Overall patient is doing well following her hospital stay.  She is tracking her BP at home and has had controlled readings.  No side effects currently on her  medication regimen.  I have rechecked the cost of OAC and pricing remains unaffordable for patient.  This was discussed.  Labs reviewed, hospital echo reviewed.  Today, she has no reports of CP, dizziness, lightheadedness, SOB, orthopnea, edema or HF symptoms.  __________________________________________________________________         Assessment and Impression:     Paroxysmal atrial fibrillation  Continues to decline anticoagulation, confirmed again in clinic today.  She has also declined Watchman  Most recent device notes frequent mode switches; controlled ventricular rates  Stable on Lopressor 50 mg BID.  Hypertension  Mildly elevated in clinic; reports BP at goal at home/  Feeling well on current regimen.  Hx severe hyponatremia on hydrochlorothiazide  Sick sinus syndrome  Status post permanent pacemaker in 2011  Follows with device clinic; normal device function  Small to moderate pericardial effusion, Incidental finding, stable  Chest pain, Resolved.  Coronary CTA without obstructive CAD  Valvular disease:  Moderate-severe MR, severe TR.  No symptoms attributable to valve disease, euvolemic         Recommendations and Plan:     No medication changes for today.  BP controlled and patient feeling well.  Patient would like sodium rechecked, which is reasonable.  Will have her check labs on Friday before her follow up with PCP.  From a cardiac standpoint, she is asymptomatic today.  Repeat echocardiogram in 3-months.  DERRICK follow up visit in 3-months.  __________________________________________________________________    Thank you for the opportunity to participate in this pleasant patient's care.    We would be happy to see this patient sooner for any concerns in the meantime.    ZA Rankin, CNP   Nurse Practitioner  [facility]    Today's clinic visit entailed:  The following tests were independently interpreted by me as noted in my documentation: echo, labs, device  report  Ordering of each unique test  Prescription drug management  The level of medical decision making during this visit was of moderate complexity.  Review of the result(s) of each unique test - cardiac testing, cardiac imaging, labs  Care everywhere reviewed for additional records to facilitate comprehensive patient care.    Orders this Visit:  Orders Placed This Encounter   Procedures    Basic metabolic panel    Follow-Up with Cardiology- DERRICK    Echocardiogram Complete     No orders of the defined types were placed in this encounter.    There are no discontinued medications.  Encounter Diagnoses   Name Primary?    Paroxysmal atrial fibrillation (H)     Pericardial effusion     Nonrheumatic tricuspid valve regurgitation     Hyponatremia     Cardiac pacemaker in situ     Essential (primary) hypertension Yes     CURRENT MEDICATIONS:  Current Outpatient Medications   Medication Sig Dispense Refill    Ascorbic Acid (VITAMIN C) 500 MG CAPS Take 500 mg by mouth daily      aspirin (ASA) 325 MG EC tablet Take 325 mg by mouth daily.      calcium 600 MG tablet Take 1 tablet by mouth daily      COMPRESSION STOCKINGS 1 each daily 1 each 1    hydrALAZINE (APRESOLINE) 10 MG tablet Take 1 tablet (10 mg) by mouth 2 times daily. HOLD/DO NOT TAKE if systolic blood pressure is less than 130 60 tablet 0    metoprolol tartrate (LOPRESSOR) 50 MG tablet Take 1 tablet (50 mg) by mouth 2 times daily. 60 tablet 0    multivitamin w/minerals (THERA-VIT-M) tablet Take 1 tablet by mouth daily      polyethylene glycol-propylene glycol (SYSTANE ULTRA) 0.4-0.3 % SOLN ophthalmic solution Place 1 drop into both eyes 2 times daily.      sodium chloride 1 GM tablet Take 1 tablet (1 g) by mouth 2 times daily (with meals). Re-evaluate need for salt tabs out-patient with PCP. 60 tablet 0    Vitamin D, Cholecalciferol, 25 MCG (1000 UT) TABS Take 1,000 Units by mouth daily      cholestyramine (QUESTRAN) 4 g packet Take 1 packet (4 g) by mouth at  "bedtime. (Patient not taking: Reported on 3/26/2025) 60 packet 5     ALLERGIES     Allergies   Allergen Reactions    Lisinopril Angioedema    Ciprofloxacin Rash    Macrobid [Nitrofurantoin] Itching and Rash    Tylenol [Acetaminophen] Headache     PAST MEDICAL, SURGICAL, FAMILY, SOCIAL HISTORY:  History was reviewed and updated as needed, see medical record.    Review of Systems:  A 10-point Review Of Systems is otherwise normal except for that which is noted in the HPI and interval summary.    Physical Exam:    Vitals: /84 (BP Location: Right arm, Patient Position: Sitting, Cuff Size: Adult Regular)   Pulse 65   Resp 18   Ht 1.651 m (5' 5\")   Wt 55.7 kg (122 lb 12.8 oz)   SpO2 99%   BMI 20.43 kg/m    Constitutional: Appears stated age, well nourished, NAD.  Neck: Supple. Carotid pulses full and equal.   Respiratory: Non-labored. Lungs CTAB.  Cardiovascular: RRR, normal S1 and S2. No M/G/R.  No edema.  GI: Soft, non-distended, non-tender.  Skin: Warm and dry.   Musculoskeletal/Extremities: Symmetrical movement.  Neurologic: No gross focal deficits. Alert, awake.  Psychiatric: Affect appropriate. Mentation normal.    Recent Lab Results:  LIPID RESULTS:  Lab Results   Component Value Date    CHOL 303 (H) 10/29/2024    HDL 84 10/29/2024     (H) 10/29/2024    TRIG 96 10/29/2024     LIVER ENZYME RESULTS:  Lab Results   Component Value Date    AST 30 03/18/2025    AST 17 05/05/2021    ALT 19 03/18/2025    ALT 22 05/05/2021     CBC RESULTS:  Lab Results   Component Value Date    WBC 9.0 03/19/2025    WBC 7.7 04/20/2020    RBC 4.96 03/19/2025    RBC 4.25 04/20/2020    HGB 15.0 03/19/2025    HGB 13.6 04/20/2020    HCT 44.0 03/19/2025    HCT 39.1 04/20/2020    MCV 89 03/19/2025    MCV 92 04/20/2020    MCH 30.2 03/19/2025    MCH 32.0 04/20/2020    MCHC 34.1 03/19/2025    MCHC 34.8 04/20/2020    RDW 13.2 03/19/2025    RDW 11.7 04/20/2020     03/19/2025     04/20/2020     BMP RESULTS:  Lab Results " "  Component Value Date     03/23/2025     (L) 05/05/2021    POTASSIUM 4.9 03/23/2025    POTASSIUM 4.9 09/15/2022    POTASSIUM 4.5 05/05/2021    CHLORIDE 102 03/23/2025    CHLORIDE 105 09/15/2022    CHLORIDE 97 05/05/2021    CO2 23 03/23/2025    CO2 26 09/15/2022    CO2 29 05/05/2021    ANIONGAP 7 03/23/2025    ANIONGAP 5 09/15/2022    ANIONGAP 6 05/05/2021    GLC 92 03/23/2025    GLC 99 12/16/2024    GLC 88 09/15/2022    GLC 96 05/05/2021    BUN 28.5 (H) 03/23/2025    BUN 27 09/15/2022    BUN 31 (H) 05/05/2021    CR 0.92 03/23/2025    CR 1.20 (H) 05/05/2021    GFRESTIMATED 59 (L) 03/23/2025    GFRESTIMATED 41 (L) 05/05/2021    GFRESTBLACK 48 (L) 05/05/2021    HO 9.2 03/23/2025    HO 9.4 05/05/2021      A1C RESULTS:  No results found for: \"A1C\"  INR RESULTS:  No results found for: \"INR\"                  "

## 2025-03-24 NOTE — PROGRESS NOTES
[facility]    Background: Transitional Care Management program identified per system criteria and reviewed by [facility] team for possible outreach.    Assessment: Upon chart review, [facility] Team member will not proceed with patient outreach related to this episode of Transitional Care Management program due to reason below:    Patient has a follow up appointment with an appropriate provider today for hospital discharge    Plan: Transitional Care Management episode addressed appropriately per reason noted above.          MARGOTH Mills  [phone]  [facility]

## 2025-03-24 NOTE — LETTER
3/24/2025      Audrey Ortega  [address]  [facility] [address]      Dear Colleague,    Thank you for referring your patient, Audrey Ortega, to the [facility]. Please see a copy of my visit note below.    Gastroenterology return    89-year-old last seen May 2023 for altered stools.  Patient left hospital yesterday where she was admitted with hyponatremia attributed to blood pressure diuretic medications.    Inpatient hospital care 3/18 to 23.    For the last month they have been of variable stools.  Background of constipation with no bowel movement for 4 days.  He was treated with MiraLAX and prune juice and she had a recent bowel movement.  Small amounts of stool since.  In the hospital patient had diarrhea once.    Patient will use her rescue enemas.  She has concerns about her sodium pill.  Patient has concerns about chronic insomnia.  She wishes to restart Ambien but brief discussion of benzos are not recommended at this point    Gallbladder is intact.  Patient notes that Metamucil is more effective than Imodium in the past.  Patient notes over the last week stools are typically [place] type VII.  3-5 Bowel Movements per Day.  No longer Using Magnesium.    Medications reviewed on epic    Allergies reviewed on epic      Impression/plan: Altered stool form and frequency, inconsistent with tendencies towards constipation and tendencies towards [place] 6 or 7 stools.  Patient's reluctant to use medications on a regular basis.  No clear anal incontinence.  Patient notes Metamucil has been better than loperamide in the past.  Consider empiric treatment for bile salt diarrhea as testing is typically not diagnostic consistently.  Defer treatment of insomnia and use of sodium tablets per PCP.  Questran 4 g nightly or every other day to modify stool form.  Patient to call the next 1 to 3 weeks for an update.  As needed return          Again, thank you for allowing me to participate in the care of  your patient.        Sincerely,        Tyrone Gonzales MD    Electronically signed

## 2025-03-24 NOTE — PROGRESS NOTES
Gastroenterology return    89-year-old last seen May 2023 for altered stools.  Patient left hospital yesterday where she was admitted with hyponatremia attributed to blood pressure diuretic medications.    Inpatient hospital care 3/18 to 23.    For the last month they have been of variable stools.  Background of constipation with no bowel movement for 4 days.  He was treated with MiraLAX and prune juice and she had a recent bowel movement.  Small amounts of stool since.  In the hospital patient had diarrhea once.    Patient will use her rescue enemas.  She has concerns about her sodium pill.  Patient has concerns about chronic insomnia.  She wishes to restart Ambien but brief discussion of benzos are not recommended at this point    Gallbladder is intact.  Patient notes that Metamucil is more effective than Imodium in the past.  Patient notes over the last week stools are typically [place] type VII.  3-5 Bowel Movements per Day.  No longer Using Magnesium.    Medications reviewed on epic    Allergies reviewed on epic      Impression/plan: Altered stool form and frequency, inconsistent with tendencies towards constipation and tendencies towards [place] 6 or 7 stools.  Patient's reluctant to use medications on a regular basis.  No clear anal incontinence.  Patient notes Metamucil has been better than loperamide in the past.  Consider empiric treatment for bile salt diarrhea as testing is typically not diagnostic consistently.  Defer treatment of insomnia and use of sodium tablets per PCP.  Questran 4 g nightly or every other day to modify stool form.  Patient to call the next 1 to 3 weeks for an update.  As needed return

## 2025-03-24 NOTE — PATIENT INSTRUCTIONS
Discussed     1.  In place of Metamucil or Citrucel, use Questran powder either nightly or every other night to see if this will help with the looser stool consistency    2.  Treatment of insomnia per Dr. Bates.  Some sleep doctors will use pediatric doses of melatonin    3.  Call back to give us an update on the Questran powder and management of the stool form.    As needed return

## 2025-03-26 ENCOUNTER — OFFICE VISIT (OUTPATIENT)
Dept: CARDIOLOGY | Facility: CLINIC | Age: 89
End: 2025-03-26
Payer: COMMERCIAL

## 2025-03-26 VITALS
SYSTOLIC BLOOD PRESSURE: 138 MMHG | HEIGHT: 65 IN | BODY MASS INDEX: 20.46 KG/M2 | WEIGHT: 122.8 LBS | HEART RATE: 65 BPM | RESPIRATION RATE: 18 BRPM | OXYGEN SATURATION: 99 % | DIASTOLIC BLOOD PRESSURE: 84 MMHG

## 2025-03-26 DIAGNOSIS — Z95.0 CARDIAC PACEMAKER IN SITU: ICD-10-CM

## 2025-03-26 DIAGNOSIS — I31.39 PERICARDIAL EFFUSION: ICD-10-CM

## 2025-03-26 DIAGNOSIS — I36.1 NONRHEUMATIC TRICUSPID VALVE REGURGITATION: ICD-10-CM

## 2025-03-26 DIAGNOSIS — E87.1 HYPONATREMIA: ICD-10-CM

## 2025-03-26 DIAGNOSIS — I48.0 PAROXYSMAL ATRIAL FIBRILLATION (H): ICD-10-CM

## 2025-03-26 DIAGNOSIS — I10 ESSENTIAL (PRIMARY) HYPERTENSION: Primary | ICD-10-CM

## 2025-03-26 PROCEDURE — 3075F SYST BP GE 130 - 139MM HG: CPT | Performed by: NURSE PRACTITIONER

## 2025-03-26 PROCEDURE — 1126F AMNT PAIN NOTED NONE PRSNT: CPT | Performed by: NURSE PRACTITIONER

## 2025-03-26 PROCEDURE — 99214 OFFICE O/P EST MOD 30 MIN: CPT | Performed by: NURSE PRACTITIONER

## 2025-03-26 PROCEDURE — 3079F DIAST BP 80-89 MM HG: CPT | Performed by: NURSE PRACTITIONER

## 2025-03-26 ASSESSMENT — PAIN SCALES - GENERAL: PAINLEVEL_OUTOF10: NO PAIN (0)

## 2025-03-26 NOTE — PATIENT INSTRUCTIONS
Thank you for your visit with the [facility] today.    Today's Summary:    Recheck labs on Friday, 3/28.  Echocardiogram (ultrasound of the heart) in 3-months.  Keep the medications the same.  BP looks good today.    Follow-up:  Cardiology follow up at [facility]: 3-months.     Cardiology Scheduling ~[phone]    It was a pleasure seeing you today.     AZ Rankin, CNP  Certified Nurse Practitioner  [facility]  March 26, 2025  ________________________________________________________

## 2025-03-26 NOTE — LETTER
3/26/2025    Krish Bates MD  [address] [facility] [address]    RE: Audrey Ortega       Dear Colleague,     I had the pleasure of seeing Audrey Ortega in the [facility].              ~Cardiology Clinic Visit~    Primary Cardiologist: beata Zapata MD (former Dr. Jama patient)  Reason for visit: annual follow up    History of Present Illness    Ms. Audrey Ortega, a pleasant 89 year old patient who has a past medical history significant for paroxysmal atrial fibrillation (declined anticoagulation), sick sinus syndrome status post permanent pacemaker in 2011 and hypertension who presents to clinic today for follow up.  She was last seen by Brooklyn Rollins CNP in December 2023.       In brief review, she has previously declined anticoagulation (unable to afford DOACs and warfarin caused bilateral shoulder pain). Previous device checks have been showing prolonged episodes of tachycardia thought to be atrial flutter or atrial tachycardia.     In 2023, Audrey was hospitalized at [facility] for chest pain.  She underwent a coronary CTA which did not show obstructive disease.  An echocardiogram demonstrated  Normal LV function with moderate to severe MR, and severe TR.  There was a small to moderate pericardial effusion. Follow up echocardiogram demonstrated normal LV function with moderate MR, moderate-severe TR and small circumferential pericardial effusion.       She underwent pacemaker generator change without complications in December 2023.    ON 3/18/25 she presented to the ER with N/V/D.  She reported a several month history of diarrhea - a CT scan prior to that visit in Dec. 2024 showed diverticulitis.  She had hyponatremia, was on hydrochlorothiazide for BP management at that point.    Diagnostics:    Device check 3/6/25: Atrial Arrhythmia: 156 episodes logged. EGMs show AFL with controlled V rates, longest lasting > 99h. AF burden at 88% Pt declines OAC and is on ASA.  Ventricular  Arrhythmia: 0      Interval 03/26/25    Overall patient is doing well following her hospital stay.  She is tracking her BP at home and has had controlled readings.  No side effects currently on her medication regimen.  I have rechecked the cost of OAC and pricing remains unaffordable for patient.  This was discussed.  Labs reviewed, hospital echo reviewed.  Today, she has no reports of CP, dizziness, lightheadedness, SOB, orthopnea, edema or HF symptoms.  __________________________________________________________________         Assessment and Impression:     Paroxysmal atrial fibrillation  Continues to decline anticoagulation, confirmed again in clinic today.  She has also declined Watchman  Most recent device notes frequent mode switches; controlled ventricular rates  Stable on Lopressor 50 mg BID.  Hypertension  Mildly elevated in clinic; reports BP at goal at home/  Feeling well on current regimen.  Hx severe hyponatremia on hydrochlorothiazide  Sick sinus syndrome  Status post permanent pacemaker in 2011  Follows with device clinic; normal device function  Small to moderate pericardial effusion, Incidental finding, stable  Chest pain, Resolved.  Coronary CTA without obstructive CAD  Valvular disease:  Moderate-severe MR, severe TR.  No symptoms attributable to valve disease, euvolemic         Recommendations and Plan:     No medication changes for today.  BP controlled and patient feeling well.  Patient would like sodium rechecked, which is reasonable.  Will have her check labs on Friday before her follow up with PCP.  From a cardiac standpoint, she is asymptomatic today.  Repeat echocardiogram in 3-months.  DERRICK follow up visit in 3-months.  __________________________________________________________________    Thank you for the opportunity to participate in this pleasant patient's care.    We would be happy to see this patient sooner for any concerns in the meantime.    Blanche Florence, APRN, CNP   Nurse  Practitioner  [facility]    Today's clinic visit entailed:  The following tests were independently interpreted by me as noted in my documentation: echo, labs, device report  Ordering of each unique test  Prescription drug management  The level of medical decision making during this visit was of moderate complexity.  Review of the result(s) of each unique test - cardiac testing, cardiac imaging, labs  Care everywhere reviewed for additional records to facilitate comprehensive patient care.    Orders this Visit:  Orders Placed This Encounter   Procedures     Basic metabolic panel     Follow-Up with Cardiology- DERRICK     Echocardiogram Complete     No orders of the defined types were placed in this encounter.    There are no discontinued medications.  Encounter Diagnoses   Name Primary?     Paroxysmal atrial fibrillation (H)      Pericardial effusion      Nonrheumatic tricuspid valve regurgitation      Hyponatremia      Cardiac pacemaker in situ      Essential (primary) hypertension Yes     CURRENT MEDICATIONS:  Current Outpatient Medications   Medication Sig Dispense Refill     Ascorbic Acid (VITAMIN C) 500 MG CAPS Take 500 mg by mouth daily       aspirin (ASA) 325 MG EC tablet Take 325 mg by mouth daily.       calcium 600 MG tablet Take 1 tablet by mouth daily       COMPRESSION STOCKINGS 1 each daily 1 each 1     hydrALAZINE (APRESOLINE) 10 MG tablet Take 1 tablet (10 mg) by mouth 2 times daily. HOLD/DO NOT TAKE if systolic blood pressure is less than 130 60 tablet 0     metoprolol tartrate (LOPRESSOR) 50 MG tablet Take 1 tablet (50 mg) by mouth 2 times daily. 60 tablet 0     multivitamin w/minerals (THERA-VIT-M) tablet Take 1 tablet by mouth daily       polyethylene glycol-propylene glycol (SYSTANE ULTRA) 0.4-0.3 % SOLN ophthalmic solution Place 1 drop into both eyes 2 times daily.       sodium chloride 1 GM tablet Take 1 tablet (1 g) by mouth 2 times daily (with meals). Re-evaluate need for salt  "tabs out-patient with PCP. 60 tablet 0     Vitamin D, Cholecalciferol, 25 MCG (1000 UT) TABS Take 1,000 Units by mouth daily       cholestyramine (QUESTRAN) 4 g packet Take 1 packet (4 g) by mouth at bedtime. (Patient not taking: Reported on 3/26/2025) 60 packet 5     ALLERGIES     Allergies   Allergen Reactions     Lisinopril Angioedema     Ciprofloxacin Rash     Macrobid [Nitrofurantoin] Itching and Rash     Tylenol [Acetaminophen] Headache     PAST MEDICAL, SURGICAL, FAMILY, SOCIAL HISTORY:  History was reviewed and updated as needed, see medical record.    Review of Systems:  A 10-point Review Of Systems is otherwise normal except for that which is noted in the HPI and interval summary.    Physical Exam:    Vitals: /84 (BP Location: Right arm, Patient Position: Sitting, Cuff Size: Adult Regular)   Pulse 65   Resp 18   Ht 1.651 m (5' 5\")   Wt 55.7 kg (122 lb 12.8 oz)   SpO2 99%   BMI 20.43 kg/m    Constitutional: Appears stated age, well nourished, NAD.  Neck: Supple. Carotid pulses full and equal.   Respiratory: Non-labored. Lungs CTAB.  Cardiovascular: RRR, normal S1 and S2. No M/G/R.  No edema.  GI: Soft, non-distended, non-tender.  Skin: Warm and dry.   Musculoskeletal/Extremities: Symmetrical movement.  Neurologic: No gross focal deficits. Alert, awake.  Psychiatric: Affect appropriate. Mentation normal.    Recent Lab Results:  LIPID RESULTS:  Lab Results   Component Value Date    CHOL 303 (H) 10/29/2024    HDL 84 10/29/2024     (H) 10/29/2024    TRIG 96 10/29/2024     LIVER ENZYME RESULTS:  Lab Results   Component Value Date    AST 30 03/18/2025    AST 17 05/05/2021    ALT 19 03/18/2025    ALT 22 05/05/2021     CBC RESULTS:  Lab Results   Component Value Date    WBC 9.0 03/19/2025    WBC 7.7 04/20/2020    RBC 4.96 03/19/2025    RBC 4.25 04/20/2020    HGB 15.0 03/19/2025    HGB 13.6 04/20/2020    HCT 44.0 03/19/2025    HCT 39.1 04/20/2020    MCV 89 03/19/2025    MCV 92 04/20/2020    MCH " "30.2 03/19/2025    MCH 32.0 04/20/2020    MCHC 34.1 03/19/2025    MCHC 34.8 04/20/2020    RDW 13.2 03/19/2025    RDW 11.7 04/20/2020     03/19/2025     04/20/2020     BMP RESULTS:  Lab Results   Component Value Date     03/23/2025     (L) 05/05/2021    POTASSIUM 4.9 03/23/2025    POTASSIUM 4.9 09/15/2022    POTASSIUM 4.5 05/05/2021    CHLORIDE 102 03/23/2025    CHLORIDE 105 09/15/2022    CHLORIDE 97 05/05/2021    CO2 23 03/23/2025    CO2 26 09/15/2022    CO2 29 05/05/2021    ANIONGAP 7 03/23/2025    ANIONGAP 5 09/15/2022    ANIONGAP 6 05/05/2021    GLC 92 03/23/2025    GLC 99 12/16/2024    GLC 88 09/15/2022    GLC 96 05/05/2021    BUN 28.5 (H) 03/23/2025    BUN 27 09/15/2022    BUN 31 (H) 05/05/2021    CR 0.92 03/23/2025    CR 1.20 (H) 05/05/2021    GFRESTIMATED 59 (L) 03/23/2025    GFRESTIMATED 41 (L) 05/05/2021    GFRESTBLACK 48 (L) 05/05/2021    HO 9.2 03/23/2025    HO 9.4 05/05/2021      A1C RESULTS:  No results found for: \"A1C\"  INR RESULTS:  No results found for: \"INR\"                    Thank you for allowing me to participate in the care of your patient.      Sincerely,     ZA Rankin [facility]  cc:   Krish Bates MD  [address] [place] [id number]      "

## 2025-05-20 ENCOUNTER — APPOINTMENT (OUTPATIENT)
Dept: CT IMAGING | Facility: CLINIC | Age: 89
End: 2025-05-20
Attending: EMERGENCY MEDICINE
Payer: MEDICARE

## 2025-05-20 ENCOUNTER — HOSPITAL ENCOUNTER (EMERGENCY)
Facility: CLINIC | Age: 89
Discharge: HOME | End: 2025-05-20
Attending: EMERGENCY MEDICINE | Admitting: EMERGENCY MEDICINE
Payer: MEDICARE

## 2025-05-20 VITALS
BODY MASS INDEX: 19.99 KG/M2 | HEART RATE: 61 BPM | WEIGHT: 120 LBS | HEIGHT: 65 IN | TEMPERATURE: 98 F | DIASTOLIC BLOOD PRESSURE: 84 MMHG | OXYGEN SATURATION: 96 % | SYSTOLIC BLOOD PRESSURE: 142 MMHG | RESPIRATION RATE: 18 BRPM

## 2025-05-20 DIAGNOSIS — S00.03XA CONTUSION OF SCALP, INITIAL ENCOUNTER: ICD-10-CM

## 2025-05-20 DIAGNOSIS — S01.81XA: ICD-10-CM

## 2025-05-20 PROCEDURE — 99284 EMERGENCY DEPT VISIT MOD MDM: CPT | Mod: 25 | Performed by: EMERGENCY MEDICINE

## 2025-05-20 PROCEDURE — 70450 CT HEAD/BRAIN W/O DYE: CPT

## 2025-05-20 PROCEDURE — 12052 INTMD RPR FACE/MM 2.6-5.0 CM: CPT | Performed by: EMERGENCY MEDICINE

## 2025-05-20 PROCEDURE — 70486 CT MAXILLOFACIAL W/O DYE: CPT

## 2025-05-20 PROCEDURE — 72125 CT NECK SPINE W/O DYE: CPT

## 2025-05-20 RX ORDER — LIDOCAINE HYDROCHLORIDE AND EPINEPHRINE 10; 10 MG/ML; UG/ML
4 INJECTION, SOLUTION INFILTRATION; PERINEURAL ONCE
Status: DISCONTINUED | OUTPATIENT
Start: 2025-05-20 | End: 2025-05-20 | Stop reason: HOSPADM

## 2025-05-20 ASSESSMENT — ACTIVITIES OF DAILY LIVING (ADL)
ADLS_ACUITY_SCORE: 58
ADLS_ACUITY_SCORE: 58

## 2025-05-20 NOTE — ED TRIAGE NOTES
Pt presents with concern of injury to forehead, nose and laceration near left eye. Pt was walking in her basement and tripped on a cord. She struck a table and then landed on carpet. Pt states no LOC. Pt recently hospitalized for uncontrolled blood pressure. Pt called her neighbor to bring her in.

## 2025-05-21 ENCOUNTER — PATIENT OUTREACH (OUTPATIENT)
Dept: CARE COORDINATION | Facility: CLINIC | Age: 89
End: 2025-05-21
Payer: COMMERCIAL

## 2025-05-21 NOTE — ED PROVIDER NOTES
History     Chief Complaint   Patient presents with    Fall    Head Injury    Facial Injury     HPI  Audrey Ortega is a 89 year old female who presents for evaluation of a head injury.  She tripped over a cord at home landing on her left forehead about 2 hours ago.  Mild headache.  Laceration.  No injury below the neck.  All extremities move well.    Allergies:  Allergies   Allergen Reactions    Lisinopril Angioedema    Ciprofloxacin Rash    Macrobid [Nitrofurantoin] Itching and Rash    Dyazide [Hydrochlorothiazide-Triamterene]      Hyponatremia      Tylenol [Acetaminophen] Headache       Problem List:    Patient Active Problem List    Diagnosis Date Noted    Diarrhea, acute on chronic 03/18/2025     Priority: Medium    Generalized muscle weakness 03/18/2025     Priority: Medium    Nausea and vomiting 03/18/2025     Priority: Medium    Pain, unspecified 12/19/2024     Priority: Medium    Localized edema 12/19/2024     Priority: Medium    Laceration without foreign body of right eyelid and periocular area, subsequent encounter 12/19/2024     Priority: Medium    Insomnia, unspecified 12/19/2024     Priority: Medium    Hypo-osmolality and hyponatremia 12/19/2024     Priority: Medium    Constipation, unspecified 12/19/2024     Priority: Medium    Chronic kidney disease, stage 3 unspecified (H) 12/19/2024     Priority: Medium    Fracture of unspecified part of neck of right femur, subsequent encounter for closed fracture with routine healing 12/19/2024     Priority: Medium    Essential (primary) hypertension 12/19/2024     Priority: Medium    Syncope and collapse 12/19/2024     Priority: Medium    Presence of cardiac pacemaker 12/19/2024     Priority: Medium    Hip fracture, right, closed, initial encounter (H) 12/16/2024     Priority: Medium    Closed displaced fracture of right femoral neck (H) 12/16/2024     Priority: Medium    Vasovagal syncope 12/16/2024     Priority: Medium    Sick sinus syndrome (H) 12/16/2024      Priority: Medium     Status post dual chamber pacemaker placement for this in 2011      Dehydration 12/16/2024     Priority: Medium    Headache 04/21/2023     Priority: Medium    Hyponatremia 04/21/2023     Priority: Medium    Loose stools 04/21/2023     Priority: Medium    Unstable angina (H) 04/21/2023     Priority: Medium    Chronic kidney disease, stage 3 (H) 05/05/2021     Priority: Medium    Paroxysmal atrial fibrillation (H) 10/13/2017     Priority: Medium     Cardiology consult 1/2019  1. Paroxysmal atrial fibrillation.    A.     Low AF burden, asymptomatic.  The 33-hour episode on December 18th may have reflected atrial flutter of even atrial tachycardia.  Asymptomatic.  Continue to follow for any increase atrial arrhythmia burden.    B.     Strong conviction against anticoagulation.  Continue to use aspirin.  We will continue surveillance.   C.     If we see evidence of AF of significant frequency and duration I would like to discuss with her a trial of anti-arrhythmic therapy such as flecainide given her firm reservations against anti-coagulation therapy.      HTN (hypertension) 10/13/2017     Priority: Medium    Pacemaker 10/13/2017     Priority: Medium     Cardiology consult 1/2019  Her pacemaker interrogation from December 21st is overall very favorable.  She did have an episode of A. fib of 33 hours duration on December 18th.  The EGM suggests either an atrial tachycardia or an atrial flutter as there is significant regularity to the atrial activity.  She had no perception of this but acknowledges increased situational stress around the holidays.          Past Medical History:    Past Medical History:   Diagnosis Date    Nausea and vomiting 3/18/2025       Past Surgical History:    Past Surgical History:   Procedure Laterality Date    EP PACEMAKER GENERATOR REPLACEMENT- DUAL N/A 12/29/2023    Procedure: Pacemaker Generator Replacement Dual;  Surgeon: Sae Peguero MD;  Location:  [facility]    OPEN REDUCTION INTERNAL FIXATION HIP BIPOLAR Right 12/16/2024    Procedure: HEMIARTHROPLASTY, right HIP, BIPOLAR;  Surgeon: Adama Begum MD;  Location: [facility]    PHACOEMULSIFICATION WITH STANDARD INTRAOCULAR LENS IMPLANT Right 7/16/2020    Procedure: Cataract surgery with intraocular lens implant;  Surgeon: Fortino Ford MD;  Location: [facility]    PHACOEMULSIFICATION WITH STANDARD INTRAOCULAR LENS IMPLANT Left 7/30/2020    Procedure: Cataract Surgery Left Eye;  Surgeon: Max Cooley MD;  Location: [facility] OR       Family History:    No family history on file.    Social History:  Marital Status:  Single [1]  Social History     Tobacco Use    Smoking status: Never    Smokeless tobacco: Never   Vaping Use    Vaping status: Never Used   Substance Use Topics    Alcohol use: Not Currently    Drug use: Never        Medications:    Ascorbic Acid (VITAMIN C) 500 MG CAPS  aspirin (ASA) 325 MG EC tablet  calcium 600 MG tablet  COMPRESSION STOCKINGS  diphenoxylate-atropine (LOMOTIL) 2.5-0.025 MG tablet  hydrALAZINE (APRESOLINE) 10 MG tablet  metoprolol tartrate (LOPRESSOR) 50 MG tablet  multivitamin w/minerals (THERA-VIT-M) tablet  polyethylene glycol-propylene glycol (SYSTANE ULTRA) 0.4-0.3 % SOLN ophthalmic solution  Vitamin D, Cholecalciferol, 25 MCG (1000 UT) TABS          Review of Systems  All other systems are reviewed and are negative    Physical Exam   BP: (!) 181/91  Pulse: 61  Temp: 98  F (36.7  C)  Resp: 18  Height: 165.1 cm (5' 5\")  Weight: 54.4 kg (120 lb)  SpO2: 98 %      Physical Exam  Constitutional:       General: She is not in acute distress.     Appearance: She is not diaphoretic.   HENT:      Head:        Comments: Lac, some soft tissue swelling in the right frontal and left periorbital region.  No bony step-offs or crepitus.  Eyes:      Pupils: Pupils are equal, round, and reactive to light.   Cardiovascular:      Rate and Rhythm: Regular rhythm.      Heart sounds: Normal heart " sounds.   Pulmonary:      Effort: No respiratory distress.      Breath sounds: Normal breath sounds.   Chest:      Chest wall: No tenderness.   Abdominal:      General: Bowel sounds are normal.      Palpations: Abdomen is soft.      Tenderness: There is no abdominal tenderness.   Musculoskeletal:         General: No tenderness. Normal range of motion.      Cervical back: No tenderness.      Thoracic back: No tenderness.      Lumbar back: No tenderness.   Skin:     Findings: No abrasion or laceration.   Neurological:      Mental Status: She is alert and oriented to person, place, and time.         ED Course        [facility]    -Laceration Repair    Date/Time: 5/20/2025 8:06 PM    Performed by: Corey English MD  Authorized by: Corey English MD    Risks, benefits and alternatives discussed.      ANESTHESIA (see MAR for exact dosages):     Anesthesia method:  Local infiltration    Local anesthetic:  Lidocaine 1% WITH epi  LACERATION DETAILS     Location:  Face    Face location:  L eyebrow    Length (cm):  3.5    REPAIR TYPE:     Repair type:  Intermediate    EXPLORATION:     Contaminated: no      TREATMENT:     Area cleansed with:  Saline    SKIN REPAIR     Repair method:  Sutures    Suture size:  5-0    Suture material:  Nylon    Number of sutures:  5    APPROXIMATION     Approximation:  Close    PROCEDURE    Patient Tolerance:  Patient tolerated the procedure well with no immediate complications                    Results for orders placed or performed during the hospital encounter of 05/20/25 (from the past 24 hours)   CT Head w/o Contrast    Narrative    EXAM: CT HEAD W/O CONTRAST, CT CERVICAL SPINE W/O CONTRAST, CT FACIAL BONES WITHOUT CONTRAST  LOCATION: [facility]  DATE: 5/20/2025    INDICATION: Head, face, and neck injury  COMPARISON: CT head 12/16/2024, CT cervical spine 12/16/2019  TECHNIQUE:   1) Routine CT Head without IV contrast.  Multiplanar reformats. Dose reduction techniques were used.  2) Routine CT Facial Bones without IV contrast. Multiplanar reformats. Dose reduction techniques were used.  3) Routine CT Cervical Spine without IV contrast. Multiplanar reformats. Dose reduction techniques were used.    FINDINGS:  HEAD CT:   INTRACRANIAL CONTENTS: No intracranial hemorrhage, extraaxial collection, or mass effect.  No CT evidence of acute infarct. Moderate presumed chronic small vessel ischemic changes. Moderate generalized volume loss. No hydrocephalus.     OSSEOUS STRUCTURES/SOFT TISSUES: Mild soft tissue swelling/hematoma overlying right frontal bone.     FACIAL BONE CT:  OSSEOUS STRUCTURES/SOFT TISSUES: Mild soft tissue swelling/hematoma overlying right frontal bone. No facial bone fracture or malalignment. No evidence for dental trauma or periapical abscess.    ORBITAL CONTENTS: No acute abnormality.    SINUSES: No paranasal sinus mucosal disease.    CERVICAL SPINE CT:   VERTEBRA: Normal vertebral body heights. Straightening of cervical lordosis. No fracture or posttraumatic subluxation.     CANAL/FORAMINA: No canal or neural foraminal stenosis.    PARASPINAL: 24 mm nodule left thyroid gland, subcentimeter nodule right thyroid gland. Visualized lung fields are clear.      Impression    IMPRESSION:  HEAD CT:  1.  No acute intracranial process.    FACIAL BONE CT:  1.  No facial bone or mandibular fracture.    CERVICAL SPINE CT:  1.  No fracture or posttraumatic subluxation.  2.  No high-grade spinal canal or neural foraminal stenosis.  3.  24 mm nodule left thyroid gland, subcentimeter nodule right thyroid gland.    REFERENCE:  Deep HINSON et al. Managing Incidental Thyroid Nodules Detected on Imaging: White Paper of the ACR Incidental Thyroid Findings Committee. JACR 2015; 12:143-150.    Incidental thyroid nodule detected on CT or MRI without suspicious findings. Applies to general population without limited life expectancy or  significant comorbidities.    Age greater than or equal to 35 years  Less than 1.5 cm: No further evaluation.  Greater than or equal to 1.5 cm: Evaluate with thyroid ultrasound.   CT Facial Bones without Contrast    Narrative    EXAM: CT HEAD W/O CONTRAST, CT CERVICAL SPINE W/O CONTRAST, CT FACIAL BONES WITHOUT CONTRAST  LOCATION: [facility]  DATE: 5/20/2025    INDICATION: Head, face, and neck injury  COMPARISON: CT head 12/16/2024, CT cervical spine 12/16/2019  TECHNIQUE:   1) Routine CT Head without IV contrast. Multiplanar reformats. Dose reduction techniques were used.  2) Routine CT Facial Bones without IV contrast. Multiplanar reformats. Dose reduction techniques were used.  3) Routine CT Cervical Spine without IV contrast. Multiplanar reformats. Dose reduction techniques were used.    FINDINGS:  HEAD CT:   INTRACRANIAL CONTENTS: No intracranial hemorrhage, extraaxial collection, or mass effect.  No CT evidence of acute infarct. Moderate presumed chronic small vessel ischemic changes. Moderate generalized volume loss. No hydrocephalus.     OSSEOUS STRUCTURES/SOFT TISSUES: Mild soft tissue swelling/hematoma overlying right frontal bone.     FACIAL BONE CT:  OSSEOUS STRUCTURES/SOFT TISSUES: Mild soft tissue swelling/hematoma overlying right frontal bone. No facial bone fracture or malalignment. No evidence for dental trauma or periapical abscess.    ORBITAL CONTENTS: No acute abnormality.    SINUSES: No paranasal sinus mucosal disease.    CERVICAL SPINE CT:   VERTEBRA: Normal vertebral body heights. Straightening of cervical lordosis. No fracture or posttraumatic subluxation.     CANAL/FORAMINA: No canal or neural foraminal stenosis.    PARASPINAL: 24 mm nodule left thyroid gland, subcentimeter nodule right thyroid gland. Visualized lung fields are clear.      Impression    IMPRESSION:  HEAD CT:  1.  No acute intracranial process.    FACIAL BONE CT:  1.  No facial bone or  mandibular fracture.    CERVICAL SPINE CT:  1.  No fracture or posttraumatic subluxation.  2.  No high-grade spinal canal or neural foraminal stenosis.  3.  24 mm nodule left thyroid gland, subcentimeter nodule right thyroid gland.    REFERENCE:  Deep HINSON et al. Managing Incidental Thyroid Nodules Detected on Imaging: White Paper of the ACR Incidental Thyroid Findings Committee. JACR 2015; 12:143-150.    Incidental thyroid nodule detected on CT or MRI without suspicious findings. Applies to general population without limited life expectancy or significant comorbidities.    Age greater than or equal to 35 years  Less than 1.5 cm: No further evaluation.  Greater than or equal to 1.5 cm: Evaluate with thyroid ultrasound.   CT Cervical Spine w/o Contrast    Narrative    EXAM: CT HEAD W/O CONTRAST, CT CERVICAL SPINE W/O CONTRAST, CT FACIAL BONES WITHOUT CONTRAST  LOCATION: [facility]  DATE: 5/20/2025    INDICATION: Head, face, and neck injury  COMPARISON: CT head 12/16/2024, CT cervical spine 12/16/2019  TECHNIQUE:   1) Routine CT Head without IV contrast. Multiplanar reformats. Dose reduction techniques were used.  2) Routine CT Facial Bones without IV contrast. Multiplanar reformats. Dose reduction techniques were used.  3) Routine CT Cervical Spine without IV contrast. Multiplanar reformats. Dose reduction techniques were used.    FINDINGS:  HEAD CT:   INTRACRANIAL CONTENTS: No intracranial hemorrhage, extraaxial collection, or mass effect.  No CT evidence of acute infarct. Moderate presumed chronic small vessel ischemic changes. Moderate generalized volume loss. No hydrocephalus.     OSSEOUS STRUCTURES/SOFT TISSUES: Mild soft tissue swelling/hematoma overlying right frontal bone.     FACIAL BONE CT:  OSSEOUS STRUCTURES/SOFT TISSUES: Mild soft tissue swelling/hematoma overlying right frontal bone. No facial bone fracture or malalignment. No evidence for dental trauma or periapical  abscess.    ORBITAL CONTENTS: No acute abnormality.    SINUSES: No paranasal sinus mucosal disease.    CERVICAL SPINE CT:   VERTEBRA: Normal vertebral body heights. Straightening of cervical lordosis. No fracture or posttraumatic subluxation.     CANAL/FORAMINA: No canal or neural foraminal stenosis.    PARASPINAL: 24 mm nodule left thyroid gland, subcentimeter nodule right thyroid gland. Visualized lung fields are clear.      Impression    IMPRESSION:  HEAD CT:  1.  No acute intracranial process.    FACIAL BONE CT:  1.  No facial bone or mandibular fracture.    CERVICAL SPINE CT:  1.  No fracture or posttraumatic subluxation.  2.  No high-grade spinal canal or neural foraminal stenosis.  3.  24 mm nodule left thyroid gland, subcentimeter nodule right thyroid gland.    REFERENCE:  Deep HINSON et al. Managing Incidental Thyroid Nodules Detected on Imaging: White Paper of the ACR Incidental Thyroid Findings Committee. JACR 2015; 12:143-150.    Incidental thyroid nodule detected on CT or MRI without suspicious findings. Applies to general population without limited life expectancy or significant comorbidities.    Age greater than or equal to 35 years  Less than 1.5 cm: No further evaluation.  Greater than or equal to 1.5 cm: Evaluate with thyroid ultrasound.       Medications - No data to display    Assessments & Plan (with Medical Decision Making)  89-year-old female with fall and left periorbital stellate laceration.  This is sutured.  Fortunately CT of head, neck and facial bones without fracture or intracranial hemorrhage.  Stable for discharge home.  Patient has elected to follow-up in clinic in 7 days for suture removal.  Return anytime sooner if signs of infection or other concern     I have reviewed the nursing notes.    I have reviewed the findings, diagnosis, plan and need for follow up with the patient.          New Prescriptions    No medications on file       Final diagnoses:   Complex laceration of forehead    Contusion of scalp, initial encounter       5/20/2025   [facility] EMERGENCY DEPT       Corey English MD  05/20/25 2009

## 2025-05-21 NOTE — PROGRESS NOTES
Clinic Care Coordination Contact  Community Health Worker Initial Outreach    CHW Initial Information Gathering:  Referral Source: ED Follow-Up  Current living arrangement:: Not Assessed  CHW Additional Questions  If ED/Hospital discharge, follow-up appointment scheduled as recommended?: Yes  Medication changes made following ED/Hospital discharge?: No  MyChart active?: No    Patient accepts CC: No, declined. No additional support is needed at this time. Unable to send Care Coordination introduction letter. Clinic Care Coordination services remain available via referral if needed.     Jena Mao [facility]  Care Coordination

## 2025-05-21 NOTE — DISCHARGE INSTRUCTIONS
May use an ice pack to the forehead where there is some swelling.  May use some antibiotic ointment to the laceration once or twice a day.  For any pain, may use Tylenol up to 2 extra strength every 4 hours as needed.

## 2025-05-28 ENCOUNTER — OFFICE VISIT (OUTPATIENT)
Dept: FAMILY MEDICINE | Facility: CLINIC | Age: 89
End: 2025-05-28
Payer: COMMERCIAL

## 2025-05-28 VITALS
HEART RATE: 71 BPM | HEIGHT: 65 IN | SYSTOLIC BLOOD PRESSURE: 138 MMHG | TEMPERATURE: 97.5 F | BODY MASS INDEX: 20.83 KG/M2 | RESPIRATION RATE: 16 BRPM | WEIGHT: 125 LBS | DIASTOLIC BLOOD PRESSURE: 88 MMHG | OXYGEN SATURATION: 99 %

## 2025-05-28 DIAGNOSIS — S01.81XA: Primary | ICD-10-CM

## 2025-05-28 PROCEDURE — 1126F AMNT PAIN NOTED NONE PRSNT: CPT | Performed by: STUDENT IN AN ORGANIZED HEALTH CARE EDUCATION/TRAINING PROGRAM

## 2025-05-28 PROCEDURE — 3079F DIAST BP 80-89 MM HG: CPT | Performed by: STUDENT IN AN ORGANIZED HEALTH CARE EDUCATION/TRAINING PROGRAM

## 2025-05-28 PROCEDURE — 3075F SYST BP GE 130 - 139MM HG: CPT | Performed by: STUDENT IN AN ORGANIZED HEALTH CARE EDUCATION/TRAINING PROGRAM

## 2025-05-28 PROCEDURE — 99024 POSTOP FOLLOW-UP VISIT: CPT | Performed by: STUDENT IN AN ORGANIZED HEALTH CARE EDUCATION/TRAINING PROGRAM

## 2025-05-28 ASSESSMENT — PAIN SCALES - GENERAL: PAINLEVEL_OUTOF10: NO PAIN (0)

## 2025-05-28 NOTE — PROGRESS NOTES
"  Assessment & Plan     Complex laceration of forehead  Presented to the clinic today for removal of sutures.  The patient has had the sutures in place for 7 days.  There has been no history of infection or drainage.   6 sutures are seen located on the L forehead.  Well healing complex laceration of left forehead lateral to L eyebrow after mechanical fall related to trip over cord. No signs of infection.  Tetanus status is up to date.   All sutures were easily removed today.  Routine wound care discussed.  The patient will follow up as needed.   - REMOVAL OF SUTURES    20 minutes spent by me on the date of the encounter doing chart review, history and exam, documentation and further activities per the note      Subjective   Audrey is a 89 year old, presenting for the following health issues:  ER F/U        5/28/2025     7:18 AM   Additional Questions   Roomed by Zhanna FRAGOSO     [facility]        ED/UC Followup:    Facility:  [facility] Emergency Dept  Date of visit: 5/20/25  Reason for visit: head injury  Current Status: Pt will need 5 sutures taken out at clinic visit  Denies additional issues or concerns.   No symptoms of concern. Denies HA, changes in vision. Has had no issues with laceration and denies any continued bleeding or purulent drainage.   Has follow-up for unrelated issue with Cards In first week of June.   Plans to establish care with Zhanna Ray.        Review of Systems  Negative unless otherwise specified per HPI.        Objective    BP (!) 144/96   Pulse 71   Temp 97.5  F (36.4  C) (Temporal)   Resp 16   Ht 1.651 m (5' 5\")   Wt 56.7 kg (125 lb)   SpO2 99%   BMI 20.80 kg/m    Body mass index is 20.8 kg/m .  Physical Exam   GENERAL: alert and no acute distress  EYES: Eyes grossly normal to inspection, PERRL and conjunctivae and sclerae normal  HENT: nose and mouth without ulcers or lesions, some remnant bruising across cheeks and bridge of nose, nttp.   RESP: normal rate and effort  CV: " regular rate, no peripheral edema  ABDOMEN: soft, nontender, non-distended  MS: no gross musculoskeletal defects noted, no edema  SKIN: no suspicious lesions or rashes  NEURO: Normal strength and tone, mentation intact and speech normal  PSYCH: mentation appears normal, affect normal/bright          Signed Electronically by: Dianna Godfrey DO

## 2025-06-09 ENCOUNTER — ANCILLARY PROCEDURE (OUTPATIENT)
Dept: CARDIOLOGY | Facility: CLINIC | Age: 89
End: 2025-06-09
Attending: INTERNAL MEDICINE
Payer: COMMERCIAL

## 2025-06-09 DIAGNOSIS — I49.5 SICK SINUS SYNDROME (H): ICD-10-CM

## 2025-06-09 DIAGNOSIS — Z95.0 CARDIAC PACEMAKER IN SITU: ICD-10-CM

## 2025-06-09 LAB
MDC_IDC_EPISODE_DTM: NORMAL
MDC_IDC_EPISODE_DURATION: 1016 S
MDC_IDC_EPISODE_DURATION: 102 S
MDC_IDC_EPISODE_DURATION: 102 S
MDC_IDC_EPISODE_DURATION: 111 S
MDC_IDC_EPISODE_DURATION: 112 S
MDC_IDC_EPISODE_DURATION: 122 S
MDC_IDC_EPISODE_DURATION: 123 S
MDC_IDC_EPISODE_DURATION: 129 S
MDC_IDC_EPISODE_DURATION: 13 S
MDC_IDC_EPISODE_DURATION: 131 S
MDC_IDC_EPISODE_DURATION: 139 S
MDC_IDC_EPISODE_DURATION: 1522 S
MDC_IDC_EPISODE_DURATION: 1674 S
MDC_IDC_EPISODE_DURATION: 17 S
MDC_IDC_EPISODE_DURATION: 191 S
MDC_IDC_EPISODE_DURATION: 20 S
MDC_IDC_EPISODE_DURATION: 25 S
MDC_IDC_EPISODE_DURATION: 272 S
MDC_IDC_EPISODE_DURATION: 282 S
MDC_IDC_EPISODE_DURATION: 29 S
MDC_IDC_EPISODE_DURATION: 29 S
MDC_IDC_EPISODE_DURATION: 302 S
MDC_IDC_EPISODE_DURATION: 323 S
MDC_IDC_EPISODE_DURATION: 374 S
MDC_IDC_EPISODE_DURATION: 459 S
MDC_IDC_EPISODE_DURATION: 4706 S
MDC_IDC_EPISODE_DURATION: 4994 S
MDC_IDC_EPISODE_DURATION: 50 S
MDC_IDC_EPISODE_DURATION: 512 S
MDC_IDC_EPISODE_DURATION: 53 S
MDC_IDC_EPISODE_DURATION: 56 S
MDC_IDC_EPISODE_DURATION: 57 S
MDC_IDC_EPISODE_DURATION: 6 S
MDC_IDC_EPISODE_DURATION: 6032 S
MDC_IDC_EPISODE_DURATION: 63 S
MDC_IDC_EPISODE_DURATION: 68 S
MDC_IDC_EPISODE_DURATION: 7774 S
MDC_IDC_EPISODE_DURATION: 816 S
MDC_IDC_EPISODE_DURATION: 968 S
MDC_IDC_EPISODE_DURATION: 97 S
MDC_IDC_EPISODE_DURATION: 97 S
MDC_IDC_EPISODE_DURATION: 9717 S
MDC_IDC_EPISODE_DURATION: NORMAL S
MDC_IDC_EPISODE_ID: 2033
MDC_IDC_EPISODE_ID: 2034
MDC_IDC_EPISODE_ID: 2035
MDC_IDC_EPISODE_ID: 2036
MDC_IDC_EPISODE_ID: 2037
MDC_IDC_EPISODE_ID: 2038
MDC_IDC_EPISODE_ID: 2039
MDC_IDC_EPISODE_ID: 2040
MDC_IDC_EPISODE_ID: 2041
MDC_IDC_EPISODE_ID: 2042
MDC_IDC_EPISODE_ID: 2043
MDC_IDC_EPISODE_ID: 2044
MDC_IDC_EPISODE_ID: 2045
MDC_IDC_EPISODE_ID: 2046
MDC_IDC_EPISODE_ID: 2047
MDC_IDC_EPISODE_ID: 2048
MDC_IDC_EPISODE_ID: 2049
MDC_IDC_EPISODE_ID: 2050
MDC_IDC_EPISODE_ID: 2051
MDC_IDC_EPISODE_ID: 2052
MDC_IDC_EPISODE_ID: 2053
MDC_IDC_EPISODE_ID: 2054
MDC_IDC_EPISODE_ID: 2055
MDC_IDC_EPISODE_ID: 2056
MDC_IDC_EPISODE_ID: 2057
MDC_IDC_EPISODE_ID: 2058
MDC_IDC_EPISODE_ID: 2059
MDC_IDC_EPISODE_ID: 2060
MDC_IDC_EPISODE_ID: 2061
MDC_IDC_EPISODE_ID: 2062
MDC_IDC_EPISODE_ID: 2063
MDC_IDC_EPISODE_ID: 2064
MDC_IDC_EPISODE_ID: 2065
MDC_IDC_EPISODE_ID: 2066
MDC_IDC_EPISODE_ID: 2067
MDC_IDC_EPISODE_ID: 2068
MDC_IDC_EPISODE_ID: 2069
MDC_IDC_EPISODE_ID: 2070
MDC_IDC_EPISODE_ID: 2071
MDC_IDC_EPISODE_ID: 2072
MDC_IDC_EPISODE_ID: 2073
MDC_IDC_EPISODE_ID: 2074
MDC_IDC_EPISODE_ID: 2075
MDC_IDC_EPISODE_ID: 2076
MDC_IDC_EPISODE_ID: 2077
MDC_IDC_EPISODE_ID: 2078
MDC_IDC_EPISODE_ID: 2079
MDC_IDC_EPISODE_ID: 2080
MDC_IDC_EPISODE_ID: 2081
MDC_IDC_EPISODE_ID: 2082
MDC_IDC_EPISODE_ID: 2083
MDC_IDC_EPISODE_TYPE: NORMAL
MDC_IDC_LEAD_CONNECTION_STATUS: NORMAL
MDC_IDC_LEAD_CONNECTION_STATUS: NORMAL
MDC_IDC_LEAD_IMPLANT_DT: NORMAL
MDC_IDC_LEAD_IMPLANT_DT: NORMAL
MDC_IDC_LEAD_LOCATION: NORMAL
MDC_IDC_LEAD_LOCATION: NORMAL
MDC_IDC_LEAD_LOCATION_DETAIL_1: NORMAL
MDC_IDC_LEAD_LOCATION_DETAIL_1: NORMAL
MDC_IDC_LEAD_MFG: NORMAL
MDC_IDC_LEAD_MFG: NORMAL
MDC_IDC_LEAD_MODEL: NORMAL
MDC_IDC_LEAD_MODEL: NORMAL
MDC_IDC_LEAD_POLARITY_TYPE: NORMAL
MDC_IDC_LEAD_POLARITY_TYPE: NORMAL
MDC_IDC_LEAD_SERIAL: NORMAL
MDC_IDC_LEAD_SERIAL: NORMAL
MDC_IDC_MSMT_BATTERY_DTM: NORMAL
MDC_IDC_MSMT_BATTERY_REMAINING_LONGEVITY: 139 MO
MDC_IDC_MSMT_BATTERY_RRT_TRIGGER: 2.62
MDC_IDC_MSMT_BATTERY_STATUS: NORMAL
MDC_IDC_MSMT_BATTERY_VOLTAGE: 3.03 V
MDC_IDC_MSMT_LEADCHNL_RA_IMPEDANCE_VALUE: 266 OHM
MDC_IDC_MSMT_LEADCHNL_RA_IMPEDANCE_VALUE: 361 OHM
MDC_IDC_MSMT_LEADCHNL_RA_PACING_THRESHOLD_AMPLITUDE: 0.5 V
MDC_IDC_MSMT_LEADCHNL_RA_PACING_THRESHOLD_PULSEWIDTH: 0.4 MS
MDC_IDC_MSMT_LEADCHNL_RA_SENSING_INTR_AMPL: 0.38 MV
MDC_IDC_MSMT_LEADCHNL_RA_SENSING_INTR_AMPL: 0.38 MV
MDC_IDC_MSMT_LEADCHNL_RV_IMPEDANCE_VALUE: 342 OHM
MDC_IDC_MSMT_LEADCHNL_RV_IMPEDANCE_VALUE: 418 OHM
MDC_IDC_MSMT_LEADCHNL_RV_PACING_THRESHOLD_AMPLITUDE: 0.5 V
MDC_IDC_MSMT_LEADCHNL_RV_PACING_THRESHOLD_PULSEWIDTH: 0.4 MS
MDC_IDC_MSMT_LEADCHNL_RV_SENSING_INTR_AMPL: 2 MV
MDC_IDC_MSMT_LEADCHNL_RV_SENSING_INTR_AMPL: 2 MV
MDC_IDC_PG_IMPLANT_DTM: NORMAL
MDC_IDC_PG_MFG: NORMAL
MDC_IDC_PG_MODEL: NORMAL
MDC_IDC_PG_SERIAL: NORMAL
MDC_IDC_PG_TYPE: NORMAL
MDC_IDC_SESS_CLINIC_NAME: NORMAL
MDC_IDC_SESS_DTM: NORMAL
MDC_IDC_SESS_TYPE: NORMAL
MDC_IDC_SET_BRADY_AT_MODE_SWITCH_RATE: 171 {BEATS}/MIN
MDC_IDC_SET_BRADY_HYSTRATE: NORMAL
MDC_IDC_SET_BRADY_LOWRATE: 60 {BEATS}/MIN
MDC_IDC_SET_BRADY_MAX_SENSOR_RATE: 130 {BEATS}/MIN
MDC_IDC_SET_BRADY_MAX_TRACKING_RATE: 130 {BEATS}/MIN
MDC_IDC_SET_BRADY_MODE: NORMAL
MDC_IDC_SET_BRADY_PAV_DELAY_LOW: 180 MS
MDC_IDC_SET_BRADY_SAV_DELAY_LOW: 150 MS
MDC_IDC_SET_LEADCHNL_RA_PACING_AMPLITUDE: 1.5 V
MDC_IDC_SET_LEADCHNL_RA_PACING_ANODE_ELECTRODE_1: NORMAL
MDC_IDC_SET_LEADCHNL_RA_PACING_ANODE_LOCATION_1: NORMAL
MDC_IDC_SET_LEADCHNL_RA_PACING_CAPTURE_MODE: NORMAL
MDC_IDC_SET_LEADCHNL_RA_PACING_CATHODE_ELECTRODE_1: NORMAL
MDC_IDC_SET_LEADCHNL_RA_PACING_CATHODE_LOCATION_1: NORMAL
MDC_IDC_SET_LEADCHNL_RA_PACING_POLARITY: NORMAL
MDC_IDC_SET_LEADCHNL_RA_PACING_PULSEWIDTH: 0.4 MS
MDC_IDC_SET_LEADCHNL_RA_SENSING_ANODE_ELECTRODE_1: NORMAL
MDC_IDC_SET_LEADCHNL_RA_SENSING_ANODE_LOCATION_1: NORMAL
MDC_IDC_SET_LEADCHNL_RA_SENSING_CATHODE_ELECTRODE_1: NORMAL
MDC_IDC_SET_LEADCHNL_RA_SENSING_CATHODE_LOCATION_1: NORMAL
MDC_IDC_SET_LEADCHNL_RA_SENSING_POLARITY: NORMAL
MDC_IDC_SET_LEADCHNL_RA_SENSING_SENSITIVITY: 0.3 MV
MDC_IDC_SET_LEADCHNL_RV_PACING_AMPLITUDE: 2 V
MDC_IDC_SET_LEADCHNL_RV_PACING_ANODE_ELECTRODE_1: NORMAL
MDC_IDC_SET_LEADCHNL_RV_PACING_ANODE_LOCATION_1: NORMAL
MDC_IDC_SET_LEADCHNL_RV_PACING_CAPTURE_MODE: NORMAL
MDC_IDC_SET_LEADCHNL_RV_PACING_CATHODE_ELECTRODE_1: NORMAL
MDC_IDC_SET_LEADCHNL_RV_PACING_CATHODE_LOCATION_1: NORMAL
MDC_IDC_SET_LEADCHNL_RV_PACING_POLARITY: NORMAL
MDC_IDC_SET_LEADCHNL_RV_PACING_PULSEWIDTH: 0.4 MS
MDC_IDC_SET_LEADCHNL_RV_SENSING_ANODE_ELECTRODE_1: NORMAL
MDC_IDC_SET_LEADCHNL_RV_SENSING_ANODE_LOCATION_1: NORMAL
MDC_IDC_SET_LEADCHNL_RV_SENSING_CATHODE_ELECTRODE_1: NORMAL
MDC_IDC_SET_LEADCHNL_RV_SENSING_CATHODE_LOCATION_1: NORMAL
MDC_IDC_SET_LEADCHNL_RV_SENSING_POLARITY: NORMAL
MDC_IDC_SET_LEADCHNL_RV_SENSING_SENSITIVITY: 0.9 MV
MDC_IDC_SET_ZONE_DETECTION_INTERVAL: 350 MS
MDC_IDC_SET_ZONE_DETECTION_INTERVAL: 400 MS
MDC_IDC_SET_ZONE_STATUS: NORMAL
MDC_IDC_SET_ZONE_STATUS: NORMAL
MDC_IDC_SET_ZONE_TYPE: NORMAL
MDC_IDC_SET_ZONE_VENDOR_TYPE: NORMAL
MDC_IDC_STAT_AT_BURDEN_PERCENT: 99.9 %
MDC_IDC_STAT_AT_DTM_END: NORMAL
MDC_IDC_STAT_AT_DTM_START: NORMAL
MDC_IDC_STAT_BRADY_DTM_END: NORMAL
MDC_IDC_STAT_BRADY_DTM_START: NORMAL
MDC_IDC_STAT_BRADY_RA_PERCENT_PACED: 11.39 %
MDC_IDC_STAT_BRADY_RV_PERCENT_PACED: 95.54 %
MDC_IDC_STAT_EPISODE_RECENT_COUNT: 0
MDC_IDC_STAT_EPISODE_RECENT_COUNT: 600
MDC_IDC_STAT_EPISODE_RECENT_COUNT_DTM_END: NORMAL
MDC_IDC_STAT_EPISODE_RECENT_COUNT_DTM_START: NORMAL
MDC_IDC_STAT_EPISODE_TOTAL_COUNT: 0
MDC_IDC_STAT_EPISODE_TOTAL_COUNT: 2083
MDC_IDC_STAT_EPISODE_TOTAL_COUNT_DTM_END: NORMAL
MDC_IDC_STAT_EPISODE_TOTAL_COUNT_DTM_START: NORMAL
MDC_IDC_STAT_EPISODE_TYPE: NORMAL
MDC_IDC_STAT_TACHYTHERAPY_RECENT_DTM_END: NORMAL
MDC_IDC_STAT_TACHYTHERAPY_RECENT_DTM_START: NORMAL
MDC_IDC_STAT_TACHYTHERAPY_TOTAL_DTM_END: NORMAL
MDC_IDC_STAT_TACHYTHERAPY_TOTAL_DTM_START: NORMAL

## 2025-06-09 PROCEDURE — 93294 REM INTERROG EVL PM/LDLS PM: CPT | Performed by: INTERNAL MEDICINE

## 2025-06-09 PROCEDURE — 93296 REM INTERROG EVL PM/IDS: CPT | Performed by: INTERNAL MEDICINE

## 2025-06-17 ENCOUNTER — HOSPITAL ENCOUNTER (OUTPATIENT)
Dept: CARDIOLOGY | Facility: CLINIC | Age: 89
Discharge: HOME | End: 2025-06-17
Attending: NURSE PRACTITIONER
Payer: MEDICARE

## 2025-06-17 ENCOUNTER — TELEPHONE (OUTPATIENT)
Dept: CARDIOLOGY | Facility: CLINIC | Age: 89
End: 2025-06-17

## 2025-06-17 DIAGNOSIS — I31.39 PERICARDIAL EFFUSION: ICD-10-CM

## 2025-06-17 DIAGNOSIS — I31.39 PERICARDIAL EFFUSION: Primary | ICD-10-CM

## 2025-06-17 DIAGNOSIS — I36.1 NONRHEUMATIC TRICUSPID VALVE REGURGITATION: ICD-10-CM

## 2025-06-17 LAB — LVEF ECHO: NORMAL

## 2025-06-17 PROCEDURE — 93306 TTE W/DOPPLER COMPLETE: CPT

## 2025-06-17 PROCEDURE — 93306 TTE W/DOPPLER COMPLETE: CPT | Mod: 26 | Performed by: INTERNAL MEDICINE

## 2025-06-17 NOTE — TELEPHONE ENCOUNTER
See Echo results below. Called Pt she said she was doing fine presently, she stated earlier today she was more tired. Pt states she had hip surgery back in 12/24 after fall, but now she is doing much better, she stated she is even mowing lawn now. Advised her if SOB, CP, or feels poorly in any way please go to ER. Informed her would reach out to her again tomorrow with NP's recommendations, and she has office visit 6/24/25. Blanche CHAVARRIA asks that Note be sent to DOD to determine if Pt should be tapped.     Interpretation Summary     Large anterior and small posterior pericardial effusion.  There are no echocardiographic indications of cardiac tamponade.     The left ventricle is normal in size.  Left ventricular systolic function is mildly reduced.  The visual ejection fraction is 50-55%.  Paradoxical septal motion is consistent with right ventricular volume  overload.  The right ventricle is severely dilated.  There is mild (1+) mitral regurgitation.  There is mod-severe to severe (3-4+) tricuspid regurgitation.  IVC diameter >2.1 cm collapsing <50% with sniff suggests a high RA pressure  estimated at 15 mmHg or greater.     Compared to the previous study, the pericardial effusion is larger. TR is  similar.  ______________________________________________________________________________  Left Ventricle  The left ventricle is normal in size. There is normal left ventricular wall  thickness. Left ventricular systolic function is mildly reduced. The visual  ejection fraction is 50-55%. Paradoxical septal motion is consistent with  right ventricular volume overload.     Right Ventricle  The right ventricle is severely dilated. Mildly decreased right ventricular  systolic function. There is a pacemaker lead in the right ventricle.     Atria  There is severe biatrial enlargement. There is no atrial shunt seen.     Mitral Valve  There is mild (1+) mitral regurgitation.     Tricuspid Valve  There is mod-severe to severe (3-4+)  tricuspid regurgitation. The right  ventricular systolic pressure is approximated at 16.6 mmHg plus the right  atrial pressure. IVC diameter >2.1 cm collapsing <50% with sniff suggests a  high RA pressure estimated at 15 mmHg or greater.     Aortic Valve  The aortic valve is trileaflet with aortic valve sclerosis. No aortic  regurgitation is present. No aortic stenosis is present.     Pulmonic Valve  The pulmonic valve is not well seen, but is grossly normal. There is trace  pulmonic valvular regurgitation.     Vessels  Normal size aorta.     Pericardium  Large anterior and small posterior pericardial effusion. There are no  echocardiographic indications of cardiac tamponade.     KEVIN Cuenca RN

## 2025-06-18 ENCOUNTER — RESULTS FOLLOW-UP (OUTPATIENT)
Dept: CARDIOLOGY | Facility: CLINIC | Age: 89
End: 2025-06-18

## 2025-06-18 ENCOUNTER — TELEPHONE (OUTPATIENT)
Dept: CARDIOLOGY | Facility: CLINIC | Age: 89
End: 2025-06-18
Payer: COMMERCIAL

## 2025-06-18 NOTE — TELEPHONE ENCOUNTER
1st attempt- Left voicemail for the patient to call back and schedule the following:    Appointment type:  New Structural   Provider:  any general cardiologist   Return date:  next available  Additional appointment(s) needed:  na  Additional Notes:  na  Specialty phone number: [phone]

## 2025-06-19 ENCOUNTER — OFFICE VISIT (OUTPATIENT)
Dept: FAMILY MEDICINE | Facility: CLINIC | Age: 89
End: 2025-06-19
Payer: COMMERCIAL

## 2025-06-19 VITALS
WEIGHT: 121.2 LBS | OXYGEN SATURATION: 98 % | RESPIRATION RATE: 16 BRPM | DIASTOLIC BLOOD PRESSURE: 82 MMHG | HEART RATE: 76 BPM | TEMPERATURE: 97.1 F | BODY MASS INDEX: 20.19 KG/M2 | HEIGHT: 65 IN | SYSTOLIC BLOOD PRESSURE: 157 MMHG

## 2025-06-19 DIAGNOSIS — I49.5 SICK SINUS SYNDROME (H): ICD-10-CM

## 2025-06-19 DIAGNOSIS — L30.9 ECZEMA, UNSPECIFIED TYPE: ICD-10-CM

## 2025-06-19 DIAGNOSIS — I48.0 PAROXYSMAL ATRIAL FIBRILLATION (H): ICD-10-CM

## 2025-06-19 DIAGNOSIS — G47.00 INSOMNIA, UNSPECIFIED TYPE: ICD-10-CM

## 2025-06-19 DIAGNOSIS — Z76.89 ENCOUNTER TO ESTABLISH CARE: Primary | ICD-10-CM

## 2025-06-19 DIAGNOSIS — I50.20 HEART FAILURE WITH REDUCED EJECTION FRACTION (H): ICD-10-CM

## 2025-06-19 DIAGNOSIS — N18.30 STAGE 3 CHRONIC KIDNEY DISEASE, UNSPECIFIED WHETHER STAGE 3A OR 3B CKD (H): ICD-10-CM

## 2025-06-19 PROCEDURE — 1126F AMNT PAIN NOTED NONE PRSNT: CPT | Performed by: PHYSICIAN ASSISTANT

## 2025-06-19 PROCEDURE — 3079F DIAST BP 80-89 MM HG: CPT | Performed by: PHYSICIAN ASSISTANT

## 2025-06-19 PROCEDURE — 99214 OFFICE O/P EST MOD 30 MIN: CPT | Performed by: PHYSICIAN ASSISTANT

## 2025-06-19 PROCEDURE — 3077F SYST BP >= 140 MM HG: CPT | Performed by: PHYSICIAN ASSISTANT

## 2025-06-19 RX ORDER — ZOLPIDEM TARTRATE 5 MG/1
2.5 TABLET ORAL
Qty: 30 TABLET | Refills: 1 | Status: SHIPPED | OUTPATIENT
Start: 2025-06-19

## 2025-06-19 ASSESSMENT — PAIN SCALES - GENERAL: PAINLEVEL_OUTOF10: NO PAIN (0)

## 2025-06-19 NOTE — TELEPHONE ENCOUNTER
Called Son and asked they contact mother, clinic is trying to get her in for urgent appointment. Asked to have her call clinic. KEVIN Cuenca RN

## 2025-06-19 NOTE — PROGRESS NOTES
"    Lyric Ventura is a 89 year old, presenting for the following health issues:  Establish Care and Derm Problem (On left arm, would like provider to look at it and determine if she should see derm )        6/19/2025     1:52 PM   Additional Questions   Roomed by Cara     History of Present Illness       Reason for visit:  Est care   She is taking medications regularly.      Patient presents today to establish care. She is overall quite healthy. She has atrial fibrillation and has a cardiac pacemaker. This has been going quite well. She follows with cardiology. Currently taking Metoprolol 50 mg BID and  mg daily.     Her biggest concern today is regarding sleep. She reports for many years she has been taking 2.5 mg of Ambien a few times per week when needed to sleep. She has never taken a higher dose and never had a side effect related. She has tried other medications for sleep but they did not work for her age. She reports that last provider she was seeing would no longer give this to her due to her age. She reports when she broke her arm they would offer pain meds left and right however. She is requesting to continue this as needed.     Has had a few dry/itchy patches on her arm. Uses OTC products which does help.       Review of Systems  Constitutional, HEENT, cardiovascular, pulmonary, GI, , musculoskeletal, neuro, skin, endocrine and psych systems are negative, except as otherwise noted.      Objective    BP (!) 157/82   Pulse 76   Temp 97.1  F (36.2  C) (Oral)   Resp 16   Ht 1.651 m (5' 5\")   Wt 55 kg (121 lb 3.2 oz)   SpO2 98%   BMI 20.17 kg/m    Body mass index is 20.17 kg/m .  Physical Exam   GENERAL: alert and no distress  EYES: Eyes grossly normal to inspection, PERRL and conjunctivae and sclerae normal  HENT: ear canals and TM's normal, nose and mouth without ulcers or lesions  NECK: no adenopathy, no asymmetry, masses, or scars  RESP: lungs clear to auscultation - no rales, rhonchi " or wheezes  CV: regular rate and rhythm, normal S1 S2, no S3 or S4, no murmur, click or rub, no peripheral edema  ABDOMEN: soft, nontender, no hepatosplenomegaly, no masses and bowel sounds normal  MS: no gross musculoskeletal defects noted, no edema  SKIN: no suspicious lesions. Few small eczematous patches on arm  NEURO: Normal strength and tone, mentation intact and speech normal  PSYCH: mentation appears normal, affect normal/bright        Assessment & Plan     Encounter to establish care  Health history and plan of care reviewed today.     Paroxysmal atrial fibrillation (H)  Stable overall. She does continue to follow with cardiology and has a visit next week. Blood pressure is slightly elevated today but she attributes this to being anxious about her visit today.     Sick sinus syndrome (H)  See above.     Heart failure with reduced ejection fraction (H)  See above     Stage 3 chronic kidney disease, unspecified whether stage 3a or 3b CKD (H)  Continue routine monitoring - labs up to date.     Insomnia, unspecified type  We had a long discussion regarding use of Ambien. She takes just 2.5 mg a few night per week. Risks and benefits discussed in detail today.   - zolpidem (AMBIEN) 5 MG tablet; Take 0.5 tablets (2.5 mg) by mouth nightly as needed for sleep.    Eczema, unspecified type  Patches on arms consistent with eczema. She is having relief with use of OTC products. She will update me if this changes/worsens.     The patient indicates understanding of these issues and agrees with the plan.    Signed Electronically by: Zhanna Ray PA-C

## 2025-06-24 ENCOUNTER — OFFICE VISIT (OUTPATIENT)
Dept: CARDIOLOGY | Facility: CLINIC | Age: 89
End: 2025-06-24
Payer: COMMERCIAL

## 2025-06-24 ENCOUNTER — TELEPHONE (OUTPATIENT)
Dept: CARDIOLOGY | Facility: CLINIC | Age: 89
End: 2025-06-24

## 2025-06-24 VITALS
WEIGHT: 121 LBS | BODY MASS INDEX: 20.16 KG/M2 | SYSTOLIC BLOOD PRESSURE: 148 MMHG | DIASTOLIC BLOOD PRESSURE: 84 MMHG | RESPIRATION RATE: 18 BRPM | OXYGEN SATURATION: 99 % | HEART RATE: 86 BPM | HEIGHT: 65 IN

## 2025-06-24 DIAGNOSIS — I48.19 PERSISTENT ATRIAL FIBRILLATION (H): ICD-10-CM

## 2025-06-24 DIAGNOSIS — I36.1 NONRHEUMATIC TRICUSPID VALVE REGURGITATION: ICD-10-CM

## 2025-06-24 DIAGNOSIS — Z95.0 PACEMAKER: ICD-10-CM

## 2025-06-24 DIAGNOSIS — I49.5 SICK SINUS SYNDROME (H): ICD-10-CM

## 2025-06-24 DIAGNOSIS — I10 ESSENTIAL (PRIMARY) HYPERTENSION: ICD-10-CM

## 2025-06-24 DIAGNOSIS — I31.39 PERICARDIAL EFFUSION: Primary | ICD-10-CM

## 2025-06-24 ASSESSMENT — PAIN SCALES - GENERAL: PAINLEVEL_OUTOF10: NO PAIN (0)

## 2025-06-24 NOTE — LETTER
6/24/2025    Zhanna Ray PA-C  [address] [place] [address]    RE: Audrey Ortega       Dear Colleague,     I had the pleasure of seeing Audrey Ortega in the [facility].              ~Cardiology Clinic Visit~    Audrey Ortega MRN# [id number]   YOB: 1935 Age: 89 year old   Primary Cardiologist: Previously followed with Dr. Jama           Assessment and Plan:   Audrey Ortega is a very pleasant 89 year old female who is here today for 3-month follow-up    Pericardial effusion without features of tamponade  Noted on echo 6/2025  Asymptomatic    Hypertension  Elevated during office visit but appearing well-controlled at home  History of several blood pressure medication intolerances including hypotension on beta-blockers, lower extremity edema from amlodipine, and angioedema from lisinopril  Becomes symptomatic if systolic readings become less than 120  On metoprolol tartrate 50 mg twice daily    Atrial fibrillation, persistent  Rate controlled with metoprolol tartrate 50 mg twice daily  Has declined anticoagulation and Watchman    Sick sinus syndrome  Status post PPM implantation in 2011 with generator change in 2023  Most recent device check noted atrial fibrillation with controlled ventricular rate    Valvular disease  Mild mitral regurgitation, and moderate-severe to severe tricuspid regurgitation on echo 6/2025      Plan:   - Reviewed most recent echocardiogram results with DOD. Will repeat echo in 3 days. Order placed   - Continue aspirin 325 mg daily, metoprolol tartrate 50 mg twice daily       Establish care with a new cardiologist this Fall       Juanita Quintana PA-C  Physician Assistant   [facility]  Pager: [phone]          History of Presenting Illness:    Audrey Ortega is a very pleasant 89 year old female with a history of     Persistent atrial fibrillation-declined anticoagulation  Sick sinus syndrome-status post permanent pacemaker implantation  in 2011.  Generator change 12/2023  Hypertension  Pericardial effusion  Valvular disease    In brief,  she has previously declined anticoagulation (unable to afford DOACs and warfarin caused bilateral shoulder pain). Previous device checks have been showing prolonged episodes of tachycardia thought to be atrial flutter or atrial tachycardia.     In 2023, Audrey was hospitalized at [facility] for chest pain.  She underwent a coronary CTA which did not show obstructive disease.  An echocardiogram demonstrated  Normal LV function with moderate to severe MR, and severe TR.  There was a small to moderate pericardial effusion. Follow up echocardiogram demonstrated normal LV function with moderate MR, moderate-severe TR and small circumferential pericardial effusion.       She underwent pacemaker generator change without complications in December 2023.    She was hospitalized in December 2024 for after a fall.  She underwent a right hip hemiarthroplasty.  Echocardiogram showed an LVEF of 60 to 65% and a small pericardial effusion.    In March 2025 she presented to the ER with nausea, vomiting, and diarrhea. She had hyponatremia, Dyazide was discontinued.    Patient was evaluated in the ER 5/20/2025 after a fall and received sutures to a left periorbital laceration.    Device check 6/9/2025 noted atrial fibrillation with controlled ventricular rates.    Follow up echocardiogram 6/17/2025 showed a large anterior and small posterior pericardial effusion there were no echocardiographic indications of cardiac tamponade.  LVEF was 50-55%.  Septal motion was consistent with right ventricular volume overload.  The right ventricle was severely dilated.  There was mild mitral regurgitation and moderate to severe to severe tricuspid regurgitation.    Patient finally feels like she has regained her strength after having hip surgery 6 months ago.  She has been able to mow her own lawn independently without any symptoms.  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  A few weeks ago before she had her most recent echo she noted she felt short of breath and had to take a break when she was mowing her lawn.  This has now resolved.  She does monitor her blood pressure at home and manages it with medication and with eating beets. She reports she passes out if her blood pressure becomes lower than 120 systolic.     Denies shortness of breath, orthopnea and PND. Denies chest pain, palpitations, lightheadedness, dizziness, near syncope and syncope.     Taking medications daily as prescribed.      Blood pressure 148/48 and HR 86 in clinic today. Blood pressure at home in the 130s systolic.         Social History       Social History     Socioeconomic History     Marital status: Single     Spouse name: Not on file     Number of children: Not on file     Years of education: Not on file     Highest education level: Not on file   Occupational History     Not on file   Tobacco Use     Smoking status: Never     Smokeless tobacco: Never   Vaping Use     Vaping status: Never Used   Substance and Sexual Activity     Alcohol use: Not Currently     Drug use: Never     Sexual activity: Not Currently     Partners: Male   Other Topics Concern     Parent/sibling w/ CABG, MI or angioplasty before 65F 55M? No   Social History Narrative     Not on file     Social Drivers of Health     Financial Resource Strain: Low Risk  (3/18/2025)    Financial Resource Strain      Within the past 12 months, have you or your family members you live with been unable to get utilities (heat, electricity) when it was really needed?: No   Food Insecurity: Low Risk  (3/18/2025)    Food Insecurity      Within the past 12 months, did you worry that your food would run out before you got money to buy more?: No      Within the past 12 months, did the food you bought just not last and you didn t have money to get more?: No   Transportation Needs: Low Risk  (3/18/2025)    Transportation  "Needs      Within the past 12 months, has lack of transportation kept you from medical appointments, getting your medicines, non-medical meetings or appointments, work, or from getting things that you need?: No   Physical Activity: Not on file   Stress: Stress Concern Present (10/29/2024)    [place] [employer] [employer] - Occupational Stress Questionnaire      Feeling of Stress : To some extent   Social Connections: Unknown (10/29/2024)    Social Connection and Isolation Panel [NHANES]      Frequency of Communication with Friends and Family: Not on file      Frequency of Social Gatherings with Friends and Family: Twice a week      Attends [employer] Services: Not on file      Active Member of Clubs or Organizations: Not on file      Attends Club or Organization Meetings: Not on file      Marital Status: Not on file   Interpersonal Safety: Low Risk  (3/18/2025)    Interpersonal Safety      Do you feel physically and emotionally safe where you currently live?: Yes      Within the past 12 months, have you been hit, slapped, kicked or otherwise physically hurt by someone?: No      Within the past 12 months, have you been humiliated or emotionally abused in other ways by your partner or ex-partner?: No   Housing Stability: Low Risk  (3/18/2025)    Housing Stability      Do you have housing? : Yes      Are you worried about losing your housing?: No            Review of Systems:   Please see HPI         Physical Exam:   Vitals: BP (!) 150/86 (BP Location: Right arm, Patient Position: Sitting, Cuff Size: Adult Regular)   Pulse 86   Resp 18   Ht 1.651 m (5' 5\")   Wt 54.9 kg (121 lb)   SpO2 99%   BMI 20.14 kg/m     Wt Readings from Last 4 Encounters:   06/24/25 54.9 kg (121 lb)   06/19/25 55 kg (121 lb 3.2 oz)   05/28/25 56.7 kg (125 lb)   05/20/25 54.4 kg (120 lb)     GEN: well nourished, in no acute distress.  NECK: Supple. JVP was not appreciated.  C/V:  Regular rate  RESP: Respirations are unlabored. " "Clear to auscultation bilaterally without wheezing, rales, or rhonchi.  EXTREM: Bilateral lower extremities with trace edema.   SKIN: Warm and dry.        Data:   LIPID RESULTS:  Lab Results   Component Value Date    CHOL 303 (H) 10/29/2024    HDL 84 10/29/2024     (H) 10/29/2024    TRIG 96 10/29/2024     LIVER ENZYME RESULTS:  Lab Results   Component Value Date    AST 30 03/18/2025    AST 17 05/05/2021    ALT 19 03/18/2025    ALT 22 05/05/2021     CBC RESULTS:  Lab Results   Component Value Date    WBC 9.0 03/19/2025    WBC 7.7 04/20/2020    RBC 4.96 03/19/2025    RBC 4.25 04/20/2020    HGB 15.0 03/19/2025    HGB 13.6 04/20/2020    HCT 44.0 03/19/2025    HCT 39.1 04/20/2020    MCV 89 03/19/2025    MCV 92 04/20/2020    MCH 30.2 03/19/2025    MCH 32.0 04/20/2020    MCHC 34.1 03/19/2025    MCHC 34.8 04/20/2020    RDW 13.2 03/19/2025    RDW 11.7 04/20/2020     03/19/2025     04/20/2020     BMP RESULTS:  Lab Results   Component Value Date     03/28/2025     (L) 05/05/2021    POTASSIUM 4.4 03/28/2025    POTASSIUM 4.9 09/15/2022    POTASSIUM 4.5 05/05/2021    CHLORIDE 104 03/28/2025    CHLORIDE 105 09/15/2022    CHLORIDE 97 05/05/2021    CO2 25 03/28/2025    CO2 26 09/15/2022    CO2 29 05/05/2021    ANIONGAP 10 03/28/2025    ANIONGAP 5 09/15/2022    ANIONGAP 6 05/05/2021    GLC 61 (L) 03/28/2025    GLC 99 12/16/2024    GLC 88 09/15/2022    GLC 96 05/05/2021    BUN 32.0 (H) 03/28/2025    BUN 27 09/15/2022    BUN 31 (H) 05/05/2021    CR 1.13 (H) 03/28/2025    CR 1.20 (H) 05/05/2021    GFRESTIMATED 46 (L) 03/28/2025    GFRESTIMATED 41 (L) 05/05/2021    GFRESTBLACK 48 (L) 05/05/2021    HO 9.3 03/28/2025    HO 9.4 05/05/2021      A1C RESULTS:  No results found for: \"A1C\"  INR RESULTS:  No results found for: \"INR\"         Medications     Current Outpatient Medications   Medication Sig Dispense Refill     Ascorbic Acid (VITAMIN C) 500 MG CAPS Take 500 mg by mouth daily       aspirin (ASA) 325 " MG EC tablet Take 325 mg by mouth daily.       calcium 600 MG tablet Take 1 tablet by mouth daily       COMPRESSION STOCKINGS 1 each daily 1 each 1     metoprolol tartrate (LOPRESSOR) 50 MG tablet Take 1 tablet (50 mg) by mouth 2 times daily. 180 tablet 3     multivitamin w/minerals (THERA-VIT-M) tablet Take 1 tablet by mouth daily       polyethylene glycol-propylene glycol (SYSTANE ULTRA) 0.4-0.3 % SOLN ophthalmic solution Place 1 drop into both eyes 2 times daily.       Vitamin D, Cholecalciferol, 25 MCG (1000 UT) TABS Take 1,000 Units by mouth daily       zolpidem (AMBIEN) 5 MG tablet Take 0.5 tablets (2.5 mg) by mouth nightly as needed for sleep. 30 tablet 1          Past Medical History     Past Medical History:   Diagnosis Date     Nausea and vomiting 3/18/2025     Past Surgical History:   Procedure Laterality Date     EP PACEMAKER GENERATOR REPLACEMENT- DUAL N/A 12/29/2023    Procedure: Pacemaker Generator Replacement Dual;  Surgeon: Sae Peguero MD;  Location:  [facility]     OPEN REDUCTION INTERNAL FIXATION HIP BIPOLAR Right 12/16/2024    Procedure: HEMIARTHROPLASTY, right HIP, BIPOLAR;  Surgeon: Adama Begum MD;  Location: [facility] OR     PHACOEMULSIFICATION WITH STANDARD INTRAOCULAR LENS IMPLANT Right 7/16/2020    Procedure: Cataract surgery with intraocular lens implant;  Surgeon: Fortino Ford MD;  Location: [facility]     PHACOEMULSIFICATION WITH STANDARD INTRAOCULAR LENS IMPLANT Left 7/30/2020    Procedure: Cataract Surgery Left Eye;  Surgeon: Max Cooley MD;  Location: [facility] OR     No family history on file.         Allergies   Lisinopril, Ciprofloxacin, Macrobid [nitrofurantoin], Dyazide [hydrochlorothiazide-triamterene], and Tylenol [acetaminophen]      This note was completed in part using dictation via the Dragon voice recognition software. Some word and grammatical errors may occur and must be interpreted in the appropriate clinical context.  If there are any questions  pertaining to this issue, please contact me for further clarification.    Thank you for allowing me to participate in the care of your patient.      Sincerely,     Juanita Quintana PA-C     [facility]  cc:   Krihs Bates MD  [address] [place] [phone]

## 2025-06-24 NOTE — PATIENT INSTRUCTIONS
Thank you for your visit with the [facility] today.    Today's plan:   Medication changes: none   I will have nursing staff call you this afternoon to let you know if you need an echo in a week   Follow up with a new cardiologist this Fall     If you have questions or concerns please call the nurse team at [phone] or send a LoveIt message.     Scheduling phone number: [phone]    On Fridays call: [phone] as the [facility] office is closed     It was a pleasure seeing you today!     Juanita Quintana PA-C   Physician Assistant   [facility]

## 2025-06-24 NOTE — TELEPHONE ENCOUNTER
Unable to reach patient. Left v/m with call back number [phone]. Patient could be worked in for limited echocardiogram here in [place] on Friday 6/27/25 at 1pm.     ----- Message from Juanita Quintana sent at 6/24/2025 10:14 AM CDT -----  Please call patient and let her know after reviewing echocardiogram with DOD, recommend repeat limited echocardiogram this Friday.  Order has been placed.  Thanks

## 2025-06-24 NOTE — TELEPHONE ENCOUNTER
Tried to reach patient again to try and schedule echocardiogram limited this coming Friday at 1pm. Left another v/m with call back number [phone].

## 2025-06-24 NOTE — PROGRESS NOTES
~Cardiology Clinic Visit~    Audrey Ortega MRN# [id number]   YOB: 1935 Age: 89 year old   Primary Cardiologist: Previously followed with Dr. Jama           Assessment and Plan:   Audrey Ortega is a very pleasant 89 year old female who is here today for 3-month follow-up    Pericardial effusion without features of tamponade  Noted on echo 6/2025  Asymptomatic    Hypertension  Elevated during office visit but appearing well-controlled at home  History of several blood pressure medication intolerances including hypotension on beta-blockers, lower extremity edema from amlodipine, and angioedema from lisinopril  Becomes symptomatic if systolic readings become less than 120  On metoprolol tartrate 50 mg twice daily    Atrial fibrillation, persistent  Rate controlled with metoprolol tartrate 50 mg twice daily  Has declined anticoagulation and Watchman    Sick sinus syndrome  Status post PPM implantation in 2011 with generator change in 2023  Most recent device check noted atrial fibrillation with controlled ventricular rate    Valvular disease  Mild mitral regurgitation, and moderate-severe to severe tricuspid regurgitation on echo 6/2025      Plan:   - Reviewed most recent echocardiogram results with DOD. Will repeat echo in 3 days. Order placed   - Continue aspirin 325 mg daily, metoprolol tartrate 50 mg twice daily       Establish care with a new cardiologist this Fall       Juanita Quintana PA-C  Physician Assistant   [facility]  Pager: [phone]          History of Presenting Illness:    Audrey Ortega is a very pleasant 89 year old female with a history of     Persistent atrial fibrillation-declined anticoagulation  Sick sinus syndrome-status post permanent pacemaker implantation in 2011.  Generator change 12/2023  Hypertension  Pericardial effusion  Valvular disease    In brief,  she has previously declined anticoagulation (unable to afford DOACs and warfarin caused  bilateral shoulder pain). Previous device checks have been showing prolonged episodes of tachycardia thought to be atrial flutter or atrial tachycardia.     In 2023, Audrey was hospitalized at [facility] for chest pain.  She underwent a coronary CTA which did not show obstructive disease.  An echocardiogram demonstrated  Normal LV function with moderate to severe MR, and severe TR.  There was a small to moderate pericardial effusion. Follow up echocardiogram demonstrated normal LV function with moderate MR, moderate-severe TR and small circumferential pericardial effusion.       She underwent pacemaker generator change without complications in December 2023.    She was hospitalized in December 2024 for after a fall.  She underwent a right hip hemiarthroplasty.  Echocardiogram showed an LVEF of 60 to 65% and a small pericardial effusion.    In March 2025 she presented to the ER with nausea, vomiting, and diarrhea. She had hyponatremia, Dyazide was discontinued.    Patient was evaluated in the ER 5/20/2025 after a fall and received sutures to a left periorbital laceration.    Device check 6/9/2025 noted atrial fibrillation with controlled ventricular rates.    Follow up echocardiogram 6/17/2025 showed a large anterior and small posterior pericardial effusion there were no echocardiographic indications of cardiac tamponade.  LVEF was 50-55%.  Septal motion was consistent with right ventricular volume overload.  The right ventricle was severely dilated.  There was mild mitral regurgitation and moderate to severe to severe tricuspid regurgitation.    Patient finally feels like she has regained her strength after having hip surgery 6 months ago.  She has been able to mow her own lawn independently without any symptoms.  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  A few weeks ago before she had her most recent echo she noted she felt short of breath and had to take a break when she was mowing her lawn.   This has now resolved.  She does monitor her blood pressure at home and manages it with medication and with eating beets. She reports she passes out if her blood pressure becomes lower than 120 systolic.     Denies shortness of breath, orthopnea and PND. Denies chest pain, palpitations, lightheadedness, dizziness, near syncope and syncope.     Taking medications daily as prescribed.      Blood pressure 148/48 and HR 86 in clinic today. Blood pressure at home in the 130s systolic.         Social History       Social History     Socioeconomic History    Marital status: Single     Spouse name: Not on file    Number of children: Not on file    Years of education: Not on file    Highest education level: Not on file   Occupational History    Not on file   Tobacco Use    Smoking status: Never    Smokeless tobacco: Never   Vaping Use    Vaping status: Never Used   Substance and Sexual Activity    Alcohol use: Not Currently    Drug use: Never    Sexual activity: Not Currently     Partners: Male   Other Topics Concern    Parent/sibling w/ CABG, MI or angioplasty before 65F 55M? No   Social History Narrative    Not on file     Social Drivers of Health     Financial Resource Strain: Low Risk  (3/18/2025)    Financial Resource Strain     Within the past 12 months, have you or your family members you live with been unable to get utilities (heat, electricity) when it was really needed?: No   Food Insecurity: Low Risk  (3/18/2025)    Food Insecurity     Within the past 12 months, did you worry that your food would run out before you got money to buy more?: No     Within the past 12 months, did the food you bought just not last and you didn t have money to get more?: No   Transportation Needs: Low Risk  (3/18/2025)    Transportation Needs     Within the past 12 months, has lack of transportation kept you from medical appointments, getting your medicines, non-medical meetings or appointments, work, or from getting things that you  "need?: No   Physical Activity: Not on file   Stress: Stress Concern Present (10/29/2024)    [place] [employer] - Occupational Stress Questionnaire     Feeling of Stress : To some extent   Social Connections: Unknown (10/29/2024)    Social Connection and Isolation Panel [NHANES]     Frequency of Communication with Friends and Family: Not on file     Frequency of Social Gatherings with Friends and Family: Twice a week     Attends Orthodoxy Services: Not on file     Active Member of Clubs or Organizations: Not on file     Attends Club or Organization Meetings: Not on file     Marital Status: Not on file   Interpersonal Safety: Low Risk  (3/18/2025)    Interpersonal Safety     Do you feel physically and emotionally safe where you currently live?: Yes     Within the past 12 months, have you been hit, slapped, kicked or otherwise physically hurt by someone?: No     Within the past 12 months, have you been humiliated or emotionally abused in other ways by your partner or ex-partner?: No   Housing Stability: Low Risk  (3/18/2025)    Housing Stability     Do you have housing? : Yes     Are you worried about losing your housing?: No            Review of Systems:   Please see HPI         Physical Exam:   Vitals: BP (!) 150/86 (BP Location: Right arm, Patient Position: Sitting, Cuff Size: Adult Regular)   Pulse 86   Resp 18   Ht 1.651 m (5' 5\")   Wt 54.9 kg (121 lb)   SpO2 99%   BMI 20.14 kg/m     Wt Readings from Last 4 Encounters:   06/24/25 54.9 kg (121 lb)   06/19/25 55 kg (121 lb 3.2 oz)   05/28/25 56.7 kg (125 lb)   05/20/25 54.4 kg (120 lb)     GEN: well nourished, in no acute distress.  NECK: Supple. JVP was not appreciated.  C/V:  Regular rate  RESP: Respirations are unlabored. Clear to auscultation bilaterally without wheezing, rales, or rhonchi.  EXTREM: Bilateral lower extremities with trace edema.   SKIN: Warm and dry.        Data:   LIPID RESULTS:  Lab Results   Component Value Date    " "CHOL 303 (H) 10/29/2024    HDL 84 10/29/2024     (H) 10/29/2024    TRIG 96 10/29/2024     LIVER ENZYME RESULTS:  Lab Results   Component Value Date    AST 30 03/18/2025    AST 17 05/05/2021    ALT 19 03/18/2025    ALT 22 05/05/2021     CBC RESULTS:  Lab Results   Component Value Date    WBC 9.0 03/19/2025    WBC 7.7 04/20/2020    RBC 4.96 03/19/2025    RBC 4.25 04/20/2020    HGB 15.0 03/19/2025    HGB 13.6 04/20/2020    HCT 44.0 03/19/2025    HCT 39.1 04/20/2020    MCV 89 03/19/2025    MCV 92 04/20/2020    MCH 30.2 03/19/2025    MCH 32.0 04/20/2020    MCHC 34.1 03/19/2025    MCHC 34.8 04/20/2020    RDW 13.2 03/19/2025    RDW 11.7 04/20/2020     03/19/2025     04/20/2020     BMP RESULTS:  Lab Results   Component Value Date     03/28/2025     (L) 05/05/2021    POTASSIUM 4.4 03/28/2025    POTASSIUM 4.9 09/15/2022    POTASSIUM 4.5 05/05/2021    CHLORIDE 104 03/28/2025    CHLORIDE 105 09/15/2022    CHLORIDE 97 05/05/2021    CO2 25 03/28/2025    CO2 26 09/15/2022    CO2 29 05/05/2021    ANIONGAP 10 03/28/2025    ANIONGAP 5 09/15/2022    ANIONGAP 6 05/05/2021    GLC 61 (L) 03/28/2025    GLC 99 12/16/2024    GLC 88 09/15/2022    GLC 96 05/05/2021    BUN 32.0 (H) 03/28/2025    BUN 27 09/15/2022    BUN 31 (H) 05/05/2021    CR 1.13 (H) 03/28/2025    CR 1.20 (H) 05/05/2021    GFRESTIMATED 46 (L) 03/28/2025    GFRESTIMATED 41 (L) 05/05/2021    GFRESTBLACK 48 (L) 05/05/2021    HO 9.3 03/28/2025    HO 9.4 05/05/2021      A1C RESULTS:  No results found for: \"A1C\"  INR RESULTS:  No results found for: \"INR\"         Medications     Current Outpatient Medications   Medication Sig Dispense Refill    Ascorbic Acid (VITAMIN C) 500 MG CAPS Take 500 mg by mouth daily      aspirin (ASA) 325 MG EC tablet Take 325 mg by mouth daily.      calcium 600 MG tablet Take 1 tablet by mouth daily      COMPRESSION STOCKINGS 1 each daily 1 each 1    metoprolol tartrate (LOPRESSOR) 50 MG tablet Take 1 tablet (50 mg) by " mouth 2 times daily. 180 tablet 3    multivitamin w/minerals (THERA-VIT-M) tablet Take 1 tablet by mouth daily      polyethylene glycol-propylene glycol (SYSTANE ULTRA) 0.4-0.3 % SOLN ophthalmic solution Place 1 drop into both eyes 2 times daily.      Vitamin D, Cholecalciferol, 25 MCG (1000 UT) TABS Take 1,000 Units by mouth daily      zolpidem (AMBIEN) 5 MG tablet Take 0.5 tablets (2.5 mg) by mouth nightly as needed for sleep. 30 tablet 1          Past Medical History     Past Medical History:   Diagnosis Date    Nausea and vomiting 3/18/2025     Past Surgical History:   Procedure Laterality Date    EP PACEMAKER GENERATOR REPLACEMENT- DUAL N/A 12/29/2023    Procedure: Pacemaker Generator Replacement Dual;  Surgeon: Sae Peguero MD;  Location:  [facility]    OPEN REDUCTION INTERNAL FIXATION HIP BIPOLAR Right 12/16/2024    Procedure: HEMIARTHROPLASTY, right HIP, BIPOLAR;  Surgeon: Adama Begum MD;  Location: [facility]    PHACOEMULSIFICATION WITH STANDARD INTRAOCULAR LENS IMPLANT Right 7/16/2020    Procedure: Cataract surgery with intraocular lens implant;  Surgeon: Fortino Ford MD;  Location: [facility] OR    PHACOEMULSIFICATION WITH STANDARD INTRAOCULAR LENS IMPLANT Left 7/30/2020    Procedure: Cataract Surgery Left Eye;  Surgeon: Max Cooley MD;  Location: [facility] OR     No family history on file.         Allergies   Lisinopril, Ciprofloxacin, Macrobid [nitrofurantoin], Dyazide [hydrochlorothiazide-triamterene], and Tylenol [acetaminophen]      This note was completed in part using dictation via the Dragon voice recognition software. Some word and grammatical errors may occur and must be interpreted in the appropriate clinical context.  If there are any questions pertaining to this issue, please contact me for further clarification.

## 2025-06-27 ENCOUNTER — HOSPITAL ENCOUNTER (OUTPATIENT)
Dept: CARDIOLOGY | Facility: CLINIC | Age: 89
Discharge: HOME | End: 2025-06-27
Payer: MEDICARE

## 2025-06-27 DIAGNOSIS — I31.39 PERICARDIAL EFFUSION: ICD-10-CM

## 2025-06-27 LAB — LVEF ECHO: NORMAL

## 2025-06-27 PROCEDURE — 93308 TTE F-UP OR LMTD: CPT | Mod: 26 | Performed by: INTERNAL MEDICINE

## 2025-06-27 PROCEDURE — 93325 DOPPLER ECHO COLOR FLOW MAPG: CPT | Mod: 26 | Performed by: INTERNAL MEDICINE

## 2025-06-27 PROCEDURE — 93325 DOPPLER ECHO COLOR FLOW MAPG: CPT

## 2025-06-27 PROCEDURE — 93321 DOPPLER ECHO F-UP/LMTD STD: CPT | Mod: 26 | Performed by: INTERNAL MEDICINE

## 2025-07-01 ENCOUNTER — RESULTS FOLLOW-UP (OUTPATIENT)
Dept: CARDIOLOGY | Facility: CLINIC | Age: 89
End: 2025-07-01
Payer: COMMERCIAL

## (undated) DEVICE — SUCTION SLEEVE NEPTUNE 2 125MM 0703-005-125

## (undated) DEVICE — DRAPE POUCH IRR 1016

## (undated) DEVICE — SU STRATAFIX PDS PLUS 0 CT-2 9" SXPP1A446

## (undated) DEVICE — SOL NACL 0.9% IRRIG 1000ML BOTTLE 07138-09

## (undated) DEVICE — NDL ECLIPSE 18GA 1.5"

## (undated) DEVICE — DEFIB PRO-PADZ LVP LQD GEL ADULT 8900-2105-01

## (undated) DEVICE — DRAPE HIP LEG POCKETS CLR 29429

## (undated) DEVICE — DRSG AQUACEL AG 3.5X9.75" HYDROFIBER 412011

## (undated) DEVICE — DRAPE IOBAN INCISE 36X23" 6651EZ

## (undated) DEVICE — SU MONOCRYL 3-0 PS-2 27" Y427H

## (undated) DEVICE — ESU BIPOLAR SEALER AQUAMANTYS 6MM 23-112-1

## (undated) DEVICE — GLOVE PROTEGRITY 7.5 LATEX

## (undated) DEVICE — DRAPE POUCH INSTRUMENT 1018

## (undated) DEVICE — BONE CLEANING TIP INTERPULSE  0210-010-000

## (undated) DEVICE — PACK PCMKR PERM SRG PROC LF SAN32PC573

## (undated) DEVICE — SU VICRYL 0 CT-2 27" UND J270H

## (undated) DEVICE — DRAPE SHEET REV FOLD 3/4 9349

## (undated) DEVICE — DRAPE C-ARM

## (undated) DEVICE — BLADE SAW SAGITTAL STRK 18X90X1.27MM HD SYS 6 6118-127-090

## (undated) DEVICE — IMM PILLOW ABDUCT HIP MED 0814-8033

## (undated) DEVICE — DRAPE STERI TOWEL SM 1000

## (undated) DEVICE — SOL WATER IRRIG 1000ML BOTTLE 07139-09

## (undated) DEVICE — ESU PENCIL SMOKE EVAC W/ROCKER SWITCH 0703-047-000

## (undated) DEVICE — SUCTION IRR SYSTEM W/O TIP INTERPULSE HANDPIECE 0210-100-000

## (undated) DEVICE — DRAPE STERI U 1015

## (undated) DEVICE — GLOVE BIOGEL PI SZ 8.0 40880

## (undated) DEVICE — ESU GROUND PAD UNIVERSAL W/O CORD

## (undated) DEVICE — SU MONOCRYL 3-0 PS-2 18" UND Y497G

## (undated) DEVICE — PACK TOTAL JOINT STD LATEX

## (undated) DEVICE — SU ETHIBOND 0 CTX CR  8X18" CX31D

## (undated) DEVICE — ESU ELEC BLADE 6" COATED E1450-6

## (undated) DEVICE — SU DERMABOND ADVANCED .7ML DNX12

## (undated) DEVICE — GLOVE BIOGEL PI ULTRATOUCH G SZ 8.5 42185

## (undated) DEVICE — GLOVE BIOGEL PI INDICATOR 8.0 LF 41680

## (undated) DEVICE — TAPE MEDIPORE 4"X10YD 2964

## (undated) RX ORDER — CEFAZOLIN SODIUM 1 G/3ML
INJECTION, POWDER, FOR SOLUTION INTRAMUSCULAR; INTRAVENOUS
Status: DISPENSED
Start: 2024-12-16

## (undated) RX ORDER — FENTANYL CITRATE 50 UG/ML
INJECTION, SOLUTION INTRAMUSCULAR; INTRAVENOUS
Status: DISPENSED
Start: 2023-12-29

## (undated) RX ORDER — FENTANYL CITRATE 50 UG/ML
INJECTION, SOLUTION INTRAMUSCULAR; INTRAVENOUS
Status: DISPENSED
Start: 2020-07-16

## (undated) RX ORDER — LIDOCAINE HYDROCHLORIDE 10 MG/ML
INJECTION, SOLUTION EPIDURAL; INFILTRATION; INTRACAUDAL; PERINEURAL
Status: DISPENSED
Start: 2024-12-16

## (undated) RX ORDER — BUPIVACAINE HYDROCHLORIDE 2.5 MG/ML
INJECTION, SOLUTION EPIDURAL; INFILTRATION; INTRACAUDAL
Status: DISPENSED
Start: 2023-12-29

## (undated) RX ORDER — LIDOCAINE HYDROCHLORIDE 10 MG/ML
INJECTION, SOLUTION EPIDURAL; INFILTRATION; INTRACAUDAL; PERINEURAL
Status: DISPENSED
Start: 2023-12-29

## (undated) RX ORDER — FENTANYL CITRATE-0.9 % NACL/PF 10 MCG/ML
PLASTIC BAG, INJECTION (ML) INTRAVENOUS
Status: DISPENSED
Start: 2024-12-16

## (undated) RX ORDER — VANCOMYCIN HYDROCHLORIDE 1 G/20ML
INJECTION, POWDER, LYOPHILIZED, FOR SOLUTION INTRAVENOUS
Status: DISPENSED
Start: 2024-12-16

## (undated) RX ORDER — FENTANYL CITRATE 50 UG/ML
INJECTION, SOLUTION INTRAMUSCULAR; INTRAVENOUS
Status: DISPENSED
Start: 2024-12-16

## (undated) RX ORDER — FENTANYL CITRATE 50 UG/ML
INJECTION, SOLUTION INTRAMUSCULAR; INTRAVENOUS
Status: DISPENSED
Start: 2020-07-30

## (undated) RX ORDER — CEFAZOLIN SODIUM 2 G/100ML
INJECTION, SOLUTION INTRAVENOUS
Status: DISPENSED
Start: 2023-12-29